# Patient Record
Sex: FEMALE | Race: ASIAN | Employment: UNEMPLOYED | URBAN - METROPOLITAN AREA
[De-identification: names, ages, dates, MRNs, and addresses within clinical notes are randomized per-mention and may not be internally consistent; named-entity substitution may affect disease eponyms.]

---

## 2017-01-10 ENCOUNTER — OFFICE VISIT (OUTPATIENT)
Dept: INTERNAL MEDICINE | Facility: CLINIC | Age: 62
End: 2017-01-10

## 2017-01-10 VITALS
HEART RATE: 80 BPM | BODY MASS INDEX: 40.96 KG/M2 | WEIGHT: 224 LBS | DIASTOLIC BLOOD PRESSURE: 80 MMHG | SYSTOLIC BLOOD PRESSURE: 118 MMHG

## 2017-01-10 DIAGNOSIS — Z12.31 VISIT FOR SCREENING MAMMOGRAM: Primary | ICD-10-CM

## 2017-01-10 DIAGNOSIS — I10 ESSENTIAL HYPERTENSION, BENIGN: ICD-10-CM

## 2017-01-10 DIAGNOSIS — Z79.52 LONG TERM CURRENT USE OF SYSTEMIC STEROIDS: ICD-10-CM

## 2017-01-10 DIAGNOSIS — Z94.0 S/P KIDNEY TRANSPLANT: ICD-10-CM

## 2017-01-10 PROBLEM — N18.4 CKD (CHRONIC KIDNEY DISEASE) STAGE 4, GFR 15-29 ML/MIN (H): Status: ACTIVE | Noted: 2017-01-10

## 2017-01-10 ASSESSMENT — PAIN SCALES - GENERAL: PAINLEVEL: NO PAIN (0)

## 2017-01-10 NOTE — MR AVS SNAPSHOT
After Visit Summary   1/10/2017    Eleni Logan    MRN: [id number]           Patient Information     Date Of Birth          1955        Visit Information        Provider Department      1/10/2017 8:20 AM Rocio Saul MD [facility]        Today's Diagnoses     Visit for screening mammogram    -  1     Essential hypertension, benign         S/P kidney transplant         Long term current use of systemic steroids            Care Instructions    Primary Care Center Medication Refill Request Information:  * Please contact your pharmacy regarding ANY request for medication refills.  ** [facility] Prescription Fax = [phone]  * Please allow 3 business days for routine medication refills.  * Please allow 5 business days for controlled substance medication refills.     Primary Care Center Test Result notification information:  *You will be notified with in 7-10 days of your appointment day regarding the results of your test.  If you are on MyChart you will be notified as soon as the provider has reviewed the results and signed off on them.    DEXA Screening Tool    Dexa Scan  Is the patient taking any calcium supplements? no, if yes patient must stop calcium supplements 24 hours prior to appointment.    Mammogram Screening Tool    Mammogram   Does patient have a history of breast cancer? no  Does patient have breast implants? no  Reason for mammogram? Routine          Follow-ups after your visit        Your next 10 appointments already scheduled     Jan 13, 2017 11:00 AM   DX HIP/PELVIS/SPINE with UCDX1   [facility] ([facility])    [address] [place] [id number] [phone]           Please do not take any of the following 48 hours prior to your exam: vitamins, calcium tablets, antacids.            Jan 13, 2017 11:30 AM   (Arrive by 11:15 AM)   MA SCREENING DIGITAL BILATERAL with [id number]   [facility] ([facility])    [address] [place] [address] [phone]           Do not use any powder, lotion or deodorant under your arms or on your breast. If you do, we will ask you to remove it before your exam.  Wear comfortable, two-piece clothing.  If you have any allergies, tell your care team.  Bring any previous mammograms from other facilities or have them mailed to the breast center. This mammogram location, [facility], now offers 3D mammography. It doesn't replace a screening mammogram and can be done with a regular screening mammogram. It is optional and not all insurances will pay for it. 3D mammography is a special kind of mammogram that produces a three-dimensional image of the breast by using low dose-xrays. 3D allows the radiologist to see the breast tissue differently from 2D, which reduces the chance of repeat testing due to overlapping breast tissue. If you are interested in have a 3D mammogram, please check with your insurance before you arrive for your exam. 3D mammography is offered to all patients. On the day of your exam you will be asked to sign a form stating yes, you wish to have 3D imaging or, no, you decline.            Jan 30, 2017  1:30 PM   LAB with  LAB   [facility] ([facility])    [address] [place] [address] [phone]           Patient must bring picture ID.  Patient should be prepared to give a urine specimen  Please do not eat 10-12 hours before your appointment if you are coming in fasting for labs on lipids, cholesterol, or glucose (sugar).  Pregnant women should follow their Care Team instructions. Water with medications is okay. Do not drink coffee or other fluids.   If you have concerns about taking  your medications, please ask at office or if scheduling via [employer], send a message by clicking on Secure Messaging,  Message Your Care Team.            Jan 30, 2017  2:20 PM   (Arrive by 1:50 PM)   Return Kidney Transplant with Sakina Huntley MD   [facility] ([facility])    [address] [place] [id number] [phone]            Mar 28, 2017  2:00 PM   (Arrive by 1:30 PM)   Return Kidney Transplant with Tyshawn Sexton MD   [facility] ([facility])    [address] [place] [id number] [phone]            Jun 14, 2017  2:00 PM   (Arrive by 1:45 PM)   Return Visit with Kayden Garcia MD   [facility] and Infectious Diseases ([facility])    [address] [place] [id number] [phone]              Future tests that were ordered for you today     Open Future Orders        Priority Expected Expires Ordered    Dexa hip/pelvis/spine* Routine  1/10/2018 1/10/2017    MA SCREENING DIGITAL BILAT - Future  (s+30) Routine  1/10/2018 1/10/2017            Who to contact     Please call your clinic at [phone] to:    Ask questions about your health    Make or cancel appointments    Discuss your medicines    Learn about your test results    Speak to your doctor   If you have compliments or concerns about an experience at your clinic, or if you wish to file a complaint, please contact [facility] Patient Relations at [phone] or email us at [email or web address]         Additional Information About Your Visit        [employer] Information     [employer] is an electronic gateway that provides easy, online access to your medical records. With [employer], you can request a clinic appointment, read your test results, renew a prescription or communicate with your care team.     To sign up for [employer] visit the website at [email or web address]   You will be asked to enter the access code listed below, as well as some  personal information. Please follow the directions to create your username and password.     Your access code is: [email or web address]  Expires: 3/7/2017  6:31 AM     Your access code will  in 90 days. If you need help or a new code, please contact your [facility] or call [phone] for assistance.        Care EveryWhere ID     This is your Care EveryWhere ID. This could be used by other organizations to access your [place] medical records  [id number]        Your Vitals Were     Pulse                   80            Blood Pressure from Last 3 Encounters:   01/10/17 118/80   16 158/100   10/02/16 156/73    Weight from Last 3 Encounters:   01/10/17 101.606 kg (224 lb)   16 101.197 kg (223 lb 1.6 oz)   10/01/16 98.9 kg (218 lb 0.6 oz)                 Today's Medication Changes          These changes are accurate as of: 1/10/17  8:50 AM.  If you have any questions, ask your nurse or doctor.               These medicines have changed or have updated prescriptions.        Dose/Directions    mycophenolate capsule   This may have changed:  additional instructions   Used for:  Renal transplant recipient, Long-term use of immunosuppressant medication, EBV (Abiel-Barr virus) viremia, Chronic kidney disease, stage IV (severe) (H)        Dose:  250 mg   Take 1 capsule (250 mg) by mouth 2 times daily   Quantity:  60 capsule   Refills:  6         Stop taking these medicines if you haven't already. Please contact your care team if you have questions.     BIOTENE ORALBALANCE DRY MOUTH Gel   Stopped by:  Rocio Saul MD           cefpodoxime 100 MG tablet   Commonly known as:  VANTIN   Stopped by:  Rocio Saul MD           cevimeline 30 MG capsule   Commonly known as:  EVOXAC   Stopped by:  Rocio Saul MD           pilocarpine 5 MG tablet   Commonly known as:  SALAGEN   Stopped by:  Rocio Saul MD           zolpidem 5 MG tablet   Commonly known as:   BARB   Stopped by:  Rocio Saul MD                    Primary Care Provider Office Phone # Fax #    Rocio Saul [phone]       [facility] [address] [facility] [address]        Thank you!     Thank you for choosing [facility]  for your care. Our goal is always to provide you with excellent care. Hearing back from our patients is one way we can continue to improve our services. Please take a few minutes to complete the written survey that you may receive in the mail after your visit with us. Thank you!             Your Updated Medication List - Protect others around you: Learn how to safely use, store and throw away your medicines at www.disposemymeds.org.          This list is accurate as of: 1/10/17  8:50 AM.  Always use your most recent med list.                   Brand Name Dispense Instructions for use    albuterol 108 (90 BASE) MCG/ACT Inhaler    albuterol    1 Inhaler    Inhale 2 puffs into the lungs every 4 hours as needed for shortness of breath / dyspnea       allopurinol 100 MG tablet    ZYLOPRIM    90 tablet    TAKE ONE TABLET BY MOUTH EVERY DAY       atorvastatin 20 MG tablet    LIPITOR    90 tablet    Take 1 tablet (20 mg) by mouth daily       darbepoetin bucky 300 MCG/0.6ML injection    ARANESP (ALBUMIN FREE)    0.6 mL    Inject 0.6 mLs (300 mcg) Subcutaneous every 28 days As needed for hgb<10g/dL       ferrous sulfate 325 (65 FE) MG tablet    IRON    200 tablet    Take 1 tablet (325 mg) by mouth daily       folic acid 1 MG tablet    FOLVITE    90 tablet    Take 1 tablet (1 mg) by mouth daily       hydrALAZINE 100 MG Tabs tablet    APRESOLINE    270 tablet    Take 1 tablet (100 mg) by mouth 3 times daily       hydrocortisone 1 % cream    CORTAID    60 g    Apply topically 2 times daily       mycophenolate capsule     60 capsule    Take 1 capsule (250 mg) by mouth 2 times daily       pantoprazole 40 MG EC tablet    PROTONIX     30 tablet    Take 1 tablet (40 mg) by mouth every morning       predniSONE 5 MG tablet    DELTASONE    30 tablet    Take 1 tablet (5 mg) by mouth daily       propranolol HCl 60 MG Tabs     180 tablet    Take 1 tablet (60 mg) by mouth 2 times daily       sodium bicarbonate 650 MG tablet     270 tablet    Take 1 tablet (650 mg) by mouth 2 times daily       triamcinolone 0.1 % ointment    KENALOG    80 g    Apply to affected area of the buttock and belly button twice a day as needed when rash is present. Do not use for longer than 4 weeks

## 2017-01-10 NOTE — PATIENT INSTRUCTIONS
Primary Care Center Medication Refill Request Information:  * Please contact your pharmacy regarding ANY request for medication refills.  ** [facility] Prescription Fax = [phone]  * Please allow 3 business days for routine medication refills.  * Please allow 5 business days for controlled substance medication refills.     Primary Care Center Test Result notification information:  *You will be notified with in 7-10 days of your appointment day regarding the results of your test.  If you are on MyChart you will be notified as soon as the provider has reviewed the results and signed off on them.    DEXA Screening Tool    Dexa Scan  Is the patient taking any calcium supplements? no, if yes patient must stop calcium supplements 24 hours prior to appointment.    Mammogram Screening Tool    Mammogram   Does patient have a history of breast cancer? no  Does patient have breast implants? no  Reason for mammogram? Routine

## 2017-01-10 NOTE — PROGRESS NOTES
HPI  Patient is here for follow up. She reports that she has been taking antibiotics for the bladder infection, completed the course as directed. She states that she currently has no symptoms consistent with an infection. She denies new problems. She has been taking all of her medications as prescribed.     Review of Systems     Constitutional:  Positive for fatigue. Negative for fever and chills.   Eyes:  Negative for decreased vision.   Respiratory:   Negative for cough.    Cardiovascular:  Negative for chest pain.   Gastrointestinal:  Negative for abdominal pain, diarrhea and constipation.   Skin:  Negative for itching and rash.   Endo/Heme:  Negative for anemia, swollen glands and bruises/bleeds easily.   Psychiatric/Behavioral:  Negative for depression, decreased concentration, mood swings and panic attacks.    Endocrine:  Negative for altered temperature regulation, polyphagia, polydipsia, unwanted hair growth and change in facial hair.    Current Outpatient Prescriptions   Medication     pantoprazole (PROTONIX) 40 MG EC tablet     atorvastatin (LIPITOR) 20 MG tablet     darbepoetin bucky (ARANESP, ALBUMIN FREE,) 300 MCG/0.6ML injection     predniSONE (DELTASONE) 5 MG tablet     triamcinolone (KENALOG) 0.1 % ointment     ferrous sulfate (IRON) 325 (65 FE) MG tablet     hydrALAZINE (APRESOLINE) 100 MG TABS     propranolol HCl 60 MG TABS     hydrocortisone (CORTAID) 1 % cream     CELLCEPT 250 MG PO CAPSULE     allopurinol (ZYLOPRIM) 100 MG tablet     folic acid (FOLVITE) 1 MG tablet     albuterol (ALBUTEROL) 108 (90 BASE) MCG/ACT inhaler     sodium bicarbonate 650 MG tablet     No current facility-administered medications for this visit.     Filed Vitals:    01/10/17 0746   BP: 118/80   Pulse: 80   Weight: 101.606 kg (224 lb)       Physical Exam   Constitutional: She is oriented to person, place, and time and well-developed, well-nourished, and in no distress.   HENT:   Head: Normocephalic and atraumatic.   Eyes:  Pupils are equal, round, and reactive to light.   Neck: Normal range of motion.   Cardiovascular: Normal rate and regular rhythm.    Pulmonary/Chest: Effort normal and breath sounds normal.   Neurological: She is alert and oriented to person, place, and time.   Skin: Skin is warm and dry.   Psychiatric: Mood, memory, affect and judgment normal.   Vitals reviewed.      Assessment and Plan:  Eleni was seen today for recheck medication.    Diagnoses and all orders for this visit:    Visit for screening mammogram  -     MA SCREENING DIGITAL BILAT - Future  (s+30); Future    Essential hypertension, benign. Blood pressure is at goal. Recommend to continue with current medical therapy without changes. Patient has regular follow up with Nephrology.     S/P kidney transplant. Discussed increased risk for osteoporosis in the setting of CKD and transplant. Will check DeXA scan, patient will be advised on the results accordingly.   -     Dexa hip/pelvis/spine*; Future    Long term current use of systemic steroids.   -     Dexa hip/pelvis/spine*; Future    Total time spent 25 minutes.  More than 50% of the time spent with Ms. Logan on counseling / coordinating her care    Rocio Saul MD

## 2017-01-10 NOTE — NURSING NOTE
Chief Complaint   Patient presents with     Recheck Medication     Pt is here to follow up on her medications.      Brigitte Amezquita LPN January 10, 2017 7:46 AM

## 2017-01-13 ENCOUNTER — RADIANT APPOINTMENT (OUTPATIENT)
Dept: MAMMOGRAPHY | Facility: CLINIC | Age: 62
End: 2017-01-13
Attending: INTERNAL MEDICINE

## 2017-01-13 ENCOUNTER — TELEPHONE (OUTPATIENT)
Dept: PHARMACY | Facility: CLINIC | Age: 62
End: 2017-01-13

## 2017-01-13 DIAGNOSIS — D63.1 ANEMIA DUE TO CHRONIC KIDNEY DISEASE: ICD-10-CM

## 2017-01-13 DIAGNOSIS — Z12.31 VISIT FOR SCREENING MAMMOGRAM: ICD-10-CM

## 2017-01-13 DIAGNOSIS — N18.9 ANEMIA DUE TO CHRONIC KIDNEY DISEASE: ICD-10-CM

## 2017-01-13 DIAGNOSIS — B27.00 EBV (EPSTEIN-BARR VIRUS) VIREMIA: ICD-10-CM

## 2017-01-13 LAB
HCT VFR BLD AUTO: 33.1 % (ref 35–47)
HGB BLD-MCNC: 10.1 G/DL (ref 11.7–15.7)

## 2017-01-13 PROCEDURE — 87799 DETECT AGENT NOS DNA QUANT: CPT | Performed by: INTERNAL MEDICINE

## 2017-01-13 NOTE — TELEPHONE ENCOUNTER
Anemia Management Note  SUBJECTIVE/OBJECTIVE:  Referred by Sakina Huntley on 2013  Primary Diagnosis: Anemia in Chronic Kidney Disease (N18.3 D63.1)   Secondary Diagnosis: Chronic Kidney Disease, Stage III (N18.3)   Hgb goal range: 9-10  Epo/Darbo: Aranesp 300 mcg every 14 days - At home   RX will  on 10/30/2017  Iron regimen:  Ferrous sulfate 325 mg QD on 16  Lab orders  17 in EPIC     Anemia Latest Ref Rng 10/17/2016 2016 2016 2016 12/15/2016 2016 2017   HGB Goal - - - - - - - -   VALARIE Dose - - - - - 300 mcg 300 mcg -   Hemoglobin 11.7 - 15.7 g/dL 12.1 11.2(L) 10.1(L) 8.8(L) - 8.8(L) 10.1(L)   TSAT 15 - 46 % 30 - - 13(L) - - -   Ferritin 8 - 252 ng/mL 729(H) - - 971(H) - - -   PRBCs - - - - - - - -     BP Readings from Last 3 Encounters:   01/10/17 118/80   16 158/100   10/02/16 156/73     Wt Readings from Last 2 Encounters:   01/10/17 224 lb (101.606 kg)   16 223 lb 1.6 oz (101.197 kg)     Angelnekevin has an appt w/Dr Huntley and labs on 17    ASSESSMENT:  Hgb:Above goal - recommend hold dose  TSat: not at goal (>30%) but ferritin >500ng/mL.  IV iron not indicated at this time per anemia protocol. Continue ferrous sulfate once daily.    PLAN:  LM to Hold Aranesp and RTC for hgb then aranesp if needed in 2 week(s)    Orders needed to be renewed (for next follow-up date) in [facility]: None    Iron labs due:  3/14/17    Plan discussed with:  NAY Knowles  Plan provided by:  Winston    NEXT FOLLOW-UP DATE:  17    Anemia Management Service  Yvette Goodwin,PharmD and Preethi Akers CPhT  Phone: [phone]  Fax: [phone]

## 2017-01-15 ASSESSMENT — ENCOUNTER SYMPTOMS
COUGH: 0
BRUISES/BLEEDS EASILY: 0
POLYDIPSIA: 0
FATIGUE: 1
ABDOMINAL PAIN: 0
ALTERED TEMPERATURE REGULATION: 0
DEPRESSION: 0
FEVER: 0
DIARRHEA: 0
CHILLS: 0
NERVOUS/ANXIOUS: 0
SWOLLEN GLANDS: 0
POLYPHAGIA: 0
DECREASED CONCENTRATION: 0
CONSTIPATION: 0
INSOMNIA: 0
PANIC: 0

## 2017-01-16 ENCOUNTER — TELEPHONE (OUTPATIENT)
Dept: NEPHROLOGY | Facility: CLINIC | Age: 62
End: 2017-01-16

## 2017-01-16 DIAGNOSIS — N18.4 CKD (CHRONIC KIDNEY DISEASE) STAGE 4, GFR 15-29 ML/MIN (H): Primary | ICD-10-CM

## 2017-01-16 DIAGNOSIS — Z94.0 KIDNEY REPLACED BY TRANSPLANT: ICD-10-CM

## 2017-01-16 LAB
EBV DNA # SPEC NAA+PROBE: 5760 {COPIES}/ML
EBV DNA SPEC NAA+PROBE-LOG#: 3.8 {LOG_COPIES}/ML

## 2017-01-16 NOTE — NURSING NOTE
Additional labs  Christine Iqbal LPN  Nephrology  [facility]  [phone]

## 2017-01-16 NOTE — TELEPHONE ENCOUNTER
Moe, this is  office from Clinic [facility] at the [facility]. We are calling to remind you of your upcoming Nephrology appointment on 01/30/17 at 220pm. Please arrive about 1 hours prior to your appointment time for labs. Also, please bring an updated medication list or your labeled medication bottles with you to your appointment. If you have any questions or would like to cancel or reschedule your appointment, please call us at [phone].     You are welcome to have your labs done up to a week before your appointment at any [facility] or [facility] facility. If you have your labs completed before your appointment, please still come 30 minutes early for check-in  HILDA CASTANEDA CMA

## 2017-01-30 ENCOUNTER — OFFICE VISIT (OUTPATIENT)
Dept: NEPHROLOGY | Facility: CLINIC | Age: 62
End: 2017-01-30
Attending: INTERNAL MEDICINE
Payer: MEDICARE

## 2017-01-30 ENCOUNTER — TELEPHONE (OUTPATIENT)
Dept: PHARMACY | Facility: CLINIC | Age: 62
End: 2017-01-30

## 2017-01-30 VITALS
BODY MASS INDEX: 41.11 KG/M2 | HEART RATE: 87 BPM | TEMPERATURE: 97.8 F | SYSTOLIC BLOOD PRESSURE: 173 MMHG | OXYGEN SATURATION: 98 % | WEIGHT: 223.4 LBS | HEIGHT: 62 IN | DIASTOLIC BLOOD PRESSURE: 81 MMHG

## 2017-01-30 DIAGNOSIS — D63.1 ANEMIA DUE TO CHRONIC KIDNEY DISEASE: ICD-10-CM

## 2017-01-30 DIAGNOSIS — N18.4 CKD (CHRONIC KIDNEY DISEASE) STAGE 4, GFR 15-29 ML/MIN (H): ICD-10-CM

## 2017-01-30 DIAGNOSIS — Z94.0 KIDNEY REPLACED BY TRANSPLANT: ICD-10-CM

## 2017-01-30 DIAGNOSIS — Z94.0 KIDNEY REPLACED BY TRANSPLANT: Primary | ICD-10-CM

## 2017-01-30 DIAGNOSIS — N18.9 ANEMIA DUE TO CHRONIC KIDNEY DISEASE: ICD-10-CM

## 2017-01-30 LAB
ALBUMIN SERPL-MCNC: 3.2 G/DL (ref 3.4–5)
ANION GAP SERPL CALCULATED.3IONS-SCNC: 10 MMOL/L (ref 3–14)
BUN SERPL-MCNC: 60 MG/DL (ref 7–30)
CALCIUM SERPL-MCNC: 8 MG/DL (ref 8.5–10.1)
CHLORIDE SERPL-SCNC: 110 MMOL/L (ref 94–109)
CO2 SERPL-SCNC: 22 MMOL/L (ref 20–32)
CREAT SERPL-MCNC: 2.29 MG/DL (ref 0.52–1.04)
CREAT UR-MCNC: 44 MG/DL
DEPRECATED CALCIDIOL+CALCIFEROL SERPL-MC: 14 UG/L (ref 20–75)
GFR SERPL CREATININE-BSD FRML MDRD: 22 ML/MIN/1.7M2
GLUCOSE SERPL-MCNC: 113 MG/DL (ref 70–99)
HCT VFR BLD AUTO: 28.8 % (ref 35–47)
HGB BLD-MCNC: 8.9 G/DL (ref 11.7–15.7)
PHOSPHATE SERPL-MCNC: 3.1 MG/DL (ref 2.5–4.5)
POTASSIUM SERPL-SCNC: 4.2 MMOL/L (ref 3.4–5.3)
PROT UR-MCNC: 0.41 G/L
PROT/CREAT 24H UR: 0.94 G/G CR (ref 0–0.2)
PTH-INTACT SERPL-MCNC: 277 PG/ML (ref 12–72)
SODIUM SERPL-SCNC: 141 MMOL/L (ref 133–144)

## 2017-01-30 PROCEDURE — 80069 RENAL FUNCTION PANEL: CPT | Performed by: INTERNAL MEDICINE

## 2017-01-30 PROCEDURE — 83970 ASSAY OF PARATHORMONE: CPT | Performed by: INTERNAL MEDICINE

## 2017-01-30 PROCEDURE — 99213 OFFICE O/P EST LOW 20 MIN: CPT | Mod: ZF

## 2017-01-30 PROCEDURE — 84156 ASSAY OF PROTEIN URINE: CPT | Performed by: INTERNAL MEDICINE

## 2017-01-30 PROCEDURE — 82306 VITAMIN D 25 HYDROXY: CPT | Performed by: INTERNAL MEDICINE

## 2017-01-30 PROCEDURE — 85018 HEMOGLOBIN: CPT | Performed by: INTERNAL MEDICINE

## 2017-01-30 PROCEDURE — 85014 HEMATOCRIT: CPT | Performed by: INTERNAL MEDICINE

## 2017-01-30 PROCEDURE — 36415 COLL VENOUS BLD VENIPUNCTURE: CPT | Performed by: INTERNAL MEDICINE

## 2017-01-30 ASSESSMENT — PAIN SCALES - GENERAL: PAINLEVEL: NO PAIN (0)

## 2017-01-30 NOTE — NURSING NOTE
"Chief Complaint   Patient presents with     RECHECK     Kidney follow up       Initial /81 mmHg  Pulse 87  Temp(Src) 97.8  F (36.6  C) (Oral)  Ht 1.575 m (5' 2\")  Wt 101.334 kg (223 lb 6.4 oz)  BMI 40.85 kg/m2  SpO2 98% Estimated body mass index is 40.85 kg/(m^2) as calculated from the following:    Height as of this encounter: 1.575 m (5' 2\").    Weight as of this encounter: 101.334 kg (223 lb 6.4 oz).  BP completed using cuff size: regular    "

## 2017-01-30 NOTE — Clinical Note
1/30/2017       RE: Eleni Logan  [address]  [place] [address]     Dear Colleague,    Thank you for referring your patient, Eleni Logan, to the [facility] at [facility]. Please see a copy of my visit note below.    CHIEF COMPLAINT:  Followup for kidney transplantation.      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  She is doing relatively well and has no active complaints.  During today's visit we discussed the following issues:   1.  Allograft function.  Her serum creatinine is stable at 2.2.  She is currently on an immunosuppressive regimen that consists of prednisone 5 mg daily in addition to CellCept 250 mg b.i.d.  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  Her kidney function has been highly fluctuance, but anywhere from 1.8-2.2.  She has modest proteinuria.  At this time, we will continue the same course.   2.  Recurrent urinary tract infections.  In fact, she has had at least 10 urinary tract infections since 2014.  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  I think we should have her see Infectious Disease again for a long-term plan as she has become resistant to most antibiotics.  Moreover, I would like her to see Urology as most of her episodes are due to E. coli.  I think what is striking about the frequency of these is that they are occurring on the background of minimal immunosuppression.  I wonder whether she has bladder related issue where a bladder suspension or other procedure that would strengthen the bladder wall might be helpful.   3.  Anemia of  chronic kidney disease.  She continues to require Aranesp therapy.   4.  No recent history of gout.  She will continue allopurinol.   5.  Continue Lipitor 20 mg daily.      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      Twenty-five minutes were spent with her, over half of which were in counseling regarding the potential need for cystoscopy.     Again, thank you for allowing me to participate in the care of your patient.      Sincerely,    Sakina Huntley MD

## 2017-01-30 NOTE — TELEPHONE ENCOUNTER
Anemia Management Note  SUBJECTIVE/OBJECTIVE:  Referred by Sakina Huntley on 2013  Primary Diagnosis: Anemia in Chronic Kidney Disease (N18.3 D63.1)   Secondary Diagnosis: Chronic Kidney Disease, Stage III (N18.3)   Hgb goal range: 9-10  Epo/Darbo: Aranesp 300 mcg every 14 days - At home   RX will  on 10/30/2017  Iron regimen:  Ferrous sulfate 325 mg QD on 16  Lab orders  17 in EPIC     Anemia Latest Ref Rng 2016 2016 2016 12/15/2016 2016 2017 2017   HGB Goal - - - - - - - -   VALARIE Dose - - - - 300 mcg 300 mcg - -   Hemoglobin 11.7 - 15.7 g/dL 11.2(L) 10.1(L) 8.8(L) - 8.8(L) 10.1(L) 8.9(L)   TSAT 15 - 46 % - - 13(L) - - - -   Ferritin 8 - 252 ng/mL - - 971(H) - - - -   PRBCs - - - - - - - -     BP Readings from Last 3 Encounters:   17 173/81   01/10/17 118/80   16 158/100     Wt Readings from Last 2 Encounters:   17 223 lb 6.4 oz (101.334 kg)   01/10/17 224 lb (101.606 kg)     PA for aranesp 300 mcg has been approved effective 16 - 16 -  - The PA needs to be renew for future doses.  I will initiate the process  Eleni has one syringe of aranesp 300 mcg at home for today's dose.      ASSESSMENT:  Hgb: Not at goal (4 weeks since last dose) - recommend dose and continue current regimen  TSat: not at goal (>30%) but ferritin >500ng/mL.  IV iron not indicated at this time per anemia protocol.     PLAN:  Eleni will dose with aranesp and RTC for hgb then aranesp if needed in 2 week(s)    Orders needed to be renewed (for next follow-up date) in [facility]: None    Iron labs due:  3/14/17    Plan discussed with:  Eleni   Plan provided by:  Winston    NEXT FOLLOW-UP DATE:  17    Anemia Management Service  Yvette Goodwin PharmD and Preethi Akers CPhT  Phone: [phone]  Fax: [phone]

## 2017-01-30 NOTE — PROGRESS NOTES
CHIEF COMPLAINT:  Followup for kidney transplantation.      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  She is doing relatively well and has no active complaints.  During today's visit we discussed the following issues:   1.  Allograft function.  Her serum creatinine is stable at 2.2.  She is currently on an immunosuppressive regimen that consists of prednisone 5 mg daily in addition to CellCept 250 mg b.i.d.  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  Her kidney function has been highly fluctuance, but anywhere from 1.8-2.2.  She has modest proteinuria.  At this time, we will continue the same course.   2.  Recurrent urinary tract infections.  In fact, she has had at least 10 urinary tract infections since 2014.  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  I think we should have her see Infectious Disease again for a long-term plan as she has become resistant to most antibiotics.  Moreover, I would like her to see Urology as most of her episodes are due to E. coli.  I think what is striking about the frequency of these is that they are occurring on the background of minimal immunosuppression.  I wonder whether she has bladder related issue where a bladder suspension or other procedure that would strengthen the bladder wall might be helpful.   3.  Anemia of chronic kidney disease.  She continues to require Aranesp therapy.   4.  No recent history of gout.  She will continue allopurinol.   5.  Continue Lipitor 20 mg daily.      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      Twenty-five minutes were spent with her, over half of which were in counseling regarding the potential need for cystoscopy.

## 2017-02-06 DIAGNOSIS — Z94.0 KIDNEY TRANSPLANTED: Primary | ICD-10-CM

## 2017-02-07 RX ORDER — MYCOPHENOLATE MOFETIL 250 MG/1
500 CAPSULE ORAL 2 TIMES DAILY
Qty: 120 CAPSULE | Refills: 11 | Status: SHIPPED | OUTPATIENT
Start: 2017-02-07 | End: 2018-01-29

## 2017-02-10 ENCOUNTER — TELEPHONE (OUTPATIENT)
Dept: PHARMACY | Facility: CLINIC | Age: 62
End: 2017-02-10

## 2017-02-10 DIAGNOSIS — N18.4 CKD (CHRONIC KIDNEY DISEASE) STAGE 4, GFR 15-29 ML/MIN (H): ICD-10-CM

## 2017-02-10 DIAGNOSIS — N18.4 ANEMIA IN STAGE 4 CHRONIC KIDNEY DISEASE (H): Primary | ICD-10-CM

## 2017-02-10 DIAGNOSIS — Z94.0 S/P KIDNEY TRANSPLANT: ICD-10-CM

## 2017-02-10 DIAGNOSIS — D63.1 ANEMIA IN STAGE 4 CHRONIC KIDNEY DISEASE (H): Primary | ICD-10-CM

## 2017-02-10 NOTE — TELEPHONE ENCOUNTER
Follow-up with anemia management service:    I spoke to Eleni, reminding her she is due for hgb then aranesp if needed.  Her aranesp orders are actually every 28 days so I will need to update orders to every 2 weeks if she needs to dose.      Anemia Latest Ref Rng 11/2/2016 11/16/2016 12/14/2016 12/15/2016 12/29/2016 1/13/2017 1/30/2017   HGB Goal - - - - - - - -   VALARIE Dose - - - - 300 mcg 300 mcg - 300 mcg   Hemoglobin 11.7 - 15.7 g/dL 11.2(L) 10.1(L) 8.8(L) - 8.8(L) 10.1(L) 8.9(L)   TSAT 15 - 46 % - - 13(L) - - - -   Ferritin 8 - 252 ng/mL - - 971(H) - - - -   PRBCs - - - - - - - -     Orders needed to be renewed (for next follow-up date) in EPIC: updated anemia referral to Dr Sexton.  Update med and lab orders when he signs.     Follow-up call date: 2/14    [facility]    Anemia Management Service  Yvette Goodwin,Doug and Preethi Akers CPhT  Phone: [phone]  Fax: [phone]

## 2017-02-13 ENCOUNTER — TELEPHONE (OUTPATIENT)
Dept: TRANSPLANT | Facility: CLINIC | Age: 62
End: 2017-02-13

## 2017-02-13 DIAGNOSIS — Z79.60 LONG-TERM USE OF IMMUNOSUPPRESSANT MEDICATION: ICD-10-CM

## 2017-02-13 DIAGNOSIS — Z94.0 KIDNEY REPLACED BY TRANSPLANT: Primary | ICD-10-CM

## 2017-02-13 DIAGNOSIS — Z48.298 AFTERCARE FOLLOWING ORGAN TRANSPLANT: ICD-10-CM

## 2017-02-13 DIAGNOSIS — Z79.899 ENCOUNTER FOR LONG-TERM CURRENT USE OF MEDICATION: ICD-10-CM

## 2017-02-13 DIAGNOSIS — D63.1 ANEMIA DUE TO CHRONIC KIDNEY DISEASE: ICD-10-CM

## 2017-02-13 DIAGNOSIS — Z94.0 KIDNEY TRANSPLANT RECIPIENT: ICD-10-CM

## 2017-02-13 DIAGNOSIS — N39.0 RECURRENT URINARY TRACT INFECTION: ICD-10-CM

## 2017-02-13 DIAGNOSIS — D84.9 IMMUNOSUPPRESSED STATUS (H): ICD-10-CM

## 2017-02-13 DIAGNOSIS — N18.9 ANEMIA DUE TO CHRONIC KIDNEY DISEASE: ICD-10-CM

## 2017-02-13 LAB
ANION GAP SERPL CALCULATED.3IONS-SCNC: 10 MMOL/L (ref 3–14)
BUN SERPL-MCNC: 70 MG/DL (ref 7–30)
CALCIUM SERPL-MCNC: 8 MG/DL (ref 8.5–10.1)
CHLORIDE SERPL-SCNC: 111 MMOL/L (ref 94–109)
CO2 SERPL-SCNC: 22 MMOL/L (ref 20–32)
CREAT SERPL-MCNC: 3.05 MG/DL (ref 0.52–1.04)
ERYTHROCYTE [DISTWIDTH] IN BLOOD BY AUTOMATED COUNT: 15.8 % (ref 10–15)
FERRITIN SERPL-MCNC: 828 NG/ML (ref 8–252)
GFR SERPL CREATININE-BSD FRML MDRD: 15 ML/MIN/1.7M2
GLUCOSE SERPL-MCNC: 140 MG/DL (ref 70–99)
HCT VFR BLD AUTO: 33.6 % (ref 35–47)
HGB BLD-MCNC: 10.2 G/DL (ref 11.7–15.7)
IRON SATN MFR SERPL: 29 % (ref 15–46)
IRON SERPL-MCNC: 66 UG/DL (ref 35–180)
MCH RBC QN AUTO: 25.4 PG (ref 26.5–33)
MCHC RBC AUTO-ENTMCNC: 30.4 G/DL (ref 31.5–36.5)
MCV RBC AUTO: 84 FL (ref 78–100)
PLATELET # BLD AUTO: 312 10E9/L (ref 150–450)
POTASSIUM SERPL-SCNC: 5.1 MMOL/L (ref 3.4–5.3)
RBC # BLD AUTO: 4.02 10E12/L (ref 3.8–5.2)
SODIUM SERPL-SCNC: 143 MMOL/L (ref 133–144)
TIBC SERPL-MCNC: 225 UG/DL (ref 240–430)
WBC # BLD AUTO: 8.6 10E9/L (ref 4–11)

## 2017-02-13 NOTE — TELEPHONE ENCOUNTER
Creatinine 3.05, up from baseline of 2,04-2.29.    PLAN:  - Screen for causes of increased creatinine: illness/dehydration, etc.  - Increase fluid intake, then repeat creatinine.    TASK: Please call patient with questions and instructions.

## 2017-02-14 ENCOUNTER — TELEPHONE (OUTPATIENT)
Dept: PHARMACY | Facility: CLINIC | Age: 62
End: 2017-02-14

## 2017-02-14 DIAGNOSIS — N18.30 CHRONIC KIDNEY DISEASE, STAGE III (MODERATE) (H): ICD-10-CM

## 2017-02-14 DIAGNOSIS — N18.30 ANEMIA IN STAGE 3 CHRONIC KIDNEY DISEASE (H): Primary | ICD-10-CM

## 2017-02-14 DIAGNOSIS — D63.1 ANEMIA IN STAGE 3 CHRONIC KIDNEY DISEASE (H): Primary | ICD-10-CM

## 2017-02-14 NOTE — TELEPHONE ENCOUNTER
Anemia Management Note  SUBJECTIVE/OBJECTIVE:  Referred by Dr Tyshawn Sexton February 10, 2017  Primary Diagnosis: Anemia in Chronic Kidney Disease (N18.3 D63.1)   Secondary Diagnosis: Chronic Kidney Disease, Stage III (N18.3)   Hgb goal range: 9-10  Epo/Darbo: Aranesp 300 mcg every 14 days - At home   RX will  on 10/30/2017  Iron regimen:  Ferrous sulfate 325 mg QD on 16  Lab orders  17 in EPIC     Anemia Latest Ref Rng & Units 2016 2016 12/15/2016 2016 2017 2017 2017   VALARIE Dose - - - 300 mcg 300 mcg - 300 mcg -   Hemoglobin 11.7 - 15.7 g/dL 10.1(L) 8.8(L) - 8.8(L) 10.1(L) 8.9(L) 10.2(L)   TSAT 15 - 46 % - 13(L) - - - - 29   Ferritin 8 - 252 ng/mL - 971(H) - - - - 828(H)   PRBCs - - - - - - - -     BP Readings from Last 3 Encounters:   17 173/81   01/10/17 118/80   16 (!) 158/100     Wt Readings from Last 2 Encounters:   17 223 lb 6.4 oz (101.3 kg)   01/10/17 224 lb (101.6 kg)     PA for aranesp Case: [id number], Status: Approved, Coverage Starts on: 2017 12:00:00 AM, Coverage Ends on: 2017 12:00:00 AM. Questions? Contact [phone] ([employer])    ASSESSMENT:  Hgb: Above goal - recommend hold dose  TSat: not at goal (>30%) but ferritin >500ng/mL.  IV iron not indicated at this time per anemia protocol.     PLAN:  Hold Aranesp and RTC for hgb then aranesp if needed in 2 week(s)  2/15: Updated all orders to Dr Sexton.  -[facility]    Orders needed to be renewed (for next follow-up date) in [facility]: None    Iron labs due:  3/14/17    Plan discussed with:  Eleni  Plan provided by:  Winston    NEXT FOLLOW-UP DATE:  17    Anemia Management Service  Yvette Goodwin PharmD and Preethi Akers CPhT  Phone: [phone]  Fax: [phone]

## 2017-02-14 NOTE — TELEPHONE ENCOUNTER
Spoke to patient regarding elevated creatinine.  Patient denies any new or recent illness.  Patient states that she is staying hydrated.  Encouraged 2-3 L of fluid daily and patient will return to lab in 2 weeks for repeat labs.

## 2017-02-20 ENCOUNTER — TELEPHONE (OUTPATIENT)
Dept: INTERNAL MEDICINE | Facility: CLINIC | Age: 62
End: 2017-02-20

## 2017-02-20 DIAGNOSIS — M81.0 OSTEOPOROSIS: Primary | ICD-10-CM

## 2017-02-27 ENCOUNTER — TELEPHONE (OUTPATIENT)
Dept: PHARMACY | Facility: CLINIC | Age: 62
End: 2017-02-27

## 2017-02-27 NOTE — TELEPHONE ENCOUNTER
Follow-up with anemia management service:    I spoke to Eleni reminding her that she is due for her Hgb lab and possibly an aranesp dose.  She will call to schedule the appt    Anemia Latest Ref Rng & Units 2016 2016 12/15/2016 2016 2017 2017 2017   VALARIE Dose - - - 300 mcg 300 mcg - 300 mcg -   Hemoglobin 11.7 - 15.7 g/dL 10.1(L) 8.8(L) - 8.8(L) 10.1(L) 8.9(L) 10.2(L)   TSAT 15 - 46 % - 13(L) - - - - 29   Ferritin 8 - 252 ng/mL - 971(H) - - - - 828(H)   PRBCs - - - - - - - -       Orders needed to be renewed (for next follow-up date) in EPIC: None   Med order expires: 18   Lab orders : 18    Follow-up call date: 3/2/17    [place]    Anemia Management Service  Yvette Goodwin,GiniD and Preethi Akers CPhT  Phone: [phone]  Fax: [phone]

## 2017-02-28 DIAGNOSIS — Z94.0 KIDNEY REPLACED BY TRANSPLANT: ICD-10-CM

## 2017-02-28 DIAGNOSIS — N18.30 CHRONIC KIDNEY DISEASE, STAGE III (MODERATE) (H): ICD-10-CM

## 2017-02-28 DIAGNOSIS — D63.1 ANEMIA IN STAGE 3 CHRONIC KIDNEY DISEASE (H): ICD-10-CM

## 2017-02-28 DIAGNOSIS — Z79.899 ENCOUNTER FOR LONG-TERM CURRENT USE OF MEDICATION: ICD-10-CM

## 2017-02-28 DIAGNOSIS — N18.30 ANEMIA IN STAGE 3 CHRONIC KIDNEY DISEASE (H): ICD-10-CM

## 2017-02-28 DIAGNOSIS — Z48.298 AFTERCARE FOLLOWING ORGAN TRANSPLANT: ICD-10-CM

## 2017-02-28 LAB
HCT VFR BLD AUTO: 32.9 % (ref 35–47)
HGB BLD-MCNC: 9.9 G/DL (ref 11.7–15.7)

## 2017-02-28 PROCEDURE — 86832 HLA CLASS I HIGH DEFIN QUAL: CPT | Performed by: INTERNAL MEDICINE

## 2017-02-28 PROCEDURE — 86833 HLA CLASS II HIGH DEFIN QUAL: CPT | Performed by: INTERNAL MEDICINE

## 2017-02-28 PROCEDURE — 87799 DETECT AGENT NOS DNA QUANT: CPT | Performed by: INTERNAL MEDICINE

## 2017-03-01 ENCOUNTER — TELEPHONE (OUTPATIENT)
Dept: PHARMACY | Facility: CLINIC | Age: 62
End: 2017-03-01

## 2017-03-01 LAB
BKV DNA # SPEC NAA+PROBE: NORMAL COPIES/ML
BKV DNA SPEC NAA+PROBE-LOG#: NORMAL LOG COPIES/ML
PRA DONOR SPECIFIC ABY: NORMAL
SPECIMEN SOURCE: NORMAL

## 2017-03-01 NOTE — TELEPHONE ENCOUNTER
Anemia Management Note  SUBJECTIVE/OBJECTIVE:  Referred by Dr Tyshawn Sexton February 10, 2017  Primary Diagnosis: Anemia in Chronic Kidney Disease (N18.3 D63.1)   Secondary Diagnosis: Chronic Kidney Disease, Stage III (N18.3)   Hgb goal range: 9-10  Epo/Darbo: Aranesp 300 mcg every 14 days - At home   RX will  on 18  Iron regimen:  Ferrous sulfate 325 mg QD on 16  Lab orders  18 in EPIC     Anemia Latest Ref Rng & Units 2016 12/15/2016 2016 2017 2017 2017 2017   VALARIE Dose - - 300 mcg 300 mcg - 300 mcg - -   Hemoglobin 11.7 - 15.7 g/dL 8.8(L) - 8.8(L) 10.1(L) 8.9(L) 10.2(L) 9.9(L)   TSAT 15 - 46 % 13(L) - - - - 29 -   Ferritin 8 - 252 ng/mL 971(H) - - - - 828(H) -   PRBCs - - - - - - - -     BP Readings from Last 3 Encounters:   17 173/81   01/10/17 118/80   16 (!) 158/100     Wt Readings from Last 2 Encounters:   17 223 lb 6.4 oz (101.3 kg)   01/10/17 224 lb (101.6 kg)     ASSESSMENT:  Hgb: At goal - recommend dose  TSat: not at goal (>30%) but ferritin >500ng/mL.  IV iron not indicated at this time per anemia protocol.     PLAN:  Dose with aranesp and RTC for hgb then aranesp if needed in 2 week(s)    Orders needed to be renewed (for next follow-up date) in [facility]: None    Iron labs due:  3/15/17    Plan discussed with:  Eleni  Plan provided by:  Winston    NEXT FOLLOW-UP DATE:  3/15/17    Anemia Management Service  Yvette Goodwin,GiniD and Preethi Akers CPhT  Phone: [phone]  Fax: [phone]

## 2017-03-08 DIAGNOSIS — E87.20 ACIDOSIS: ICD-10-CM

## 2017-03-08 RX ORDER — SODIUM BICARBONATE 650 MG/1
650 TABLET ORAL 2 TIMES DAILY
Qty: 180 TABLET | Refills: 1 | Status: SHIPPED | OUTPATIENT
Start: 2017-03-08 | End: 2017-10-02

## 2017-03-08 NOTE — TELEPHONE ENCOUNTER
Drug Name: sodium bicarbonate 650mg  Last Fill Date: 2/8/17  Quantity: 270    Edwige Delgado   [facility]  [phone]

## 2017-03-08 NOTE — TELEPHONE ENCOUNTER
Last Office Visit with Nephrologist:  1/30/17.  Medication refilled per Nephrology Clinic protocol.     Brittney Quinones RN

## 2017-03-15 ENCOUNTER — TELEPHONE (OUTPATIENT)
Dept: PHARMACY | Facility: CLINIC | Age: 62
End: 2017-03-15

## 2017-03-16 ENCOUNTER — TELEPHONE (OUTPATIENT)
Dept: NEPHROLOGY | Facility: CLINIC | Age: 62
End: 2017-03-16

## 2017-03-16 NOTE — TELEPHONE ENCOUNTER
Moe, this is Carlei's office from Medicine Specialty on the 3rd floor at [facility] located at [address] [place] [address]. We are reminding you of your upcoming Nephrology appointment on 3/28/2017 at 2:00 pm. Please arrive an hour prior to your appointment time for labs. Also, please bring an updated medication list including dose of prescribed and over the counter medications you are currently taking or your labeled medication bottles with you to your appointment. If you have any questions or would like to cancel or reschedule your appointment, please call us at [phone].     If you are having labs draw same day you need a lab appointment scheduled 1 hour prior to your visit.    You are welcome to have your labs done 2-7 days before your appointment at any [place] or [facility] facility. If you have your labs completed before your appointment, please still come 30 minutes early for check-in.     Thank-You for allowing us to be a member of your Health Care Team,     Jess Mayo. CMA

## 2017-03-20 ENCOUNTER — TELEPHONE (OUTPATIENT)
Dept: PHARMACY | Facility: CLINIC | Age: 62
End: 2017-03-20

## 2017-03-20 NOTE — TELEPHONE ENCOUNTER
Follow-up with anemia management service:    I spoke to Eleni reminding her that she is due for Hgb, ferritin, and iron labs and possibly an aranesp dose.  She has an appt w/Dr Sexton on 3/28/17 at 2:00 and would like to get her labs drawn then.    Anemia Latest Ref Rng & Units 12/15/2016 2016 2017 2017 2017 2017 3/1/2017   VALARIE Dose - 300 mcg 300 mcg - 300 mcg - - 300 mcg   Hemoglobin 11.7 - 15.7 g/dL - 8.8(L) 10.1(L) 8.9(L) 10.2(L) 9.9(L) -   TSAT 15 - 46 % - - - - 29 - -   Ferritin 8 - 252 ng/mL - - - - 828(H) - -     Orders needed to be renewed (for next follow-up date) in EPIC: None  Med order expires: 18  Lab orders : 18    Follow-up call date: 3/28/17    [place]    Anemia Management Service  Yvette Goodwin,PharmD and Preethi Akers CPhT  Phone: [phone]  Fax: [phone]

## 2017-03-28 ENCOUNTER — OFFICE VISIT (OUTPATIENT)
Dept: NEPHROLOGY | Facility: CLINIC | Age: 62
End: 2017-03-28
Attending: INTERNAL MEDICINE
Payer: MEDICARE

## 2017-03-28 ENCOUNTER — TELEPHONE (OUTPATIENT)
Dept: PHARMACY | Facility: CLINIC | Age: 62
End: 2017-03-28

## 2017-03-28 VITALS
OXYGEN SATURATION: 95 % | WEIGHT: 223 LBS | HEART RATE: 75 BPM | DIASTOLIC BLOOD PRESSURE: 86 MMHG | BODY MASS INDEX: 40.79 KG/M2 | SYSTOLIC BLOOD PRESSURE: 145 MMHG

## 2017-03-28 DIAGNOSIS — Z48.298 AFTERCARE FOLLOWING ORGAN TRANSPLANT: ICD-10-CM

## 2017-03-28 DIAGNOSIS — R30.0 DYSURIA: ICD-10-CM

## 2017-03-28 DIAGNOSIS — R30.0 DYSURIA: Primary | ICD-10-CM

## 2017-03-28 DIAGNOSIS — D84.9 IMMUNOSUPPRESSION (H): ICD-10-CM

## 2017-03-28 DIAGNOSIS — E55.9 VITAMIN D DEFICIENCY: ICD-10-CM

## 2017-03-28 DIAGNOSIS — Z94.0 S/P KIDNEY TRANSPLANT: ICD-10-CM

## 2017-03-28 LAB
ALBUMIN SERPL-MCNC: 3.4 G/DL (ref 3.4–5)
ALBUMIN UR-MCNC: 30 MG/DL
ANION GAP SERPL CALCULATED.3IONS-SCNC: 9 MMOL/L (ref 3–14)
APPEARANCE UR: ABNORMAL
BACTERIA #/AREA URNS HPF: ABNORMAL /HPF
BASOPHILS # BLD AUTO: 0 10E9/L (ref 0–0.2)
BASOPHILS NFR BLD AUTO: 0.4 %
BILIRUB UR QL STRIP: NEGATIVE
BUN SERPL-MCNC: 72 MG/DL (ref 7–30)
CALCIUM SERPL-MCNC: 8 MG/DL (ref 8.5–10.1)
CHLORIDE SERPL-SCNC: 110 MMOL/L (ref 94–109)
CO2 SERPL-SCNC: 23 MMOL/L (ref 20–32)
COLOR UR AUTO: YELLOW
CREAT SERPL-MCNC: 2.23 MG/DL (ref 0.52–1.04)
DIFFERENTIAL METHOD BLD: ABNORMAL
EOSINOPHIL # BLD AUTO: 0.1 10E9/L (ref 0–0.7)
EOSINOPHIL NFR BLD AUTO: 1.8 %
ERYTHROCYTE [DISTWIDTH] IN BLOOD BY AUTOMATED COUNT: 14.7 % (ref 10–15)
GFR SERPL CREATININE-BSD FRML MDRD: 22 ML/MIN/1.7M2
GLUCOSE SERPL-MCNC: 153 MG/DL (ref 70–99)
GLUCOSE UR STRIP-MCNC: NEGATIVE MG/DL
HCT VFR BLD AUTO: 34.3 % (ref 35–47)
HGB BLD-MCNC: 10.3 G/DL (ref 11.7–15.7)
HGB UR QL STRIP: NEGATIVE
IMM GRANULOCYTES # BLD: 0 10E9/L (ref 0–0.4)
IMM GRANULOCYTES NFR BLD: 0.4 %
KETONES UR STRIP-MCNC: NEGATIVE MG/DL
LEUKOCYTE ESTERASE UR QL STRIP: ABNORMAL
LYMPHOCYTES # BLD AUTO: 1.6 10E9/L (ref 0.8–5.3)
LYMPHOCYTES NFR BLD AUTO: 23.6 %
MCH RBC QN AUTO: 24.2 PG (ref 26.5–33)
MCHC RBC AUTO-ENTMCNC: 30 G/DL (ref 31.5–36.5)
MCV RBC AUTO: 81 FL (ref 78–100)
MONOCYTES # BLD AUTO: 0.6 10E9/L (ref 0–1.3)
MONOCYTES NFR BLD AUTO: 8.2 %
NEUTROPHILS # BLD AUTO: 4.4 10E9/L (ref 1.6–8.3)
NEUTROPHILS NFR BLD AUTO: 65.6 %
NITRATE UR QL: NEGATIVE
NRBC # BLD AUTO: 0 10*3/UL
NRBC BLD AUTO-RTO: 0 /100
PH UR STRIP: 5 PH (ref 5–7)
PHOSPHATE SERPL-MCNC: 3.4 MG/DL (ref 2.5–4.5)
PLATELET # BLD AUTO: 242 10E9/L (ref 150–450)
POTASSIUM SERPL-SCNC: 3.9 MMOL/L (ref 3.4–5.3)
RBC # BLD AUTO: 4.26 10E12/L (ref 3.8–5.2)
RBC #/AREA URNS AUTO: 2 /HPF (ref 0–2)
SODIUM SERPL-SCNC: 142 MMOL/L (ref 133–144)
SP GR UR STRIP: 1.01 (ref 1–1.03)
SQUAMOUS #/AREA URNS AUTO: <1 /HPF (ref 0–1)
URN SPEC COLLECT METH UR: ABNORMAL
UROBILINOGEN UR STRIP-MCNC: 0 MG/DL (ref 0–2)
WBC # BLD AUTO: 6.7 10E9/L (ref 4–11)
WBC #/AREA URNS AUTO: >182 /HPF (ref 0–2)

## 2017-03-28 PROCEDURE — 87186 SC STD MICRODIL/AGAR DIL: CPT | Performed by: INTERNAL MEDICINE

## 2017-03-28 PROCEDURE — 81001 URINALYSIS AUTO W/SCOPE: CPT | Performed by: INTERNAL MEDICINE

## 2017-03-28 PROCEDURE — 87086 URINE CULTURE/COLONY COUNT: CPT | Performed by: INTERNAL MEDICINE

## 2017-03-28 PROCEDURE — 99212 OFFICE O/P EST SF 10 MIN: CPT | Mod: ZF

## 2017-03-28 PROCEDURE — 87088 URINE BACTERIA CULTURE: CPT | Performed by: INTERNAL MEDICINE

## 2017-03-28 PROCEDURE — 36415 COLL VENOUS BLD VENIPUNCTURE: CPT | Performed by: INTERNAL MEDICINE

## 2017-03-28 PROCEDURE — 80069 RENAL FUNCTION PANEL: CPT | Performed by: INTERNAL MEDICINE

## 2017-03-28 PROCEDURE — 85025 COMPLETE CBC W/AUTO DIFF WBC: CPT | Performed by: INTERNAL MEDICINE

## 2017-03-28 RX ORDER — CEFPODOXIME PROXETIL 100 MG/1
100 TABLET, FILM COATED ORAL DAILY
Qty: 10 TABLET | Refills: 0 | Status: SHIPPED | OUTPATIENT
Start: 2017-03-28 | End: 2017-08-08

## 2017-03-28 RX ORDER — ERGOCALCIFEROL 1.25 MG/1
50000 CAPSULE, LIQUID FILLED ORAL WEEKLY
Qty: 8 CAPSULE | Refills: 0 | Status: SHIPPED | OUTPATIENT
Start: 2017-03-28 | End: 2017-08-08

## 2017-03-28 ASSESSMENT — PAIN SCALES - GENERAL: PAINLEVEL: NO PAIN (0)

## 2017-03-28 NOTE — TELEPHONE ENCOUNTER
Anemia Management Note  SUBJECTIVE/OBJECTIVE:  Referred by Dr Tyshawn Sexton February 10, 2017  Primary Diagnosis: Anemia in Chronic Kidney Disease (N18.3 D63.1)   Secondary Diagnosis: Chronic Kidney Disease, Stage III (N18.3)   Hgb goal range: 9-10  Epo/Darbo: Aranesp 300 mcg every 14 days - At home   RX will  on 18  Iron regimen:  Ferrous sulfate 325 mg QD on 16  Lab orders  18 in EPIC     Anemia Latest Ref Rng & Units 2016 2017 2017 2017 2017 3/1/2017 3/28/2017   VALARIE Dose - 300 mcg - 300 mcg - - 300 mcg -   Hemoglobin 11.7 - 15.7 g/dL 8.8(L) 10.1(L) 8.9(L) 10.2(L) 9.9(L) - 10.3(L)   TSAT 15 - 46 % - - - 29 - - -   Ferritin 8 - 252 ng/mL - - - 828(H) - - -     BP Readings from Last 3 Encounters:   17 145/86   17 173/81   01/10/17 118/80     Wt Readings from Last 2 Encounters:   17 223 lb (101.2 kg)   17 223 lb 6.4 oz (101.3 kg)     ASSESSMENT:  Hgb: Above goal - recommend hold dose  TSat: not at goal (>30%) but ferritin >500ng/mL.  IV iron not indicated at this time per anemia protocol.     PLAN:  Hold Aranesp and RTC for hgb then aranesp if needed in 4 week(s) or on  if feeling symptomatic  Appt w/Dr Saul on 17 - Could get her anemia labs drawn then if she is feeling symptomatic    Orders needed to be renewed (for next follow-up date) in [facility]: None    Iron labs due:  17    Plan discussed with:  NAY for Eleni - Eleni returned my call on 4/3 to discuss labs and plan Winston  Plan provided by:  Winston    NEXT FOLLOW-UP DATE:  17    Anemia Management Service  Yvette Goodwin,GiniD and Preethi Akers CPhT  Phone: [phone]  Fax: [phone]

## 2017-03-28 NOTE — MR AVS SNAPSHOT
After Visit Summary   3/28/2017    Eleni Logan    MRN: [id number]           Patient Information     Date Of Birth          1955        Visit Information        Provider Department      3/28/2017 2:00 PM Tyshawn Sexton MD [facility] Neph[facility]        Today's Diagnoses     Dysuria    -  1    Vitamin D deficiency        Aftercare following organ transplant           Follow-ups after your visit        Your next 10 appointments already scheduled     Apr 18, 2017 10:40 AM CDT   (Arrive by 10:25 AM)   ACUTE/CHRONIC SINGLE CONDITION with Rocio Saul MD   [facility] ([facility])    [address] [place] [address]  [address] [facility] [address]   [phone]            Jun 07, 2017  1:30 PM CDT   (Arrive by 1:15 PM)   Return Visit with Kayden Gacria MD   [facility] ([facility])    [address] [place] [address]  3rd [facility] [id number] [phone]            Aug 08, 2017  1:10 PM CDT   (Arrive by 12:40 PM)   Return Kidney Transplant with Tyshawn Sexton MD   [facility] ([facility])    [address] [place] [address]  3rd [facility] [id number] [phone]              Who to contact     If you have questions or need follow up information about today's clinic visit or your schedule please contact [facility] NEPHROLOG[facility] directly at [phone].  Normal or non-critical lab and imaging results will be communicated to you by MyChart, letter or phone within 4 business days after the clinic has received the results. If you do not hear from us within 7 days, please contact the clinic through MyChart or phone. If you have a critical or abnormal lab result, we will notify you by phone as soon as possible.  Submit refill requests through [employer] or call your pharmacy and they will forward the refill request to us. Please allow 3 business days for your refill  "to be completed.          Additional Information About Your Visit        [employer] Information     [employer] lets you send messages to your doctor, view your test results, renew your prescriptions, schedule appointments and more. To sign up, go to [email or web address] . Click on \"Log in\" on the left side of the screen, which will take you to the Welcome page. Then click on \"Sign up Now\" on the right side of the page.     You will be asked to enter the access code listed below, as well as some personal information. Please follow the directions to create your username and password.     Your access code is: [email or web address]  Expires: 2017  6:30 AM     Your access code will  in 90 days. If you need help or a new code, please call your [facility] clinic or [phone].        Care EveryWhere ID     This is your Care EveryWhere ID. This could be used by other organizations to access your [facility] medical records  [id number]        Your Vitals Were     Pulse Pulse Oximetry BMI (Body Mass Index)             75 95% 40.79 kg/m2          Blood Pressure from Last 3 Encounters:   17 145/86   17 173/81   01/10/17 118/80    Weight from Last 3 Encounters:   17 101.2 kg (223 lb)   17 101.3 kg (223 lb 6.4 oz)   01/10/17 101.6 kg (224 lb)                 Today's Medication Changes          These changes are accurate as of: 3/28/17  3:00 PM.  If you have any questions, ask your nurse or doctor.               Start taking these medicines.        Dose/Directions    cefpodoxime 100 MG tablet   Commonly known as:  VANTIN   Used for:  Dysuria   Started by:  Tyshawn Sexton MD        Dose:  100 mg   Take 1 tablet (100 mg) by mouth daily   Quantity:  10 tablet   Refills:  0       vitamin D 10812 UNIT capsule   Commonly known as:  ERGOCALCIFEROL   Used for:  Vitamin D deficiency   Started by:  Tyshawn Sexton MD        Dose:  86668 Units   Take 1 capsule (50,000 Units) by mouth once a week   Quantity:  8 " capsule   Refills:  0         Stop taking these medicines if you haven't already. Please contact your care team if you have questions.     albuterol 108 (90 BASE) MCG/ACT Inhaler   Commonly known as:  albuterol   Stopped by:  Tyshawn Sexton MD           ferrous sulfate 325 (65 FE) MG tablet   Commonly known as:  IRON   Stopped by:  Tyshawn Sexton MD                Where to get your medicines      These medications were sent to [facility] [address]  [address] [facility] [address], [facility] [address]    Hours:  TRANSPLANT PHONE NUMBER [phone] Phone:  [phone]     cefpodoxime 100 MG tablet    vitamin D 21510 UNIT capsule                Primary Care Provider Office Phone # Fax #    Rocio Marianna Saul [phone]       [facility] [address] [facility] [address]        Thank you!     Thank you for choosing [facility] NEPHROLOGY  for your care. Our goal is always to provide you with excellent care. Hearing back from our patients is one way we can continue to improve our services. Please take a few minutes to complete the written survey that you may receive in the mail after your visit with us. Thank you!             Your Updated Medication List - Protect others around you: Learn how to safely use, store and throw away your medicines at www.disposemymeds.org.          This list is accurate as of: 3/28/17  3:00 PM.  Always use your most recent med list.                   Brand Name Dispense Instructions for use    allopurinol 100 MG tablet    ZYLOPRIM    90 tablet    TAKE ONE TABLET BY MOUTH EVERY DAY       atorvastatin 20 MG tablet    LIPITOR    90 tablet    Take 1 tablet (20 mg) by mouth daily       cefpodoxime 100 MG tablet    VANTIN    10 tablet    Take 1 tablet (100 mg) by mouth daily       darbepoetin bucky 300 MCG/0.6ML injection    ARANESP (ALBUMIN FREE)    0.6 mL    Inject 0.6 mLs (300 mcg) Subcutaneous  every 28 days As needed for hgb<10g/dL       hydrALAZINE 100 MG Tabs tablet    APRESOLINE    270 tablet    Take 1 tablet (100 mg) by mouth 3 times daily       hydrocortisone 1 % cream    CORTAID    60 g    Apply topically 2 times daily       mycophenolate capsule     120 capsule    Take 2 capsules (500 mg) by mouth 2 times daily       predniSONE 5 MG tablet    DELTASONE    30 tablet    Take 1 tablet (5 mg) by mouth daily       propranolol HCl 60 MG Tabs     180 tablet    Take 1 tablet (60 mg) by mouth 2 times daily       sodium bicarbonate 650 MG tablet     180 tablet    Take 1 tablet (650 mg) by mouth 2 times daily       triamcinolone 0.1 % ointment    KENALOG    80 g    Apply to affected area of the buttock and belly button twice a day as needed when rash is present. Do not use for longer than 4 weeks       vitamin D 34964 UNIT capsule    ERGOCALCIFEROL    8 capsule    Take 1 capsule (50,000 Units) by mouth once a week

## 2017-03-28 NOTE — NURSING NOTE
"Chief Complaint   Patient presents with     RECHECK     kidney tx 7/1/1999       Initial /86  Pulse 75  Wt 101.2 kg (223 lb)  SpO2 95%  BMI 40.79 kg/m2 Estimated body mass index is 40.79 kg/(m^2) as calculated from the following:    Height as of 1/30/17: 1.575 m (5' 2\").    Weight as of this encounter: 101.2 kg (223 lb).  Medication Reconciliation: complete    "

## 2017-03-28 NOTE — LETTER
3/28/2017      RE: Eleni Logan  [address]  [address]  [facility] [address]       Assessment and Plan:  1.  Allograft function.  Her serum creatinine is stable at 2.2.    2. Immunosuppression management: She is currently on an immunosuppressive regimen that consists of prednisone 5 mg daily in addition to CellCept 250 mg b.i.d.  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  Her kidney function has been highly fluctuance, but anywhere from 1.8-2.2.  She has modest proteinuria.  At this time, we will continue the same course.   3.  Recurrent urinary tract infections.  She more than 10 episodes and she feels that she is dealing with one currently. Will check UA and UC.   4.  Anemia of chronic kidney disease.  She continues to require Aranesp therapy.  5. Will refer to access planning    6. Obesity: we discussed weight loss specially if she is interested in repeat kidney transplant     Assessment and plan was discussed with patient and she voiced her understanding and agreement.    Reason for Visit:  Ms. Logan is here for routine follow up and immunosuppression management.    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 She feels like she is dealing with a UTI. No other complaints. No fever or chills. No NVD.          Transplant Hx:       Tx: LDKT  Date: 1999       Present Maintenance IS: Mycophenolate mofetil and Prednisone       Baseline Creatinine: 2-3 mg/dL        Biopsy: yes with TMA    Home BP: controlled.      ROS:   A comprehensive review of systems was obtained and negative, except as noted in the HPI or PMH.    Active Medical Problems:  Patient Active Problem List   Diagnosis     Chronic rhinitis     Essential hypertension, benign     S/P kidney transplant     Rash     Asthma  exacerbation     UTI (urinary tract infection)     Anemia     Chest pain     Urinary tract infection     Localized pustular psoriasis     CKD (chronic kidney disease) stage 4, GFR 15-29 ml/min (H)       Personal Hx:  Social History     Social History     Marital status:      Spouse name: N/A     Number of children: N/A     Years of education: N/A     Occupational History     Not on file.     Social History Main Topics     Smoking status: Never Smoker     Smokeless tobacco: Never Used      Comment: Has been around second hand smoke     Alcohol use Yes      Comment: Once in a great while     Drug use: No     Sexual activity: Not on file     Other Topics Concern     Not on file     Social History Narrative       Allergies:  Allergies   Allergen Reactions     Ciprofloxacin Palpitations       Medications:  Prior to Admission medications    Medication Sig Start Date End Date Taking? Authorizing Provider   vitamin D (ERGOCALCIFEROL) 00507 UNIT capsule Take 1 capsule (50,000 Units) by mouth once a week 3/28/17  Yes Tyshawn Sexton MD   cefpodoxime (VANTIN) 100 MG tablet Take 1 tablet (100 mg) by mouth daily 3/28/17  Yes Tyshawn Sexton MD   sodium bicarbonate 650 MG tablet Take 1 tablet (650 mg) by mouth 2 times daily 3/8/17  Yes Sakina Huntley MD   darbepoetin bucky (ARANESP, ALBUMIN FREE,) 300 MCG/0.6ML injection Inject 0.6 mLs (300 mcg) Subcutaneous every 28 days As needed for hgb<10g/dL 2/15/17  Yes Tyshawn Sexton MD   CELLCEPT 250 MG PO CAPSULE Take 2 capsules (500 mg) by mouth 2 times daily 2/7/17  Yes Sakina Huntley MD   atorvastatin (LIPITOR) 20 MG tablet Take 1 tablet (20 mg) by mouth daily 11/10/16  Yes Rocio Saul MD   predniSONE (DELTASONE) 5 MG tablet Take 1 tablet (5 mg) by mouth daily 10/6/16  Yes Sakina Huntley MD   triamcinolone (KENALOG) 0.1 % ointment Apply to affected area of the buttock and belly button twice a day as needed when rash is present. Do not use for longer than  4 weeks 10/5/16  Yes Fidel Pereira MD   hydrALAZINE (APRESOLINE) 100 MG TABS Take 1 tablet (100 mg) by mouth 3 times daily 8/8/16  Yes Sakina Huntley MD   propranolol HCl 60 MG TABS Take 1 tablet (60 mg) by mouth 2 times daily 7/5/16  Yes Taryn Green APRN CNP   hydrocortisone (CORTAID) 1 % cream Apply topically 2 times daily 7/5/16  Yes Taryn Green APRN CNP   allopurinol (ZYLOPRIM) 100 MG tablet TAKE ONE TABLET BY MOUTH EVERY DAY 5/4/16  Yes Sakina Huntley MD       Vitals:  /86  Pulse 75  Wt 101.2 kg (223 lb)  SpO2 95%  BMI 40.79 kg/m2    Exam:   GENERAL APPEARANCE: alert and no distress  HENT: mouth without ulcers or lesions  LYMPHATICS: no cervical or supraclavicular nodes  RESP: lungs clear to auscultation - no rales, rhonchi or wheezes  CV: regular rhythm, normal rate, no rub, no murmur  EDEMA: no LE edema bilaterally  ABDOMEN: soft, nondistended, nontender, bowel sounds normal  MS: extremities normal - no gross deformities noted, no evidence of inflammation in joints, no muscle tenderness  SKIN: no rash    Results:   Reviewed       Tyshawn Sexton MD

## 2017-03-29 LAB
BACTERIA SPEC CULT: ABNORMAL
Lab: ABNORMAL
MICRO REPORT STATUS: ABNORMAL
MICROORGANISM SPEC CULT: ABNORMAL
SPECIMEN SOURCE: ABNORMAL

## 2017-04-02 PROBLEM — D84.9 IMMUNOSUPPRESSION (H): Status: ACTIVE | Noted: 2017-04-02

## 2017-04-03 ENCOUNTER — TELEPHONE (OUTPATIENT)
Dept: NEPHROLOGY | Facility: CLINIC | Age: 62
End: 2017-04-03

## 2017-04-03 NOTE — TELEPHONE ENCOUNTER
Patient states that she has picked up prescribed Vantin from pharmacy, but has not yet begun taking them. Her understanding was that someone from Dr. Sxeton's office was going to call her to confirm that the medication is necessary. Writer verified that she should start taking the medication today because based on lab results, there is some bacteria in her urine. Patient confirms that she will begin taking today and will call Dr. Sexton's office with further questions/concerns.

## 2017-04-03 NOTE — PROGRESS NOTES
Assessment and Plan:  1.  Allograft function.  Her serum creatinine is stable at 2.2.    2. Immunosuppression management: She is currently on an immunosuppressive regimen that consists of prednisone 5 mg daily in addition to CellCept 250 mg b.i.d.  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  Her kidney function has been highly fluctuance, but anywhere from 1.8-2.2.  She has modest proteinuria.  At this time, we will continue the same course.   3.  Recurrent urinary tract infections.  She more than 10 episodes and she feels that she is dealing with one currently. Will check UA and UC.   4.  Anemia of chronic kidney disease.  She continues to require Aranesp therapy.  5. Will refer to access planning    6. Obesity: we discussed weight loss specially if she is interested in repeat kidney transplant     Assessment and plan was discussed with patient and she voiced her understanding and agreement.    Reason for Visit:  Ms. Logan is here for routine follow up and immunosuppression management.    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 She feels like she is dealing with a UTI. No other complaints. No fever or chills. No NVD.          Transplant Hx:       Tx: LDKT  Date: 1999       Present Maintenance IS: Mycophenolate mofetil and Prednisone       Baseline Creatinine: 2-3 mg/dL        Biopsy: yes with TMA    Home BP: controlled.      ROS:   A comprehensive review of systems was obtained and negative, except as noted in the HPI or PMH.    Active Medical Problems:  Patient Active Problem List   Diagnosis     Chronic rhinitis     Essential hypertension, benign     S/P kidney transplant     Rash     Asthma exacerbation     UTI (urinary tract infection)     Anemia     Chest pain     Urinary tract  infection     Localized pustular psoriasis     CKD (chronic kidney disease) stage 4, GFR 15-29 ml/min (H)       Personal Hx:  Social History     Social History     Marital status:      Spouse name: N/A     Number of children: N/A     Years of education: N/A     Occupational History     Not on file.     Social History Main Topics     Smoking status: Never Smoker     Smokeless tobacco: Never Used      Comment: Has been around second hand smoke     Alcohol use Yes      Comment: Once in a great while     Drug use: No     Sexual activity: Not on file     Other Topics Concern     Not on file     Social History Narrative       Allergies:  Allergies   Allergen Reactions     Ciprofloxacin Palpitations       Medications:  Prior to Admission medications    Medication Sig Start Date End Date Taking? Authorizing Provider   vitamin D (ERGOCALCIFEROL) 59757 UNIT capsule Take 1 capsule (50,000 Units) by mouth once a week 3/28/17  Yes Tyshawn Sexton MD   cefpodoxime (VANTIN) 100 MG tablet Take 1 tablet (100 mg) by mouth daily 3/28/17  Yes Tyshawn Sexton MD   sodium bicarbonate 650 MG tablet Take 1 tablet (650 mg) by mouth 2 times daily 3/8/17  Yes Sakina Huntley MD   darbepoetin bucky (ARANESP, ALBUMIN FREE,) 300 MCG/0.6ML injection Inject 0.6 mLs (300 mcg) Subcutaneous every 28 days As needed for hgb<10g/dL 2/15/17  Yes Tyshawn Sexton MD   CELLCEPT 250 MG PO CAPSULE Take 2 capsules (500 mg) by mouth 2 times daily 2/7/17  Yes Sakina Huntley MD   atorvastatin (LIPITOR) 20 MG tablet Take 1 tablet (20 mg) by mouth daily 11/10/16  Yes Rocio Saul MD   predniSONE (DELTASONE) 5 MG tablet Take 1 tablet (5 mg) by mouth daily 10/6/16  Yes Sakina Huntley MD   triamcinolone (KENALOG) 0.1 % ointment Apply to affected area of the buttock and belly button twice a day as needed when rash is present. Do not use for longer than 4 weeks 10/5/16  Yes Fidel Pereira MD   hydrALAZINE (APRESOLINE) 100 MG TABS Take 1  tablet (100 mg) by mouth 3 times daily 8/8/16  Yes Sakina Huntley MD   propranolol HCl 60 MG TABS Take 1 tablet (60 mg) by mouth 2 times daily 7/5/16  Yes Taryn Green APRN CNP   hydrocortisone (CORTAID) 1 % cream Apply topically 2 times daily 7/5/16  Yes Taryn Green APRN CNP   allopurinol (ZYLOPRIM) 100 MG tablet TAKE ONE TABLET BY MOUTH EVERY DAY 5/4/16  Yes Sakina Huntley MD       Vitals:  /86  Pulse 75  Wt 101.2 kg (223 lb)  SpO2 95%  BMI 40.79 kg/m2    Exam:   GENERAL APPEARANCE: alert and no distress  HENT: mouth without ulcers or lesions  LYMPHATICS: no cervical or supraclavicular nodes  RESP: lungs clear to auscultation - no rales, rhonchi or wheezes  CV: regular rhythm, normal rate, no rub, no murmur  EDEMA: no LE edema bilaterally  ABDOMEN: soft, nondistended, nontender, bowel sounds normal  MS: extremities normal - no gross deformities noted, no evidence of inflammation in joints, no muscle tenderness  SKIN: no rash    Results:   Reviewed

## 2017-04-04 DIAGNOSIS — I10 HTN (HYPERTENSION): ICD-10-CM

## 2017-04-04 DIAGNOSIS — Z29.9 PREVENTIVE MEASURE: Primary | ICD-10-CM

## 2017-04-04 RX ORDER — ALLOPURINOL 100 MG/1
TABLET ORAL
Qty: 90 TABLET | Refills: 3 | Status: SHIPPED | OUTPATIENT
Start: 2017-04-04 | End: 2017-05-01

## 2017-04-04 NOTE — TELEPHONE ENCOUNTER
Last Office Visit with Nephrologist:  3/28/17.  Medication refilled per Nephrology Clinic protocol.     Brittney Quinones RN

## 2017-04-05 ENCOUNTER — TELEPHONE (OUTPATIENT)
Dept: NEPHROLOGY | Facility: CLINIC | Age: 62
End: 2017-04-05

## 2017-04-05 NOTE — TELEPHONE ENCOUNTER
Dr. Saul,   Patient is requesting a refill of folic acid. Is this something she should still be on? Was previously on 1mg daily.

## 2017-04-06 RX ORDER — FOLIC ACID 1 MG/1
1 TABLET ORAL DAILY
Qty: 30 TABLET | Refills: 11 | Status: SHIPPED | OUTPATIENT
Start: 2017-04-06 | End: 2018-04-02

## 2017-04-18 ENCOUNTER — TELEPHONE (OUTPATIENT)
Dept: PHARMACY | Facility: CLINIC | Age: 62
End: 2017-04-18

## 2017-04-18 ENCOUNTER — OFFICE VISIT (OUTPATIENT)
Dept: INTERNAL MEDICINE | Facility: CLINIC | Age: 62
End: 2017-04-18

## 2017-04-18 VITALS
HEART RATE: 72 BPM | RESPIRATION RATE: 20 BRPM | WEIGHT: 220.2 LBS | DIASTOLIC BLOOD PRESSURE: 72 MMHG | SYSTOLIC BLOOD PRESSURE: 104 MMHG | BODY MASS INDEX: 40.28 KG/M2

## 2017-04-18 DIAGNOSIS — E66.9 OBESITY, UNSPECIFIED OBESITY SEVERITY, UNSPECIFIED OBESITY TYPE: ICD-10-CM

## 2017-04-18 DIAGNOSIS — Z48.298 AFTERCARE FOLLOWING ORGAN TRANSPLANT: ICD-10-CM

## 2017-04-18 DIAGNOSIS — Z79.899 ENCOUNTER FOR LONG-TERM CURRENT USE OF MEDICATION: ICD-10-CM

## 2017-04-18 DIAGNOSIS — I10 ESSENTIAL HYPERTENSION, BENIGN: ICD-10-CM

## 2017-04-18 DIAGNOSIS — N18.30 ANEMIA IN STAGE 3 CHRONIC KIDNEY DISEASE (H): ICD-10-CM

## 2017-04-18 DIAGNOSIS — N18.4 CKD (CHRONIC KIDNEY DISEASE) STAGE 4, GFR 15-29 ML/MIN (H): ICD-10-CM

## 2017-04-18 DIAGNOSIS — Z94.0 KIDNEY REPLACED BY TRANSPLANT: ICD-10-CM

## 2017-04-18 DIAGNOSIS — E55.9 VITAMIN D DEFICIENCY: Primary | ICD-10-CM

## 2017-04-18 DIAGNOSIS — D63.1 ANEMIA IN STAGE 3 CHRONIC KIDNEY DISEASE (H): ICD-10-CM

## 2017-04-18 DIAGNOSIS — N18.30 CHRONIC KIDNEY DISEASE, STAGE III (MODERATE) (H): ICD-10-CM

## 2017-04-18 LAB
ANION GAP SERPL CALCULATED.3IONS-SCNC: 10 MMOL/L (ref 3–14)
BUN SERPL-MCNC: 63 MG/DL (ref 7–30)
CALCIUM SERPL-MCNC: 8.6 MG/DL (ref 8.5–10.1)
CHLORIDE SERPL-SCNC: 110 MMOL/L (ref 94–109)
CO2 SERPL-SCNC: 20 MMOL/L (ref 20–32)
CREAT SERPL-MCNC: 2.44 MG/DL (ref 0.52–1.04)
ERYTHROCYTE [DISTWIDTH] IN BLOOD BY AUTOMATED COUNT: 15 % (ref 10–15)
FERRITIN SERPL-MCNC: 966 NG/ML (ref 8–252)
GFR SERPL CREATININE-BSD FRML MDRD: 20 ML/MIN/1.7M2
GLUCOSE SERPL-MCNC: 101 MG/DL (ref 70–99)
HCT VFR BLD AUTO: 31.8 % (ref 35–47)
HGB BLD-MCNC: 9.6 G/DL (ref 11.7–15.7)
IRON SATN MFR SERPL: 40 % (ref 15–46)
IRON SERPL-MCNC: 78 UG/DL (ref 35–180)
MCH RBC QN AUTO: 24.4 PG (ref 26.5–33)
MCHC RBC AUTO-ENTMCNC: 30.2 G/DL (ref 31.5–36.5)
MCV RBC AUTO: 81 FL (ref 78–100)
PLATELET # BLD AUTO: 215 10E9/L (ref 150–450)
POTASSIUM SERPL-SCNC: 5 MMOL/L (ref 3.4–5.3)
RBC # BLD AUTO: 3.93 10E12/L (ref 3.8–5.2)
SODIUM SERPL-SCNC: 140 MMOL/L (ref 133–144)
TIBC SERPL-MCNC: 196 UG/DL (ref 240–430)
WBC # BLD AUTO: 5.4 10E9/L (ref 4–11)

## 2017-04-18 ASSESSMENT — PAIN SCALES - GENERAL: PAINLEVEL: NO PAIN (0)

## 2017-04-18 NOTE — PROGRESS NOTES
HPI  Patient is here for follow up on several medical issues. She is concerned about the need for dialysis as her kidney function has been decreasing. She was treated for UTI, states that she is feeling well and has no symptoms suggestive of infection.     Review of Systems     Constitutional:  Positive for fatigue. Negative for fever and chills.   Eyes:  Negative for decreased vision.   Respiratory:   Positive for dyspnea on exertion. Negative for cough, shortness of breath, respiratory pain and cough disturbing sleep.    Cardiovascular:  Positive for dyspnea on exertion. Negative for chest pain.   Gastrointestinal:  Negative for abdominal pain, diarrhea and constipation.   Skin:  Negative for itching and rash.   Endo/Heme:  Negative for anemia, swollen glands and bruises/bleeds easily.   Psychiatric/Behavioral:  Negative for depression, decreased concentration, mood swings and panic attacks.    Endocrine:  Negative for altered temperature regulation, polyphagia, polydipsia, unwanted hair growth and change in facial hair.    Current Outpatient Prescriptions   Medication     folic acid (FOLVITE) 1 MG tablet     allopurinol (ZYLOPRIM) 100 MG tablet     vitamin D (ERGOCALCIFEROL) 74698 UNIT capsule     cefpodoxime (VANTIN) 100 MG tablet     sodium bicarbonate 650 MG tablet     darbepoetin bucky (ARANESP, ALBUMIN FREE,) 300 MCG/0.6ML injection     CELLCEPT 250 MG PO CAPSULE     atorvastatin (LIPITOR) 20 MG tablet     predniSONE (DELTASONE) 5 MG tablet     triamcinolone (KENALOG) 0.1 % ointment     hydrALAZINE (APRESOLINE) 100 MG TABS     propranolol HCl 60 MG TABS     hydrocortisone (CORTAID) 1 % cream     No current facility-administered medications for this visit.      Vitals:    04/18/17 1120   BP: 104/72   Pulse: 72   Resp: 20   Weight: 99.9 kg (220 lb 3.2 oz)     Physical Exam   Constitutional: She is oriented to person, place, and time and well-developed, well-nourished, and in no distress.   HENT:   Head:  Normocephalic and atraumatic.   Eyes: Pupils are equal, round, and reactive to light.   Neck: Normal range of motion.   Cardiovascular: Normal rate, regular rhythm and normal heart sounds.    Pulmonary/Chest: Effort normal and breath sounds normal.   Neurological: She is alert and oriented to person, place, and time.   Skin: Skin is warm and dry.   Psychiatric: Mood, memory, affect and judgment normal.   Vitals reviewed.      Assessment and Plan:  Eleni was seen today for recheck medication.    Diagnoses and all orders for this visit:    Vitamin D deficiency. Patient has recently started vitamin D replacement therapy. Recommend checking level after completion of the 2 month course.       Obesity, unspecified obesity severity, unspecified obesity type. Patient has been advised to work of weight loss by Nephrology to be considerate for the kidney transplantation. Discussed approach to weight loss, recommend consultation with the Weight Management center. Referral was made today. Patient is planning to work on weight loss independently for several months, if not successful, will schedule a visit with Weight Management.   -     BARIATRIC ADULT REFERRAL    CKD (chronic kidney disease) stage 4, GFR 15-29 ml/min (H). Patient has close follow up with Nephrology. Recommend to continue with current plan of care. Discussed plans for dialysis, patient would like to avoid it given her prior experience. She has scheduled consultation to discuss dialysis access.   -     BARIATRIC ADULT REFERRAL    Essential hypertension, benign. Blood pressure is within acceptable range. Recommend to continue with current medical therapy as she has been able to tolerate it well.       Total time spent 25 minutes.  More than 50% of the time spent with Ms. Logan on counseling / coordinating her care    Rocio Saul MD

## 2017-04-18 NOTE — TELEPHONE ENCOUNTER
Anemia Management Note  SUBJECTIVE/OBJECTIVE:  Referred by Dr Tyshawn Sexton February 10, 2017  Primary Diagnosis: Anemia in Chronic Kidney Disease (N18.3 D63.1)   Secondary Diagnosis: Chronic Kidney Disease, Stage III (N18.3)   Hgb goal range: 9-10  Epo/Darbo: Aranesp 300 mcg every 14 days - At home   RX will  on 18  Iron regimen:  Ferrous sulfate 325 mg QD on 16  Lab orders  18 in EPIC     Anemia Latest Ref Rng & Units 2017 2017 2017 2017 3/1/2017 3/28/2017 2017   VALARIE Dose - - 300 mcg - - 300 mcg - -   Hemoglobin 11.7 - 15.7 g/dL 10.1(L) 8.9(L) 10.2(L) 9.9(L) - 10.3(L) 9.6(L)   TSAT 15 - 46 % - - 29 - - - 40   Ferritin 8 - 252 ng/mL - - 828(H) - - - 966(H)     BP Readings from Last 3 Encounters:   17 104/72   17 145/86   17 173/81     Wt Readings from Last 2 Encounters:   17 220 lb 3.2 oz (99.9 kg)   17 223 lb (101.2 kg)     ASSESSMENT:  Hgb:At goal - recommend dose  TSat: at goal >30% Ferritin: At goal (>100ng/mL)    PLAN:  Eleni will dose with aranesp  and RTC for hgb then aranesp if needed in 4 week(s)    Orders needed to be renewed (for next follow-up date) in [facility]: None    Iron labs due:  17    Plan discussed with:  Eleni  Plan provided by:  Winston    NEXT FOLLOW-UP DATE:  17    Anemia Management Service  Yvette Goodwin,GiniD and Preethi Akers CPhT  Phone: [phone]  Fax: [phone]

## 2017-04-18 NOTE — PATIENT INSTRUCTIONS
Primary Care Center Phone Number [phone]  Primary Care Center Medication Refill Request Information:  * Please contact your pharmacy regarding ANY request for medication refills.  ** [facility] = [phone]  * Please allow 3 business days for routine medication refills.  * Please allow 5 business days for controlled substance medication refills.     [facility] Care Center Test Result notification information:  *You will be notified with in 7-10 days of your appointment day regarding the results of your test.  If you are on MyChart you will be notified as soon as the provider has reviewed the results and signed off on them.        Please schedule the following appointments:  Bariatrics/ Weight Management [phone] ([facility] [place]

## 2017-04-18 NOTE — NURSING NOTE
Chief Complaint   Patient presents with     Recheck Medication     pt is here for a medication follow up        Leisa Schwab CMA at 11:20 AM on 4/18/2017

## 2017-04-18 NOTE — MR AVS SNAPSHOT
After Visit Summary   4/18/2017    Eleni Logan    MRN: [id number]           Patient Information     Date Of Birth          1955        Visit Information        Provider Department      4/18/2017 10:40 AM Rocio Saul MD [facility]        Today's Diagnoses     Vitamin D deficiency    -  1    Obesity, unspecified obesity severity, unspecified obesity type        CKD (chronic kidney disease) stage 4, GFR 15-29 ml/min (H)        Essential hypertension, benign          Care Instructions    Primary Care Center Phone Number [phone]  Primary Care Center Medication Refill Request Information:  * Please contact your pharmacy regarding ANY request for medication refills.  ** [facility] Prescription Fax = [phone]  * Please allow 3 business days for routine medication refills.  * Please allow 5 business days for controlled substance medication refills.     [facility] Care Center Test Result notification information:  *You will be notified with in 7-10 days of your appointment day regarding the results of your test.  If you are on MyChart you will be notified as soon as the provider has reviewed the results and signed off on them.        Please schedule the following appointments:  Bariatrics/ Weight Management [phone] ([facility] 4th [facility])          Follow-ups after your visit        Additional Services     BARIATRIC ADULT REFERRAL       Your provider has referred you to: [facility]: [facility] - [place] [phone]   [email or web address]    Please be aware that coverage of these services is subject to the terms and limitations of your health insurance plan.  Call member services at your health plan with any benefit or coverage questions.      Please bring the following with you to your appointment:      (1) List of current medications   (2) This referral request   (3) Any documents/labs given to you for this referral                   Your next 10 appointments already scheduled     May 01, 2017  9:00 AM CDT   (Arrive by 8:45 AM)   New Patient Visit with Pancho Salgado MD   [facility] Management ([facility])    [address] [place] [address]  4th [facility] [id number] [phone]            2017  1:30 PM CDT   (Arrive by 1:15 PM)   Return Visit with Kayden Garcia MD   [facility] Diseases ([facility])    [address] [place] [address]  3rd [facility] [id number] [phone]            Aug 08, 2017  1:10 PM CDT   (Arrive by 12:40 PM)   Return Kidney Transplant with Tyshawn Sexton MD   [facility] Nephrology ([facility])    [address] [place] [id number] [phone]              Who to contact     Please call your clinic at [phone] to:    Ask questions about your health    Make or cancel appointments    Discuss your medicines    Learn about your test results    Speak to your doctor   If you have compliments or concerns about an experience at your clinic, or if you wish to file a complaint, please contact [facility] Patient Relations at [phone] or email us at [email or web address]         Additional Information About Your Visit        [employer] Information     [facility] is an electronic gateway that provides easy, online access to your medical records. With [employer], you can request a clinic appointment, read your test results, renew a prescription or communicate with your care team.     To sign up for [facility] visit the website at [email or web address]   You will be asked to enter the access code listed below, as well as some personal information. Please follow the directions to create your username and password.     Your access code is: [email or web address]  Expires: 2017  6:30 AM     Your access code will  in  90 days. If you need help or a new code, please contact your [facility] or call [phone] for assistance.        Care EveryWhere ID     This is your Care EveryWhere ID. This could be used by other organizations to access your [facility] medical records  [id number]        Your Vitals Were     Pulse Respirations Breastfeeding? BMI (Body Mass Index)          72 20 No 40.28 kg/m2         Blood Pressure from Last 3 Encounters:   04/18/17 104/72   03/28/17 145/86   01/30/17 173/81    Weight from Last 3 Encounters:   04/18/17 99.9 kg (220 lb 3.2 oz)   03/28/17 101.2 kg (223 lb)   01/30/17 101.3 kg (223 lb 6.4 oz)              We Performed the Following     BARIATRIC ADULT REFERRAL        Primary Care Provider Office Phone # Fax #    Rocio Marianna Saul [phone]       [facility] [address] [place] [id number]        Thank you!     Thank you for choosing [facility]  for your care. Our goal is always to provide you with excellent care. Hearing back from our patients is one way we can continue to improve our services. Please take a few minutes to complete the written survey that you may receive in the mail after your visit with us. Thank you!             Your Updated Medication List - Protect others around you: Learn how to safely use, store and throw away your medicines at www.disposemymeds.org.          This list is accurate as of: 4/18/17 11:43 AM.  Always use your most recent med list.                   Brand Name Dispense Instructions for use    allopurinol 100 MG tablet    ZYLOPRIM    90 tablet    TAKE ONE TABLET BY MOUTH EVERY DAY       atorvastatin 20 MG tablet    LIPITOR    90 tablet    Take 1 tablet (20 mg) by mouth daily       cefpodoxime 100 MG tablet    VANTIN    10 tablet    Take 1 tablet (100 mg) by mouth daily       darbepoetin bucky 300 MCG/0.6ML injection    ARANESP (ALBUMIN FREE)    0.6 mL    Inject 0.6 mLs (300 mcg)  Subcutaneous every 28 days As needed for hgb<10g/dL       folic acid 1 MG tablet    FOLVITE    30 tablet    Take 1 tablet (1 mg) by mouth daily       hydrALAZINE 100 MG Tabs tablet    APRESOLINE    270 tablet    Take 1 tablet (100 mg) by mouth 3 times daily       hydrocortisone 1 % cream    CORTAID    60 g    Apply topically 2 times daily       mycophenolate capsule     120 capsule    Take 2 capsules (500 mg) by mouth 2 times daily       predniSONE 5 MG tablet    DELTASONE    30 tablet    Take 1 tablet (5 mg) by mouth daily       propranolol HCl 60 MG Tabs     180 tablet    Take 1 tablet (60 mg) by mouth 2 times daily       sodium bicarbonate 650 MG tablet     180 tablet    Take 1 tablet (650 mg) by mouth 2 times daily       triamcinolone 0.1 % ointment    KENALOG    80 g    Apply to affected area of the buttock and belly button twice a day as needed when rash is present. Do not use for longer than 4 weeks       vitamin D 00962 UNIT capsule    ERGOCALCIFEROL    8 capsule    Take 1 capsule (50,000 Units) by mouth once a week

## 2017-04-28 ASSESSMENT — ENCOUNTER SYMPTOMS
CHILLS: 0
POLYPHAGIA: 0
ALTERED TEMPERATURE REGULATION: 0
DYSPNEA ON EXERTION: 1
DEPRESSION: 0
DIARRHEA: 0
COUGH: 0
POLYDIPSIA: 0
SWOLLEN GLANDS: 0
PANIC: 0
DECREASED CONCENTRATION: 0
SHORTNESS OF BREATH: 0
ABDOMINAL PAIN: 0
RESPIRATORY PAIN: 0
FATIGUE: 1
CONSTIPATION: 0
INSOMNIA: 0
NERVOUS/ANXIOUS: 0
COUGH DISTURBING SLEEP: 0
FEVER: 0
BRUISES/BLEEDS EASILY: 0

## 2017-05-01 ENCOUNTER — OFFICE VISIT (OUTPATIENT)
Dept: ENDOCRINOLOGY | Facility: CLINIC | Age: 62
End: 2017-05-01

## 2017-05-01 VITALS
HEIGHT: 62 IN | DIASTOLIC BLOOD PRESSURE: 96 MMHG | SYSTOLIC BLOOD PRESSURE: 172 MMHG | OXYGEN SATURATION: 97 % | BODY MASS INDEX: 41.88 KG/M2 | WEIGHT: 227.6 LBS | TEMPERATURE: 97.9 F | HEART RATE: 69 BPM

## 2017-05-01 DIAGNOSIS — E66.1 MORBID DRUG-INDUCED OBESITY: Primary | ICD-10-CM

## 2017-05-01 RX ORDER — TOPIRAMATE 25 MG/1
TABLET, FILM COATED ORAL
Qty: 90 TABLET | Refills: 5 | Status: SHIPPED | OUTPATIENT
Start: 2017-05-01 | End: 2017-08-08

## 2017-05-01 ASSESSMENT — PAIN SCALES - GENERAL: PAINLEVEL: NO PAIN (0)

## 2017-05-01 NOTE — PROGRESS NOTES
"    New Medical Weight Management Consult    PATIENT:  Eleni Logan  MRN:         [id number]  :         1955  MATT:         2017    Dear Rocio Saul MD,    I had the pleasure of seeing your patient, Eleni Logan.  Full intake/assessment done to determine barriers to weight loss success and develop a treatment plan.  Eleni Logan is a 61 year old female interested in treatment of medical problems associated with weight.  Her weight today is 227 lbs 9.6 oz, Body mass index is 41.63 kg/(m^2)., and she has the following co-morbidities:     2017   I have the following co-morbidities associated with obesity: High Blood Pressure, High Cholesterol, Sleep Apnea, Asthma, Weight Bearing Joint Pain       Patient Goals Reviewed With Patient 2017   I am interested in attaining a healthier weight to diminish current health problems related to co-morbid conditions: Yes   I am interested in attaining a healthier weight in order to prevent future health problems: Yes       Referring Provider 2017   Please name the provider who referred you to Medical Weight Management.  If you do not know, please answer: \"I Don't Know\". Dr. Zarate       Wt Readings from Last 4 Encounters:   17 227 lb 9.6 oz   17 220 lb 3.2 oz   17 223 lb   17 223 lb 6.4 oz       Weight History Reviewed With Patient 2017   How concerned are you about your weight? Very Concerned   Would you describe your weight gain as gradual? Yes   I became overweight: After Pregnancy   The following factors have contributed to my weight gain:  Eating Wrong Types of Food, Eating Too Much, Lack of Exercise   I have tried the following methods to lose weight: Exercise   I have the following family history of obesity/being overweight:  Many of my relatives are overweight   Has anyone in your family had weight loss surgery? No       Diet Recall Reviewed With Patient 2017   How many glasses of juice do you drink in a " typical day? 0   How many of glasses of milk do you drink in a typical day? 0   How many 8oz glasses of sugar containing drinks such as Julius-Aid/sweet tea do you drink in a day? 0   How many cans/bottles of sugar pop/soda/tea/sports drinks do you drink in a day? 0   How many cans/bottles of diet pop/soda/tea or sports drink do you drink in a day? 0   How often do you have a drink of alcohol? Never       Eating Habits Reviewed With Patient 5/1/2017   Generally, my meals include foods like these: bread, pasta, rice, potatoes, corn, crackers, sweet dessert, pop, or juice. A Few Times a Week   Generally, my meals include foods like these: fried meats, brats, burgers, french fries, pizza, cheese, chips, or ice cream. A Few Times a Week   Eat fast food (like McDonal[employer], [employer], [employer]). A Few Times a Week   Eat at a buffet or sit-down restaurant. Never   Eat most of my meals in front of the TV or computer. Everyday   Often skip meals, eat at random times, have no regular eating times. Everyday   Rarely sit down for a meal but snack or graze throughout.  Never   Eat extra snacks between meals. A Few Times a Week   Eat most of my food at the end of the day. Everyday   Eat in the middle of the night or wake up at night to eat. A Few TImes a Week   Eat extra snacks to prevent or correct low blood sugar. Never   Eat to prevent acid reflux or stomach pain. Never   Worry about not having enough food to eat. Never   Have you been to the food shelf at least a few times this year? No   I eat when I am depressed, stressed, anxious, or bored. Everyday   I eat when I am happy or as a reward. Half of the Week   I feel hungry all the time even if I just have eaten. Never   Feeling full is important to me. A Few Times a Week   Once I start eating, it is hard to stop. Everyday   I can't resist eating delicious food or walk past the good food/smell. Everyday   I eat/snack without noticing that I am eating. Everyday   I eat when I am  preparing the meal. Everyday   I eat more than usual when I see others eating. Everyday   I have trouble not eating sweets, ice cream, cookies, or chips if they are around the house. Never   I think about food all day. Never   Please list any other foods you crave? Hot Spicy Foods   I feel out of control when eating. Everyday   I eat a large amount of food, like a loaf of bread, a box of cookies, a pint/quart of ice cream, all at once. Never   I eat a large amount of food even when I am not hungry. Almost Everyday   I eat rapidly. Everyday   I eat alone because I feel embarrassed and do not want others to see how much I have eaten. Never   I eat until I am uncomfortably full. Monthly   I feel bad, disgusted, or guilty after I overeat. Everyday   I make myself vomit what I have eaten or use laxatives to get rid of food. Never       Activity/Exercise History Reviewed With Patient 5/1/2017   How much of a typical 12 hour day do you spend sitting? Most of the Day   How much of a typical 12 hour day do you spend lying down? Most of the Day   How much of a typical day do you spend walking/standing? Less Than Half the Day   How many hours (not including work) do you spend on the TV/Video Games/Computer/Tablet/Phone? 6 Hours or More   How many times a week are you active for the purpose of exercise? Never   How many total minutes do you spend doing some activity for the purpose of exercising when you exercise? Less Than 15 Minutes   What keeps you from being more active? Pain, Shortness of Breath       PAST MEDICAL HISTORY:  Past Medical History:   Diagnosis Date     Anatomical narrow angle      Anemia      Gout      HTN (hypertension)      Hyperlipidaemia      IgA nephropathy      Immunosuppressed status (H)     Prednisone and cellcept     Nonsenile cataract      Obstructive sleep apnea        Work/Social History Reviewed With Patient 5/1/2017   My employment status is: Unemployed   What is your marital status? Single   If  in a relationship, is your significant other overweight? N/A   Do you have children? Yes   If you have children, are they overweight? Yes       Mental Health History Reviewed With Patient 5/1/2017   Have you ever been physically or sexually abused? No   How often in the past 2 weeks have you felt little interest or pleasure in doing things? For Several Days   Over the past 2 weeks how often have you felt down, depressed, or hopeless? For Several Days       Sleep History Reviewed With Patient 5/1/2017   How many hours do you sleep at night? 4   Do you think that you snore loudly or has anybody ever heard you snore loudly (louder than talking or so loud it can be heard behind a shut door)? Yes   Has anyone seen or heard you stop breathing during your sleep? No   Do you often feel tired, fatigued, or sleepy during the day? Yes       MEDICATIONS:   Current Outpatient Prescriptions   Medication Sig Dispense Refill     folic acid (FOLVITE) 1 MG tablet Take 1 tablet (1 mg) by mouth daily 30 tablet 11     vitamin D (ERGOCALCIFEROL) 89539 UNIT capsule Take 1 capsule (50,000 Units) by mouth once a week 8 capsule 0     cefpodoxime (VANTIN) 100 MG tablet Take 1 tablet (100 mg) by mouth daily 10 tablet 0     sodium bicarbonate 650 MG tablet Take 1 tablet (650 mg) by mouth 2 times daily 180 tablet 1     darbepoetin bucky (ARANESP, ALBUMIN FREE,) 300 MCG/0.6ML injection Inject 0.6 mLs (300 mcg) Subcutaneous every 28 days As needed for hgb<10g/dL 0.6 mL 11     CELLCEPT 250 MG PO CAPSULE Take 2 capsules (500 mg) by mouth 2 times daily 120 capsule 11     atorvastatin (LIPITOR) 20 MG tablet Take 1 tablet (20 mg) by mouth daily 90 tablet 2     predniSONE (DELTASONE) 5 MG tablet Take 1 tablet (5 mg) by mouth daily 30 tablet 11     hydrALAZINE (APRESOLINE) 100 MG TABS Take 1 tablet (100 mg) by mouth 3 times daily 270 tablet 3     propranolol HCl 60 MG TABS Take 1 tablet (60 mg) by mouth 2 times daily 180 tablet 3     hydrocortisone  "(CORTAID) 1 % cream Apply topically 2 times daily 60 g 1       ALLERGIES:   Allergies   Allergen Reactions     Ciprofloxacin Palpitations       PHYSICAL EXAM:  BP (!) 172/96 (BP Location: Left arm, Patient Position: Chair, Cuff Size: Adult Large)  Pulse 69  Temp 97.9  F (36.6  C)  Ht 5' 2\"  Wt 227 lb 9.6 oz  SpO2 97%  BMI 41.63 kg/m2   A & O x 3  HEENT: NCAT, mucous membranes moist  Respirations unlabored  Location of obesity: Mixed Obesity    ASSESSMENT:  Eleni is a patient with mature onset morbid obesity without significant element of familial/genetic influence and with current health consequences. She does need aggressive weight loss plan due to High Blood Pressure, High Cholesterol, Sleep Apnea, Asthma, Weight Bearing Joint Pain.      Eleni Logan eats a high carb diet, eats a high fat diet, eats fast food once or more per week, mostly eats during the evening, wakes at night to eat and has a disorganized meal pattern.    Her problem is complicated by strong craving/reward pathways, a neurobiological disorder, gender and short stature, impaired metabolic rate and history of prednisone induced weight gain.    Her ability to lose weight is impacted by physical impairment, lack of confidence and lack of education on nutrition and dietary needs.    PLAN:    Volumetrics eating plan  Meal planning    Mental health/Medication barriers   Ancillary testing:  N/A.  Food Plan:  Volumetrics.   Activity Plan:   Activity journal.  Supplementary:  N/A.   Medication:   The patient will begin medication in pursuit of improved medical status as influenced by body weight. She will start topiramate. Patient was made aware that topiramate is not approved for the treatment of obesity.  There is a mutual understanding of the goals and risks of this therapy. The patient is in agreement. She is educated on dosage regimen and possible side effects.    RTC:    12 weeks.  30/45 minutes spent on counseling and " education    Sincerely,    Pancho Salgado MD

## 2017-05-01 NOTE — LETTER
"2017       RE: Eleni Logan  [address]  [address]  [facility] [id number]     Dear Colleague,    Thank you for referring your patient, Eleni Logan, to the [facility] MEDICAL WEIGHT MANAGEMENT at [facility]. Please see a copy of my visit note below.        New Medical Weight Management Consult    PATIENT:  Eleni Logan  MRN:         [id number]  :         1955  MATT:         2017    Dear Rocio Saul MD,    I had the pleasure of seeing your patient, Eleni Logan.  Full intake/assessment done to determine barriers to weight loss success and develop a treatment plan.  Eleni Logan is a 61 year old female interested in treatment of medical problems associated with weight.  Her weight today is 227 lbs 9.6 oz, Body mass index is 41.63 kg/(m^2)., and she has the following co-morbidities:     2017   I have the following co-morbidities associated with obesity: High Blood Pressure, High Cholesterol, Sleep Apnea, Asthma, Weight Bearing Joint Pain       Patient Goals Reviewed With Patient 2017   I am interested in attaining a healthier weight to diminish current health problems related to co-morbid conditions: Yes   I am interested in attaining a healthier weight in order to prevent future health problems: Yes       Referring Provider 2017   Please name the provider who referred you to Medical Weight Management.  If you do not know, please answer: \"I Don't Know\". Dr. Zarate       Wt Readings from Last 4 Encounters:   17 227 lb 9.6 oz   17 220 lb 3.2 oz   17 223 lb   17 223 lb 6.4 oz       Weight History Reviewed With Patient 2017   How concerned are you about your weight? Very Concerned   Would you describe your weight gain as gradual? Yes   I became overweight: After Pregnancy   The following factors have contributed to my weight gain:  Eating Wrong Types of Food, Eating Too Much, Lack of Exercise   I have tried the " following methods to lose weight: Exercise   I have the following family history of obesity/being overweight:  Many of my relatives are overweight   Has anyone in your family had weight loss surgery? No       Diet Recall Reviewed With Patient 5/1/2017   How many glasses of juice do you drink in a typical day? 0   How many of glasses of milk do you drink in a typical day? 0   How many 8oz glasses of sugar containing drinks such as Julius-Aid/sweet tea do you drink in a day? 0   How many cans/bottles of sugar pop/soda/tea/sports drinks do you drink in a day? 0   How many cans/bottles of diet pop/soda/tea or sports drink do you drink in a day? 0   How often do you have a drink of alcohol? Never       Eating Habits Reviewed With Patient 5/1/2017   Generally, my meals include foods like these: bread, pasta, rice, potatoes, corn, crackers, sweet dessert, pop, or juice. A Few Times a Week   Generally, my meals include foods like these: fried meats, brats, burgers, french fries, pizza, cheese, chips, or ice cream. A Few Times a Week   Eat fast food (like McDonalds, [employer], [employer]). A Few Times a Week   Eat at a buffet or sit-down restaurant. Never   Eat most of my meals in front of the TV or computer. Everyday   Often skip meals, eat at random times, have no regular eating times. Everyday   Rarely sit down for a meal but snack or graze throughout.  Never   Eat extra snacks between meals. A Few Times a Week   Eat most of my food at the end of the day. Everyday   Eat in the middle of the night or wake up at night to eat. A Few TImes a Week   Eat extra snacks to prevent or correct low blood sugar. Never   Eat to prevent acid reflux or stomach pain. Never   Worry about not having enough food to eat. Never   Have you been to the food shelf at least a few times this year? No   I eat when I am depressed, stressed, anxious, or bored. Everyday   I eat when I am happy or as a reward. Half of the Week   I feel hungry all the  time even if I just have eaten. Never   Feeling full is important to me. A Few Times a Week   Once I start eating, it is hard to stop. Everyday   I can't resist eating delicious food or walk past the good food/smell. Everyday   I eat/snack without noticing that I am eating. Everyday   I eat when I am preparing the meal. Everyday   I eat more than usual when I see others eating. Everyday   I have trouble not eating sweets, ice cream, cookies, or chips if they are around the house. Never   I think about food all day. Never   Please list any other foods you crave? Hot Spicy Foods   I feel out of control when eating. Everyday   I eat a large amount of food, like a loaf of bread, a box of cookies, a pint/quart of ice cream, all at once. Never   I eat a large amount of food even when I am not hungry. Almost Everyday   I eat rapidly. Everyday   I eat alone because I feel embarrassed and do not want others to see how much I have eaten. Never   I eat until I am uncomfortably full. Monthly   I feel bad, disgusted, or guilty after I overeat. Everyday   I make myself vomit what I have eaten or use laxatives to get rid of food. Never       Activity/Exercise History Reviewed With Patient 5/1/2017   How much of a typical 12 hour day do you spend sitting? Most of the Day   How much of a typical 12 hour day do you spend lying down? Most of the Day   How much of a typical day do you spend walking/standing? Less Than Half the Day   How many hours (not including work) do you spend on the TV/Video Games/Computer/Tablet/Phone? 6 Hours or More   How many times a week are you active for the purpose of exercise? Never   How many total minutes do you spend doing some activity for the purpose of exercising when you exercise? Less Than 15 Minutes   What keeps you from being more active? Pain, Shortness of Breath       PAST MEDICAL HISTORY:  Past Medical History:   Diagnosis Date     Anatomical narrow angle      Anemia      Gout      HTN  (hypertension)      Hyperlipidaemia      IgA nephropathy      Immunosuppressed status (H)     Prednisone and cellcept     Nonsenile cataract      Obstructive sleep apnea        Work/Social History Reviewed With Patient 5/1/2017   My employment status is: Unemployed   What is your marital status? Single   If in a relationship, is your significant other overweight? N/A   Do you have children? Yes   If you have children, are they overweight? Yes       Mental Health History Reviewed With Patient 5/1/2017   Have you ever been physically or sexually abused? No   How often in the past 2 weeks have you felt little interest or pleasure in doing things? For Several Days   Over the past 2 weeks how often have you felt down, depressed, or hopeless? For Several Days       Sleep History Reviewed With Patient 5/1/2017   How many hours do you sleep at night? 4   Do you think that you snore loudly or has anybody ever heard you snore loudly (louder than talking or so loud it can be heard behind a shut door)? Yes   Has anyone seen or heard you stop breathing during your sleep? No   Do you often feel tired, fatigued, or sleepy during the day? Yes       MEDICATIONS:   Current Outpatient Prescriptions   Medication Sig Dispense Refill     folic acid (FOLVITE) 1 MG tablet Take 1 tablet (1 mg) by mouth daily 30 tablet 11     vitamin D (ERGOCALCIFEROL) 32161 UNIT capsule Take 1 capsule (50,000 Units) by mouth once a week 8 capsule 0     cefpodoxime (VANTIN) 100 MG tablet Take 1 tablet (100 mg) by mouth daily 10 tablet 0     sodium bicarbonate 650 MG tablet Take 1 tablet (650 mg) by mouth 2 times daily 180 tablet 1     darbepoetin bucky (ARANESP, ALBUMIN FREE,) 300 MCG/0.6ML injection Inject 0.6 mLs (300 mcg) Subcutaneous every 28 days As needed for hgb<10g/dL 0.6 mL 11     CELLCEPT 250 MG PO CAPSULE Take 2 capsules (500 mg) by mouth 2 times daily 120 capsule 11     atorvastatin (LIPITOR) 20 MG tablet Take 1 tablet (20 mg) by mouth daily 90  "tablet 2     predniSONE (DELTASONE) 5 MG tablet Take 1 tablet (5 mg) by mouth daily 30 tablet 11     hydrALAZINE (APRESOLINE) 100 MG TABS Take 1 tablet (100 mg) by mouth 3 times daily 270 tablet 3     propranolol HCl 60 MG TABS Take 1 tablet (60 mg) by mouth 2 times daily 180 tablet 3     hydrocortisone (CORTAID) 1 % cream Apply topically 2 times daily 60 g 1       ALLERGIES:   Allergies   Allergen Reactions     Ciprofloxacin Palpitations       PHYSICAL EXAM:  BP (!) 172/96 (BP Location: Left arm, Patient Position: Chair, Cuff Size: Adult Large)  Pulse 69  Temp 97.9  F (36.6  C)  Ht 5' 2\"  Wt 227 lb 9.6 oz  SpO2 97%  BMI 41.63 kg/m2   A & O x 3  HEENT: NCAT, mucous membranes moist  Respirations unlabored  Location of obesity: Mixed Obesity    ASSESSMENT:  Eleni is a patient with mature onset morbid obesity without significant element of familial/genetic influence and with current health consequences. She does need aggressive weight loss plan due to High Blood Pressure, High Cholesterol, Sleep Apnea, Asthma, Weight Bearing Joint Pain.      Eleni Logan eats a high carb diet, eats a high fat diet, eats fast food once or more per week, mostly eats during the evening, wakes at night to eat and has a disorganized meal pattern.    Her problem is complicated by strong craving/reward pathways, a neurobiological disorder, gender and short stature, impaired metabolic rate and history of prednisone induced weight gain.    Her ability to lose weight is impacted by physical impairment, lack of confidence and lack of education on nutrition and dietary needs.    PLAN:    Volumetrics eating plan  Meal planning    Mental health/Medication barriers   Ancillary testing:  N/A.  Food Plan:  Volumetrics.   Activity Plan:   Activity journal.  Supplementary:  N/A.   Medication:   The patient will begin medication in pursuit of improved medical status as influenced by body weight. She will start topiramate. Patient was made aware " that topiramate is not approved for the treatment of obesity.  There is a mutual understanding of the goals and risks of this therapy. The patient is in agreement. She is educated on dosage regimen and possible side effects.    RTC:    12 weeks.  30/45 minutes spent on counseling and education    Sincerely,    Pancho Salgado MD

## 2017-05-01 NOTE — MR AVS SNAPSHOT
After Visit Summary   5/1/2017    Eleni Logan    MRN: [id number]           Patient Information     Date Of Birth          1955        Visit Information        Provider Department      5/1/2017 9:00 AM Pancho Salgado MD [facility] Weight Management        Today's Diagnoses     Morbid drug-induced obesity    -  1       Follow-ups after your visit        Your next 10 appointments already scheduled     Jun 07, 2017  1:30 PM CDT   (Arrive by 1:15 PM)   Return Visit with Kayden Garcia MD   [facility] and Infectious Diseases ([facility])    [address]  3rd [facility] [address]   [phone]            Aug 07, 2017  9:30 AM CDT   (Arrive by 9:15 AM)   Return Visit with Pancho Salgado MD   [facility] Management ([facility])    [address] [place] [address]  4th [facility] [id number] [phone]            Aug 08, 2017  1:10 PM CDT   (Arrive by 12:40 PM)   Return Kidney Transplant with Tyshawn Sexton MD   [facility] Nephrology ([facility])    [address] [place] [address]  3rd [facility] [id number] [phone]              Who to contact     Please call your clinic at [phone] to:    Ask questions about your health    Make or cancel appointments    Discuss your medicines    Learn about your test results    Speak to your doctor   If you have compliments or concerns about an experience at your clinic, or if you wish to file a complaint, please contact [facility] Patient Relations at [phone] or email us at [email or web address]         Additional Information About Your Visit        MyChart Information     Extreme Reality is an electronic gateway that provides easy, online access to your medical records. With Extreme Reality, you can request a clinic appointment, read your test results, renew a prescription or  "communicate with your care team.     To sign up for StratusLIVEhart visit the website at [email or web address]   You will be asked to enter the access code listed below, as well as some personal information. Please follow the directions to create your username and password.     Your access code is: [email or web address]  Expires: 2017  6:30 AM     Your access code will  in 90 days. If you need help or a new code, please contact your [facility] or call [phone] for assistance.        Care EveryWhere ID     This is your Care EveryWhere ID. This could be used by other organizations to access your [place] medical records  [id number]        Your Vitals Were     Pulse Temperature Height Pulse Oximetry BMI (Body Mass Index)       69 97.9  F (36.6  C) 5' 2\" 97% 41.63 kg/m2        Blood Pressure from Last 3 Encounters:   17 (!) 172/96   17 104/72   17 145/86    Weight from Last 3 Encounters:   17 227 lb 9.6 oz   17 220 lb 3.2 oz   17 223 lb              Today, you had the following     No orders found for display         Today's Medication Changes          These changes are accurate as of: 17  9:28 AM.  If you have any questions, ask your nurse or doctor.               Start taking these medicines.        Dose/Directions    topiramate 25 MG tablet   Commonly known as:  TOPAMAX   Used for:  Morbid drug-induced obesity   Started by:  Pancho Salgado MD        25 mg at bedtime for 1 week, 50 mg at bedtime for 1 week and 75 mg daily at bedtime thereafter   Quantity:  90 tablet   Refills:  5         Stop taking these medicines if you haven't already. Please contact your care team if you have questions.     allopurinol 100 MG tablet   Commonly known as:  ZYLOPRIM   Stopped by:  Pancho Salgado MD           triamcinolone 0.1 % ointment   Commonly known as:  KENALOG   Stopped by:  Pancho Salgado MD                Where to get " your medicines      These medications were sent to [facility] - [place], [place] - [address]  [address], [facility] [address]    Hours:  TRANSPLANT PHONE NUMBER [phone] Phone:  [phone]     topiramate 25 MG tablet                Primary Care Provider Office Phone # Fax #    Rocio Marianna Saul [phone]       [facility] [address] [facility] [address]        Thank you!     Thank you for choosing [facility] WE[facility] MANAGEMENT  for your care. Our goal is always to provide you with excellent care. Hearing back from our patients is one way we can continue to improve our services. Please take a few minutes to complete the written survey that you may receive in the mail after your visit with us. Thank you!             Your Updated Medication List - Protect others around you: Learn how to safely use, store and throw away your medicines at www.disposemymeds.org.          This list is accurate as of: 5/1/17  9:28 AM.  Always use your most recent med list.                   Brand Name Dispense Instructions for use    atorvastatin 20 MG tablet    LIPITOR    90 tablet    Take 1 tablet (20 mg) by mouth daily       cefpodoxime 100 MG tablet    VANTIN    10 tablet    Take 1 tablet (100 mg) by mouth daily       darbepoetin bucky 300 MCG/0.6ML injection    ARANESP (ALBUMIN FREE)    0.6 mL    Inject 0.6 mLs (300 mcg) Subcutaneous every 28 days As needed for hgb<10g/dL       folic acid 1 MG tablet    FOLVITE    30 tablet    Take 1 tablet (1 mg) by mouth daily       hydrALAZINE 100 MG Tabs tablet    APRESOLINE    270 tablet    Take 1 tablet (100 mg) by mouth 3 times daily       hydrocortisone 1 % cream    CORTAID    60 g    Apply topically 2 times daily       mycophenolate capsule     120 capsule    Take 2 capsules (500 mg) by mouth 2 times daily       predniSONE 5 MG tablet    DELTASONE    30 tablet    Take 1 tablet (5 mg) by  mouth daily       propranolol HCl 60 MG Tabs     180 tablet    Take 1 tablet (60 mg) by mouth 2 times daily       sodium bicarbonate 650 MG tablet     180 tablet    Take 1 tablet (650 mg) by mouth 2 times daily       topiramate 25 MG tablet    TOPAMAX    90 tablet    25 mg at bedtime for 1 week, 50 mg at bedtime for 1 week and 75 mg daily at bedtime thereafter       vitamin D 12774 UNIT capsule    ERGOCALCIFEROL    8 capsule    Take 1 capsule (50,000 Units) by mouth once a week

## 2017-05-05 DIAGNOSIS — K21.00 GASTROESOPHAGEAL REFLUX DISEASE WITH ESOPHAGITIS: Primary | ICD-10-CM

## 2017-05-07 RX ORDER — PANTOPRAZOLE SODIUM 40 MG/1
40 TABLET, DELAYED RELEASE ORAL DAILY
Qty: 30 TABLET | Refills: 11 | Status: SHIPPED | OUTPATIENT
Start: 2017-05-07 | End: 2018-11-15

## 2017-05-15 DIAGNOSIS — N18.30 ANEMIA IN STAGE 3 CHRONIC KIDNEY DISEASE (H): ICD-10-CM

## 2017-05-15 DIAGNOSIS — D63.1 ANEMIA IN STAGE 3 CHRONIC KIDNEY DISEASE (H): ICD-10-CM

## 2017-05-15 DIAGNOSIS — Z79.899 ENCOUNTER FOR LONG-TERM CURRENT USE OF MEDICATION: ICD-10-CM

## 2017-05-15 DIAGNOSIS — N18.30 CHRONIC KIDNEY DISEASE, STAGE III (MODERATE) (H): ICD-10-CM

## 2017-05-15 DIAGNOSIS — Z48.298 AFTERCARE FOLLOWING ORGAN TRANSPLANT: ICD-10-CM

## 2017-05-15 DIAGNOSIS — Z94.0 KIDNEY REPLACED BY TRANSPLANT: ICD-10-CM

## 2017-05-15 LAB
ANION GAP SERPL CALCULATED.3IONS-SCNC: 11 MMOL/L (ref 3–14)
BUN SERPL-MCNC: 72 MG/DL (ref 7–30)
CALCIUM SERPL-MCNC: 8.4 MG/DL (ref 8.5–10.1)
CHLORIDE SERPL-SCNC: 110 MMOL/L (ref 94–109)
CO2 SERPL-SCNC: 18 MMOL/L (ref 20–32)
CREAT SERPL-MCNC: 2.72 MG/DL (ref 0.52–1.04)
ERYTHROCYTE [DISTWIDTH] IN BLOOD BY AUTOMATED COUNT: 15.9 % (ref 10–15)
GFR SERPL CREATININE-BSD FRML MDRD: 18 ML/MIN/1.7M2
GLUCOSE SERPL-MCNC: 103 MG/DL (ref 70–99)
HCT VFR BLD AUTO: 30 % (ref 35–47)
HGB BLD-MCNC: 9.3 G/DL (ref 11.7–15.7)
MCH RBC QN AUTO: 25.3 PG (ref 26.5–33)
MCHC RBC AUTO-ENTMCNC: 31 G/DL (ref 31.5–36.5)
MCV RBC AUTO: 82 FL (ref 78–100)
PLATELET # BLD AUTO: 246 10E9/L (ref 150–450)
POTASSIUM SERPL-SCNC: 4.2 MMOL/L (ref 3.4–5.3)
RBC # BLD AUTO: 3.67 10E12/L (ref 3.8–5.2)
SODIUM SERPL-SCNC: 139 MMOL/L (ref 133–144)
WBC # BLD AUTO: 6.1 10E9/L (ref 4–11)

## 2017-05-16 ENCOUNTER — TELEPHONE (OUTPATIENT)
Dept: PHARMACY | Facility: CLINIC | Age: 62
End: 2017-05-16

## 2017-05-16 NOTE — TELEPHONE ENCOUNTER
Anemia Management Note  SUBJECTIVE/OBJECTIVE:  Referred by Dr Tyshawn Sexton February 10, 2017  Primary Diagnosis: Anemia in Chronic Kidney Disease (N18.3 D63.1)   Secondary Diagnosis: Chronic Kidney Disease, Stage III (N18.3)   Hgb goal range: 9-10  Epo/Darbo: Aranesp 300 mcg every 14 days - At home   RX will  on 18  Iron regimen:  Ferrous sulfate 325 mg QD on 16  Lab orders  18 in EPIC     Anemia Latest Ref Rng & Units 2017 2017 2017 3/1/2017 3/28/2017 2017 5/15/2017   VALARIE Dose - 300 mcg - - 300 mcg - 300 mcg -   Hemoglobin 11.7 - 15.7 g/dL 8.9(L) 10.2(L) 9.9(L) - 10.3(L) 9.6(L) 9.3(L)   TSAT 15 - 46 % - 29 - - - 40 -   Ferritin 8 - 252 ng/mL - 828(H) - - - 966(H) -     BP Readings from Last 3 Encounters:   17 (!) 172/96   17 104/72   17 145/86     Wt Readings from Last 2 Encounters:   17 227 lb 9.6 oz (103.2 kg)   17 220 lb 3.2 oz (99.9 kg)     ASSESSMENT:  Hgb: At goal - recommend dose  TSat: at goal >30% Ferritin: At goal (>100ng/mL)    PLAN:  I spoke to Eleni, she will dose with aranesp  and RTC for hgb then aranesp if needed in 4 week(s)    Orders needed to be renewed (for next follow-up date) in [facility]: None    Iron labs due:  17    Plan discussed with:  Eleni  Plan provided by:  Winston    NEXT FOLLOW-UP DATE:  17    Anemia Management Service  Yvette Goodwin,GiniD and Preethi Akers CPhT  Phone: [phone]  Fax: [phone]

## 2017-06-13 ENCOUNTER — TELEPHONE (OUTPATIENT)
Dept: PHARMACY | Facility: CLINIC | Age: 62
End: 2017-06-13

## 2017-06-13 NOTE — TELEPHONE ENCOUNTER
Follow-up with anemia management service:    LM for Eleni reminding her that she is due for Hgb lab and possibly an aranesp dose    Anemia Latest Ref Rng & Units 2017 2017 3/1/2017 3/28/2017 2017 5/15/2017 2017   VALARIE Dose - - - 300 mcg - 300 mcg - 300 mcg   Hemoglobin 11.7 - 15.7 g/dL 10.2(L) 9.9(L) - 10.3(L) 9.6(L) 9.3(L) -   TSAT 15 - 46 % 29 - - - 40 - -   Ferritin 8 - 252 ng/mL 828(H) - - - 966(H) - -       Orders needed to be renewed (for next follow-up date) in EPIC: None  Med order expires: 18  Lab orders : 18       Follow-up call date: 6/15/17    Winston    Anemia Management Service  Yvette Goodwin,GiniD and Preethi Akers CPhT  Phone: [phone]  Fax: [phone]

## 2017-06-15 ENCOUNTER — TELEPHONE (OUTPATIENT)
Dept: PHARMACY | Facility: CLINIC | Age: 62
End: 2017-06-15

## 2017-06-15 NOTE — TELEPHONE ENCOUNTER
Follow-up with anemia management service:    I spoke to Eleni reminded her that she is due for Hgb lab and possibly an aranesp dose.  She didn't have a car today so she will go in on 17    Anemia Latest Ref Rng & Units 2017 2017 3/1/2017 3/28/2017 2017 5/15/2017 2017   VALARIE Dose - - - 300 mcg - 300 mcg - 300 mcg   Hemoglobin 11.7 - 15.7 g/dL 10.2(L) 9.9(L) - 10.3(L) 9.6(L) 9.3(L) -   TSAT 15 - 46 % 29 - - - 40 - -   Ferritin 8 - 252 ng/mL 828(H) - - - 966(H) - -       Orders needed to be renewed (for next follow-up date) in EPIC: None  Med order expires: 18  Lab orders : 18    Follow-up call date: 17    Winston    Anemia Management Service  Yvette Goodwin,Doug and Preethi Akers CPhT  Phone: [phone]  Fax: [phone]

## 2017-06-16 DIAGNOSIS — N18.30 ANEMIA IN STAGE 3 CHRONIC KIDNEY DISEASE (H): ICD-10-CM

## 2017-06-16 DIAGNOSIS — J45.901 ASTHMA EXACERBATION: ICD-10-CM

## 2017-06-16 DIAGNOSIS — Z48.298 AFTERCARE FOLLOWING ORGAN TRANSPLANT: ICD-10-CM

## 2017-06-16 DIAGNOSIS — Z94.0 KIDNEY REPLACED BY TRANSPLANT: ICD-10-CM

## 2017-06-16 DIAGNOSIS — D63.1 ANEMIA IN STAGE 3 CHRONIC KIDNEY DISEASE (H): ICD-10-CM

## 2017-06-16 DIAGNOSIS — N18.30 CHRONIC KIDNEY DISEASE, STAGE III (MODERATE) (H): ICD-10-CM

## 2017-06-16 DIAGNOSIS — Z79.899 ENCOUNTER FOR LONG-TERM CURRENT USE OF MEDICATION: ICD-10-CM

## 2017-06-16 LAB
ANION GAP SERPL CALCULATED.3IONS-SCNC: 10 MMOL/L (ref 3–14)
BUN SERPL-MCNC: 50 MG/DL (ref 7–30)
CALCIUM SERPL-MCNC: 8.1 MG/DL (ref 8.5–10.1)
CHLORIDE SERPL-SCNC: 112 MMOL/L (ref 94–109)
CO2 SERPL-SCNC: 21 MMOL/L (ref 20–32)
CREAT SERPL-MCNC: 1.86 MG/DL (ref 0.52–1.04)
ERYTHROCYTE [DISTWIDTH] IN BLOOD BY AUTOMATED COUNT: 15.4 % (ref 10–15)
GFR SERPL CREATININE-BSD FRML MDRD: 27 ML/MIN/1.7M2
GLUCOSE SERPL-MCNC: 98 MG/DL (ref 70–99)
HCT VFR BLD AUTO: 30.5 % (ref 35–47)
HGB BLD-MCNC: 9.2 G/DL (ref 11.7–15.7)
MCH RBC QN AUTO: 25.2 PG (ref 26.5–33)
MCHC RBC AUTO-ENTMCNC: 30.2 G/DL (ref 31.5–36.5)
MCV RBC AUTO: 84 FL (ref 78–100)
PLATELET # BLD AUTO: 213 10E9/L (ref 150–450)
POTASSIUM SERPL-SCNC: 4.1 MMOL/L (ref 3.4–5.3)
RBC # BLD AUTO: 3.65 10E12/L (ref 3.8–5.2)
SODIUM SERPL-SCNC: 143 MMOL/L (ref 133–144)
WBC # BLD AUTO: 5.8 10E9/L (ref 4–11)

## 2017-06-19 ENCOUNTER — TELEPHONE (OUTPATIENT)
Dept: PHARMACY | Facility: CLINIC | Age: 62
End: 2017-06-19

## 2017-06-20 NOTE — TELEPHONE ENCOUNTER
Anemia Management Note  SUBJECTIVE/OBJECTIVE:  Referred by Dr Tyshawn Sexton February 10, 2017  Primary Diagnosis: Anemia in Chronic Kidney Disease (N18.3 D63.1)   Secondary Diagnosis: Chronic Kidney Disease, Stage III (N18.3)   Hgb goal range: 9-10  Epo/Darbo: Aranesp 300 mcg every 14 days - At home   RX will  on 18  Iron regimen:  Ferrous sulfate 325 mg QD on 16  Lab orders  18 in EPIC     Anemia Latest Ref Rng & Units 2017 3/1/2017 3/28/2017 2017 5/15/2017 2017 2017   VALARIE Dose - - 300 mcg - 300 mcg - 300 mcg -   Hemoglobin 11.7 - 15.7 g/dL 9.9(L) - 10.3(L) 9.6(L) 9.3(L) - 9.2(L)   TSAT 15 - 46 % - - - 40 - - -   Ferritin 8 - 252 ng/mL - - - 966(H) - - -     BP Readings from Last 3 Encounters:   17 (!) 172/96   17 104/72   17 145/86     Wt Readings from Last 2 Encounters:   17 227 lb 9.6 oz (103.2 kg)   17 220 lb 3.2 oz (99.9 kg)     ASSESSMENT:  Hgb:At goal - recommend dose  TSat: at goal >30% Ferritin: At goal (>100ng/mL)    PLAN:  Dose with aranesp and RTC for hgb then aranesp if needed in 2 week(s)    Orders needed to be renewed (for next follow-up date) in [facility]: None    Iron labs due:  17    Plan discussed with:  NAY for Usanee  Plan provided by:  Winston    NEXT FOLLOW-UP DATE:  7/3/17    Anemia Management Service  Yvette Goodwin,GiniD and Preethi Akers CPhT  Phone: [phone]  Fax: [phone]

## 2017-06-21 ENCOUNTER — OFFICE VISIT (OUTPATIENT)
Dept: INFECTIOUS DISEASES | Facility: CLINIC | Age: 62
End: 2017-06-21
Attending: INTERNAL MEDICINE
Payer: MEDICARE

## 2017-06-21 VITALS
DIASTOLIC BLOOD PRESSURE: 75 MMHG | HEART RATE: 78 BPM | BODY MASS INDEX: 40.63 KG/M2 | HEIGHT: 62 IN | SYSTOLIC BLOOD PRESSURE: 101 MMHG | TEMPERATURE: 97.6 F | WEIGHT: 220.8 LBS

## 2017-06-21 DIAGNOSIS — B27.00 EBV (EPSTEIN-BARR VIRUS) VIREMIA: Primary | ICD-10-CM

## 2017-06-21 DIAGNOSIS — Z94.0 S/P KIDNEY TRANSPLANT: ICD-10-CM

## 2017-06-21 PROCEDURE — 99213 OFFICE O/P EST LOW 20 MIN: CPT | Mod: ZF

## 2017-06-21 ASSESSMENT — PAIN SCALES - GENERAL: PAINLEVEL: NO PAIN (0)

## 2017-06-21 NOTE — MR AVS SNAPSHOT
After Visit Summary   6/21/2017    Eleni Logan    MRN: [id number]           Patient Information     Date Of Birth          1955        Visit Information        Provider Department      6/21/2017 4:00 PM Kayden Garcia MD [facility] and Infectious Diseases        Today's Diagnoses     EBV (Abiel-Barr virus) viremia    -  1       Follow-ups after your visit        Follow-up notes from your care team     Return if symptoms worsen or fail to improve.      Your next 10 appointments already scheduled     Jun 22, 2017  8:20 AM CDT   CT CHEST ABODMEN W/O CONTRAST with UCCT1   [facility] ([facility])    [address]  [address]st [facility] [id number] [phone]           Please bring any scans or X-rays taken at other hospitals, if similar tests were done. Also bring a list of your medicines, including vitamins, minerals and over-the-counter drugs. It is safest to leave personal items at home.  Be sure to tell your doctor:   If you have any allergies.   If there s any chance you are pregnant.   If you are breastfeeding.   If you have any special needs.  You do not need to do anything special to prepare.  Please wear loose clothing, such as a sweat suit or jogging clothes. Avoid snaps, zippers and other metal. We may ask you to undress and put on a hospital gown.            Aug 07, 2017  9:30 AM CDT   (Arrive by 9:15 AM)   Return Visit with Pancho Salgado MD   [facility] ([facility])    [address]  4th Floor  [facility] [id number] [phone]            Aug 08, 2017  1:10 PM CDT   (Arrive by 12:40 PM)   Return Kidney Transplant with Tyshawn Sexton MD   [facility] ([facility])    [address]  3rd Floor  [facility] [address] [phone]              Future tests that were ordered for you today     Open  "Future Orders        Priority Expected Expires Ordered    EBV DNA PCR Quantitative Whole Blood Routine 2017            Who to contact     If you have questions or need follow up information about today's clinic visit or your schedule please contact [facility] AND INFECTIOUS DISEASES directly at [phone].  Normal or non-critical lab and imaging results will be communicated to you by MyChart, letter or phone within 4 business days after the clinic has received the results. If you do not hear from us within 7 days, please contact the clinic through MightyNesthart or phone. If you have a critical or abnormal lab result, we will notify you by phone as soon as possible.  Submit refill requests through Platter or call your pharmacy and they will forward the refill request to us. Please allow 3 business days for your refill to be completed.          Additional Information About Your Visit        MyChart Information     Platter lets you send messages to your doctor, view your test results, renew your prescriptions, schedule appointments and more. To sign up, go to [email or web address] . Click on \"Log in\" on the left side of the screen, which will take you to the Welcome page. Then click on \"Sign up Now\" on the right side of the page.     You will be asked to enter the access code listed below, as well as some personal information. Please follow the directions to create your username and password.     Your access code is: [email or web address]  Expires: 2017  4:36 PM     Your access code will  in 90 days. If you need help or a new code, please call your [place] clinic or [phone].        Care EveryWhere ID     This is your Care EveryWhere ID. This could be used by other organizations to access your [place] medical records  [id number]        Your Vitals Were     Pulse Temperature Height BMI (Body Mass Index)          78 97.6  F (36.4  C) (Oral) 1.575 m (5' 2\") 40.38 kg/m2      "    Blood Pressure from Last 3 Encounters:   06/21/17 101/75   05/01/17 (!) 172/96   04/18/17 104/72    Weight from Last 3 Encounters:   06/21/17 100.2 kg (220 lb 12.8 oz)   05/01/17 103.2 kg (227 lb 9.6 oz)   04/18/17 99.9 kg (220 lb 3.2 oz)               Primary Care Provider Office Phone # Fax #    Rocio Marianna Saul [phone]       [facility] [address]  [facility] [address]        Equal Access to Services     [facility]: Hadii lani castaneda hadstanley Sodioni, wawyatt muniz, qaybta kaalmada inessa, clare chambers . So [facility] [phone].    ATENCIÓN: Si habla español, tiene a stoner disposición servicios gratuitos de asistencia lingüística. Ll[facility] [phone].    We comply with applicable federal civil rights laws and [place] laws. We do not discriminate on the basis of race, color, national origin, age, disability sex, sexual orientation or gender identity.            Thank you!     Thank you for choosing [facility] AND INFECTIOUS DISEASES  for your care. Our goal is always to provide you with excellent care. Hearing back from our patients is one way we can continue to improve our services. Please take a few minutes to complete the written survey that you may receive in the mail after your visit with us. Thank you!             Your Updated Medication List - Protect others around you: Learn how to safely use, store and throw away your medicines at www.disposemymeds.org.          This list is accurate as of: 6/21/17  4:36 PM.  Always use your most recent med list.                   Brand Name Dispense Instructions for use Diagnosis    atorvastatin 20 MG tablet    LIPITOR    90 tablet    Take 1 tablet (20 mg) by mouth daily    Hypercholesteremia       cefpodoxime 100 MG tablet    VANTIN    10 tablet    Take 1 tablet (100 mg) by mouth daily    Dysuria       darbepoetin bucky 300 MCG/0.6ML injection    ARANESP (ALBUMIN FREE)    0.6 mL     Inject 0.6 mLs (300 mcg) Subcutaneous every 28 days As needed for hgb<10g/dL    Chronic kidney disease, stage III (moderate), Anemia in stage 3 chronic kidney disease       folic acid 1 MG tablet    FOLVITE    30 tablet    Take 1 tablet (1 mg) by mouth daily    Preventive measure       hydrALAZINE 100 MG Tabs tablet    APRESOLINE    270 tablet    Take 1 tablet (100 mg) by mouth 3 times daily    HTN (hypertension)       hydrocortisone 1 % cream    CORTAID    60 g    Apply topically 2 times daily    Rash, skin       mycophenolate capsule     120 capsule    Take 2 capsules (500 mg) by mouth 2 times daily    Kidney transplanted       pantoprazole 40 MG EC tablet    PROTONIX    30 tablet    Take 1 tablet (40 mg) by mouth daily    Gastroesophageal reflux disease with esophagitis       predniSONE 5 MG tablet    DELTASONE    30 tablet    Take 1 tablet (5 mg) by mouth daily    Kidney transplanted       propranolol HCl 60 MG Tabs     180 tablet    Take 1 tablet (60 mg) by mouth 2 times daily    Kidney replaced by transplant       sodium bicarbonate 650 MG tablet     180 tablet    Take 1 tablet (650 mg) by mouth 2 times daily    Acidosis       topiramate 25 MG tablet    TOPAMAX    90 tablet    25 mg at bedtime for 1 week, 50 mg at bedtime for 1 week and 75 mg daily at bedtime thereafter    Morbid drug-induced obesity       vitamin D 30811 UNIT capsule    ERGOCALCIFEROL    8 capsule    Take 1 capsule (50,000 Units) by mouth once a week    Vitamin D deficiency

## 2017-06-21 NOTE — LETTER
"6/21/2017      RE: Eleni Logan  [address]  [address]  [facility] [address]       Infectious Disease Clinic Staff Note: Ms. Logan was seen, examined, and her case was discussed with Dr. Garcia, ID Fellow -- I agree with his interval history and examination, assessment and plan in this outpatient ID Progress note. The note reflects my observations and opinions, and the plan outlined fully reflects my approach. I have reviewed the available history, laboratory results, radiography, and other reports with the Fellow.  The Review of Systems was otherwise completely negative beyond that in the Fellow's note.      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  She is presenting to me for evaluation of EBV viremia.  I last saw the patient on 12/16.  Since that visit, she has had repeat EBV blood checks, last 01/13/2017 at 5000.  Last CT scan was 06/04/2016, which showed no evidence of PTLD.  The patient has had recurrent UTIs growing pansensitive E. coli.  No recent episodes of urinary tract infections.  Her energy level is down, though she thinks this is from the recent weight loss medicine, which has caused her to have much decreased energy.  In addition, she continues to have issues with anemia.  She denies fevers, chills or sweats.  Says her weight has been stable recently.  She continues to live with a \"roommate\" in [place].  No recent travel home.  She did travel to [place] recently to visit her son and daughter who live there.      PAST MEDICAL HISTORY:    Past Medical History:   Diagnosis Date     Anatomical narrow angle      Anemia      Gout      HTN (hypertension)      Hyperlipidaemia      IgA nephropathy      Immunosuppressed status (H)     Prednisone and cellcept     Nonsenile cataract      Obstructive sleep apnea       "   MEDICATIONS:    Current Outpatient Prescriptions   Medication     pantoprazole (PROTONIX) 40 MG EC tablet     folic acid (FOLVITE) 1 MG tablet     vitamin D (ERGOCALCIFEROL) 38666 UNIT capsule     cefpodoxime (VANTIN) 100 MG tablet     sodium bicarbonate 650 MG tablet     darbepoetin bucky (ARANESP, ALBUMIN FREE,) 300 MCG/0.6ML injection     CELLCEPT 250 MG PO CAPSULE     atorvastatin (LIPITOR) 20 MG tablet     predniSONE (DELTASONE) 5 MG tablet     hydrALAZINE (APRESOLINE) 100 MG TABS     propranolol HCl 60 MG TABS     hydrocortisone (CORTAID) 1 % cream     topiramate (TOPAMAX) 25 MG tablet     No current facility-administered medications for this visit.          SOCIAL HISTORY:  Reviewed.      FAMILY HISTORY:  Reviewed.      PHYSICAL EXAMINATION:   GENERAL:  The patient appears stated age.  Pleasant.   HEENT:  Sclerae are anicteric.  Oral mucosa moist.   CARDIOVASCULAR:  Regular rhythm.  Normal rate.  No additional heart sounds or murmurs.   LUNGS:  Clear.  No crackles or wheezes.   ABDOMEN:  Obese.  Bowel sounds present.  Nontender to light palpation.   EXTREMITIES:  No lower extremity edema.   SKIN:  Warm, dry.  No rash.      LABORATORY WORK AND IMAGING:  Reviewed.   - Urine culture 12/14/2016:   E. coli, pansensitive.   - Urine culture 03/28/2017:  E. coli, pansensitive.   - EBV PCR blood 01/13/2017:  5000.      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  No symptoms to suggest worsening EBV, though we will need to recheck EBV PCR blood.  At this point, she is due for a repeat CT chest, abdomen, and pelvis to rule out findings suggestive of PTLD.  If the CT scan does not show evidence of PTLD, and her EBV PCR blood has not increased significantly since January, it would be reasonable to continue checking the patient every 3-6 months with EBV PCR blood.  This can be done by her Nephrology  Team.  Indications to obtain a CT scan to evaluate for PTLD would be marked and persistent rise in the EBV PCR blood level.  It would be reasonable to re-consult ID at that time.  For now, the patient can be seen by Nephrology moving forward.      The patient was discussed with Dr. Kate.   Dictated by Sheryl Harrison, Infectious Disease Fellow         LINDSEY KATE MD       As dictated by SHERYL HARRISON MD            D: 2017 17:32   T: 2017 05:54   MT: PARRISH      Name:     ISABELLE VAZQUEZ   MRN:      [id number]        Account:      [id number]   :      1955           Service Date: 2017      Document: [id number]        Sheryl Harrison MD

## 2017-06-21 NOTE — NURSING NOTE
"Chief Complaint   Patient presents with     RECHECK     follow up with UTI's, tzimmer cma       Initial /75  Pulse 78  Temp 97.6  F (36.4  C) (Oral)  Ht 1.575 m (5' 2\")  Wt 100.2 kg (220 lb 12.8 oz)  BMI 40.38 kg/m2 Estimated body mass index is 40.38 kg/(m^2) as calculated from the following:    Height as of this encounter: 1.575 m (5' 2\").    Weight as of this encounter: 100.2 kg (220 lb 12.8 oz).  Medication Reconciliation: complete    "

## 2017-06-22 DIAGNOSIS — D63.1 ANEMIA IN STAGE 3 CHRONIC KIDNEY DISEASE (H): ICD-10-CM

## 2017-06-22 DIAGNOSIS — B27.00 EBV (EPSTEIN-BARR VIRUS) VIREMIA: ICD-10-CM

## 2017-06-22 DIAGNOSIS — N18.30 CHRONIC KIDNEY DISEASE, STAGE III (MODERATE) (H): ICD-10-CM

## 2017-06-22 DIAGNOSIS — N18.30 ANEMIA IN STAGE 3 CHRONIC KIDNEY DISEASE (H): ICD-10-CM

## 2017-06-22 LAB
HCT VFR BLD AUTO: 31.3 % (ref 35–47)
HGB BLD-MCNC: 9.4 G/DL (ref 11.7–15.7)

## 2017-06-22 PROCEDURE — 87799 DETECT AGENT NOS DNA QUANT: CPT | Performed by: INTERNAL MEDICINE

## 2017-06-22 NOTE — PROGRESS NOTES
"Progress Note    HISTORY OF PRESENT ILLNESS:  The patient is a 61-year-old female with a living related kidney transplant secondary to IgA nephropathy, currently on CellCept and prednisone; EBV viremia; CKD; morbid obesity.  She is presenting to me for evaluation of EBV viremia.  I last saw the patient on 12/16.  Since that visit, she has had repeat EBV blood checks, last 01/13/2017 at 5000.  Last CT scan was 06/04/2016, which showed no evidence of PTLD.  The patient has had recurrent UTIs growing pansensitive E. coli.  No recent episodes of urinary tract infections.  Her energy level is down, though she thinks this is from the recent weight loss medicine, which has caused her to have much decreased energy.  In addition, she continues to have issues with anemia.  She denies fevers, chills or sweats.  Says her weight has been stable recently.  She continues to live with a \"roommate\" in [place].  No recent travel home.  She did travel to [place] recently to visit her son and daughter who live there.      PAST MEDICAL HISTORY:    Past Medical History:   Diagnosis Date     Anatomical narrow angle      Anemia      Gout      HTN (hypertension)      Hyperlipidaemia      IgA nephropathy      Immunosuppressed status (H)     Prednisone and cellcept     Nonsenile cataract      Obstructive sleep apnea         MEDICATIONS:    Current Outpatient Prescriptions   Medication     pantoprazole (PROTONIX) 40 MG EC tablet     folic acid (FOLVITE) 1 MG tablet     vitamin D (ERGOCALCIFEROL) 81277 UNIT capsule     cefpodoxime (VANTIN) 100 MG tablet     sodium bicarbonate 650 MG tablet     darbepoetin bucky (ARANESP, ALBUMIN FREE,) 300 MCG/0.6ML injection     CELLCEPT 250 MG PO CAPSULE     atorvastatin (LIPITOR) 20 MG tablet     predniSONE (DELTASONE) 5 MG tablet     hydrALAZINE (APRESOLINE) 100 MG TABS     propranolol HCl 60 MG TABS     hydrocortisone (CORTAID) 1 % cream     topiramate (TOPAMAX) 25 MG tablet     No current " facility-administered medications for this visit.          SOCIAL HISTORY:  Reviewed.      FAMILY HISTORY:  Reviewed.      PHYSICAL EXAMINATION:   GENERAL:  The patient appears stated age.  Pleasant.   HEENT:  Sclerae are anicteric.  Oral mucosa moist.   CARDIOVASCULAR:  Regular rhythm.  Normal rate.  No additional heart sounds or murmurs.   LUNGS:  Clear.  No crackles or wheezes.   ABDOMEN:  Obese.  Bowel sounds present.  Nontender to light palpation.   EXTREMITIES:  No lower extremity edema.   SKIN:  Warm, dry.  No rash.      LABORATORY WORK AND IMAGING:  Reviewed.   - Urine culture 2016:   E. coli, pansensitive.   - Urine culture 2017:  E. coli, pansensitive.   - EBV PCR blood 2017:  5000.      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  No symptoms to suggest worsening EBV, though we will need to recheck EBV PCR blood.  At this point, she is due for a repeat CT chest, abdomen, and pelvis to rule out findings suggestive of PTLD.  If the CT scan does not show evidence of PTLD, and her EBV PCR blood has not increased significantly since January, it would be reasonable to continue checking the patient every 3-6 months with EBV PCR blood.  This can be done by her Nephrology Team.  Indications to obtain a CT scan to evaluate for PTLD would be marked and persistent rise in the EBV PCR blood level.  It would be reasonable to re-consult ID at that time.  For now, the patient can be seen by Nephrology moving forward.      The patient was discussed with Dr. Kate.   Dictated by Sheryl Harrison, Infectious Disease Fellow         LINDSEY KATE MD       As dictated by SHERYL HARRISON MD            D: 2017 17:32   T: 2017 05:54   MT: PARRISH      Name:     ISABELLE VAZQUEZ   MRN:      [id number]        Account:      [id number]   :      1955           Service Date:  06/21/2017      Document: [id number]

## 2017-06-23 LAB
EBV DNA # SPEC NAA+PROBE: 6495 {COPIES}/ML
EBV DNA SPEC NAA+PROBE-LOG#: 3.8 {LOG_COPIES}/ML

## 2017-06-27 NOTE — PROGRESS NOTES
Infectious Disease Clinic Staff Note: Ms. Logan was seen, examined, and her case was discussed with Dr. Garcia, ID Fellow -- I agree with his interval history and examination, assessment and plan in this outpatient ID Progress note. The note reflects my observations and opinions, and the plan outlined fully reflects my approach. I have reviewed the available history, laboratory results, radiography, and other reports with the Fellow.  The Review of Systems was otherwise completely negative beyond that in the Fellow's note.

## 2017-07-03 ENCOUNTER — TELEPHONE (OUTPATIENT)
Dept: PHARMACY | Facility: CLINIC | Age: 62
End: 2017-07-03

## 2017-07-03 NOTE — TELEPHONE ENCOUNTER
Anemia Management Note  SUBJECTIVE/OBJECTIVE:  Referred by Dr Tyshawn Sexton February 10, 2017  Primary Diagnosis: Anemia in Chronic Kidney Disease (N18.3 D63.1)   Secondary Diagnosis: Chronic Kidney Disease, Stage III (N18.3)   Hgb goal range: 9-10  Epo/Darbo: Aranesp 300 mcg every 14 days - At home   RX will  on 18  Iron regimen:  Ferrous sulfate 325 mg QD on 16  Lab orders  18 in EPIC     Anemia Latest Ref Rng & Units 3/28/2017 2017 5/15/2017 2017 2017 2017 2017   VALARIE Dose - - 300 mcg - 300 mcg - - 300 mcg   Hemoglobin 11.7 - 15.7 g/dL 10.3(L) 9.6(L) 9.3(L) - 9.2(L) 9.4(L) -   TSAT 15 - 46 % - 40 - - - - -   Ferritin 8 - 252 ng/mL - 966(H) - - - - -     BP Readings from Last 3 Encounters:   17 101/75   17 (!) 172/96   17 104/72     Wt Readings from Last 2 Encounters:   17 220 lb 12.8 oz (100.2 kg)   17 227 lb 9.6 oz (103.2 kg)     ASSESSMENT:  Hgb:  At goal - recommend dose  TSat: at goal >30% Ferritin: At goal (>100ng/mL)    PLAN:  Eleni dosed with aranesp  and will RTC for hgb then aranesp if needed in 2 week(s)    Orders needed to be renewed (for next follow-up date) in [facility]: None    Iron labs due:  17    Plan discussed with:  Eleni  Plan provided by:  Winston    NEXT FOLLOW-UP DATE:  17    Anemia Management Service  Yvette Goodwin,GiniD and Preethi Akers CPhT  Phone: [phone]  Fax: [phone]

## 2017-07-07 ENCOUNTER — TELEPHONE (OUTPATIENT)
Dept: PHARMACY | Facility: CLINIC | Age: 62
End: 2017-07-07

## 2017-07-07 NOTE — TELEPHONE ENCOUNTER
Follow-up with anemia management service:    NAY with Gomez that Eleni is due for hgb then aranesp if needed.    Anemia Latest Ref Rng & Units 3/28/2017 2017 5/15/2017 2017 2017 2017 2017   VALARIE Dose - - 300 mcg - 300 mcg - - 300 mcg   Hemoglobin 11.7 - 15.7 g/dL 10.3(L) 9.6(L) 9.3(L) - 9.2(L) 9.4(L) -   TSAT 15 - 46 % - 40 - - - - -   Ferritin 8 - 252 ng/mL - 966(H) - - - - -     Orders needed to be renewed (for next follow-up date) in EPIC: None   Med order expires: 2018   Lab orders : 2018    Follow-up call date:     [facility]    Anemia Management Service  Yvette Goodwin PharmD and Preethi Akers CPhT  Phone: [phone]  Fax: [phone]

## 2017-07-10 DIAGNOSIS — Z94.0 KIDNEY REPLACED BY TRANSPLANT: ICD-10-CM

## 2017-07-10 DIAGNOSIS — Z48.298 AFTERCARE FOLLOWING ORGAN TRANSPLANT: ICD-10-CM

## 2017-07-10 DIAGNOSIS — N18.30 ANEMIA IN STAGE 3 CHRONIC KIDNEY DISEASE (H): ICD-10-CM

## 2017-07-10 DIAGNOSIS — N18.30 CHRONIC KIDNEY DISEASE, STAGE III (MODERATE) (H): ICD-10-CM

## 2017-07-10 DIAGNOSIS — Z79.899 ENCOUNTER FOR LONG-TERM CURRENT USE OF MEDICATION: ICD-10-CM

## 2017-07-10 DIAGNOSIS — D63.1 ANEMIA IN STAGE 3 CHRONIC KIDNEY DISEASE (H): ICD-10-CM

## 2017-07-10 LAB
ANION GAP SERPL CALCULATED.3IONS-SCNC: 9 MMOL/L (ref 3–14)
BUN SERPL-MCNC: 71 MG/DL (ref 7–30)
CALCIUM SERPL-MCNC: 8.2 MG/DL (ref 8.5–10.1)
CHLORIDE SERPL-SCNC: 107 MMOL/L (ref 94–109)
CO2 SERPL-SCNC: 24 MMOL/L (ref 20–32)
CREAT SERPL-MCNC: 2.55 MG/DL (ref 0.52–1.04)
CREAT UR-MCNC: 91 MG/DL
ERYTHROCYTE [DISTWIDTH] IN BLOOD BY AUTOMATED COUNT: 15.6 % (ref 10–15)
FERRITIN SERPL-MCNC: 759 NG/ML (ref 8–252)
GFR SERPL CREATININE-BSD FRML MDRD: 19 ML/MIN/1.7M2
GLUCOSE SERPL-MCNC: 142 MG/DL (ref 70–99)
HCT VFR BLD AUTO: 31.6 % (ref 35–47)
HGB BLD-MCNC: 9.5 G/DL (ref 11.7–15.7)
IRON SATN MFR SERPL: 23 % (ref 15–46)
IRON SERPL-MCNC: 52 UG/DL (ref 35–180)
MCH RBC QN AUTO: 25.1 PG (ref 26.5–33)
MCHC RBC AUTO-ENTMCNC: 30.1 G/DL (ref 31.5–36.5)
MCV RBC AUTO: 83 FL (ref 78–100)
PLATELET # BLD AUTO: 254 10E9/L (ref 150–450)
POTASSIUM SERPL-SCNC: 3.7 MMOL/L (ref 3.4–5.3)
PROT UR-MCNC: 0.34 G/L
PROT/CREAT 24H UR: 0.37 G/G CR (ref 0–0.2)
RBC # BLD AUTO: 3.79 10E12/L (ref 3.8–5.2)
SODIUM SERPL-SCNC: 140 MMOL/L (ref 133–144)
TIBC SERPL-MCNC: 219 UG/DL (ref 240–430)
WBC # BLD AUTO: 7.1 10E9/L (ref 4–11)

## 2017-07-10 PROCEDURE — 87799 DETECT AGENT NOS DNA QUANT: CPT | Performed by: INTERNAL MEDICINE

## 2017-07-12 LAB
BKV DNA # SPEC NAA+PROBE: NORMAL COPIES/ML
BKV DNA SPEC NAA+PROBE-LOG#: NORMAL LOG COPIES/ML
SPECIMEN SOURCE: NORMAL

## 2017-07-12 RX ORDER — PROPRANOLOL HYDROCHLORIDE 60 MG/1
1 TABLET ORAL 2 TIMES DAILY
Qty: 180 TABLET | Refills: 2 | Status: SHIPPED | OUTPATIENT
Start: 2017-07-12 | End: 2017-08-08

## 2017-07-12 NOTE — TELEPHONE ENCOUNTER
propranolol HCl 60 MG      Last Written Prescription Date:  7/5/16  Last Fill Quantity: 180,   # refills: 3  Last Office Visit : 4/18/17  Future Office visit:  none  BP Readings from Last 3 Encounters:   06/21/17 101/75   05/01/17 (!) 172/96   04/18/17 104/72

## 2017-07-13 ENCOUNTER — TELEPHONE (OUTPATIENT)
Dept: PHARMACY | Facility: CLINIC | Age: 62
End: 2017-07-13

## 2017-07-13 NOTE — TELEPHONE ENCOUNTER
Anemia Management Note  SUBJECTIVE/OBJECTIVE:  Referred by Dr Tyshawn Sexton February 10, 2017  Primary Diagnosis: Anemia in Chronic Kidney Disease (N18.3 D63.1)   Secondary Diagnosis: Chronic Kidney Disease, Stage III (N18.3)   Hgb goal range: 9-10  Epo/Darbo: Aranesp 300 mcg every 14 days - At home   RX will  on 18  Iron regimen:  Ferrous sulfate 325 mg QD on 16  Lab orders  18 in EPIC     Anemia Latest Ref Rng & Units 2017 5/15/2017 2017 2017 2017 2017 7/10/2017   VALARIE Dose - 300 mcg - 300 mcg - - 300 mcg -   Hemoglobin 11.7 - 15.7 g/dL 9.6(L) 9.3(L) - 9.2(L) 9.4(L) - 9.5(L)   TSAT 15 - 46 % 40 - - - - - 23   Ferritin 8 - 252 ng/mL 966(H) - - - - - 759(H)     BP Readings from Last 3 Encounters:   17 101/75   17 (!) 172/96   17 104/72     Wt Readings from Last 2 Encounters:   17 220 lb 12.8 oz (100.2 kg)   17 227 lb 9.6 oz (103.2 kg)     ASSESSMENT:  Hgb: At goal - recommend dose  TSat: not at goal (>30%) but ferritin >500ng/mL.  IV iron not indicated at this time per anemia protocol.     PLAN:  Eleni will dose with aranesp  and RTC for hgb then aranesp if needed in 2 week(s)    Orders needed to be renewed (for next follow-up date) in [facility]: None    Iron labs due:  10/10/17    Plan discussed with:  Eleni  Plan provided by:  Winston    NEXT FOLLOW-UP DATE:  17    Anemia Management Service  Yvette Goodwin,GiniD and Preethi Akers CPhT  Phone: [phone]  Fax: [phone]

## 2017-07-17 ENCOUNTER — OFFICE VISIT (OUTPATIENT)
Dept: INTERNAL MEDICINE | Facility: CLINIC | Age: 62
End: 2017-07-17

## 2017-07-17 VITALS
TEMPERATURE: 98.6 F | SYSTOLIC BLOOD PRESSURE: 120 MMHG | WEIGHT: 220 LBS | RESPIRATION RATE: 20 BRPM | DIASTOLIC BLOOD PRESSURE: 88 MMHG | HEART RATE: 117 BPM | BODY MASS INDEX: 40.24 KG/M2 | OXYGEN SATURATION: 97 %

## 2017-07-17 DIAGNOSIS — J30.1 ACUTE SEASONAL ALLERGIC RHINITIS DUE TO POLLEN: Primary | ICD-10-CM

## 2017-07-17 RX ORDER — FEXOFENADINE HCL 180 MG/1
180 TABLET ORAL DAILY
Qty: 30 TABLET | Refills: 1 | Status: SHIPPED | OUTPATIENT
Start: 2017-07-17 | End: 2017-11-03

## 2017-07-17 ASSESSMENT — PAIN SCALES - GENERAL: PAINLEVEL: MODERATE PAIN (5)

## 2017-07-17 NOTE — NURSING NOTE
Chief Complaint   Patient presents with     Insomnia     Here for sleep problems     Nose Problem     Also here for nose pain; burning sensation      Ruy Milan CMA at 5:23 PM on 7/17/2017

## 2017-07-17 NOTE — MR AVS SNAPSHOT
After Visit Summary   7/17/2017    Eleni Logan    MRN: [id number]           Patient Information     Date Of Birth          1955        Visit Information        Provider Department      7/17/2017 5:00 PM Perri Malagon MD [facility]        Today's Diagnoses     Acute seasonal allergic rhinitis due to pollen    -  1      Care Instructions    Primary Care Center Medication Refill Request Information:  * Please contact your pharmacy regarding ANY request for medication refills.  ** [facility] Prescription Fax = [phone]  * Please allow 3 business days for routine medication refills.  * Please allow 5 business days for controlled substance medication refills.     Primary Care Center Test Result notification information:  *You will be notified with in 7-10 days of your appointment day regarding the results of your test.  If you are on MyChart you will be notified as soon as the provider has reviewed the results and signed off on them.    Primary Care Center [phone]             Follow-ups after your visit        Your next 10 appointments already scheduled     Aug 07, 2017  9:30 AM CDT   (Arrive by 9:15 AM)   Return Visit with Pancho Salgado MD   [facility] ([facility])    [address]  [address] [facility] [address] [phone]            Aug 08, 2017  1:10 PM CDT   (Arrive by 12:40 PM)   Return Kidney Transplant with Tyshawn Sexton MD   [facility] Nephrology ([facility])    [address]  [address] [facility] [address] [phone]              Who to contact     Please call your clinic at [phone] to:    Ask questions about your health    Make or cancel appointments    Discuss your medicines    Learn about your test results    Speak to your doctor   If you have compliments or concerns about an experience at your clinic, or if you wish to file a  complaint, please contact [facility] Patient Relations at [phone] or email us at [email or web address]         Additional Information About Your Visit        [employer] Information     Mozy is an electronic gateway that provides easy, online access to your medical records. With Mozy, you can request a clinic appointment, read your test results, renew a prescription or communicate with your care team.     To sign up for Mozy visit the website at [email or web address]   You will be asked to enter the access code listed below, as well as some personal information. Please follow the directions to create your username and password.     Your access code is: [email or web address]  Expires: 2017  4:36 PM     Your access code will  in 90 days. If you need help or a new code, please contact your [facility] or call [phone] for assistance.        Care EveryWhere ID     This is your Care EveryWhere ID. This could be used by other organizations to access your [place] medical records  [id number]        Your Vitals Were     Pulse Temperature Respirations Pulse Oximetry Breastfeeding? BMI (Body Mass Index)    117 98.6  F (37  C) (Oral) 20 97% No 40.24 kg/m2       Blood Pressure from Last 3 Encounters:   17 120/88   17 101/75   17 (!) 172/96    Weight from Last 3 Encounters:   17 99.8 kg (220 lb)   17 100.2 kg (220 lb 12.8 oz)   17 103.2 kg (227 lb 9.6 oz)              Today, you had the following     No orders found for display         Today's Medication Changes          These changes are accurate as of: 17  5:37 PM.  If you have any questions, ask your nurse or doctor.               Start taking these medicines.        Dose/Directions    fexofenadine 180 MG tablet   Commonly known as:  ALLEGRA   Used for:  Acute seasonal allergic rhinitis due to pollen   Started by:  Perri Malagon MD         Dose:  180 mg   Take 1 tablet (180 mg) by mouth daily   Quantity:  30 tablet   Refills:  1            Where to get your medicines      These medications were sent to [place], [place] - [address]  [address], [facility] [address]    Hours:  TRANSPLANT PHONE NUMBER [phone] Phone:  [phone]     fexofenadine 180 MG tablet                Primary Care Provider Office Phone # Fax #    Rocio Marianna Saul [phone]       [facility] [address]  [facility] [address]        Equal Access to Services     [facility] CenterMARIA D : Hadii lani castaneda hadasho Sodioni, waaxda luqadaha, qaybta kaalmada adehayderyada, clare chambers . So [facility] [phone].    ATENCIÓN: Si habla español, tiene a stoner disposición servicios gratuitos de asistencia lingüística. Llame al [phone].    We comply with applicable federal civil rights laws and [place] laws. We do not discriminate on the basis of race, color, national origin, age, disability sex, sexual orientation or gender identity.            Thank you!     Thank you for choosing [facility]  for your care. Our goal is always to provide you with excellent care. Hearing back from our patients is one way we can continue to improve our services. Please take a few minutes to complete the written survey that you may receive in the mail after your visit with us. Thank you!             Your Updated Medication List - Protect others around you: Learn how to safely use, store and throw away your medicines at www.disposemymeds.org.          This list is accurate as of: 7/17/17  5:37 PM.  Always use your most recent med list.                   Brand Name Dispense Instructions for use Diagnosis    atorvastatin 20 MG tablet    LIPITOR    90 tablet    Take 1 tablet (20 mg) by mouth daily    Hypercholesteremia       cefpodoxime 100 MG tablet    VANTIN    10  tablet    Take 1 tablet (100 mg) by mouth daily    Dysuria       darbepoetin bucky 300 MCG/0.6ML injection    ARANESP (ALBUMIN FREE)    0.6 mL    Inject 0.6 mLs (300 mcg) Subcutaneous every 28 days As needed for hgb<10g/dL    Chronic kidney disease, stage III (moderate), Anemia in stage 3 chronic kidney disease       fexofenadine 180 MG tablet    ALLEGRA    30 tablet    Take 1 tablet (180 mg) by mouth daily    Acute seasonal allergic rhinitis due to pollen       folic acid 1 MG tablet    FOLVITE    30 tablet    Take 1 tablet (1 mg) by mouth daily    Preventive measure       hydrALAZINE 100 MG Tabs tablet    APRESOLINE    270 tablet    Take 1 tablet (100 mg) by mouth 3 times daily    HTN (hypertension)       hydrocortisone 1 % cream    CORTAID    60 g    Apply topically 2 times daily    Rash, skin       mycophenolate capsule     120 capsule    Take 2 capsules (500 mg) by mouth 2 times daily    Kidney transplanted       pantoprazole 40 MG EC tablet    PROTONIX    30 tablet    Take 1 tablet (40 mg) by mouth daily    Gastroesophageal reflux disease with esophagitis       predniSONE 5 MG tablet    DELTASONE    30 tablet    Take 1 tablet (5 mg) by mouth daily    Kidney transplanted       propranolol HCl 60 MG Tabs     180 tablet    Take 1 tablet (60 mg) by mouth 2 times daily    Kidney replaced by transplant       sodium bicarbonate 650 MG tablet     180 tablet    Take 1 tablet (650 mg) by mouth 2 times daily    Acidosis       topiramate 25 MG tablet    TOPAMAX    90 tablet    25 mg at bedtime for 1 week, 50 mg at bedtime for 1 week and 75 mg daily at bedtime thereafter    Morbid drug-induced obesity       vitamin D 33076 UNIT capsule    ERGOCALCIFEROL    8 capsule    Take 1 capsule (50,000 Units) by mouth once a week    Vitamin D deficiency

## 2017-07-17 NOTE — PATIENT INSTRUCTIONS
[facility] Medication Refill Request Information:  * Please contact your pharmacy regarding ANY request for medication refills.  ** [facility] Prescription Fax = [phone]  * Please allow 3 business days for routine medication refills.  * Please allow 5 business days for controlled substance medication refills.     [facility] Test Result notification information:  *You will be notified with in 7-10 days of your appointment day regarding the results of your test.  If you are on MyChart you will be notified as soon as the provider has reviewed the results and signed off on them.    [facility] [phone]

## 2017-07-17 NOTE — PROGRESS NOTES
PRIMARY CARE CENTER       SUBJECTIVE:  Eleni Logan is a 61 year old female who comes in as she has not been sleeping well for the past couple days. She has been drinking more water because her Cr was more elevated last week. She also has burning in her nose and pain in her eyes with swelling. She isn't sure if this is making her not sleep well either. She has only been getting a couple hours of sleep at a time.     Past Medical History:   Diagnosis Date     Anatomical narrow angle      Anemia      Gout      HTN (hypertension)      Hyperlipidaemia      IgA nephropathy      Immunosuppressed status (H)     Prednisone and cellcept     Nonsenile cataract      Obstructive sleep apnea    Kidney replaced by transplant    Medications and allergies reviewed by me today.     ROS:   Constitutional, HEENT, cardiovascular, pulmonary, gi and gu systems are negative, except as otherwise noted.    OBJECTIVE:    /88 (BP Location: Right arm, Patient Position: Chair, Cuff Size: Adult Large)  Pulse 117  Temp 98.6  F (37  C) (Oral)  Resp 20  Wt 99.8 kg (220 lb)  SpO2 97%  Breastfeeding? No  BMI 40.24 kg/m2   Wt Readings from Last 1 Encounters:   07/17/17 99.8 kg (220 lb)       GENERAL APPEARANCE: healthy, alert and no distress     EYES: edematous eyelids, mild conjunctival irritation     HENT: ear canals and TM's normal, mouth without ulcers or lesions, nares with mild mucosal edema     NECK: no adenopathy, no asymmetry, masses, or scars and thyroid normal to palpation     RESP: lungs clear to auscultation - no rales, rhonchi or wheezes     CV: regular rates and rhythm, normal S1 S2, no S3 or S4 and no murmur, click or rub     LYMPHATICS: No axillary, cervical, or supraclavicular nodes     ASSESSMENT/PLAN:    Eleni was seen today for insomnia and nose problem.    Diagnoses and all orders for this visit:    Acute seasonal allergic rhinitis due to pollen. Suspect her nasal and eye symptoms are related  to allergies, and this maybe what's keeping her up at night. Will start anti-histamine. Her kidney function although more elevated at last check, is within her baseline function over the past several months, so I am less concerned that this is related to her current symptoms.   -     fexofenadine (ALLEGRA) 180 MG tablet; Take 1 tablet (180 mg) by mouth daily       Pt should return to clinic for f/u with me PRN.      Perri Silverman MD  07/17/17

## 2017-07-27 ENCOUNTER — TELEPHONE (OUTPATIENT)
Dept: PHARMACY | Facility: CLINIC | Age: 62
End: 2017-07-27

## 2017-07-27 NOTE — TELEPHONE ENCOUNTER
Follow-up with anemia management service:    I spoke to Eleni, reminding her that she will be due for Hgb labs and possibly an aranesp dose on 17    Eleni has an appt with Dr Sexton on 17 at 1:10 pm    Anemia Latest Ref Rng & Units 5/15/2017 2017 2017 2017 2017 7/10/2017 7/15/2017   HGB Goal - - - - - - - -   VALARIE Dose - - 300 mcg - - 300 mcg - 300 mcg   Hemoglobin 11.7 - 15.7 g/dL 9.3(L) - 9.2(L) 9.4(L) - 9.5(L) -   TSAT 15 - 46 % - - - - - 23 -   Ferritin 8 - 252 ng/mL - - - - - 759(H) -   PRBCs - - - - - - - -     Orders needed to be renewed (for next follow-up date) in EPIC: None                          Med order expires: 2018                          Lab orders : 2018    Follow-up call date: 17  [facility]    Anemia Management Service  Yvette Goodwin,GiniD and Preethi Akers CPhT  Phone: [phone]  Fax: [phone]

## 2017-07-29 ENCOUNTER — HEALTH MAINTENANCE LETTER (OUTPATIENT)
Age: 62
End: 2017-07-29

## 2017-08-04 DIAGNOSIS — N18.30 CHRONIC KIDNEY DISEASE, STAGE III (MODERATE) (H): ICD-10-CM

## 2017-08-04 DIAGNOSIS — D63.1 ANEMIA IN STAGE 3 CHRONIC KIDNEY DISEASE (H): ICD-10-CM

## 2017-08-04 DIAGNOSIS — N18.30 ANEMIA IN STAGE 3 CHRONIC KIDNEY DISEASE (H): ICD-10-CM

## 2017-08-04 LAB
HCT VFR BLD AUTO: 32.9 % (ref 35–47)
HGB BLD-MCNC: 10 G/DL (ref 11.7–15.7)

## 2017-08-07 ENCOUNTER — OFFICE VISIT (OUTPATIENT)
Dept: ENDOCRINOLOGY | Facility: CLINIC | Age: 62
End: 2017-08-07

## 2017-08-07 VITALS
DIASTOLIC BLOOD PRESSURE: 92 MMHG | SYSTOLIC BLOOD PRESSURE: 189 MMHG | BODY MASS INDEX: 41.09 KG/M2 | WEIGHT: 223.3 LBS | HEART RATE: 62 BPM | HEIGHT: 62 IN | OXYGEN SATURATION: 96 %

## 2017-08-07 DIAGNOSIS — E66.01 MORBID OBESITY DUE TO EXCESS CALORIES (H): Primary | ICD-10-CM

## 2017-08-07 ASSESSMENT — PAIN SCALES - GENERAL: PAINLEVEL: NO PAIN (0)

## 2017-08-07 NOTE — PROGRESS NOTES
"    Return Medical Weight Management Note     Eleni Logan  MRN:  [id number]  :  1955  MATT:  2017    Dear Rocio Saul MD,    I had the pleasure of seeing your patient Eleni Logan.  She is a 61 year old female who I am continuing to see for treatment of obesity related to:       2017   I have the following co-morbidities associated with obesity: High Blood Pressure, High Cholesterol, Sleep Apnea, Asthma, Weight Bearing Joint Pain       CURRENT WEIGHT:   223 lbs 4.8 oz    Wt Readings from Last 4 Encounters:   17 101.3 kg (223 lb 4.8 oz)   17 99.8 kg (220 lb)   17 100.2 kg (220 lb 12.8 oz)   17 103.2 kg (227 lb 9.6 oz)       Height:  5' 2\"  Body Mass Index:  Body mass index is 40.84 kg/(m^2).  Vitals:  B/P: 189/92, P: 62    Initial consult weight was 227 on 17.  Weight change since last seen on 2017 is down 4 pounds.   Total loss is 4 pounds.    INTERVAL HISTORY:  Lost weight but she is not sure how. Did not tolerate topiramate 75 HS (weakness), but thinks she was ok on 50 HS.    Diet and Activity Changes Since Last Visit Reviewed With Patient 2017   I have made the following changes to my diet since my last visit: no   For breakfast, I typically eat: toast egg ceael   For lunch, I typically eat: rice veggies   For supper, I typically eat: rice vegies   For snack(s), I typically eat: fruit   With regards to my activity/exercise, I am still struggling with: foot pain       MEDICATIONS:   Current Outpatient Prescriptions   Medication     fexofenadine (ALLEGRA) 180 MG tablet     propranolol HCl 60 MG TABS     pantoprazole (PROTONIX) 40 MG EC tablet     topiramate (TOPAMAX) 25 MG tablet     folic acid (FOLVITE) 1 MG tablet     vitamin D (ERGOCALCIFEROL) 70452 UNIT capsule     cefpodoxime (VANTIN) 100 MG tablet     sodium bicarbonate 650 MG tablet     darbepoetin bucky (ARANESP, ALBUMIN FREE,) 300 MCG/0.6ML injection     CELLCEPT 250 MG PO CAPSULE     " atorvastatin (LIPITOR) 20 MG tablet     predniSONE (DELTASONE) 5 MG tablet     hydrALAZINE (APRESOLINE) 100 MG TABS     hydrocortisone (CORTAID) 1 % cream     No current facility-administered medications for this visit.        Weight Loss Medication History Reviewed With Patient 8/7/2017   Which weight loss medications are you currently taking on a regular basis?  Topamax (topiramate)   If you are not taking a weight loss medication that was prescribed to you, please indicate why: I did not like the side effects   Are you having any side effects from the weight loss medication that we have prescribed you? Yes   If you are having side effects please describe: weakness shortness of breath       ASSESSMENT:   Will trial ramp back up just to 50 mg HS, as no other medications that are safe and paid for by medicare.    FOLLOW-UP:    12 weeks.  I spent 15 minutes with this patient face to face and explained the conditions and plans (more than 50% of time was counseling/coordination of weight management).    Sincerely,    Pancho Salgado MD

## 2017-08-07 NOTE — LETTER
"2017       RE: Eleni Logan  [address]  [address]  [facility] [address]     Dear Colleague,    Thank you for referring your patient, Eleni Logan, to the [facility] MEDICAL WEIGHT MANAGEMENT at [facility]. Please see a copy of my visit note below.        Return Medical Weight Management Note     Eleni Logan  MRN:  [id number]  :  1955  MATT:  2017    Dear Rocio Saul MD,    I had the pleasure of seeing your patient Eleni Logan.  She is a 61 year old female who I am continuing to see for treatment of obesity related to:       2017   I have the following co-morbidities associated with obesity: High Blood Pressure, High Cholesterol, Sleep Apnea, Asthma, Weight Bearing Joint Pain       CURRENT WEIGHT:   223 lbs 4.8 oz    Wt Readings from Last 4 Encounters:   17 101.3 kg (223 lb 4.8 oz)   17 99.8 kg (220 lb)   17 100.2 kg (220 lb 12.8 oz)   17 103.2 kg (227 lb 9.6 oz)       Height:  5' 2\"  Body Mass Index:  Body mass index is 40.84 kg/(m^2).  Vitals:  B/P: 189/92, P: 62    Initial consult weight was 227 on 17.  Weight change since last seen on 2017 is down 4 pounds.   Total loss is 4 pounds.    INTERVAL HISTORY:  Lost weight but she is not sure how. Did not tolerate topiramate 75 HS (weakness), but thinks she was ok on 50 HS.    Diet and Activity Changes Since Last Visit Reviewed With Patient 2017   I have made the following changes to my diet since my last visit: no   For breakfast, I typically eat: toast egg ceael   For lunch, I typically eat: rice veggies   For supper, I typically eat: rice vegies   For snack(s), I typically eat: fruit   With regards to my activity/exercise, I am still struggling with: foot pain       MEDICATIONS:   Current Outpatient Prescriptions   Medication     fexofenadine (ALLEGRA) 180 MG tablet     propranolol HCl 60 MG TABS     pantoprazole (PROTONIX) 40 MG EC tablet     topiramate " (TOPAMAX) 25 MG tablet     folic acid (FOLVITE) 1 MG tablet     vitamin D (ERGOCALCIFEROL) 33030 UNIT capsule     cefpodoxime (VANTIN) 100 MG tablet     sodium bicarbonate 650 MG tablet     darbepoetin bucky (ARANESP, ALBUMIN FREE,) 300 MCG/0.6ML injection     CELLCEPT 250 MG PO CAPSULE     atorvastatin (LIPITOR) 20 MG tablet     predniSONE (DELTASONE) 5 MG tablet     hydrALAZINE (APRESOLINE) 100 MG TABS     hydrocortisone (CORTAID) 1 % cream     No current facility-administered medications for this visit.        Weight Loss Medication History Reviewed With Patient 8/7/2017   Which weight loss medications are you currently taking on a regular basis?  Topamax (topiramate)   If you are not taking a weight loss medication that was prescribed to you, please indicate why: I did not like the side effects   Are you having any side effects from the weight loss medication that we have prescribed you? Yes   If you are having side effects please describe: weakness shortness of breath       ASSESSMENT:   Will trial ramp back up just to 50 mg HS, as no other medications that are safe and paid for by medicare.    FOLLOW-UP:    12 weeks.  I spent 15 minutes with this patient face to face and explained the conditions and plans (more than 50% of time was counseling/coordination of weight management).    Sincerely,    Pancho Salgado MD

## 2017-08-07 NOTE — MR AVS SNAPSHOT
After Visit Summary   8/7/2017    Eleni Logan    MRN: [id number]           Patient Information     Date Of Birth          1955        Visit Information        Provider Department      8/7/2017 9:30 AM Pancho Salgado MD [facility] Medical Weight Management        Today's Diagnoses     Morbid obesity due to excess calories (H)    -  1       Follow-ups after your visit        Follow-up notes from your care team     Return in about 3 months (around 11/7/2017).      Your next 10 appointments already scheduled     Aug 08, 2017  1:10 PM CDT   (Arrive by 12:40 PM)   Return Kidney Transplant with Tyshawn Sexton MD   [facility] Nephrology ([facility])    [address] [place] [address]  3rd [facility] [address] [phone]            Nov 10, 2017  9:00 AM CST   (Arrive by 8:45 AM)   Return Visit with Pancho Salgado MD   [facility] Weight Management ([facility])    [address] [place] [address]  4th [facility] [address] [phone]              Who to contact     Please call your clinic at [phone] to:    Ask questions about your health    Make or cancel appointments    Discuss your medicines    Learn about your test results    Speak to your doctor   If you have compliments or concerns about an experience at your clinic, or if you wish to file a complaint, please contact [facility] Patient Relations at [phone] or email us at [email or web address]         Additional Information About Your Visit        MyChart Information     Hoblee is an electronic gateway that provides easy, online access to your medical records. With Hoblee, you can request a clinic appointment, read your test results, renew a prescription or communicate with your care team.     To sign up for PopUpt visit the website at [email or web address]   You will be asked to enter the access code listed  "below, as well as some personal information. Please follow the directions to create your username and password.     Your access code is: [email or web address]  Expires: 2017  4:36 PM     Your access code will  in 90 days. If you need help or a new code, please contact your [facility] or call [phone] for assistance.        Care EveryWhere ID     This is your Care EveryWhere ID. This could be used by other organizations to access your [place] medical records  [id number]        Your Vitals Were     Pulse Height Pulse Oximetry BMI (Body Mass Index)          62 1.575 m (5' 2\") 96% 40.84 kg/m2         Blood Pressure from Last 3 Encounters:   17 (!) 189/92   17 120/88   17 101/75    Weight from Last 3 Encounters:   17 101.3 kg (223 lb 4.8 oz)   17 99.8 kg (220 lb)   17 100.2 kg (220 lb 12.8 oz)              Today, you had the following     No orders found for display       Primary Care Provider Office Phone # Fax #    Rocio Marianna Saul [phone]       [facility] [address] [facility] [address]        Equal Access to Services     [facility] HospitalMARIA D : Hadii lani castaneda hadasho Sodioni, waaxda luqadaha, qaybta kaalmada adeegyada, clare chambers . So [facility] [phone].    ATENCIÓN: Si habla español, tiene a stoner disposición servicios gratuitos de asistencia lingüística. Llame al [phone].    We comply with applicable federal civil rights laws and [place] laws. We do not discriminate on the basis of race, color, national origin, age, disability sex, sexual orientation or gender identity.            Thank you!     Thank you for choosing [facility] WEIGHT MANAGEMENT  for your care. Our goal is always to provide you with excellent care. Hearing back from our patients is one way we can continue to improve our services. Please take a few minutes to complete the written survey that you may " receive in the mail after your visit with us. Thank you!             Your Updated Medication List - Protect others around you: Learn how to safely use, store and throw away your medicines at www.disposemymeds.org.          This list is accurate as of: 8/7/17 10:09 AM.  Always use your most recent med list.                   Brand Name Dispense Instructions for use Diagnosis    atorvastatin 20 MG tablet    LIPITOR    90 tablet    Take 1 tablet (20 mg) by mouth daily    Hypercholesteremia       cefpodoxime 100 MG tablet    VANTIN    10 tablet    Take 1 tablet (100 mg) by mouth daily    Dysuria       darbepoetin bucky 300 MCG/0.6ML injection    ARANESP (ALBUMIN FREE)    0.6 mL    Inject 0.6 mLs (300 mcg) Subcutaneous every 28 days As needed for hgb<10g/dL    Chronic kidney disease, stage III (moderate), Anemia in stage 3 chronic kidney disease       fexofenadine 180 MG tablet    ALLEGRA    30 tablet    Take 1 tablet (180 mg) by mouth daily    Acute seasonal allergic rhinitis due to pollen       folic acid 1 MG tablet    FOLVITE    30 tablet    Take 1 tablet (1 mg) by mouth daily    Preventive measure       hydrALAZINE 100 MG Tabs tablet    APRESOLINE    270 tablet    Take 1 tablet (100 mg) by mouth 3 times daily    HTN (hypertension)       hydrocortisone 1 % cream    CORTAID    60 g    Apply topically 2 times daily    Rash, skin       mycophenolate capsule     120 capsule    Take 2 capsules (500 mg) by mouth 2 times daily    Kidney transplanted       pantoprazole 40 MG EC tablet    PROTONIX    30 tablet    Take 1 tablet (40 mg) by mouth daily    Gastroesophageal reflux disease with esophagitis       predniSONE 5 MG tablet    DELTASONE    30 tablet    Take 1 tablet (5 mg) by mouth daily    Kidney transplanted       propranolol HCl 60 MG Tabs     180 tablet    Take 1 tablet (60 mg) by mouth 2 times daily    Kidney replaced by transplant       sodium bicarbonate 650 MG tablet     180 tablet    Take 1 tablet (650 mg) by  mouth 2 times daily    Acidosis       topiramate 25 MG tablet    TOPAMAX    90 tablet    25 mg at bedtime for 1 week, 50 mg at bedtime for 1 week and 75 mg daily at bedtime thereafter    Morbid drug-induced obesity       vitamin D 66528 UNIT capsule    ERGOCALCIFEROL    8 capsule    Take 1 capsule (50,000 Units) by mouth once a week    Vitamin D deficiency

## 2017-08-07 NOTE — NURSING NOTE
"Chief Complaint   Patient presents with     Weight Problem     RMWM- 3 Months       Vitals:    08/07/17 0931   BP: (!) 189/92   BP Location: Left arm   Patient Position: Chair   Cuff Size: Adult Regular   Pulse: 62   SpO2: 96%   Weight: 223 lb 4.8 oz   Height: 5' 2\"       Body mass index is 40.84 kg/(m^2).      WOUND EVALUATION:                          "

## 2017-08-08 ENCOUNTER — OFFICE VISIT (OUTPATIENT)
Dept: NEPHROLOGY | Facility: CLINIC | Age: 62
End: 2017-08-08
Attending: INTERNAL MEDICINE
Payer: MEDICARE

## 2017-08-08 ENCOUNTER — TELEPHONE (OUTPATIENT)
Dept: PHARMACY | Facility: CLINIC | Age: 62
End: 2017-08-08

## 2017-08-08 VITALS
DIASTOLIC BLOOD PRESSURE: 81 MMHG | SYSTOLIC BLOOD PRESSURE: 152 MMHG | HEART RATE: 61 BPM | TEMPERATURE: 98.1 F | WEIGHT: 221.2 LBS | OXYGEN SATURATION: 98 % | HEIGHT: 62 IN | BODY MASS INDEX: 40.7 KG/M2

## 2017-08-08 DIAGNOSIS — N25.0 RENAL OSTEODYSTROPHY: ICD-10-CM

## 2017-08-08 DIAGNOSIS — I15.1 HYPERTENSION SECONDARY TO OTHER RENAL DISORDERS: Primary | ICD-10-CM

## 2017-08-08 DIAGNOSIS — E55.9 VITAMIN D DEFICIENCY: ICD-10-CM

## 2017-08-08 DIAGNOSIS — Z48.298 AFTERCARE FOLLOWING ORGAN TRANSPLANT: ICD-10-CM

## 2017-08-08 PROCEDURE — 99212 OFFICE O/P EST SF 10 MIN: CPT | Mod: ZF

## 2017-08-08 RX ORDER — CARVEDILOL 6.25 MG/1
12.5 TABLET ORAL 2 TIMES DAILY WITH MEALS
Qty: 360 TABLET | Refills: 1 | Status: SHIPPED | OUTPATIENT
Start: 2017-08-08 | End: 2018-11-15 | Stop reason: ALTCHOICE

## 2017-08-08 ASSESSMENT — PAIN SCALES - GENERAL: PAINLEVEL: MILD PAIN (2)

## 2017-08-08 NOTE — NURSING NOTE
"Chief Complaint   Patient presents with     RECHECK     Kidney follow up       Initial /81  Pulse 61  Temp 98.1  F (36.7  C) (Oral)  Ht 1.575 m (5' 2\")  Wt 100.3 kg (221 lb 3.2 oz)  SpO2 98%  BMI 40.46 kg/m2 Estimated body mass index is 40.46 kg/(m^2) as calculated from the following:    Height as of this encounter: 1.575 m (5' 2\").    Weight as of this encounter: 100.3 kg (221 lb 3.2 oz).  Medication Reconciliation: complete   HILDA CASTANEDA CMA      "

## 2017-08-08 NOTE — TELEPHONE ENCOUNTER
Anemia Management Note  SUBJECTIVE/OBJECTIVE:  Referred by Dr Tyshawn Sexton February 10, 2017  Primary Diagnosis: Anemia in Chronic Kidney Disease (N18.3 D63.1)   Secondary Diagnosis: Chronic Kidney Disease, Stage III (N18.3)   Hgb goal range: 9-10  Epo/Darbo: Aranesp 300 mcg every 14 days - At home   RX will  on 18  Iron regimen:  Ferrous sulfate 325 mg QD on 16  Lab orders  18 in EPIC     Anemia Latest Ref Rng & Units 2017 2017 2017 2017 7/10/2017 7/15/2017 2017   HGB Goal - - - - - - - -   VALARIE Dose - 300 mcg - - 300 mcg - 300 mcg -   Hemoglobin 11.7 - 15.7 g/dL - 9.2(L) 9.4(L) - 9.5(L) - 10.0(L)   TSAT 15 - 46 % - - - - 23 - -   Ferritin 8 - 252 ng/mL - - - - 759(H) - -   PRBCs - - - - - - - -     BP Readings from Last 3 Encounters:   17 152/81   17 (!) 189/92   17 120/88     Wt Readings from Last 2 Encounters:   17 221 lb 3.2 oz (100.3 kg)   17 223 lb 4.8 oz (101.3 kg)     ASSESSMENT:  Hgb: Above goal - recommend hold dose  TSat: not at goal (>30%) but ferritin >500ng/mL.  IV iron not indicated at this time per anemia protocol.     PLAN:  I spoke to Eleni, she will hold her Aranesp and RTC for hgb then aranesp if needed in 2 week(s)    Orders needed to be renewed (for next follow-up date) in [facility]: None    Iron labs due:  10/10/17    Plan discussed with:  NAY for Eleni - Eleni returned my call  Plan provided by:  Winston    NEXT FOLLOW-UP DATE:  17    Anemia Management Service  Yvette Goodwin,GiniD and Preethi Akers CPhT  Phone: [phone]  Fax: [phone]

## 2017-08-08 NOTE — PATIENT INSTRUCTIONS
1. I stopped your propranolol   2. I started Carvedilol (coreg) start with 1 tablet twice daily in addition to your other blood pressure medicine. If BP > 140/90 increase Carvedilol to 2 tablets twice daily. If BP < 130/80 decrease hydralazine to twice daily   3. Follow up with access clinic   4. Follow up with weight management clinic goal wait for transplant Re:listing around 195 Lb

## 2017-08-08 NOTE — LETTER
8/8/2017      RE: Eleni Logan  [address]  [address]  [facility] [address]       Assessment and Plan:  1.  Allograft function.  Her serum creatinine is stable to slightly worse now at 2.5 mg/dL. We discussed re-referral to access clinic for dialysis access planning    2. Immunosuppression management: She is currently on an immunosuppressive regimen that consists of prednisone 5 mg daily in addition to CellCept 500 mg b.i.d.  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 Her kidney function has been anywhere from 1.8-2.2 mg/dL but now slightly worse. She has modest proteinuria.    3.  Recurrent urinary tract infections. But feels free currently. Will update UA with next lab draw.   4.  Anemia of chronic kidney disease.  She continues to require Aranesp therapy.  5. Hematodialysis Access: Will refer to access planning     6. Obesity: we discussed weight loss specially if she is interested in repeat kidney transplant   7. Renal osteodystrophy will need to recheck her PTH and vitamin D    8. Hypertension I changed propranolol to Coreg for better BP control     Assessment and plan was discussed with patient and she voiced her understanding and agreement.    Reason for Visit:  Ms. Logan is here for routine follow up and immunosuppression management.    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          Transplant Hx:       Tx: LDKT  Date: 1999       Present Maintenance IS: Mycophenolate mofetil and Prednisone       Baseline Creatinine: 2-3 mg/dL        Biopsy: yes with TMA    Since she was seen last, had no new events. No recent hospitalizations. Her UTI had resolved. She did not follow up with the access clinic as planned but she is now willing.   She denied urinary symptoms. No NVD. No  abdominal pain.     Home BP: controlled.      ROS:   A comprehensive review of systems was obtained and negative, except as noted in the HPI or PMH.    Active Medical Problems:  Patient Active Problem List   Diagnosis     Chronic rhinitis     Essential hypertension, benign     S/P kidney transplant     Rash     Asthma exacerbation     UTI (urinary tract infection)     Anemia     Chest pain     Urinary tract infection     Localized pustular psoriasis     CKD (chronic kidney disease) stage 4, GFR 15-29 ml/min (H)     Immunosuppression (H)       Personal Hx:  Social History     Social History     Marital status:      Spouse name: N/A     Number of children: N/A     Years of education: N/A     Occupational History     Not on file.     Social History Main Topics     Smoking status: Never Smoker     Smokeless tobacco: Never Used      Comment: Has been around second hand smoke     Alcohol use Yes      Comment: Once in a great while     Drug use: No     Sexual activity: Not on file     Other Topics Concern     Not on file     Social History Narrative       Allergies:  Allergies   Allergen Reactions     Ciprofloxacin Palpitations       Medications:    Current Outpatient Prescriptions on File Prior to Visit:  fexofenadine (ALLEGRA) 180 MG tablet Take 1 tablet (180 mg) by mouth daily   pantoprazole (PROTONIX) 40 MG EC tablet Take 1 tablet (40 mg) by mouth daily   folic acid (FOLVITE) 1 MG tablet Take 1 tablet (1 mg) by mouth daily   sodium bicarbonate 650 MG tablet Take 1 tablet (650 mg) by mouth 2 times daily   darbepoetin bucky (ARANESP, ALBUMIN FREE,) 300 MCG/0.6ML injection Inject 0.6 mLs (300 mcg) Subcutaneous every 28 days As needed for hgb<10g/dL   CELLCEPT 250 MG PO CAPSULE Take 2 capsules (500 mg) by mouth 2 times daily   predniSONE (DELTASONE) 5 MG tablet Take 1 tablet (5 mg) by mouth daily   hydrALAZINE (APRESOLINE) 100 MG TABS Take 1 tablet (100 mg) by mouth 3 times daily   hydrocortisone (CORTAID) 1 % cream  "Apply topically 2 times daily     No current facility-administered medications on file prior to visit.       Vitals:  /81  Pulse 61  Temp 98.1  F (36.7  C) (Oral)  Ht 1.575 m (5' 2\")  Wt 100.3 kg (221 lb 3.2 oz)  SpO2 98%  BMI 40.46 kg/m2    Exam:   GENERAL APPEARANCE: alert and no distress  HENT: mouth without ulcers or lesions  LYMPHATICS: no cervical or supraclavicular nodes  RESP: lungs clear to auscultation - no rales, rhonchi or wheezes  CV: regular rhythm, normal rate, no rub, no murmur  EDEMA: no LE edema bilaterally  ABDOMEN: soft, nondistended, nontender, bowel sounds normal  MS: extremities normal - no gross deformities noted, no evidence of inflammation in joints, no muscle tenderness  SKIN: no rash    Results:   Reviewed     Tyshawn Sexton MD      "

## 2017-08-08 NOTE — MR AVS SNAPSHOT
After Visit Summary   8/8/2017    Eleni Logan    MRN: [id number]           Patient Information     Date Of Birth          1955        Visit Information        Provider Department      8/8/2017 1:10 PM Tyshawn Sexton MD [facility] Nephrology        Today's Diagnoses     Hypertension secondary to other renal disorders    -  1    Vitamin D deficiency        Renal osteodystrophy        Aftercare following organ transplant          Care Instructions    1. I stopped your propranolol   2. I started Carvedilol (coreg) start with 1 tablet twice daily in addition to your other blood pressure medicine. If BP > 140/90 increase Carvedilol to 2 tablets twice daily. If BP < 130/80 decrease hydralazine to twice daily   3. Follow up with access clinic   4. Follow up with weight management clinic goal wait for transplant Re:listing around 195 Lb           Follow-ups after your visit        Follow-up notes from your care team     Return in about 3 months (around 11/8/2017).      Your next 10 appointments already scheduled     Aug 31, 2017  4:45 PM CDT   (Arrive by 4:30 PM)   New Vascular Visit with Brandy Bond MD   [facility] ([facility])    [address] [place] [address]   [phone]            Nov 06, 2017  2:25 PM CST   (Arrive by 1:55 PM)   Return Kidney Transplant with Tyshawn Sexton MD   [facility] ([facility])    [address] [place] [address]   [phone]            Nov 10, 2017  9:00 AM CST   (Arrive by 8:45 AM)   Return Visit with Pancho Salgado MD   [facility] ([facility])    [address] [place] [id number] [phone]              Who to contact     If you have questions or need follow up information about today's clinic visit or your schedule please contact [facility] NEPHROLOGY  "directly at [phone].  Normal or non-critical lab and imaging results will be communicated to you by Equallogichart, letter or phone within 4 business days after the clinic has received the results. If you do not hear from us within 7 days, please contact the clinic through Equallogichart or phone. If you have a critical or abnormal lab result, we will notify you by phone as soon as possible.  Submit refill requests through [employer] or call your pharmacy and they will forward the refill request to us. Please allow 3 business days for your refill to be completed.          Additional Information About Your Visit        EquallogicharVook Information     [employer] lets you send messages to your doctor, view your test results, renew your prescriptions, schedule appointments and more. To sign up, go to [email or web address] . Click on \"Log in\" on the left side of the screen, which will take you to the Welcome page. Then click on \"Sign up Now\" on the right side of the page.     You will be asked to enter the access code listed below, as well as some personal information. Please follow the directions to create your username and password.     Your access code is: [email or web address]  Expires: 2017  4:36 PM     Your access code will  in 90 days. If you need help or a new code, please call your [facility] clinic or [phone].        Care EveryWhere ID     This is your Care EveryWhere ID. This could be used by other organizations to access your [facility] medical records  [id number]        Your Vitals Were     Pulse Temperature Height Pulse Oximetry BMI (Body Mass Index)       61 98.1  F (36.7  C) (Oral) 1.575 m (5' 2\") 98% 40.46 kg/m2        Blood Pressure from Last 3 Encounters:   17 152/81   17 (!) 189/92   17 120/88    Weight from Last 3 Encounters:   17 100.3 kg (221 lb 3.2 oz)   17 101.3 kg (223 lb 4.8 oz)   17 99.8 kg (220 lb)                 Today's Medication Changes          These changes are " accurate as of: 8/8/17 11:59 PM.  If you have any questions, ask your nurse or doctor.               Start taking these medicines.        Dose/Directions    carvedilol 6.25 MG tablet   Commonly known as:  COREG   Used for:  Hypertension secondary to other renal disorders   Started by:  Tyshawn Sexton MD        Dose:  12.5 mg   Take 2 tablets (12.5 mg) by mouth 2 times daily (with meals)   Quantity:  360 tablet   Refills:  1       cholecalciferol 1000 UNITS capsule   Commonly known as:  vitamin  -D   Used for:  Vitamin D deficiency   Started by:  Tyshawn Sexton MD        Dose:  2 capsule   Take 2 capsules (2,000 Units) by mouth daily   Quantity:  180 capsule   Refills:  3         Stop taking these medicines if you haven't already. Please contact your care team if you have questions.     cefpodoxime 100 MG tablet   Commonly known as:  VANTIN   Stopped by:  Tyshawn Sexton MD           propranolol HCl 60 MG Tabs   Stopped by:  Tyshawn Sexton MD           topiramate 25 MG tablet   Commonly known as:  TOPAMAX   Stopped by:  Tyshawn Sexton MD           vitamin D 87568 UNIT capsule   Commonly known as:  ERGOCALCIFEROL   Stopped by:  Tyshawn Sexton MD                Where to get your medicines      These medications were sent to [facility] [address] [address]    Hours:  TRANSPLANT PHONE NUMBER [phone] Phone:  [phone]     carvedilol 6.25 MG tablet    cholecalciferol 1000 UNITS capsule                Primary Care Provider Office Phone # Fax #    Rocio Marianna Saul [phone]       [facility] [address]  [facility] [address]        Equal Access to Services     CARLOS ENRIQUE DAVENPORT AH: Luís Anderson, nathaniel muniz, clare buchanan. So [facility] [phone].    ATENCIÓN: Si jeff sales, rao garcia stoner  disposición servicios gratuitos de asistencia lingüística. Celine muñiz [phone].    We comply with applicable federal civil rights laws and [place] laws. We do not discriminate on the basis of race, color, national origin, age, disability sex, sexual orientation or gender identity.            Thank you!     Thank you for choosing [facility] NEPHROLOGY  for your care. Our goal is always to provide you with excellent care. Hearing back from our patients is one way we can continue to improve our services. Please take a few minutes to complete the written survey that you may receive in the mail after your visit with us. Thank you!             Your Updated Medication List - Protect others around you: Learn how to safely use, store and throw away your medicines at www.disposemymeds.org.          This list is accurate as of: 8/8/17 11:59 PM.  Always use your most recent med list.                   Brand Name Dispense Instructions for use Diagnosis    carvedilol 6.25 MG tablet    COREG    360 tablet    Take 2 tablets (12.5 mg) by mouth 2 times daily (with meals)    Hypertension secondary to other renal disorders       cholecalciferol 1000 UNITS capsule    vitamin  -D    180 capsule    Take 2 capsules (2,000 Units) by mouth daily    Vitamin D deficiency       darbepoetin bucky 300 MCG/0.6ML injection    ARANESP (ALBUMIN FREE)    0.6 mL    Inject 0.6 mLs (300 mcg) Subcutaneous every 28 days As needed for hgb<10g/dL    Chronic kidney disease, stage III (moderate), Anemia in stage 3 chronic kidney disease       fexofenadine 180 MG tablet    ALLEGRA    30 tablet    Take 1 tablet (180 mg) by mouth daily    Acute seasonal allergic rhinitis due to pollen       folic acid 1 MG tablet    FOLVITE    30 tablet    Take 1 tablet (1 mg) by mouth daily    Preventive measure       hydrALAZINE 100 MG Tabs tablet    APRESOLINE    270 tablet    Take 1 tablet (100 mg) by mouth 3 times daily    HTN (hypertension)       hydrocortisone 1 % cream     CORTAID    60 g    Apply topically 2 times daily    Rash, skin       mycophenolate capsule     120 capsule    Take 2 capsules (500 mg) by mouth 2 times daily    Kidney transplanted       pantoprazole 40 MG EC tablet    PROTONIX    30 tablet    Take 1 tablet (40 mg) by mouth daily    Gastroesophageal reflux disease with esophagitis       predniSONE 5 MG tablet    DELTASONE    30 tablet    Take 1 tablet (5 mg) by mouth daily    Kidney transplanted       sodium bicarbonate 650 MG tablet     180 tablet    Take 1 tablet (650 mg) by mouth 2 times daily    Acidosis

## 2017-08-14 DIAGNOSIS — E78.00 HYPERCHOLESTEREMIA: ICD-10-CM

## 2017-08-16 RX ORDER — ATORVASTATIN CALCIUM 20 MG/1
20 TABLET, FILM COATED ORAL DAILY
Qty: 90 TABLET | Refills: 0 | Status: SHIPPED | OUTPATIENT
Start: 2017-08-16 | End: 2017-12-01

## 2017-08-16 NOTE — TELEPHONE ENCOUNTER
atorvastatin (LIPITOR) 20 MG tablet      Last Written Prescription Date:  11-10-16  Last Fill Quantity: 90,   # refills: 2  Last Office Visit : 7-17-17  Future Office visit:  none    Kathleen M Doege RN

## 2017-08-20 NOTE — PROGRESS NOTES
Assessment and Plan:  1.  Allograft function.  Her serum creatinine is stable to slightly worse now at 2.5 mg/dL. We discussed re-referral to access clinic for dialysis access planning    2. Immunosuppression management: She is currently on an immunosuppressive regimen that consists of prednisone 5 mg daily in addition to CellCept 500 mg b.i.d.  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 Her kidney function has been anywhere from 1.8-2.2 mg/dL but now slightly worse. She has modest proteinuria.    3.  Recurrent urinary tract infections. But feels free currently. Will update UA with next lab draw.   4.  Anemia of chronic kidney disease.  She continues to require Aranesp therapy.  5. Hematodialysis Access: Will refer to access planning     6. Obesity: we discussed weight loss specially if she is interested in repeat kidney transplant   7. Renal osteodystrophy will need to recheck her PTH and vitamin D    8. Hypertension I changed propranolol to Coreg for better BP control     Assessment and plan was discussed with patient and she voiced her understanding and agreement.    Reason for Visit:  Ms. Logan is here for routine follow up and immunosuppression management.    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          Transplant Hx:       Tx: LDKT  Date: 1999       Present Maintenance IS: Mycophenolate mofetil and Prednisone       Baseline Creatinine: 2-3 mg/dL        Biopsy: yes with TMA    Since she was seen last, had no new events. No recent hospitalizations. Her UTI had resolved. She did not follow up with the access clinic as planned but she is now willing.   She denied urinary symptoms. No NVD. No abdominal pain.     Home BP: controlled.      ROS:   A comprehensive review of systems was  obtained and negative, except as noted in the HPI or PMH.    Active Medical Problems:  Patient Active Problem List   Diagnosis     Chronic rhinitis     Essential hypertension, benign     S/P kidney transplant     Rash     Asthma exacerbation     UTI (urinary tract infection)     Anemia     Chest pain     Urinary tract infection     Localized pustular psoriasis     CKD (chronic kidney disease) stage 4, GFR 15-29 ml/min (H)     Immunosuppression (H)       Personal Hx:  Social History     Social History     Marital status:      Spouse name: N/A     Number of children: N/A     Years of education: N/A     Occupational History     Not on file.     Social History Main Topics     Smoking status: Never Smoker     Smokeless tobacco: Never Used      Comment: Has been around second hand smoke     Alcohol use Yes      Comment: Once in a great while     Drug use: No     Sexual activity: Not on file     Other Topics Concern     Not on file     Social History Narrative       Allergies:  Allergies   Allergen Reactions     Ciprofloxacin Palpitations       Medications:    Current Outpatient Prescriptions on File Prior to Visit:  fexofenadine (ALLEGRA) 180 MG tablet Take 1 tablet (180 mg) by mouth daily   pantoprazole (PROTONIX) 40 MG EC tablet Take 1 tablet (40 mg) by mouth daily   folic acid (FOLVITE) 1 MG tablet Take 1 tablet (1 mg) by mouth daily   sodium bicarbonate 650 MG tablet Take 1 tablet (650 mg) by mouth 2 times daily   darbepoetin bucky (ARANESP, ALBUMIN FREE,) 300 MCG/0.6ML injection Inject 0.6 mLs (300 mcg) Subcutaneous every 28 days As needed for hgb<10g/dL   CELLCEPT 250 MG PO CAPSULE Take 2 capsules (500 mg) by mouth 2 times daily   predniSONE (DELTASONE) 5 MG tablet Take 1 tablet (5 mg) by mouth daily   hydrALAZINE (APRESOLINE) 100 MG TABS Take 1 tablet (100 mg) by mouth 3 times daily   hydrocortisone (CORTAID) 1 % cream Apply topically 2 times daily     No current facility-administered medications on file  "prior to visit.       Vitals:  /81  Pulse 61  Temp 98.1  F (36.7  C) (Oral)  Ht 1.575 m (5' 2\")  Wt 100.3 kg (221 lb 3.2 oz)  SpO2 98%  BMI 40.46 kg/m2    Exam:   GENERAL APPEARANCE: alert and no distress  HENT: mouth without ulcers or lesions  LYMPHATICS: no cervical or supraclavicular nodes  RESP: lungs clear to auscultation - no rales, rhonchi or wheezes  CV: regular rhythm, normal rate, no rub, no murmur  EDEMA: no LE edema bilaterally  ABDOMEN: soft, nondistended, nontender, bowel sounds normal  MS: extremities normal - no gross deformities noted, no evidence of inflammation in joints, no muscle tenderness  SKIN: no rash    Results:   Reviewed   "

## 2017-08-21 ENCOUNTER — CARE COORDINATION (OUTPATIENT)
Dept: NEPHROLOGY | Facility: CLINIC | Age: 62
End: 2017-08-21

## 2017-08-21 DIAGNOSIS — Z79.899 ENCOUNTER FOR LONG-TERM CURRENT USE OF MEDICATION: ICD-10-CM

## 2017-08-21 DIAGNOSIS — Z48.298 AFTERCARE FOLLOWING ORGAN TRANSPLANT: ICD-10-CM

## 2017-08-21 DIAGNOSIS — E55.9 VITAMIN D DEFICIENCY: ICD-10-CM

## 2017-08-21 DIAGNOSIS — Z94.0 KIDNEY REPLACED BY TRANSPLANT: Primary | ICD-10-CM

## 2017-08-21 DIAGNOSIS — I15.1 HYPERTENSION SECONDARY TO OTHER RENAL DISORDERS: ICD-10-CM

## 2017-08-21 DIAGNOSIS — Z94.0 KIDNEY REPLACED BY TRANSPLANT: ICD-10-CM

## 2017-08-21 DIAGNOSIS — N39.0 URINARY TRACT INFECTION: ICD-10-CM

## 2017-08-21 DIAGNOSIS — N18.30 CHRONIC KIDNEY DISEASE, STAGE III (MODERATE) (H): ICD-10-CM

## 2017-08-21 DIAGNOSIS — N18.30 ANEMIA IN STAGE 3 CHRONIC KIDNEY DISEASE (H): ICD-10-CM

## 2017-08-21 DIAGNOSIS — D63.1 ANEMIA IN STAGE 3 CHRONIC KIDNEY DISEASE (H): ICD-10-CM

## 2017-08-21 DIAGNOSIS — N25.0 RENAL OSTEODYSTROPHY: ICD-10-CM

## 2017-08-21 LAB
ALBUMIN SERPL-MCNC: 3.3 G/DL (ref 3.4–5)
ALBUMIN UR-MCNC: 100 MG/DL
ANION GAP SERPL CALCULATED.3IONS-SCNC: 10 MMOL/L (ref 3–14)
APPEARANCE UR: CLEAR
BACTERIA #/AREA URNS HPF: ABNORMAL /HPF
BASOPHILS # BLD AUTO: 0 10E9/L (ref 0–0.2)
BASOPHILS NFR BLD AUTO: 0.4 %
BILIRUB UR QL STRIP: NEGATIVE
BUN SERPL-MCNC: 63 MG/DL (ref 7–30)
CALCIUM SERPL-MCNC: 8.6 MG/DL (ref 8.5–10.1)
CHLORIDE SERPL-SCNC: 108 MMOL/L (ref 94–109)
CO2 SERPL-SCNC: 23 MMOL/L (ref 20–32)
COLOR UR AUTO: YELLOW
CREAT SERPL-MCNC: 2.31 MG/DL (ref 0.52–1.04)
DIFFERENTIAL METHOD BLD: ABNORMAL
EOSINOPHIL # BLD AUTO: 0 10E9/L (ref 0–0.7)
EOSINOPHIL NFR BLD AUTO: 0.5 %
ERYTHROCYTE [DISTWIDTH] IN BLOOD BY AUTOMATED COUNT: 14.6 % (ref 10–15)
GFR SERPL CREATININE-BSD FRML MDRD: 21 ML/MIN/1.7M2
GLUCOSE SERPL-MCNC: 130 MG/DL (ref 70–99)
GLUCOSE UR STRIP-MCNC: NEGATIVE MG/DL
HCT VFR BLD AUTO: 33 % (ref 35–47)
HGB BLD-MCNC: 9.9 G/DL (ref 11.7–15.7)
HGB UR QL STRIP: NEGATIVE
HYALINE CASTS #/AREA URNS LPF: 1 /LPF (ref 0–2)
IMM GRANULOCYTES # BLD: 0 10E9/L (ref 0–0.4)
IMM GRANULOCYTES NFR BLD: 0.5 %
KETONES UR STRIP-MCNC: NEGATIVE MG/DL
LEUKOCYTE ESTERASE UR QL STRIP: ABNORMAL
LYMPHOCYTES # BLD AUTO: 1.4 10E9/L (ref 0.8–5.3)
LYMPHOCYTES NFR BLD AUTO: 25.6 %
MCH RBC QN AUTO: 24.8 PG (ref 26.5–33)
MCHC RBC AUTO-ENTMCNC: 30 G/DL (ref 31.5–36.5)
MCV RBC AUTO: 83 FL (ref 78–100)
MONOCYTES # BLD AUTO: 0.4 10E9/L (ref 0–1.3)
MONOCYTES NFR BLD AUTO: 7.5 %
NEUTROPHILS # BLD AUTO: 3.7 10E9/L (ref 1.6–8.3)
NEUTROPHILS NFR BLD AUTO: 65.5 %
NITRATE UR QL: NEGATIVE
NRBC # BLD AUTO: 0 10*3/UL
NRBC BLD AUTO-RTO: 0 /100
PH UR STRIP: 6 PH (ref 5–7)
PHOSPHATE SERPL-MCNC: 3.1 MG/DL (ref 2.5–4.5)
PLATELET # BLD AUTO: 213 10E9/L (ref 150–450)
POTASSIUM SERPL-SCNC: 4.4 MMOL/L (ref 3.4–5.3)
PTH-INTACT SERPL-MCNC: 289 PG/ML (ref 12–72)
RBC # BLD AUTO: 3.99 10E12/L (ref 3.8–5.2)
RBC #/AREA URNS AUTO: 1 /HPF (ref 0–2)
SODIUM SERPL-SCNC: 140 MMOL/L (ref 133–144)
SOURCE: ABNORMAL
SP GR UR STRIP: 1.01 (ref 1–1.03)
UROBILINOGEN UR STRIP-MCNC: 0 MG/DL (ref 0–2)
WBC # BLD AUTO: 5.6 10E9/L (ref 4–11)
WBC #/AREA URNS AUTO: 61 /HPF (ref 0–2)

## 2017-08-21 PROCEDURE — 87086 URINE CULTURE/COLONY COUNT: CPT | Performed by: INTERNAL MEDICINE

## 2017-08-21 PROCEDURE — 87088 URINE BACTERIA CULTURE: CPT | Performed by: INTERNAL MEDICINE

## 2017-08-21 PROCEDURE — 82306 VITAMIN D 25 HYDROXY: CPT | Performed by: INTERNAL MEDICINE

## 2017-08-21 PROCEDURE — 87186 SC STD MICRODIL/AGAR DIL: CPT | Performed by: INTERNAL MEDICINE

## 2017-08-21 PROCEDURE — 83970 ASSAY OF PARATHORMONE: CPT | Performed by: INTERNAL MEDICINE

## 2017-08-21 NOTE — PROGRESS NOTES
Per Dr. Sexton:  Please check on her BP progress   Needs UA and UC with her repeat labs   Needs q 1-2 ms labs and PTH q 6 months now due. And vitamin D x 1     Left detailed voicemail for patient to call back.    Brittney Quinones RN

## 2017-08-22 ENCOUNTER — TELEPHONE (OUTPATIENT)
Dept: PHARMACY | Facility: CLINIC | Age: 62
End: 2017-08-22

## 2017-08-22 LAB
BACTERIA SPEC CULT: ABNORMAL
DEPRECATED CALCIDIOL+CALCIFEROL SERPL-MC: 17 UG/L (ref 20–75)
Lab: ABNORMAL
SPECIMEN SOURCE: ABNORMAL

## 2017-08-22 NOTE — PROGRESS NOTES
Spoke with patient. Had labs drawn yesterday. Said she has not been checking BP at home, doesn't have a cuff. Denied any abnormal symptoms or concerns at this time.    Brittney Quinones RN

## 2017-08-22 NOTE — TELEPHONE ENCOUNTER
Anemia Management Note  SUBJECTIVE/OBJECTIVE:  Referred by Dr Tyshawn Sexton February 10, 2017  Primary Diagnosis: Anemia in Chronic Kidney Disease (N18.3 D63.1)   Secondary Diagnosis: Chronic Kidney Disease, Stage III (N18.3)   Hgb goal range: 9-10  Epo/Darbo: Aranesp 300 mcg every 14 days - At home   RX will  on 18  Iron regimen:  Ferrous sulfate 325 mg QD on 16  Lab orders  18 in EPIC     Anemia Latest Ref Rng & Units 2017 2017 2017 7/10/2017 7/15/2017 2017 2017   HGB Goal - - - - - - - -   VALARIE Dose - - - 300 mcg - 300 mcg - -   Hemoglobin 11.7 - 15.7 g/dL 9.2(L) 9.4(L) - 9.5(L) - 10.0(L) 9.9(L)   TSAT 15 - 46 % - - - 23 - - -   Ferritin 8 - 252 ng/mL - - - 759(H) - - -   PRBCs - - - - - - - -     BP Readings from Last 3 Encounters:   17 152/81   17 (!) 189/92   17 120/88     Wt Readings from Last 2 Encounters:   17 221 lb 3.2 oz (100.3 kg)   17 223 lb 4.8 oz (101.3 kg)     ASSESSMENT:  Hgb: At goal - recommend dose  TSat: not at goal (>30%) but ferritin >1000ng/mL.  PO iron not indicated at this time per anemia protocol. Continue ferrous sulfate once daily.    PLAN:  Eleni returned my call, she will dose with aranesp and RTC for hgb then aranesp if needed in 2 week(s)    Orders needed to be renewed (for next follow-up date) in [facility]: None    Iron labs due:  10/10/17    Plan discussed with:  NAY for Eleni  Plan provided by:  Winston    NEXT FOLLOW-UP DATE:  17    Anemia Management Service  Yvette Goodwin,GiniD and Preethi Akers CPhT  Phone: [phone]  Fax: [phone]

## 2017-08-23 NOTE — PROGRESS NOTES
Per Dr. Sexton:  Needs to start cefpodoxime 100 mg po bid for 14 days (make sure she is not allergic)     Second, lets start her on weekly vitamin D 50,000 units weekly x 12   Then D3 2000 units daily     Left voicemail for patient to call back.    Brittney Quinones RN

## 2017-08-24 NOTE — PROGRESS NOTES
Attempted to contact patient. Line picked up, but no one there. Will try again at a later time.    Brittney Quinones RN

## 2017-08-25 RX ORDER — ERGOCALCIFEROL 1.25 MG/1
50000 CAPSULE, LIQUID FILLED ORAL WEEKLY
Qty: 12 CAPSULE | Refills: 0 | Status: SHIPPED | OUTPATIENT
Start: 2017-08-25 | End: 2018-08-07

## 2017-08-25 RX ORDER — CEFPODOXIME PROXETIL 100 MG/1
100 TABLET, FILM COATED ORAL 2 TIMES DAILY
Qty: 28 TABLET | Refills: 0 | Status: SHIPPED | OUTPATIENT
Start: 2017-08-25 | End: 2017-11-06

## 2017-08-28 DIAGNOSIS — N18.6 END STAGE RENAL DISEASE (H): Primary | ICD-10-CM

## 2017-08-31 ENCOUNTER — OFFICE VISIT (OUTPATIENT)
Dept: VASCULAR SURGERY | Facility: CLINIC | Age: 62
End: 2017-08-31

## 2017-08-31 VITALS
OXYGEN SATURATION: 97 % | DIASTOLIC BLOOD PRESSURE: 99 MMHG | SYSTOLIC BLOOD PRESSURE: 174 MMHG | RESPIRATION RATE: 15 BRPM | HEART RATE: 77 BPM

## 2017-08-31 DIAGNOSIS — N18.6 END STAGE RENAL DISEASE (H): Primary | ICD-10-CM

## 2017-08-31 DIAGNOSIS — N18.30 ANEMIA IN STAGE 3 CHRONIC KIDNEY DISEASE (H): ICD-10-CM

## 2017-08-31 DIAGNOSIS — D63.1 ANEMIA IN STAGE 3 CHRONIC KIDNEY DISEASE (H): ICD-10-CM

## 2017-08-31 DIAGNOSIS — N18.30 CHRONIC KIDNEY DISEASE, STAGE III (MODERATE) (H): ICD-10-CM

## 2017-08-31 LAB
HCT VFR BLD AUTO: 31.7 % (ref 35–47)
HGB BLD-MCNC: 9.6 G/DL (ref 11.7–15.7)

## 2017-08-31 ASSESSMENT — PAIN SCALES - GENERAL
PAINLEVEL: NO PAIN (0)
PAINLEVEL: NO PAIN (0)

## 2017-08-31 NOTE — MR AVS SNAPSHOT
After Visit Summary   8/31/2017    Eleni Logan    MRN: [id number]           Patient Information     Date Of Birth          1955        Visit Information        Provider Department      8/31/2017 4:45 PM Brandy Bond MD [facility]        Today's Diagnoses     End stage renal disease (H)    -  1       Follow-ups after your visit        Your next 10 appointments already scheduled     Nov 06, 2017  2:25 PM CST   (Arrive by 1:55 PM)   Return Kidney Transplant with Tyshawn Sexton MD   [facility]phrology ([facility])    [address]  3rd Floor  [facility] [address] [phone]            Nov 10, 2017  9:00 AM CST   (Arrive by 8:45 AM)   Return Visit with Pancho Salgado MD   [facility] Management ([facility])    [address]  4th Floor  [place] [address] [phone]              Who to contact     Please call your clinic at [phone] to:    Ask questions about your health    Make or cancel appointments    Discuss your medicines    Learn about your test results    Speak to your doctor   If you have compliments or concerns about an experience at your clinic, or if you wish to file a complaint, please contact [facility] Patient Relations at [phone] or email us at [email or web address]         Additional Information About Your Visit        MyChart Information     He[employer] is an electronic gateway that provides easy, online access to your medical records. With [employer], you can request a clinic appointment, read your test results, renew a prescription or communicate with your care team.     To sign up for [employer] visit the website at [email or web address]   You will be asked to enter the access code listed below, as well as some personal information. Please follow the directions to create your username and password.     Your access  code is: [email or web address]  Expires: 2017  4:36 PM     Your access code will  in 90 days. If you need help or a new code, please contact your [facility] or call [phone] for assistance.        Care EveryWhere ID     This is your Care EveryWhere ID. This could be used by other organizations to access your [place] medical records  [id number]        Your Vitals Were     Pulse Respirations Pulse Oximetry Breastfeeding?          77 15 97% No         Blood Pressure from Last 3 Encounters:   17 (!) 174/99   17 152/81   17 (!) 189/92    Weight from Last 3 Encounters:   17 221 lb 3.2 oz   17 223 lb 4.8 oz   17 220 lb              Today, you had the following     No orders found for display       Primary Care Provider Office Phone # Fax #    Rocio Marianna Saul [phone]       [facility] [address] [place] [address]        Equal Access to Services     [facility]: Hadii aad ku hadasho Soomaali, waaxda luqadaha, qaybta kaalmada adeeggladis, clare chambers . So [facility] [phone].    ATENCIÓN: Si habla español, tiene a stoner disposición servicios gratuitos de asistencia lingüística. Llame al [phone].    We comply with applicable federal civil rights laws and [place] laws. We do not discriminate on the basis of race, color, national origin, age, disability sex, sexual orientation or gender identity.            Thank you!     Thank you for choosing [facility]  for your care. Our goal is always to provide you with excellent care. Hearing back from our patients is one way we can continue to improve our services. Please take a few minutes to complete the written survey that you may receive in the mail after your visit with us. Thank you!             Your Updated Medication List - Protect others around you: Learn how to safely use, store and throw away your medicines at  www.disposemymeds.org.          This list is accurate as of: 8/31/17 11:59 PM.  Always use your most recent med list.                   Brand Name Dispense Instructions for use Diagnosis    atorvastatin 20 MG tablet    LIPITOR    90 tablet    Take 1 tablet (20 mg) by mouth daily    Hypercholesteremia       carvedilol 6.25 MG tablet    COREG    360 tablet    Take 2 tablets (12.5 mg) by mouth 2 times daily (with meals)    Hypertension secondary to other renal disorders       cefpodoxime 100 MG tablet    VANTIN    28 tablet    Take 1 tablet (100 mg) by mouth 2 times daily    Urinary tract infection       cholecalciferol 1000 UNITS capsule    vitamin  -D    180 capsule    Take 2 capsules (2,000 Units) by mouth daily    Vitamin D deficiency       darbepoetin bucky 300 MCG/0.6ML injection    ARANESP (ALBUMIN FREE)    0.6 mL    Inject 0.6 mLs (300 mcg) Subcutaneous every 28 days As needed for hgb<10g/dL    Chronic kidney disease, stage III (moderate), Anemia in stage 3 chronic kidney disease       fexofenadine 180 MG tablet    ALLEGRA    30 tablet    Take 1 tablet (180 mg) by mouth daily    Acute seasonal allergic rhinitis due to pollen       folic acid 1 MG tablet    FOLVITE    30 tablet    Take 1 tablet (1 mg) by mouth daily    Preventive measure       hydrALAZINE 100 MG Tabs tablet    APRESOLINE    270 tablet    Take 1 tablet (100 mg) by mouth 3 times daily    HTN (hypertension)       hydrocortisone 1 % cream    CORTAID    60 g    Apply topically 2 times daily    Rash, skin       mycophenolate capsule     120 capsule    Take 2 capsules (500 mg) by mouth 2 times daily    Kidney transplanted       pantoprazole 40 MG EC tablet    PROTONIX    30 tablet    Take 1 tablet (40 mg) by mouth daily    Gastroesophageal reflux disease with esophagitis       predniSONE 5 MG tablet    DELTASONE    30 tablet    Take 1 tablet (5 mg) by mouth daily    Kidney transplanted       sodium bicarbonate 650 MG tablet     180 tablet    Take 1  tablet (650 mg) by mouth 2 times daily    Acidosis       vitamin D 80140 UNIT capsule    ERGOCALCIFEROL    12 capsule    Take 1 capsule (50,000 Units) by mouth once a week    Vitamin D deficiency

## 2017-08-31 NOTE — NURSING NOTE
Chief Complaint   Patient presents with     Consult     Consult dialysis access        Vitals:    08/31/17 1721 08/31/17 1722 08/31/17 1724   BP: (!) 182/103 (!) 159/100 (!) 174/99   BP Location: Left arm Right arm Left arm   Pulse: 65  77   Resp: 16  15   SpO2: 98%  97%       There is no height or weight on file to calculate BMI.           Kia Sotomayor LPN

## 2017-08-31 NOTE — LETTER
8/31/2017       RE: Eleni Logan  [address]  [address]  [facility] [address]     Dear Colleague,    Thank you for referring your patient, Eleni Logan, to the [facility]. Please see a copy of my visit note below.    CC: renal failure    HPI: 61f s/p renal xplant in 99 due to iga nephropathy who referred by renal service for fistula placement. Has never had a fistula before. She has no majoy complaints    Past Medical History:   Diagnosis Date     Anatomical narrow angle      Anemia      Gout      HTN (hypertension)      Hyperlipidaemia      IgA nephropathy      Immunosuppressed status (H)     Prednisone and cellcept     Nonsenile cataract      Obstructive sleep apnea      Past Surgical History:   Procedure Laterality Date     ARTHROPLASTY KNEE       COLONOSCOPY N/A 2/18/2016    Procedure: COLONOSCOPY;  Surgeon: Markie Squires MD;  Location:  GI     ESOPHAGOSCOPY, GASTROSCOPY, DUODENOSCOPY (EGD), COMBINED N/A 2/17/2016    Procedure: COMBINED ESOPHAGOSCOPY, GASTROSCOPY, DUODENOSCOPY (EGD);  Surgeon: Markie Squires MD;  Location:  GI     HC CAPSULE ENDOSCOPY N/A 2/18/2016    Procedure: CAPSULE/PILL CAM ENDOSCOPY;  Surgeon: Markie Squires MD;  Location:  GI     LASER YAG IRIDOTOMY BILATERAL  2008     RENAL TRANSPLANT PROGRAM ADULT IP CONSULT       TRANSPLANT       Family History   Problem Relation Age of Onset     KIDNEY DISEASE Mother      Macular Degeneration No family hx of      Eye Surgery No family hx of      Glaucoma No family hx of      DIABETES No family hx of      Hypertension No family hx of      Amblyopia No family hx of      Skin Cancer No family hx of      Melanoma No family hx of      Social History     Social History     Marital status:      Spouse name: N/A     Number of children: N/A     Years of education: N/A     Occupational History     Not on file.     Social History Main Topics      Smoking status: Never Smoker     Smokeless tobacco: Never Used      Comment: Has been around second hand smoke     Alcohol use Yes      Comment: Once in a great while     Drug use: No     Sexual activity: Not on file     Other Topics Concern     Not on file     Social History Narrative        Allergies   Allergen Reactions     Ciprofloxacin Palpitations     Current Outpatient Prescriptions   Medication     cefpodoxime (VANTIN) 100 MG tablet     vitamin D (ERGOCALCIFEROL) 96706 UNIT capsule     atorvastatin (LIPITOR) 20 MG tablet     carvedilol (COREG) 6.25 MG tablet     cholecalciferol (VITAMIN  -D) 1000 UNITS capsule     fexofenadine (ALLEGRA) 180 MG tablet     pantoprazole (PROTONIX) 40 MG EC tablet     folic acid (FOLVITE) 1 MG tablet     sodium bicarbonate 650 MG tablet     darbepoetin bucky (ARANESP, ALBUMIN FREE,) 300 MCG/0.6ML injection     CELLCEPT 250 MG PO CAPSULE     predniSONE (DELTASONE) 5 MG tablet     hydrALAZINE (APRESOLINE) 100 MG TABS     hydrocortisone (CORTAID) 1 % cream     No current facility-administered medications for this visit.      BP (!) 174/99 (BP Location: Left arm)  Pulse 77  Resp 15  SpO2 97%  Breastfeeding? No    nad   Rue exam  Palpable brachial, radial and ulnar pulses  Negative eneida's test  Vein mapping reveals right cephalic vein as suitable vein for fistula    Plan  Schedule rue brachial cephalic fistula  Risks of failure to mature and steal discussed with patient    Azul Lema           I saw the patient with the resident.  I agree with the resident note and plan of care attached. Adequate vein only on the right upper cephalic vein.  Most interesting issue is that she has had multiple central catheters, and so central stenosis is a possibility, but given that she is not on dialysis, venogram is as unacceptable risk.  We will go ahead with out one, accepting the duplex findings of normal vein waveforms. Surgery under regional block.      Brandy Bond MD     I spent >50%  of this 30 min visit in counseling    Again, thank you for allowing me to participate in the care of your patient.      Sincerely,    Brandy Bond MD

## 2017-09-01 NOTE — PROGRESS NOTES
CC: renal failure    HPI: 61f s/p renal xplant in 99 due to iga nephropathy who referred by renal service for fistula placement. Has never had a fistula before. She has no majoy complaints    Past Medical History:   Diagnosis Date     Anatomical narrow angle      Anemia      Gout      HTN (hypertension)      Hyperlipidaemia      IgA nephropathy      Immunosuppressed status (H)     Prednisone and cellcept     Nonsenile cataract      Obstructive sleep apnea      Past Surgical History:   Procedure Laterality Date     ARTHROPLASTY KNEE       COLONOSCOPY N/A 2/18/2016    Procedure: COLONOSCOPY;  Surgeon: Markie Squires MD;  Location:  GI     ESOPHAGOSCOPY, GASTROSCOPY, DUODENOSCOPY (EGD), COMBINED N/A 2/17/2016    Procedure: COMBINED ESOPHAGOSCOPY, GASTROSCOPY, DUODENOSCOPY (EGD);  Surgeon: Markie Squires MD;  Location:  GI     HC CAPSULE ENDOSCOPY N/A 2/18/2016    Procedure: CAPSULE/PILL CAM ENDOSCOPY;  Surgeon: Markie Squires MD;  Location:  GI     LASER YAG IRIDOTOMY BILATERAL  2008     RENAL TRANSPLANT PROGRAM ADULT IP CONSULT       TRANSPLANT       Family History   Problem Relation Age of Onset     KIDNEY DISEASE Mother      Macular Degeneration No family hx of      Eye Surgery No family hx of      Glaucoma No family hx of      DIABETES No family hx of      Hypertension No family hx of      Amblyopia No family hx of      Skin Cancer No family hx of      Melanoma No family hx of      Social History     Social History     Marital status:      Spouse name: N/A     Number of children: N/A     Years of education: N/A     Occupational History     Not on file.     Social History Main Topics     Smoking status: Never Smoker     Smokeless tobacco: Never Used      Comment: Has been around second hand smoke     Alcohol use Yes      Comment: Once in a great while     Drug use: No     Sexual activity: Not on file     Other Topics Concern     Not on file     Social History Narrative         Allergies   Allergen Reactions     Ciprofloxacin Palpitations     Current Outpatient Prescriptions   Medication     cefpodoxime (VANTIN) 100 MG tablet     vitamin D (ERGOCALCIFEROL) 42515 UNIT capsule     atorvastatin (LIPITOR) 20 MG tablet     carvedilol (COREG) 6.25 MG tablet     cholecalciferol (VITAMIN  -D) 1000 UNITS capsule     fexofenadine (ALLEGRA) 180 MG tablet     pantoprazole (PROTONIX) 40 MG EC tablet     folic acid (FOLVITE) 1 MG tablet     sodium bicarbonate 650 MG tablet     darbepoetin bucky (ARANESP, ALBUMIN FREE,) 300 MCG/0.6ML injection     CELLCEPT 250 MG PO CAPSULE     predniSONE (DELTASONE) 5 MG tablet     hydrALAZINE (APRESOLINE) 100 MG TABS     hydrocortisone (CORTAID) 1 % cream     No current facility-administered medications for this visit.      BP (!) 174/99 (BP Location: Left arm)  Pulse 77  Resp 15  SpO2 97%  Breastfeeding? No    nad   Rue exam  Palpable brachial, radial and ulnar pulses  Negative eneida's test  Vein mapping reveals right cephalic vein as suitable vein for fistula    Plan  Schedule rue brachial cephalic fistula  Risks of failure to mature and steal discussed with patient    Azul Pita

## 2017-09-06 ENCOUNTER — TELEPHONE (OUTPATIENT)
Dept: PHARMACY | Facility: CLINIC | Age: 62
End: 2017-09-06

## 2017-09-06 NOTE — TELEPHONE ENCOUNTER
Tresakevin returned my call. She will order a refill on her aranesp rx and dose when she receives it on 9/7 or 9/8.    NEXT FOLLOW-UP DATE:  9/20/17     Winston

## 2017-09-06 NOTE — TELEPHONE ENCOUNTER
Anemia Management Note  SUBJECTIVE/OBJECTIVE:  Referred by Dr Tyshawn Sexton February 10, 2017  Primary Diagnosis: Anemia in Chronic Kidney Disease (N18.3 D63.1)   Secondary Diagnosis: Chronic Kidney Disease, Stage III (N18.3)   Hgb goal range: 9-10  Epo/Darbo: Aranesp 300 mcg every 14 days - At home   RX will  on 18  Iron regimen:  Ferrous sulfate 325 mg QD on 16  Lab orders  18 in EPIC     Anemia Latest Ref Rng & Units 2017 7/10/2017 7/15/2017 2017 2017 2017 2017   HGB Goal - - - - - - - -   VALARIE Dose - 300 mcg - 300 mcg - - 300 mcg -   Hemoglobin 11.7 - 15.7 g/dL - 9.5(L) - 10.0(L) 9.9(L) - 9.6(L)   TSAT 15 - 46 % - 23 - - - - -   Ferritin 8 - 252 ng/mL - 759(H) - - - - -   PRBCs - - - - - - - -     BP Readings from Last 3 Encounters:   17 (!) 174/99   17 152/81   17 (!) 189/92     Wt Readings from Last 2 Encounters:   17 221 lb 3.2 oz (100.3 kg)   17 223 lb 4.8 oz (101.3 kg)     ASSESSMENT:  Hgb: At goal - recommend dose  TSat: not at goal (>30%) but ferritin >500ng/mL.  IV iron not indicated at this time per anemia protocol.    PLAN:  Dose with aranesp and RTC for hgb then aranesp if needed in 2 week(s)    Orders needed to be renewed (for next follow-up date) in [facility]: None    Iron labs due:  10/10/17    Plan discussed with:  NAY for Eleni  Plan provided by:  Winston    NEXT FOLLOW-UP DATE:  17    Anemia Management Service  Yvette Goodwin,PharmD and Preethi Akers CPhT  Phone: [phone]  Fax: [phone]

## 2017-09-07 NOTE — PROGRESS NOTES
I saw the patient with the resident.  I agree with the resident note and plan of care attached. Adequate vein only on the right upper cephalic vein.  Most interesting issue is that she has had multiple central catheters, and so central stenosis is a possibility, but given that she is not on dialysis, venogram is as unacceptable risk.  We will go ahead with out one, accepting the duplex findings of normal vein waveforms. Surgery under regional block.      Brandy Bond MD     I spent >50% of this 30 min visit in counseling

## 2017-09-11 DIAGNOSIS — N18.6 END STAGE RENAL DISEASE (H): Primary | ICD-10-CM

## 2017-09-12 ENCOUNTER — TELEPHONE (OUTPATIENT)
Dept: INTERVENTIONAL RADIOLOGY/VASCULAR | Facility: CLINIC | Age: 62
End: 2017-09-12

## 2017-09-12 ENCOUNTER — CARE COORDINATION (OUTPATIENT)
Dept: VASCULAR SURGERY | Facility: CLINIC | Age: 62
End: 2017-09-12

## 2017-09-12 NOTE — PROGRESS NOTES
Spoke with Eleni regarding venogram on Thursday.  Explained rationale: Dr Bond needs further imaging of the right subclavian vein prior to fistula placemen on Tuesday 9/19.        Eleni in agreement with plan.

## 2017-09-14 ENCOUNTER — APPOINTMENT (OUTPATIENT)
Dept: INTERVENTIONAL RADIOLOGY/VASCULAR | Facility: CLINIC | Age: 62
End: 2017-09-14
Attending: SURGERY
Payer: MEDICARE

## 2017-09-14 ENCOUNTER — HOSPITAL ENCOUNTER (OUTPATIENT)
Facility: CLINIC | Age: 62
Discharge: HOME | End: 2017-09-14
Attending: SURGERY | Admitting: SURGERY
Payer: MEDICARE

## 2017-09-14 ENCOUNTER — ANESTHESIA EVENT (OUTPATIENT)
Dept: SURGERY | Facility: CLINIC | Age: 62
End: 2017-09-14
Payer: MEDICARE

## 2017-09-14 ENCOUNTER — APPOINTMENT (OUTPATIENT)
Dept: MEDSURG UNIT | Facility: CLINIC | Age: 62
End: 2017-09-14
Attending: SURGERY
Payer: MEDICARE

## 2017-09-14 VITALS
RESPIRATION RATE: 16 BRPM | TEMPERATURE: 98.6 F | SYSTOLIC BLOOD PRESSURE: 146 MMHG | DIASTOLIC BLOOD PRESSURE: 75 MMHG | HEART RATE: 63 BPM | OXYGEN SATURATION: 99 %

## 2017-09-14 DIAGNOSIS — N18.6 END STAGE RENAL DISEASE (H): ICD-10-CM

## 2017-09-14 LAB
CREAT SERPL-MCNC: 2.19 MG/DL (ref 0.52–1.04)
ERYTHROCYTE [DISTWIDTH] IN BLOOD BY AUTOMATED COUNT: 16 % (ref 10–15)
GFR SERPL CREATININE-BSD FRML MDRD: 23 ML/MIN/1.7M2
HCT VFR BLD AUTO: 33.6 % (ref 35–47)
HGB BLD-MCNC: 10.1 G/DL (ref 11.7–15.7)
INR PPP: 1.01 (ref 0.86–1.14)
MCH RBC QN AUTO: 24.7 PG (ref 26.5–33)
MCHC RBC AUTO-ENTMCNC: 30.1 G/DL (ref 31.5–36.5)
MCV RBC AUTO: 82 FL (ref 78–100)
PLATELET # BLD AUTO: 260 10E9/L (ref 150–450)
POTASSIUM SERPL-SCNC: 4.1 MMOL/L (ref 3.4–5.3)
RBC # BLD AUTO: 4.09 10E12/L (ref 3.8–5.2)
WBC # BLD AUTO: 4.9 10E9/L (ref 4–11)

## 2017-09-14 PROCEDURE — 36005 INJECTION EXT VENOGRAPHY: CPT

## 2017-09-14 PROCEDURE — 99153 MOD SED SAME PHYS/QHP EA: CPT

## 2017-09-14 PROCEDURE — 25000128 H RX IP 250 OP 636: Performed by: PHYSICIAN ASSISTANT

## 2017-09-14 PROCEDURE — 40000166 ZZH STATISTIC PP CARE STAGE 1

## 2017-09-14 PROCEDURE — C1769 GUIDE WIRE: HCPCS

## 2017-09-14 PROCEDURE — 84132 ASSAY OF SERUM POTASSIUM: CPT | Performed by: RADIOLOGY

## 2017-09-14 PROCEDURE — 25000125 ZZHC RX 250: Performed by: RADIOLOGY

## 2017-09-14 PROCEDURE — 82565 ASSAY OF CREATININE: CPT | Performed by: RADIOLOGY

## 2017-09-14 PROCEDURE — 27210732 ZZH ACCESSORY CR1

## 2017-09-14 PROCEDURE — 85027 COMPLETE CBC AUTOMATED: CPT | Performed by: RADIOLOGY

## 2017-09-14 PROCEDURE — 36012 PLACE CATHETER IN VEIN: CPT

## 2017-09-14 PROCEDURE — 85610 PROTHROMBIN TIME: CPT | Performed by: RADIOLOGY

## 2017-09-14 PROCEDURE — C1753 CATH, INTRAVAS ULTRASOUND: HCPCS

## 2017-09-14 PROCEDURE — 75820 VEIN X-RAY ARM/LEG: CPT | Mod: RT

## 2017-09-14 PROCEDURE — 27210905 ZZH KIT CR7

## 2017-09-14 PROCEDURE — 27210804 ZZH SHEATH CR3

## 2017-09-14 PROCEDURE — 27210908 ZZH NEEDLE CR4

## 2017-09-14 PROCEDURE — 25000128 H RX IP 250 OP 636: Performed by: RADIOLOGY

## 2017-09-14 PROCEDURE — 37252 INTRVASC US NONCORONARY 1ST: CPT

## 2017-09-14 PROCEDURE — 99152 MOD SED SAME PHYS/QHP 5/>YRS: CPT

## 2017-09-14 PROCEDURE — C1887 CATHETER, GUIDING: HCPCS

## 2017-09-14 RX ORDER — NALOXONE HYDROCHLORIDE 0.4 MG/ML
.1-.4 INJECTION, SOLUTION INTRAMUSCULAR; INTRAVENOUS; SUBCUTANEOUS
Status: DISCONTINUED | OUTPATIENT
Start: 2017-09-14 | End: 2017-09-14 | Stop reason: HOSPADM

## 2017-09-14 RX ORDER — FENTANYL CITRATE 50 UG/ML
25-50 INJECTION, SOLUTION INTRAMUSCULAR; INTRAVENOUS EVERY 5 MIN PRN
Status: DISCONTINUED | OUTPATIENT
Start: 2017-09-14 | End: 2017-09-14 | Stop reason: HOSPADM

## 2017-09-14 RX ORDER — SODIUM CHLORIDE 9 MG/ML
INJECTION, SOLUTION INTRAVENOUS CONTINUOUS
Status: DISCONTINUED | OUTPATIENT
Start: 2017-09-14 | End: 2017-09-14 | Stop reason: HOSPADM

## 2017-09-14 RX ORDER — FLUMAZENIL 0.1 MG/ML
0.2 INJECTION, SOLUTION INTRAVENOUS
Status: DISCONTINUED | OUTPATIENT
Start: 2017-09-14 | End: 2017-09-14 | Stop reason: HOSPADM

## 2017-09-14 RX ORDER — LIDOCAINE 40 MG/G
CREAM TOPICAL
Status: DISCONTINUED | OUTPATIENT
Start: 2017-09-14 | End: 2017-09-14 | Stop reason: HOSPADM

## 2017-09-14 RX ADMIN — MIDAZOLAM 1 MG: 1 INJECTION INTRAMUSCULAR; INTRAVENOUS at 15:52

## 2017-09-14 RX ADMIN — FENTANYL CITRATE 50 MCG: 50 INJECTION, SOLUTION INTRAMUSCULAR; INTRAVENOUS at 15:52

## 2017-09-14 RX ADMIN — LIDOCAINE HYDROCHLORIDE 6 ML: 10 INJECTION, SOLUTION EPIDURAL; INFILTRATION; INTRACAUDAL; PERINEURAL at 15:51

## 2017-09-14 RX ADMIN — SODIUM CHLORIDE: 9 INJECTION, SOLUTION INTRAVENOUS at 14:45

## 2017-09-14 NOTE — IP AVS SNAPSHOT
[address] [address]                                       After Visit Summary   9/14/2017    Eleni Logan    MRN: [id number]           After Visit Summary Signature Page     I have received my discharge instructions, and my questions have been answered. I have discussed any challenges I see with this plan with the nurse or doctor.    ..........................................................................................................................................  Patient/Patient Representative Signature      ..........................................................................................................................................  Patient Representative Print Name and Relationship to Patient    ..................................................               ................................................  Date                                            Time    ..........................................................................................................................................  Reviewed by Signature/Title    ...................................................              ..............................................  Date                                                            Time

## 2017-09-14 NOTE — IP AVS SNAPSHOT
MRN:[id number]                      After Visit Summary   9/14/2017    Eleni Logan    MRN: [id number]           Visit Information        Department      9/14/2017 10:40 AM [address] [facility] [place]          Review of your medicines      UNREVIEWED medicines. Ask your doctor about these medicines        Dose / Directions    atorvastatin 20 MG tablet   Commonly known as:  LIPITOR   Used for:  Hypercholesteremia        Dose:  20 mg   Take 1 tablet (20 mg) by mouth daily   Quantity:  90 tablet   Refills:  0       carvedilol 6.25 MG tablet   Commonly known as:  COREG   Used for:  Hypertension secondary to other renal disorders        Dose:  12.5 mg   Take 2 tablets (12.5 mg) by mouth 2 times daily (with meals)   Quantity:  360 tablet   Refills:  1       cefpodoxime 100 MG tablet   Commonly known as:  VANTIN   Used for:  Urinary tract infection        Dose:  100 mg   Take 1 tablet (100 mg) by mouth 2 times daily   Quantity:  28 tablet   Refills:  0       cholecalciferol 1000 UNITS capsule   Commonly known as:  vitamin  -D   Used for:  Vitamin D deficiency        Dose:  2 capsule   Take 2 capsules (2,000 Units) by mouth daily   Quantity:  180 capsule   Refills:  3       darbepoetin bucky 300 MCG/0.6ML injection   Commonly known as:  ARANESP (ALBUMIN FREE)   Used for:  Chronic kidney disease, stage III (moderate), Anemia in stage 3 chronic kidney disease        Dose:  300 mcg   Inject 0.6 mLs (300 mcg) Subcutaneous every 28 days As needed for hgb<10g/dL   Quantity:  0.6 mL   Refills:  11       fexofenadine 180 MG tablet   Commonly known as:  ALLEGRA   Used for:  Acute seasonal allergic rhinitis due to pollen        Dose:  180 mg   Take 1 tablet (180 mg) by mouth daily   Quantity:  30 tablet   Refills:  1       folic acid 1 MG tablet   Commonly known as:  FOLVITE   Used for:  Preventive measure        Dose:  1 mg   Take 1 tablet (1 mg) by mouth daily   Quantity:  30 tablet   Refills:  11       hydrALAZINE 100  MG Tabs tablet   Commonly known as:  APRESOLINE   Used for:  HTN (hypertension)        Dose:  100 mg   Take 1 tablet (100 mg) by mouth 3 times daily   Quantity:  270 tablet   Refills:  3       hydrocortisone 1 % cream   Commonly known as:  CORTAID   Used for:  Rash, skin        Apply topically 2 times daily   Quantity:  60 g   Refills:  1       mycophenolate capsule   Used for:  Kidney transplanted        Dose:  500 mg   Take 2 capsules (500 mg) by mouth 2 times daily   Quantity:  120 capsule   Refills:  11       pantoprazole 40 MG EC tablet   Commonly known as:  PROTONIX   Used for:  Gastroesophageal reflux disease with esophagitis        Dose:  40 mg   Take 1 tablet (40 mg) by mouth daily   Quantity:  30 tablet   Refills:  11       predniSONE 5 MG tablet   Commonly known as:  DELTASONE   Used for:  Kidney transplanted        Dose:  5 mg   Take 1 tablet (5 mg) by mouth daily   Quantity:  30 tablet   Refills:  11       sodium bicarbonate 650 MG tablet   Used for:  Acidosis        Dose:  650 mg   Take 1 tablet (650 mg) by mouth 2 times daily   Quantity:  180 tablet   Refills:  1       vitamin D 38652 UNIT capsule   Commonly known as:  ERGOCALCIFEROL   Used for:  Vitamin D deficiency        Dose:  31975 Units   Take 1 capsule (50,000 Units) by mouth once a week   Quantity:  12 capsule   Refills:  0                Protect others around you: Learn how to safely use, store and throw away your medicines at www.disposemymeds.org.         Follow-ups after your visit        Your next 10 appointments already scheduled     Sep 19, 2017   Procedure with Brandy Bond MD   [facility], [place], Same Day Surgery (--)    [address] [address]   [phone]            Nov 06, 2017  2:25 PM CST   (Arrive by 1:55 PM)   Return Kidney Transplant with Tyshawn Sexton MD   [facility] Nephrology ([facility])    [address]  3rd Floor  [facility] [address] [phone]            Nov 10, 2017   9:00 AM CST   (Arrive by 8:45 AM)   Return Visit with Pancho Salgado MD   [facility] Medical [facility] ([facility])    [address]  [address] [facility] [address] [phone]               Care Instructions        Further instructions from your care team       [facility]    Interventional Radiology  Patient Instructions Following Venogram    AFTER YOU GO HOME  ? If you were given sedation DO NOT drive or operate machinery at home or at work for at least 24 hours  ? DO relax and take it easy for 24 hours, no strenuous activity for 24 hours  ? DO drink plenty of fluids  ? DO resume your regular diet, unless otherwise instructed by your Primary Physician  ? Keep the dressing dry until tomorrow morning..  ? DO NOT SMOKE FOR AT LEAST 24 HOURS, if you have been given any medications that were to help you relax or sedate you during your procedure  ? DO NOT drink alcoholic beverages the day of your procedure  ? DO NOT take a bath or shower for at least 12 hours following your procedure  ? Remove dressing after shower the next day. Replace with Band aid for 2 days.  Never leave a wet dressing in place.  ? DO NOT make any important or legal decisions for 24 hours following your procedure  ? There should be minimum drainage from the biopsy site    CALL THE PHYSICIAN IF:  ? You start bleeding from the procedure site.  If you do start to bleed from that site, lie down flat and hold pressure on the site for a minimum of 10 minutes.  Your physician will tell you if you need to return to the hospital  ? You develop nausea or vomiting  ? You have excessive swelling, redness, or tenderness at the site  ? You have drainage that looks like it is infected.  ? You develop hives or a rash or unexplained itching  ? You develop shortness of breath  ? You develop a temperature of 101 degrees F or greater          [facility] INTERVENTIONAL RADIOLOGY DEPARTMENT  Procedure  "Physician: Dr. Harrington                                 Date of procedure: 2017  Telephone Numbers: [phone] Monday-Friday 8:00 am to 4:30 pm  [phone] After 4:30 pm Monday-Friday, Weekends & Holidays.   Ask for the Interventional Radiologist on call.  Someone is on call 24 hrs/day  [facility] toll free number: [phone] Monday-Friday 8:00 am to 4:30 pm  [facility] Emergency Dept: [phone]                                                                                                                                                          Additional Information About Your Visit        [employer] Information     [employer] lets you send messages to your doctor, view your test results, renew your prescriptions, schedule appointments and more. To sign up, go to [email or web address] . Click on \"Log in\" on the left side of the screen, which will take you to the Welcome page. Then click on \"Sign up Now\" on the right side of the page.     You will be asked to enter the access code listed below, as well as some personal information. Please follow the directions to create your username and password.     Your access code is: [email or web address]  Expires: 2017  4:36 PM     Your access code will  in 90 days. If you need help or a new code, please call your [place] clinic or [phone].        Care EveryWhere ID     This is your Care EveryWhere ID. This could be used by other organizations to access your [place] medical records  [id number]        Your Vitals Were     Blood Pressure Pulse Temperature Respirations Pulse Oximetry       160/69 (BP Location: Left arm) 63 98.6  F (37  C) (Oral) 16 97%        Primary Care Provider Office Phone # Fax #    Rocio Saul [phone]      Equal Access to Services     GERBER DAVENPORT : Luís Anderson, nathaniel muniz, qaybta kaalclare romo. So [facility] [phone].    ATENCIÓN: Si " jeff sales, tiene a stoner disposición servicios gratuitos de asistencia lingüística. Celine muñiz [phone].    We comply with applicable federal civil rights laws and [place] laws. We do not discriminate on the basis of race, color, national origin, age, disability sex, sexual orientation or gender identity.            Thank you!     Thank you for choosing [facility] for your care. Our goal is always to provide you with excellent care. Hearing back from our patients is one way we can continue to improve our services. Please take a few minutes to complete the written survey that you may receive in the mail after you visit with us. Thank you!             Medication List: This is a list of all your medications and when to take them. Check marks below indicate your daily home schedule. Keep this list as a reference.      Medications           Morning Afternoon Evening Bedtime As Needed    atorvastatin 20 MG tablet   Commonly known as:  LIPITOR   Take 1 tablet (20 mg) by mouth daily                                carvedilol 6.25 MG tablet   Commonly known as:  COREG   Take 2 tablets (12.5 mg) by mouth 2 times daily (with meals)                                cefpodoxime 100 MG tablet   Commonly known as:  VANTIN   Take 1 tablet (100 mg) by mouth 2 times daily                                cholecalciferol 1000 UNITS capsule   Commonly known as:  vitamin  -D   Take 2 capsules (2,000 Units) by mouth daily                                darbepoetin bucky 300 MCG/0.6ML injection   Commonly known as:  ARANESP (ALBUMIN FREE)   Inject 0.6 mLs (300 mcg) Subcutaneous every 28 days As needed for hgb<10g/dL                                fexofenadine 180 MG tablet   Commonly known as:  ALLEGRA   Take 1 tablet (180 mg) by mouth daily                                folic acid 1 MG tablet   Commonly known as:  FOLVITE   Take 1 tablet (1 mg) by mouth daily                                hydrALAZINE 100 MG Tabs tablet   Commonly known  as:  APRESOLINE   Take 1 tablet (100 mg) by mouth 3 times daily                                hydrocortisone 1 % cream   Commonly known as:  CORTAID   Apply topically 2 times daily                                mycophenolate capsule   Take 2 capsules (500 mg) by mouth 2 times daily                                pantoprazole 40 MG EC tablet   Commonly known as:  PROTONIX   Take 1 tablet (40 mg) by mouth daily                                predniSONE 5 MG tablet   Commonly known as:  DELTASONE   Take 1 tablet (5 mg) by mouth daily                                sodium bicarbonate 650 MG tablet   Take 1 tablet (650 mg) by mouth 2 times daily                                vitamin D 51006 UNIT capsule   Commonly known as:  ERGOCALCIFEROL   Take 1 capsule (50,000 Units) by mouth once a week

## 2017-09-14 NOTE — PROGRESS NOTES
1603: Received from IR post venogram. Right upper arm/antecubital area dressing clean, flat, dry, and intact. Denies pain;requesting orals.  1605: Patient coughing and vomiting clear liquid. Patient states this happens when she is hungry.   at bedside. 1630:  Tolerates orals.  1640:  Reviewed discharge instructions. Copies given to patient. 1655:  Discharged to home. Care transferred to patient's spouse. Transported to vehicle via wheelchair.

## 2017-09-14 NOTE — DISCHARGE INSTRUCTIONS
[facility]    Interventional Radiology  Patient Instructions Following Venogram    AFTER YOU GO HOME  ? If you were given sedation DO NOT drive or operate machinery at home or at work for at least 24 hours  ? DO relax and take it easy for 24 hours, no strenuous activity for 24 hours  ? DO drink plenty of fluids  ? DO resume your regular diet, unless otherwise instructed by your Primary Physician  ? Keep the dressing dry until tomorrow morning..  ? DO NOT SMOKE FOR AT LEAST 24 HOURS, if you have been given any medications that were to help you relax or sedate you during your procedure  ? DO NOT drink alcoholic beverages the day of your procedure  ? DO NOT take a bath or shower for at least 12 hours following your procedure  ? Remove dressing after shower the next day. Replace with Band aid for 2 days.  Never leave a wet dressing in place.  ? DO NOT make any important or legal decisions for 24 hours following your procedure  ? There should be minimum drainage from the biopsy site    CALL THE PHYSICIAN IF:  ? You start bleeding from the procedure site.  If you do start to bleed from that site, lie down flat and hold pressure on the site for a minimum of 10 minutes.  Your physician will tell you if you need to return to the hospital  ? You develop nausea or vomiting  ? You have excessive swelling, redness, or tenderness at the site  ? You have drainage that looks like it is infected.  ? You develop hives or a rash or unexplained itching  ? You develop shortness of breath  ? You develop a temperature of 101 degrees F or greater          [facility] INTERVENTIONAL RADIOLOGY DEPARTMENT  Procedure Physician: Dr. Harrington                                 Date of procedure: September 14, 2017  Telephone Numbers: [phone] Monday-Friday 8:00 am to 4:30 pm  [phone] After 4:30 pm Monday-Friday, Weekends & Holidays.   Ask for the Interventional Radiologist on call.  Someone is on call 24 hrs/day  [facility] toll  free number: [phone] Monday-Friday 8:00 am to 4:30 pm  [facility] Emergency Dept: [phone]

## 2017-09-14 NOTE — PROGRESS NOTES
Interventional Radiology Pre-Procedure Sedation Assessment   Time of Assessment: 12:24 PM    Expected Level: Moderate Sedation    Indication: Sedation is required for the following type of Procedure: Venous    Sedation and procedural consent: Risks, benefits and alternatives were discussed with Patient    PO Intake: Appropriately NPO for procedure    ASA Class: Class 3 - SEVERE SYSTEMIC DISEASE, DEFINITE FUNCTIONAL LIMITATIONS.    Mallampati: Grade 3:  Soft palate visible, posterior pharyngeal wall not visible    Lungs: Lungs Clear with good breath sounds bilaterally    Heart: Normal heart sounds and rate    History and physical reviewed and no updates needed. I have reviewed the lab findings, diagnostic data, medications, and the plan for sedation. I have determined this patient to be an appropriate candidate for the planned sedation and procedure and have reassessed the patient IMMEDIATELY PRIOR to sedation and procedure.    Salvador Harrington MD

## 2017-09-14 NOTE — BRIEF OP NOTE
Interventional Radiology Brief Post Procedure Note    Procedure: Right upper extremity intravascular ultrasound and venogram    Proceduralist: Tamara De La O MD    Assistant: Salvador Harrington MD    Time Out: Prior to the start of the procedure and with procedural staff participation, I verbally confirmed the patient s identity using two indicators, relevant allergies, that the procedure was appropriate and matched the consent or emergent situation, and that the correct equipment/implants were available. Immediately prior to starting the procedure I conducted the Time Out with the procedural staff and re-confirmed the patient s name, procedure, and site/side. (The Joint Commission universal protocol was followed.)  Yes        Sedation: IR Nurse Monitored Care   Post Procedure Summary:  Prior to the start of the procedure and with procedural staff participation, I verbally confirmed the patient s identity using two indicators, relevant allergies, that the procedure was appropriate and matched the consent or emergent situation, and that the correct equipment/implants were available. Immediately prior to starting the procedure I conducted the Time Out with the procedural staff and re-confirmed the patient s name, procedure, and site/side. (The Joint Commission universal protocol was followed.)  Yes       Sedatives: Fentanyl and Midazolam (Versed)    Vital signs, airway and pulse oximetry were monitored and remained stable throughout the procedure and sedation was maintained until the procedure was complete.  The patient was monitored by staff until sedation discharge criteria were met.    Patient tolerance: Patient tolerated the procedure well with no immediate complications.    Time of sedation in minutes: 30 Minutes minutes from beginning to end of physician one to one monitoring.          Findings: Successful right upper extremity venogram and intravascular ultrasound    Estimated Blood Loss:  None    Fluoroscopy Time:  minute(s)    SPECIMENS: None    Complications: 1. None     Condition: Stable    Plan: Patient to 2A for postprocedure cares. May discharge when meets criteria.    Comments: See dictated procedure note for full details.    Salvador Harrington MD

## 2017-09-14 NOTE — PROGRESS NOTES
Interventional Radiology Intra-procedural Nursing Note    Patient Name: Eleni Logan  Medical Record Number: [id number]  Today's Date: 09/14/17    Start Time: 1455  End of procedure time: 1545  Procedure: Right Upper Extremity Venogram with CO2 and Intravascular Ultrasound  Report given to: MARIBELL Sheets 2A  Time pt departs: 1555    Proceduralist: Dr. Nader De La O and Dr. Salvador Harrington       Pt to IR Room 4 from Unit 2A. Consent confirmed with patient; questions addressed. Pt positioned supine and prepped on IR table by technologist.  Right Upper Extremity Venogram with CO2 done.  Imaging of vessels with IVUS done.  Pt tolerated procedure without apparent incident. Pt denies pain post-procedure.    Sedation Time = 50 minutes  Fentanyl given: 50 mcg  Versed given: 1 mg    MARIBELL Morales RN

## 2017-09-15 ENCOUNTER — CARE COORDINATION (OUTPATIENT)
Dept: NEPHROLOGY | Facility: CLINIC | Age: 62
End: 2017-09-15

## 2017-09-15 DIAGNOSIS — I10 HTN (HYPERTENSION): ICD-10-CM

## 2017-09-15 RX ORDER — HYDRALAZINE HYDROCHLORIDE 100 MG/1
100 TABLET, FILM COATED ORAL 3 TIMES DAILY PRN
Qty: 270 TABLET | Refills: 1 | Status: SHIPPED | OUTPATIENT
Start: 2017-09-15 | End: 2017-09-18

## 2017-09-15 RX ORDER — NIFEDIPINE 30 MG/1
30 TABLET, EXTENDED RELEASE ORAL
Qty: 180 TABLET | Refills: 3 | Status: SHIPPED | OUTPATIENT
Start: 2017-09-15 | End: 2018-02-19

## 2017-09-15 RX ORDER — CLONIDINE HYDROCHLORIDE 0.1 MG/1
0.1 TABLET ORAL 2 TIMES DAILY PRN
Qty: 60 TABLET | Refills: 3 | Status: SHIPPED | OUTPATIENT
Start: 2017-09-15 | End: 2018-02-01

## 2017-09-15 RX ORDER — HYDRALAZINE HYDROCHLORIDE 100 MG/1
100 TABLET, FILM COATED ORAL 3 TIMES DAILY
Qty: 270 TABLET | Refills: 3 | Status: SHIPPED | OUTPATIENT
Start: 2017-09-15 | End: 2017-09-15

## 2017-09-15 NOTE — PROGRESS NOTES
Reason for Call    Patient called to report BP meds are not working. BP yesterday was 183/88. Said that is an average reading for her at home. Denied any physical symptoms. Wants to know if meds should be changed.    Currently prescribed hydralazine 100mg TID and coreg 12.5mg BID. Refills of hydralazine were sent per patient request.    Collaboration    Above discussed with Dr. Sexton.  Orders received.    Lets add nifedipine 30 mg po bid   And change hydralazine to 25 mg tid prn for sbp> 170   Also lets fill her clonidine for PRN use 0.1 mg po bid prn for SBP> 180.   Lets do this before we raise coreg, but she needs to keep tack of the heart rate     Patient Education    1. Call this nurse if systolic (top number) blood pressure is consistently <110 or >160 for further instructions.     Plan    1. Adjust medications as directed  2. Follow up in 2 weeks    Patient was given an opportunity to ask questions and have those questions answered to her satisfaction.  Patient verbalized understanding of instructions provided and agreed to plan of care.    Brittney Quinones RN

## 2017-09-18 RX ORDER — HYDRALAZINE HYDROCHLORIDE 25 MG/1
25 TABLET, FILM COATED ORAL 3 TIMES DAILY PRN
Qty: 90 TABLET | Refills: 3 | Status: SHIPPED | OUTPATIENT
Start: 2017-09-18 | End: 2018-05-03

## 2017-09-18 NOTE — ANESTHESIA PREPROCEDURE EVALUATION
Eleni Logan is a 61 year old female with a PMH of  End Stage Renal Disease  who is scheduled for Procedure(s):  Creation Right Upper Extremity Arteriovenous Dialysis Fistula, Anesthesia Block - Wound Class: I-Clean    NPO Status: Adequate.  > 6 hours solids, > 2 hours clear liquids.       Past Surgical History:   Procedure Laterality Date     ARTHROPLASTY KNEE       COLONOSCOPY N/A 2/18/2016    Procedure: COLONOSCOPY;  Surgeon: Markie Squires MD;  Location:  GI     ESOPHAGOSCOPY, GASTROSCOPY, DUODENOSCOPY (EGD), COMBINED N/A 2/17/2016    Procedure: COMBINED ESOPHAGOSCOPY, GASTROSCOPY, DUODENOSCOPY (EGD);  Surgeon: Markie Squires MD;  Location:  GI     HC CAPSULE ENDOSCOPY N/A 2/18/2016    Procedure: CAPSULE/PILL CAM ENDOSCOPY;  Surgeon: Markie Squires MD;  Location:  GI     LASER YAG IRIDOTOMY BILATERAL  2008     RENAL TRANSPLANT PROGRAM ADULT IP CONSULT       TRANSPLANT  07/1999    kidney       Anesthesia Evaluation     . Pt has had prior anesthetic.     No history of anesthetic complications          ROS/MED HX    ENT/Pulmonary:     (+)sleep apnea (Does not tolerate CPAP), doesn't use CPAP , . .    Neurologic:  - neg neurologic ROS     Cardiovascular:     (+) Dyslipidemia, hypertension (Carvedilol, hydralazine)----. : . . . :. . pulmonary hypertension (The right ventricular systolic pressure is approximated at 48.3 mmHg plus the right atrial pressure. Moderate (46-55mmHg) pulmonary hypertension.), Previous cardiac testing date:results:Stress Testdate:10/2016 results:Normal dobutamine stress echocardiogram without evidence of inducible ischemia. Target heart rate was achieved. Heart rate and blood pressure response to dobutamine were normal. With stress, the left ventricular  ejection fraction increased from 55-60% to greater than 65% and the left ventricular size decreased appropriately. There was no ECG evidence of ischemia.  There is mild to moderate (1-2+) tricuspid  regurgitation.  Mild-moderate pulmonary hypertension is present. date: results: date: results:          METS/Exercise Tolerance:     Hematologic:     (+) Anemia, -      Musculoskeletal:  - neg musculoskeletal ROS       GI/Hepatic:  - neg GI/hepatic ROS       Renal/Genitourinary:     (+) chronic renal disease (s/p kidney transplant (1999) for IgA nephropathy, now with CKD 4),       Endo:     (+) Obesity, .      Psychiatric:  - neg psychiatric ROS       Infectious Disease:  - neg infectious disease ROS       Malignancy:      - no malignancy   Other:    - neg other ROS                 Physical Exam  Normal systems: cardiovascular, pulmonary and dental    Airway   Mallampati: III  TM distance: >3 FB  Neck ROM: full    Dental     Cardiovascular   Rhythm and rate: regular and normal      Pulmonary                     Anesthesia Plan      History & Physical Review  History and physical reviewed and following examination; no interval change.    ASA Status:  3 .        Plan for Periph. Nerve Block for postop pain and MAC with Intravenous induction. Maintenance will be TIVA.  Reason for MAC:  Deep or markedly invasive procedure (G8)  PONV prophylaxis:  Ondansetron (or other 5HT-3) and Dexamethasone or Solumedrol  Additional equipment: Videolaryngoscope (possible need for arterial line pending reliable NIPB measurements)      Postoperative Care  Postoperative pain management:  Multi-modal analgesia.      Consents  Anesthetic plan, risks, benefits and alternatives discussed with:  Patient.  Use of blood products discussed: Yes.   Use of blood products discussed with Patient.  Consented to blood products.  .        Landon Rae MD  11:21 AM September 19, 2017                       .

## 2017-09-19 ENCOUNTER — HOSPITAL ENCOUNTER (OUTPATIENT)
Facility: CLINIC | Age: 62
Discharge: HOME | End: 2017-09-20
Attending: SURGERY | Admitting: SURGERY
Payer: MEDICARE

## 2017-09-19 ENCOUNTER — ANESTHESIA (OUTPATIENT)
Dept: SURGERY | Facility: CLINIC | Age: 62
End: 2017-09-19
Payer: MEDICARE

## 2017-09-19 DIAGNOSIS — N18.4 CKD (CHRONIC KIDNEY DISEASE) STAGE 4, GFR 15-29 ML/MIN (H): Primary | ICD-10-CM

## 2017-09-19 PROBLEM — L98.8 FISTULA: Status: ACTIVE | Noted: 2017-09-19

## 2017-09-19 PROBLEM — N18.6: Status: ACTIVE | Noted: 2017-09-19

## 2017-09-19 LAB
APTT PPP: 28 SEC (ref 22–37)
APTT PPP: 30 SEC (ref 22–37)
BASOPHILS # BLD AUTO: 0 10E9/L (ref 0–0.2)
BASOPHILS NFR BLD AUTO: 0.2 %
CREAT SERPL-MCNC: 2.26 MG/DL (ref 0.52–1.04)
DIFFERENTIAL METHOD BLD: ABNORMAL
EOSINOPHIL # BLD AUTO: 0.1 10E9/L (ref 0–0.7)
EOSINOPHIL NFR BLD AUTO: 1.1 %
ERYTHROCYTE [DISTWIDTH] IN BLOOD BY AUTOMATED COUNT: 15.9 % (ref 10–15)
ERYTHROCYTE [DISTWIDTH] IN BLOOD BY AUTOMATED COUNT: 16 % (ref 10–15)
GFR SERPL CREATININE-BSD FRML MDRD: 22 ML/MIN/1.7M2
GLUCOSE SERPL-MCNC: 115 MG/DL (ref 70–99)
HCT VFR BLD AUTO: 31.4 % (ref 35–47)
HCT VFR BLD AUTO: 34 % (ref 35–47)
HGB BLD-MCNC: 10.2 G/DL (ref 11.7–15.7)
HGB BLD-MCNC: 9.5 G/DL (ref 11.7–15.7)
IMM GRANULOCYTES # BLD: 0 10E9/L (ref 0–0.4)
IMM GRANULOCYTES NFR BLD: 0 %
INR PPP: 0.96 (ref 0.86–1.14)
LYMPHOCYTES # BLD AUTO: 1.3 10E9/L (ref 0.8–5.3)
LYMPHOCYTES NFR BLD AUTO: 28.5 %
MCH RBC QN AUTO: 24.6 PG (ref 26.5–33)
MCH RBC QN AUTO: 24.7 PG (ref 26.5–33)
MCHC RBC AUTO-ENTMCNC: 30 G/DL (ref 31.5–36.5)
MCHC RBC AUTO-ENTMCNC: 30.3 G/DL (ref 31.5–36.5)
MCV RBC AUTO: 81 FL (ref 78–100)
MCV RBC AUTO: 82 FL (ref 78–100)
MONOCYTES # BLD AUTO: 0.3 10E9/L (ref 0–1.3)
MONOCYTES NFR BLD AUTO: 7 %
NEUTROPHILS # BLD AUTO: 3 10E9/L (ref 1.6–8.3)
NEUTROPHILS NFR BLD AUTO: 63.2 %
NRBC # BLD AUTO: 0 10*3/UL
NRBC BLD AUTO-RTO: 0 /100
PLATELET # BLD AUTO: 191 10E9/L (ref 150–450)
PLATELET # BLD AUTO: 196 10E9/L (ref 150–450)
POTASSIUM SERPL-SCNC: 4.1 MMOL/L (ref 3.4–5.3)
RBC # BLD AUTO: 3.86 10E12/L (ref 3.8–5.2)
RBC # BLD AUTO: 4.13 10E12/L (ref 3.8–5.2)
WBC # BLD AUTO: 4.7 10E9/L (ref 4–11)
WBC # BLD AUTO: 6.6 10E9/L (ref 4–11)

## 2017-09-19 PROCEDURE — 25000128 H RX IP 250 OP 636: Performed by: STUDENT IN AN ORGANIZED HEALTH CARE EDUCATION/TRAINING PROGRAM

## 2017-09-19 PROCEDURE — 27210995 ZZH RX 272: Performed by: SURGERY

## 2017-09-19 PROCEDURE — 25000128 H RX IP 250 OP 636: Performed by: SURGERY

## 2017-09-19 PROCEDURE — 82947 ASSAY GLUCOSE BLOOD QUANT: CPT | Performed by: CLINICAL NURSE SPECIALIST

## 2017-09-19 PROCEDURE — 25000128 H RX IP 250 OP 636: Performed by: CLINICAL NURSE SPECIALIST

## 2017-09-19 PROCEDURE — 36415 COLL VENOUS BLD VENIPUNCTURE: CPT | Performed by: SURGERY

## 2017-09-19 PROCEDURE — 93010 ELECTROCARDIOGRAM REPORT: CPT | Performed by: INTERNAL MEDICINE

## 2017-09-19 PROCEDURE — 37000009 ZZH ANESTHESIA TECHNICAL FEE, EACH ADDTL 15 MIN: Performed by: SURGERY

## 2017-09-19 PROCEDURE — 36000059 ZZH SURGERY LEVEL 3 EA 15 ADDTL MIN UMMC: Performed by: SURGERY

## 2017-09-19 PROCEDURE — G0378 HOSPITAL OBSERVATION PER HR: HCPCS

## 2017-09-19 PROCEDURE — 85730 THROMBOPLASTIN TIME PARTIAL: CPT | Performed by: SURGERY

## 2017-09-19 PROCEDURE — 40000171 ZZH STATISTIC PRE-PROCEDURE ASSESSMENT III: Performed by: SURGERY

## 2017-09-19 PROCEDURE — 88304 TISSUE EXAM BY PATHOLOGIST: CPT | Performed by: SURGERY

## 2017-09-19 PROCEDURE — 25000125 ZZHC RX 250: Performed by: ANESTHESIOLOGY

## 2017-09-19 PROCEDURE — 71000015 ZZH RECOVERY PHASE 1 LEVEL 2 EA ADDTL HR: Performed by: SURGERY

## 2017-09-19 PROCEDURE — 25000128 H RX IP 250 OP 636: Performed by: ANESTHESIOLOGY

## 2017-09-19 PROCEDURE — C1757 CATH, THROMBECTOMY/EMBOLECT: HCPCS | Performed by: SURGERY

## 2017-09-19 PROCEDURE — G0008 ADMIN INFLUENZA VIRUS VAC: HCPCS

## 2017-09-19 PROCEDURE — 85610 PROTHROMBIN TIME: CPT | Performed by: CLINICAL NURSE SPECIALIST

## 2017-09-19 PROCEDURE — 36000057 ZZH SURGERY LEVEL 3 1ST 30 MIN - UMMC: Performed by: SURGERY

## 2017-09-19 PROCEDURE — 85025 COMPLETE CBC W/AUTO DIFF WBC: CPT | Performed by: CLINICAL NURSE SPECIALIST

## 2017-09-19 PROCEDURE — 25000125 ZZHC RX 250: Performed by: STUDENT IN AN ORGANIZED HEALTH CARE EDUCATION/TRAINING PROGRAM

## 2017-09-19 PROCEDURE — 37000008 ZZH ANESTHESIA TECHNICAL FEE, 1ST 30 MIN: Performed by: SURGERY

## 2017-09-19 PROCEDURE — 85027 COMPLETE CBC AUTOMATED: CPT | Mod: 91 | Performed by: SURGERY

## 2017-09-19 PROCEDURE — 71000014 ZZH RECOVERY PHASE 1 LEVEL 2 FIRST HR: Performed by: SURGERY

## 2017-09-19 PROCEDURE — 25000131 ZZH RX MED GY IP 250 OP 636 PS 637: Mod: GY | Performed by: SURGERY

## 2017-09-19 PROCEDURE — A9270 NON-COVERED ITEM OR SERVICE: HCPCS | Mod: GY | Performed by: STUDENT IN AN ORGANIZED HEALTH CARE EDUCATION/TRAINING PROGRAM

## 2017-09-19 PROCEDURE — 40000065 ZZH STATISTIC EKG NON-CHARGEABLE

## 2017-09-19 PROCEDURE — 82565 ASSAY OF CREATININE: CPT | Performed by: CLINICAL NURSE SPECIALIST

## 2017-09-19 PROCEDURE — 36415 COLL VENOUS BLD VENIPUNCTURE: CPT | Performed by: CLINICAL NURSE SPECIALIST

## 2017-09-19 PROCEDURE — 27210794 ZZH OR GENERAL SUPPLY STERILE: Performed by: SURGERY

## 2017-09-19 PROCEDURE — 84132 ASSAY OF SERUM POTASSIUM: CPT | Performed by: CLINICAL NURSE SPECIALIST

## 2017-09-19 PROCEDURE — A9270 NON-COVERED ITEM OR SERVICE: HCPCS | Mod: GY | Performed by: SURGERY

## 2017-09-19 PROCEDURE — 25000132 ZZH RX MED GY IP 250 OP 250 PS 637: Mod: GY | Performed by: STUDENT IN AN ORGANIZED HEALTH CARE EDUCATION/TRAINING PROGRAM

## 2017-09-19 PROCEDURE — 25000132 ZZH RX MED GY IP 250 OP 250 PS 637: Mod: GY | Performed by: SURGERY

## 2017-09-19 RX ORDER — MYCOPHENOLATE MOFETIL 250 MG/1
500 CAPSULE ORAL 2 TIMES DAILY
Status: DISCONTINUED | OUTPATIENT
Start: 2017-09-19 | End: 2017-09-20 | Stop reason: HOSPADM

## 2017-09-19 RX ORDER — ATORVASTATIN CALCIUM 20 MG/1
20 TABLET, FILM COATED ORAL DAILY
Status: DISCONTINUED | OUTPATIENT
Start: 2017-09-19 | End: 2017-09-20 | Stop reason: HOSPADM

## 2017-09-19 RX ORDER — ACETAMINOPHEN 325 MG/1
650 TABLET ORAL
Status: DISCONTINUED | OUTPATIENT
Start: 2017-09-19 | End: 2017-09-20 | Stop reason: HOSPADM

## 2017-09-19 RX ORDER — LABETALOL HYDROCHLORIDE 5 MG/ML
5-10 INJECTION, SOLUTION INTRAVENOUS EVERY 10 MIN PRN
Status: DISCONTINUED | OUTPATIENT
Start: 2017-09-19 | End: 2017-09-19 | Stop reason: HOSPADM

## 2017-09-19 RX ORDER — CARVEDILOL 12.5 MG/1
12.5 TABLET ORAL 2 TIMES DAILY WITH MEALS
Status: DISCONTINUED | OUTPATIENT
Start: 2017-09-19 | End: 2017-09-20 | Stop reason: HOSPADM

## 2017-09-19 RX ORDER — DEXAMETHASONE SODIUM PHOSPHATE 4 MG/ML
INJECTION, SOLUTION INTRA-ARTICULAR; INTRALESIONAL; INTRAMUSCULAR; INTRAVENOUS; SOFT TISSUE PRN
Status: DISCONTINUED | OUTPATIENT
Start: 2017-09-19 | End: 2017-09-19

## 2017-09-19 RX ORDER — SODIUM CHLORIDE, SODIUM LACTATE, POTASSIUM CHLORIDE, CALCIUM CHLORIDE 600; 310; 30; 20 MG/100ML; MG/100ML; MG/100ML; MG/100ML
INJECTION, SOLUTION INTRAVENOUS CONTINUOUS
Status: DISCONTINUED | OUTPATIENT
Start: 2017-09-19 | End: 2017-09-19 | Stop reason: HOSPADM

## 2017-09-19 RX ORDER — NALOXONE HYDROCHLORIDE 0.4 MG/ML
.1-.4 INJECTION, SOLUTION INTRAMUSCULAR; INTRAVENOUS; SUBCUTANEOUS
Status: DISCONTINUED | OUTPATIENT
Start: 2017-09-19 | End: 2017-09-19 | Stop reason: HOSPADM

## 2017-09-19 RX ORDER — NIFEDIPINE 30 MG/1
30 TABLET, EXTENDED RELEASE ORAL
Status: DISCONTINUED | OUTPATIENT
Start: 2017-09-19 | End: 2017-09-20 | Stop reason: HOSPADM

## 2017-09-19 RX ORDER — ONDANSETRON 4 MG/1
4 TABLET, ORALLY DISINTEGRATING ORAL EVERY 30 MIN PRN
Status: DISCONTINUED | OUTPATIENT
Start: 2017-09-19 | End: 2017-09-19 | Stop reason: HOSPADM

## 2017-09-19 RX ORDER — HEPARIN SODIUM 10000 [USP'U]/100ML
500 INJECTION, SOLUTION INTRAVENOUS CONTINUOUS
Status: DISCONTINUED | OUTPATIENT
Start: 2017-09-19 | End: 2017-09-19

## 2017-09-19 RX ORDER — CEFAZOLIN SODIUM 2 G/100ML
2 INJECTION, SOLUTION INTRAVENOUS
Status: COMPLETED | OUTPATIENT
Start: 2017-09-19 | End: 2017-09-19

## 2017-09-19 RX ORDER — ERGOCALCIFEROL 1.25 MG/1
50000 CAPSULE, LIQUID FILLED ORAL WEEKLY
Status: DISCONTINUED | OUTPATIENT
Start: 2017-09-25 | End: 2017-09-20 | Stop reason: HOSPADM

## 2017-09-19 RX ORDER — HYDROMORPHONE HCL/0.9% NACL/PF 0.2MG/0.2
.2-.4 SYRINGE (ML) INTRAVENOUS EVERY 10 MIN PRN
Status: DISCONTINUED | OUTPATIENT
Start: 2017-09-19 | End: 2017-09-19 | Stop reason: HOSPADM

## 2017-09-19 RX ORDER — FEXOFENADINE HCL 180 MG/1
180 TABLET ORAL DAILY
Status: DISCONTINUED | OUTPATIENT
Start: 2017-09-19 | End: 2017-09-20 | Stop reason: HOSPADM

## 2017-09-19 RX ORDER — FLUMAZENIL 0.1 MG/ML
0.2 INJECTION, SOLUTION INTRAVENOUS
Status: DISCONTINUED | OUTPATIENT
Start: 2017-09-19 | End: 2017-09-19 | Stop reason: HOSPADM

## 2017-09-19 RX ORDER — BUPIVACAINE HYDROCHLORIDE AND EPINEPHRINE 5; 5 MG/ML; UG/ML
INJECTION, SOLUTION PERINEURAL PRN
Status: DISCONTINUED | OUTPATIENT
Start: 2017-09-19 | End: 2017-09-19

## 2017-09-19 RX ORDER — SODIUM BICARBONATE 325 MG/1
650 TABLET ORAL 2 TIMES DAILY
Status: DISCONTINUED | OUTPATIENT
Start: 2017-09-19 | End: 2017-09-20 | Stop reason: HOSPADM

## 2017-09-19 RX ORDER — OXYCODONE HYDROCHLORIDE 5 MG/1
5-10 TABLET ORAL
Status: DISCONTINUED | OUTPATIENT
Start: 2017-09-19 | End: 2017-09-20 | Stop reason: HOSPADM

## 2017-09-19 RX ORDER — PROPOFOL 10 MG/ML
INJECTION, EMULSION INTRAVENOUS CONTINUOUS PRN
Status: DISCONTINUED | OUTPATIENT
Start: 2017-09-19 | End: 2017-09-19

## 2017-09-19 RX ORDER — ACETAMINOPHEN 325 MG/1
975 TABLET ORAL ONCE
Status: COMPLETED | OUTPATIENT
Start: 2017-09-19 | End: 2017-09-19

## 2017-09-19 RX ORDER — FOLIC ACID 1 MG/1
1 TABLET ORAL DAILY
Status: DISCONTINUED | OUTPATIENT
Start: 2017-09-19 | End: 2017-09-20 | Stop reason: HOSPADM

## 2017-09-19 RX ORDER — HYDRALAZINE HYDROCHLORIDE 25 MG/1
25 TABLET, FILM COATED ORAL 3 TIMES DAILY PRN
Status: DISCONTINUED | OUTPATIENT
Start: 2017-09-19 | End: 2017-09-20 | Stop reason: HOSPADM

## 2017-09-19 RX ORDER — FENTANYL CITRATE 50 UG/ML
25 INJECTION, SOLUTION INTRAMUSCULAR; INTRAVENOUS EVERY 5 MIN PRN
Status: DISCONTINUED | OUTPATIENT
Start: 2017-09-19 | End: 2017-09-19 | Stop reason: HOSPADM

## 2017-09-19 RX ORDER — LIDOCAINE HYDROCHLORIDE 20 MG/ML
INJECTION, SOLUTION INFILTRATION; PERINEURAL PRN
Status: DISCONTINUED | OUTPATIENT
Start: 2017-09-19 | End: 2017-09-19

## 2017-09-19 RX ORDER — CEFPODOXIME PROXETIL 100 MG/1
100 TABLET, FILM COATED ORAL 2 TIMES DAILY
Status: DISCONTINUED | OUTPATIENT
Start: 2017-09-19 | End: 2017-09-20 | Stop reason: HOSPADM

## 2017-09-19 RX ORDER — HEPARIN SODIUM 1000 [USP'U]/ML
INJECTION, SOLUTION INTRAVENOUS; SUBCUTANEOUS PRN
Status: DISCONTINUED | OUTPATIENT
Start: 2017-09-19 | End: 2017-09-19

## 2017-09-19 RX ORDER — PROPOFOL 10 MG/ML
INJECTION, EMULSION INTRAVENOUS PRN
Status: DISCONTINUED | OUTPATIENT
Start: 2017-09-19 | End: 2017-09-19

## 2017-09-19 RX ORDER — ONDANSETRON 2 MG/ML
4 INJECTION INTRAMUSCULAR; INTRAVENOUS EVERY 30 MIN PRN
Status: DISCONTINUED | OUTPATIENT
Start: 2017-09-19 | End: 2017-09-19 | Stop reason: HOSPADM

## 2017-09-19 RX ORDER — FENTANYL CITRATE 50 UG/ML
25 INJECTION, SOLUTION INTRAMUSCULAR; INTRAVENOUS
Status: DISCONTINUED | OUTPATIENT
Start: 2017-09-19 | End: 2017-09-19 | Stop reason: HOSPADM

## 2017-09-19 RX ORDER — SODIUM CHLORIDE, SODIUM LACTATE, POTASSIUM CHLORIDE, CALCIUM CHLORIDE 600; 310; 30; 20 MG/100ML; MG/100ML; MG/100ML; MG/100ML
INJECTION, SOLUTION INTRAVENOUS CONTINUOUS PRN
Status: DISCONTINUED | OUTPATIENT
Start: 2017-09-19 | End: 2017-09-19

## 2017-09-19 RX ORDER — FENTANYL CITRATE 50 UG/ML
25-50 INJECTION, SOLUTION INTRAMUSCULAR; INTRAVENOUS
Status: DISCONTINUED | OUTPATIENT
Start: 2017-09-19 | End: 2017-09-19 | Stop reason: HOSPADM

## 2017-09-19 RX ORDER — PREDNISONE 2.5 MG/1
5 TABLET ORAL DAILY
Status: DISCONTINUED | OUTPATIENT
Start: 2017-09-19 | End: 2017-09-20 | Stop reason: HOSPADM

## 2017-09-19 RX ORDER — ONDANSETRON 2 MG/ML
INJECTION INTRAMUSCULAR; INTRAVENOUS PRN
Status: DISCONTINUED | OUTPATIENT
Start: 2017-09-19 | End: 2017-09-19

## 2017-09-19 RX ORDER — HEPARIN SODIUM 10000 [USP'U]/100ML
500 INJECTION, SOLUTION INTRAVENOUS CONTINUOUS
Status: DISCONTINUED | OUTPATIENT
Start: 2017-09-19 | End: 2017-09-20

## 2017-09-19 RX ORDER — PANTOPRAZOLE SODIUM 40 MG/1
40 TABLET, DELAYED RELEASE ORAL DAILY
Status: DISCONTINUED | OUTPATIENT
Start: 2017-09-19 | End: 2017-09-20 | Stop reason: HOSPADM

## 2017-09-19 RX ADMIN — MEPIVACAINE HYDROCHLORIDE 10 ML: 20 INJECTION, SOLUTION EPIDURAL; INFILTRATION at 09:00

## 2017-09-19 RX ADMIN — LIDOCAINE HYDROCHLORIDE 20 MG: 20 INJECTION, SOLUTION INFILTRATION; PERINEURAL at 09:37

## 2017-09-19 RX ADMIN — DEXAMETHASONE SODIUM PHOSPHATE 4 MG: 4 INJECTION, SOLUTION INTRA-ARTICULAR; INTRALESIONAL; INTRAMUSCULAR; INTRAVENOUS; SOFT TISSUE at 09:43

## 2017-09-19 RX ADMIN — FOLIC ACID 1 MG: 1 TABLET ORAL at 18:27

## 2017-09-19 RX ADMIN — CEFAZOLIN SODIUM 2 G: 2 INJECTION, SOLUTION INTRAVENOUS at 09:40

## 2017-09-19 RX ADMIN — SODIUM CHLORIDE, POTASSIUM CHLORIDE, SODIUM LACTATE AND CALCIUM CHLORIDE: 600; 310; 30; 20 INJECTION, SOLUTION INTRAVENOUS at 07:30

## 2017-09-19 RX ADMIN — CEFPODOXIME PROXETIL 100 MG: 100 TABLET, FILM COATED ORAL at 21:29

## 2017-09-19 RX ADMIN — PROPOFOL 50 MCG/KG/MIN: 10 INJECTION, EMULSION INTRAVENOUS at 09:34

## 2017-09-19 RX ADMIN — PROPOFOL 10 MG: 10 INJECTION, EMULSION INTRAVENOUS at 10:38

## 2017-09-19 RX ADMIN — PREDNISONE 5 MG: 2.5 TABLET ORAL at 18:27

## 2017-09-19 RX ADMIN — MYCOPHENOLATE MOFETIL 500 MG: 250 CAPSULE ORAL at 21:29

## 2017-09-19 RX ADMIN — HEPARIN SODIUM 500 UNITS/HR: 10000 INJECTION, SOLUTION INTRAVENOUS at 13:40

## 2017-09-19 RX ADMIN — VITAMIN D, TAB 1000IU (100/BT) 2000 UNITS: 25 TAB at 18:28

## 2017-09-19 RX ADMIN — FENTANYL CITRATE 50 MCG: 50 INJECTION, SOLUTION INTRAMUSCULAR; INTRAVENOUS at 08:44

## 2017-09-19 RX ADMIN — BUPIVACAINE HYDROCHLORIDE AND EPINEPHRINE BITARTRATE 20 ML: 5; .005 INJECTION, SOLUTION EPIDURAL; INTRACAUDAL; PERINEURAL at 09:00

## 2017-09-19 RX ADMIN — PANTOPRAZOLE SODIUM 40 MG: 40 TABLET, DELAYED RELEASE ORAL at 18:28

## 2017-09-19 RX ADMIN — CARVEDILOL 12.5 MG: 12.5 TABLET, FILM COATED ORAL at 18:33

## 2017-09-19 RX ADMIN — NIFEDIPINE 30 MG: 30 TABLET, FILM COATED, EXTENDED RELEASE ORAL at 18:28

## 2017-09-19 RX ADMIN — HEPARIN SODIUM 2000 UNITS: 1000 INJECTION, SOLUTION INTRAVENOUS; SUBCUTANEOUS at 10:41

## 2017-09-19 RX ADMIN — SODIUM BICARBONATE 650 MG: 325 TABLET ORAL at 21:29

## 2017-09-19 RX ADMIN — ONDANSETRON 4 MG: 2 INJECTION INTRAMUSCULAR; INTRAVENOUS at 09:25

## 2017-09-19 RX ADMIN — ACETAMINOPHEN 975 MG: 325 TABLET ORAL at 08:37

## 2017-09-19 RX ADMIN — ATORVASTATIN CALCIUM 20 MG: 20 TABLET, FILM COATED ORAL at 18:26

## 2017-09-19 RX ADMIN — FEXOFENADINE HYDROCHLORIDE 180 MG: 180 TABLET, FILM COATED ORAL at 18:27

## 2017-09-19 NOTE — OR NURSING
Contacted rosa friend [phone] at 0840 this AM - he stated he was able to pick pt up and stay with her for 24 hours.

## 2017-09-19 NOTE — IP AVS SNAPSHOT
MRN:[id number]                      After Visit Summary   9/19/2017    Eleni Logan    MRN: [id number]           Thank you!     Thank you for choosing [facility] for your care. Our goal is always to provide you with excellent care. Hearing back from our patients is one way we can continue to improve our services. Please take a few minutes to complete the written survey that you may receive in the mail after you visit with us. Thank you!        Patient Information     Date Of Birth          1955        Designated Caregiver       Most Recent Value    Caregiver    Will someone help with your care after discharge? no      About your hospital stay     You were admitted on:  September 19, 2017 You last received care in the:  [address] [facility] [place]    You were discharged on:  September 20, 2017        Reason for your hospital stay       You underwent right arm brachial artery-to-basilic vein fistula placement and cephalic vein upper arm thrombectomy with Dr. Bond                  Who to Call     For medical emergencies, please call 911.  For non-urgent questions about your medical care, please call your primary care provider or clinic, [phone]  For questions related to your surgery, please call your surgery clinic        Attending Provider     Provider Specialty    Brandy Bond MD Vascular Surgery       Primary Care Provider Office Phone # Fax #    Rocio Saul [phone]       When to contact your care team       Please contact Dr. Bond if any increased pain, swelling, fever, incision drainage,  right arm/hand numbness or right arm/hand coolness develop.    With questions, concerns, or to request an appointment, please call either:    Kacey Chavez, Care Coordinator RN, Vascular Surgery  [phone]    Vascular Call Center  [phone]    To contact someone after 5 pm, on a weekend, or on a Holiday, please call:  [facility]  [place]  [phone], option 4 to have a member of the Vascular Surgery Service paged.                  After Care Instructions     Activity       Your activity upon discharge: no heavy lifting, pushing or pulling over 10 lbs until seen in clinic. No strenuous activity            Diet       Follow this diet upon discharge:       Regular Diet Adult            Discharge Instructions       Follow up appointment as instructed by Dr. Bond            Discharge Instructions       Okay to shower, no tub baths            No lifting        No lifting over 5 lbs and no strenuous physical activity for 2 weeks            Wound care and dressings       Instructions to care for your wound at home: okay to keep right arm incision open to air                  Follow-up Appointments     Follow Up and recommended labs and tests       Follow up with nephrology CNS, [place], in 2 weeks with ultrasound.                  Your next 10 appointments already scheduled     Nov 06, 2017  2:25 PM CST   (Arrive by 1:55 PM)   Return Kidney Transplant with Tyshawn Sexton MD   [facility] ([facility] [place])    [address] [place] [address]  3rd [facility] [address] [phone]            Nov 10, 2017  9:00 AM CST   (Arrive by 8:45 AM)   Return Visit with Pancho Salgado MD   [facility] ([facility] [place])    [address] [facility]  4th [facility] [address] [phone]              Future tests that were ordered for you     US Ext Arterial Venous Dialys Acs Graft                 Pending Results     Date and Time Order Name Status Description    9/19/2017 1206 Surgical pathology exam In process     9/19/2017 0844 EKG 12-lead, tracing only Preliminary             Statement of Approval     Ordered          09/20/17 0935  I have reviewed and agree with all the recommendations and orders detailed in this document.  EFFECTIVE NOW     Approved and electronically  "signed by:  Yvette Prieto APRN CNS             Admission Information     Date & Time Provider Department Dept. Phone    2017 Brandy Bond MD [address] [facility] [phone]      Your Vitals Were     Blood Pressure Pulse Temperature Respirations Height Weight    145/79 (BP Location: Left arm) 70 96.8  F (36  C) (Oral) 22 1.575 m (5' 2\") 99.3 kg (218 lb 14.7 oz)    Pulse Oximetry BMI (Body Mass Index)                97% 40.04 kg/m2          [facility] Information     [facility] [employer] [facility] lets you send messages to your doctor, view your test results, renew your prescriptions, schedule appointments and more. To sign up, go to [email or web address] . Click on \"Log in\" on the left side of the screen, which will take you to the Welcome page. Then click on \"Sign up Now\" on the right side of the page.     You will be asked to enter the access code listed below, as well as some personal information. Please follow the directions to create your username and password.     Your access code is: [email or web address]  Expires: 2017  9:42 AM     Your access code will  in 90 days. If you need help or a new code, please call your [place] clinic or [phone].        Care EveryWhere ID     This is your Care EveryWhere ID. This could be used by other organizations to access your [place] medical records  [id number]        Equal Access to Services     [facility]: Hadii aad ku hadasho Soomaali, waaxda luqadaha, qaybta kaalmada adeegyada, [facility] chambers . So [facility] [phone].    ATENCIÓN: Si habla español, tiene a stoner disposición servicios gratuitos de asistencia lingüística. Llame al [phone].    We comply with applicable federal civil rights laws and [place] laws. We do not discriminate on the basis of race, color, national origin, age, disability sex, sexual orientation or gender identity.               Review of your medicines      START taking        Dose / Directions    acetaminophen " 325 MG tablet   Commonly known as:  TYLENOL   Used for:  CKD (chronic kidney disease) stage 4, GFR 15-29 ml/min (H)        Dose:  650 mg   Take 2 tablets (650 mg) by mouth once as needed for mild pain (to moderate pain)   Quantity:  100 tablet   Refills:  0       oxyCODONE 5 MG IR tablet   Commonly known as:  ROXICODONE   Used for:  CKD (chronic kidney disease) stage 4, GFR 15-29 ml/min (H)        Dose:  5-10 mg   Take 1-2 tablets (5-10 mg) by mouth every 6 hours as needed for moderate to severe pain   Quantity:  30 tablet   Refills:  0         CONTINUE these medicines which have NOT CHANGED        Dose / Directions    atorvastatin 20 MG tablet   Commonly known as:  LIPITOR   Used for:  Hypercholesteremia        Dose:  20 mg   Take 1 tablet (20 mg) by mouth daily   Quantity:  90 tablet   Refills:  0       carvedilol 6.25 MG tablet   Commonly known as:  COREG   Used for:  Hypertension secondary to other renal disorders        Dose:  12.5 mg   Take 2 tablets (12.5 mg) by mouth 2 times daily (with meals)   Quantity:  360 tablet   Refills:  1       cefpodoxime 100 MG tablet   Commonly known as:  VANTIN   Used for:  Urinary tract infection        Dose:  100 mg   Take 1 tablet (100 mg) by mouth 2 times daily   Quantity:  28 tablet   Refills:  0       cholecalciferol 1000 UNITS capsule   Commonly known as:  vitamin  -D   Used for:  Vitamin D deficiency        Dose:  2 capsule   Take 2 capsules (2,000 Units) by mouth daily   Quantity:  180 capsule   Refills:  3       cloNIDine 0.1 MG tablet   Commonly known as:  CATAPRES   Used for:  HTN (hypertension)        Dose:  0.1 mg   Take 1 tablet (0.1 mg) by mouth 2 times daily as needed For systolic BP >180   Quantity:  60 tablet   Refills:  3       darbepoetin bucky 300 MCG/0.6ML injection   Commonly known as:  ARANESP (ALBUMIN FREE)   Used for:  Chronic kidney disease, stage III (moderate), Anemia in stage 3 chronic kidney disease        Dose:  300 mcg   Inject 0.6 mLs (300 mcg)  Subcutaneous every 28 days As needed for hgb<10g/dL   Quantity:  0.6 mL   Refills:  11       fexofenadine 180 MG tablet   Commonly known as:  ALLEGRA   Used for:  Acute seasonal allergic rhinitis due to pollen        Dose:  180 mg   Take 1 tablet (180 mg) by mouth daily   Quantity:  30 tablet   Refills:  1       folic acid 1 MG tablet   Commonly known as:  FOLVITE   Used for:  Preventive measure        Dose:  1 mg   Take 1 tablet (1 mg) by mouth daily   Quantity:  30 tablet   Refills:  11       hydrALAZINE 25 MG tablet   Commonly known as:  APRESOLINE   Used for:  HTN (hypertension)        Dose:  25 mg   Take 1 tablet (25 mg) by mouth 3 times daily as needed For systolic BP >170   Quantity:  90 tablet   Refills:  3       hydrocortisone 1 % cream   Commonly known as:  CORTAID   Used for:  Rash, skin        Apply topically 2 times daily   Quantity:  60 g   Refills:  1       mycophenolate capsule   Used for:  Kidney transplanted        Dose:  500 mg   Take 2 capsules (500 mg) by mouth 2 times daily   Quantity:  120 capsule   Refills:  11       NIFEdipine ER osmotic 30 MG 24 hr tablet   Commonly known as:  PROCARDIA XL   Used for:  HTN (hypertension)        Dose:  30 mg   Take 1 tablet (30 mg) by mouth 2 times daily   Quantity:  180 tablet   Refills:  3       pantoprazole 40 MG EC tablet   Commonly known as:  PROTONIX   Used for:  Gastroesophageal reflux disease with esophagitis        Dose:  40 mg   Take 1 tablet (40 mg) by mouth daily   Quantity:  30 tablet   Refills:  11       predniSONE 5 MG tablet   Commonly known as:  DELTASONE   Used for:  Kidney transplanted        Dose:  5 mg   Take 1 tablet (5 mg) by mouth daily   Quantity:  30 tablet   Refills:  11       sodium bicarbonate 650 MG tablet   Used for:  Acidosis        Dose:  650 mg   Take 1 tablet (650 mg) by mouth 2 times daily   Quantity:  180 tablet   Refills:  1       vitamin D 47146 UNIT capsule   Commonly known as:  ERGOCALCIFEROL   Used for:  Vitamin D  deficiency        Dose:  43771 Units   Take 1 capsule (50,000 Units) by mouth once a week   Quantity:  12 capsule   Refills:  0            Where to get your medicines      Some of these will need a paper prescription and others can be bought over the counter. Ask your nurse if you have questions.     Bring a paper prescription for each of these medications     oxyCODONE 5 MG IR tablet       You don't need a prescription for these medications     acetaminophen 325 MG tablet                Protect others around you: Learn how to safely use, store and throw away your medicines at www.disposemymeds.org.             Medication List: This is a list of all your medications and when to take them. Check marks below indicate your daily home schedule. Keep this list as a reference.      Medications           Morning Afternoon Evening Bedtime As Needed    acetaminophen 325 MG tablet   Commonly known as:  TYLENOL   Take 2 tablets (650 mg) by mouth once as needed for mild pain (to moderate pain)   Last time this was given:  975 mg on 9/19/2017  8:37 AM                                atorvastatin 20 MG tablet   Commonly known as:  LIPITOR   Take 1 tablet (20 mg) by mouth daily   Last time this was given:  20 mg on 9/20/2017  9:03 AM                                carvedilol 6.25 MG tablet   Commonly known as:  COREG   Take 2 tablets (12.5 mg) by mouth 2 times daily (with meals)   Last time this was given:  12.5 mg on 9/20/2017  9:03 AM                                cefpodoxime 100 MG tablet   Commonly known as:  VANTIN   Take 1 tablet (100 mg) by mouth 2 times daily   Last time this was given:  100 mg on 9/20/2017  9:03 AM                                cholecalciferol 1000 UNITS capsule   Commonly known as:  vitamin  -D   Take 2 capsules (2,000 Units) by mouth daily                                cloNIDine 0.1 MG tablet   Commonly known as:  CATAPRES   Take 1 tablet (0.1 mg) by mouth 2 times daily as needed For systolic BP >180                                 darbepoetin bucky 300 MCG/0.6ML injection   Commonly known as:  ARANESP (ALBUMIN FREE)   Inject 0.6 mLs (300 mcg) Subcutaneous every 28 days As needed for hgb<10g/dL                                fexofenadine 180 MG tablet   Commonly known as:  ALLEGRA   Take 1 tablet (180 mg) by mouth daily   Last time this was given:  180 mg on 9/20/2017  9:03 AM                                folic acid 1 MG tablet   Commonly known as:  FOLVITE   Take 1 tablet (1 mg) by mouth daily   Last time this was given:  1 mg on 9/20/2017  9:03 AM                                hydrALAZINE 25 MG tablet   Commonly known as:  APRESOLINE   Take 1 tablet (25 mg) by mouth 3 times daily as needed For systolic BP >170                                hydrocortisone 1 % cream   Commonly known as:  CORTAID   Apply topically 2 times daily                                mycophenolate capsule   Take 2 capsules (500 mg) by mouth 2 times daily   Last time this was given:  500 mg on 9/20/2017  9:03 AM                                NIFEdipine ER osmotic 30 MG 24 hr tablet   Commonly known as:  PROCARDIA XL   Take 1 tablet (30 mg) by mouth 2 times daily   Last time this was given:  30 mg on 9/20/2017  9:03 AM                                oxyCODONE 5 MG IR tablet   Commonly known as:  ROXICODONE   Take 1-2 tablets (5-10 mg) by mouth every 6 hours as needed for moderate to severe pain                                pantoprazole 40 MG EC tablet   Commonly known as:  PROTONIX   Take 1 tablet (40 mg) by mouth daily   Last time this was given:  40 mg on 9/20/2017  9:03 AM                                predniSONE 5 MG tablet   Commonly known as:  DELTASONE   Take 1 tablet (5 mg) by mouth daily   Last time this was given:  5 mg on 9/20/2017  9:03 AM                                sodium bicarbonate 650 MG tablet   Take 1 tablet (650 mg) by mouth 2 times daily   Last time this was given:  650 mg on 9/20/2017  9:03 AM                                 vitamin D 57388 UNIT capsule   Commonly known as:  ERGOCALCIFEROL   Take 1 capsule (50,000 Units) by mouth once a week

## 2017-09-19 NOTE — IP AVS SNAPSHOT
[address] [address]    Phone:  [phone]                                       After Visit Summary   9/19/2017    Eleni Logan    MRN: [id number]           After Visit Summary Signature Page     I have received my discharge instructions, and my questions have been answered. I have discussed any challenges I see with this plan with the nurse or doctor.    ..........................................................................................................................................  Patient/Patient Representative Signature      ..........................................................................................................................................  Patient Representative Print Name and Relationship to Patient    ..................................................               ................................................  Date                                            Time    ..........................................................................................................................................  Reviewed by Signature/Title    ...................................................              ..............................................  Date                                                            Time

## 2017-09-19 NOTE — ANESTHESIA CARE TRANSFER NOTE
Patient: Tresakevin Doreen    Procedure(s):  Right Rm Basilic Vein Fistula, Cephalic Vein Thrombectomy. - Wound Class: I-Clean    Diagnosis: End Stage Renal Disease   Diagnosis Additional Information: No value filed.    Anesthesia Type:   Periph. Nerve Block for postop pain, MAC     Note:  Airway :Face Mask  Patient transferred to:PACU  Comments: Transferred to PACU, report given to RN.  Patient awake and conversant, no pain.  VSS.        Vitals: (Last set prior to Anesthesia Care Transfer)    CRNA VITALS  9/19/2017 1209 - 9/19/2017 1307      9/19/2017             Pulse: 57    SpO2: 100 %    Resp Rate (observed): (!)  1                Electronically Signed By: Jovan Martínez MD  September 19, 2017  1:07 PM

## 2017-09-19 NOTE — UTILIZATION REVIEW
"[facility]  [facility] Status; Secondary Review Determination     Admission Date: 9/19/2017  7:18 AM       Under the authority of the Utilization Management Committee, the utilization review process indicated a secondary review on the above patient.  The review outcome is based on review of the medical records, discussions with staff, and applying clinical experience noted on the date of the review.        ()      Inpatient Status Appropriate - This patient's medical care is consistent with medical management for inpatient care and reasonable inpatient medical practice.      () Observation Status Appropriate - This patient does not meet hospital inpatient criteria and is placed in observation status. If this patient's primary payer is Medicare and was admitted as an inpatient, Condition Code 44 should be used and patient status changed to \"observation\".   () Admission Status NOT Appropriate - This patient's medical care is not consistent with medical management for Inpatient or Observation Status.        (X) Outpatient Procedure Status Appropriate - Procedure not on Medicare Inpatient list and no complications at the time of this review       RATIONALE FOR DETERMINATION      Brief clinical presentation, information copied from the chart, abbreviated and edited for relevant content:       9/19/2017 5:02 PM (CT) Bharti Celestin: Paged the attending and told him this is not OBS, but OUTPATIENT unless the patient will be staying over 2 nights then inpatient would be appropriate. Unable to assess from just chart review what the long term plan is for the patient.       Patient is a 61 year old patient with End-stage renal failure. Admitted for her procedure :  Right arm brachial artery-to-basilic vein fistula and cephalic vein upper arm thrombectomy. During surgery, the surgeon felt patient could not discharge today and needed IV heparin overnight - per the OP NOTE \"keep the patient in house overnight on a heparin drip to " "improve the chance of success which we have anecdotally noted and, given that plan, we felt that we might as well try to recanalize the cephalic vein, made a small cut down directly over the thrombosed segment of the vein, dissected it out.\"      I spoke with Dr. Bond who was in surgery, and he thought OBS was correct, but the correct order is STILL OUTPATIENT despite staying overnight. Outpatient procedures have up to 24 hours after surgery even overnight. He was in surgery and advised me to contact a resident to do the order change.              The information on this document is developed by the utilization review team in order for the business office to ensure compliance.  This only denotes the appropriateness of proper admission status and does not reflect the quality of care rendered.         The definitions of Inpatient Status and Observation Status used in making the determination above are those provided in the CMS Coverage Manual, Chapter 1 and Chapter 6, section 70.4.      Sincerely,      Bharti Celestin MD   Utilization Review/ Case Management  [facility].            "

## 2017-09-19 NOTE — PROGRESS NOTES
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 Has never had a fistula before. She has no major complaints.   She denies any shortness of breath, chest pain and headaches.  She is being seen today for right arm AVF placement with Dr. Bond.      Physical Exam:  General: NAD  Chest: Regular rate and rhythm, S1 S2 present, no murmur  Lungs: Clear to auscultation bilateral    /88 (Cuff Size: Adult Regular)  Temp 97.9  F (36.6  C)  Resp 15  Ht 1.575 m (5' 2\")  Wt 99.3 kg (218 lb 14.7 oz)  SpO2 97%  BMI 40.04 kg/m2      A/P  Patient deemed suitable candidate for right arm AVF placement with Dr. Bond with block.  Procedure discussed with patient, consent obtained and patient marked.    Yvette ROTH, CNS  Division of Vascular Surgery  [facility]  Pager [phone]         "

## 2017-09-19 NOTE — ANESTHESIA PROCEDURE NOTES
Peripheral Nerve Block Procedure Note    Staff:     Anesthesiologist:  ROSA FREY    Resident/CRNA:  KATHIE IVERSON    Block performed by resident/CRNA in the presence of a teaching physician    Location: Pre-op  Procedure Start/Stop TImes:      9/19/2017 8:55 AM     9/19/2017 9:05 AM    patient identified, IV checked, site marked, risks and benefits discussed, informed consent, monitors and equipment checked, pre-op evaluation, at physician/surgeon's request and post-op pain management      Correct Patient: Yes      Correct Position: Yes      Correct Site: Yes      Correct Procedure: Yes      Correct Laterality:  Yes    Site Marked:  Yes  Procedure details:     Procedure:  Supraclavicular    ASA:  3    Laterality:  Right    Position:  Sitting    Sterile Prep: chloraprep      Needle type: pajunk.    Needle gauge:  21    Needle length (mm):  110    Ultrasound: Yes      Ultrasound used to identify targeted nerve, plexus, or vascular structure and placed a needle adjacent to it      Permanent Image entered into patiient's record      Abnormal pain on injection: No      Blood Aspirated: No      Paresthesias:  No    Bleeding at site: No      Bolus via:  Needle    Infusion Method:  Single Shot    Complications:  None

## 2017-09-19 NOTE — ANESTHESIA POSTPROCEDURE EVALUATION
Patient: Eleni Sahuryan    Procedure(s):  Right Rm Basilic Vein Fistula, Cephalic Vein Thrombectomy. - Wound Class: I-Clean    Diagnosis:End Stage Renal Disease   Diagnosis Additional Information: No value filed.    Anesthesia Type:  Periph. Nerve Block for postop pain, MAC    Note:  Anesthesia Post Evaluation    Patient location during evaluation: PACU  Patient participation: Able to participate in evaluation but full recovery from regional anesthesia has not yet ocurrred but is anticipated to occur within 48 hours (SC block)  Level of consciousness: awake and alert  Pain management: adequate  Airway patency: patent  Cardiovascular status: acceptable  Respiratory status: acceptable  Hydration status: acceptable  PONV: none     Anesthetic complications: None    Comments: Doing well. Arm remains numb 2/2 SC nerve block.        Last vitals:  Vitals:    09/19/17 0855 09/19/17 0900 09/19/17 1250   BP:  155/78 115/59   Resp: 16 18 16   Temp:   36.3  C (97.4  F)   SpO2:  98% 100%         Electronically Signed By: Landon Rae MD  September 19, 2017  1:01 PM

## 2017-09-19 NOTE — PLAN OF CARE
Problem: Goal Outcome Summary  Goal: Goal Outcome Summary  Outcome: Improving  Patient back from PACU post right arm fistula placement for dialysis. Right fistula with positive bruit/thrill, site bruised, dressing dry/intact, numbness in right arm, denies pain. Voided spont, lungs clear, tolerating sips of clear liquids.AVSS.Heparin drip infusing via piv.    Right arm with numbness, can wiggle fingers, slight grasp, cannot lift off of bed.  Dr Lema notified, patient had a block in arm for procedure, should last 24 hours per Dr Lema,

## 2017-09-19 NOTE — OP NOTE
DATE OF PROCEDURE:  09/19/2017.      LOCATION:  [facility] main operating room.      PREOPERATIVE DIAGNOSIS:  End-stage renal failure.      POSTOPERATIVE DIAGNOSIS:  End-stage renal failure.      PROCEDURE:  Right arm brachial artery-to-basilic vein fistula and cephalic vein upper arm thrombectomy.      SURGEON:  Brandy Bond MD      FIRST ASSISTANT:  Azul Lema MD      ANESTHESIA:  Regional anesthesia with IV sedation.      FINDINGS:  On intraop vein mapping, evidence of thrombosis of the cephalic vein, likely at the time of a central venogram.  In this context, brachiocephalic fistula was felt to be an unlikely choice.  The basilic vein appeared adequate in size and so went forward with a brachial artery-basilic vein fistula for future return to OR once matured for transposition.  After completion of our fistula, we had fairly intermittent pressures in the fistula with thrill that seemed to come and go, despite ligation of parasitic branches.  In this context,  I have concern that in the long-term, her cephalic vein may need to be reevaluated and for this reason, he performed a cutdown on it and thrombectomized it with the idea of keeping her on heparin overnight and trying to recanalize the vein, recognizing that this is a fairly poor success rate.  Blood loss was less than 30 mL.  The procedure was well tolerated.  Specimen of the thrombus in the cephalic vein was sent for pathology.  There is a large segment of subacute thrombus.      DESCRIPTION OF PROCEDURE:  Eleni Logan was prepped and draped for access to her right arm.  Ultrasound was performed with the findings noted above, namely thrombosis of the cephalic vein.  We, at this point, searched for her basilic vein and found it to be adequate.  A cutdown was performed just distal to the antecubital fossa crease directly over the basilic vein where the vein crossed over the brachial artery.  We dissected out the vein and ligated its  distal diving branch, mobilizing the vein.  We divided the biceps aponeurosis and identified the brachial artery, where it too divided into radial and ulnar arteries.  We controlled the artery as well here.  We now heparinized patient with 1000 units of heparin IV and ligated the vein, clamped the artery.  We opened the artery longitudinally for an opening of approximately 6-7 mm and performed a spatulated end-to-side anastomosis with the basilic vein with a 6-0 Prolene suture.  Prior to completing our anastomosis, we flushed the vessels and completed our anastomosis and restored flow.  There was initially an excellent thrill in the fistula.  After a few minutes this appeared to disappear and while we had weak Doppler signals, we did not have a good pulse or thrill.  We went onto ligate 3 parasitic branches which we had marked out on ultrasound with a separate counterincision on the upper arm.  These were ligated with 3-0 silk suture ligatures.  This improved the thrill in the fistula, but, again, we were not super happy with it.  We used intraoperative ultrasound and evaluated the artery and found that there was no anastomotic stenosis and a widely patent vessel and therefore ascribe her issues to just weaker blood pressures and dilated outflow.  In this context, however, I decided that I would keep the patient in house overnight on a heparin drip to improve the chance of success which we have anecdotally noted and, given that plan, we felt that we might as well try to recanalize the cephalic vein, made a small cut down directly over the thrombosed segment of the vein, dissected it out.  There was also a lipoma overlying it, which we removed.  We made a small transverse incision in the vein and used a Brittanie catheter, #3, to remove a long segment thrombus of least 7 cm.  We sent this for formal pathology.  Once the vein was completely thrombectomized and had good bleeding, we flushed with heparinized saline  solution.  Then we repaired it with a running 7-0 Prolene stitch.  We closed all wounds with 4-0 Vicryl subcuticular after a 3-0 Vicryl subcutaneous suture.  Procedure was well tolerated.         JOHAN CUEVAS MD             D: 2017 12:27   T: 2017 14:32   MT: BERNADETTE      Name:     ISABELLE VAZQUEZ   MRN:      [id number]        Account:        [id number]   :      1955           Procedure Date: 2017      Document: [id number]

## 2017-09-19 NOTE — BRIEF OP NOTE
[facility], [place]    Brief Operative Note    Pre-operative diagnosis: End Stage Renal Disease   Post-operative diagnosis same  Procedure: Procedure(s):  Right Rm Basilic Vein Fistula, Cephalic Vein Thrombectomy. - Wound Class: I-Clean  Surgeon: Surgeon(s) and Role:     * Brandy Bond MD - Primary     * Azul Lema MD - Assisting  Anesthesia: Combined MAC with Supraclavicular Block   Estimated blood loss: Minimal  Drains: None  Specimens:   ID Type Source Tests Collected by Time Destination   B : Lymphoma Tissue Other SURGICAL PATHOLOGY EXAM Brandy Bond MD 9/19/2017 12:06 PM    C : Vein Thrombus Tissue Other SURGICAL PATHOLOGY EXAM Brandy Bond MD 9/19/2017 12:07 PM      Findings:   None.  Complications: None.  Implants: None.

## 2017-09-20 ENCOUNTER — CARE COORDINATION (OUTPATIENT)
Dept: CARE COORDINATION | Facility: CLINIC | Age: 62
End: 2017-09-20

## 2017-09-20 ENCOUNTER — TELEPHONE (OUTPATIENT)
Dept: PHARMACY | Facility: CLINIC | Age: 62
End: 2017-09-20

## 2017-09-20 VITALS
HEART RATE: 70 BPM | RESPIRATION RATE: 22 BRPM | OXYGEN SATURATION: 97 % | BODY MASS INDEX: 40.29 KG/M2 | HEIGHT: 62 IN | TEMPERATURE: 96.8 F | DIASTOLIC BLOOD PRESSURE: 79 MMHG | WEIGHT: 218.92 LBS | SYSTOLIC BLOOD PRESSURE: 145 MMHG

## 2017-09-20 DIAGNOSIS — N18.4 CHRONIC KIDNEY DISEASE, STAGE 4, SEVERELY DECREASED GFR (H): Primary | ICD-10-CM

## 2017-09-20 DIAGNOSIS — T82.868D: ICD-10-CM

## 2017-09-20 DIAGNOSIS — T82.9XXA COMPLICATION OF VASCULAR ACCESS FOR DIALYSIS, INITIAL ENCOUNTER: ICD-10-CM

## 2017-09-20 LAB
APTT PPP: 33 SEC (ref 22–37)
INTERPRETATION ECG - MUSE: NORMAL

## 2017-09-20 PROCEDURE — 90686 IIV4 VACC NO PRSV 0.5 ML IM: CPT | Performed by: SURGERY

## 2017-09-20 PROCEDURE — 36415 COLL VENOUS BLD VENIPUNCTURE: CPT | Performed by: SURGERY

## 2017-09-20 PROCEDURE — 25000128 H RX IP 250 OP 636: Performed by: SURGERY

## 2017-09-20 PROCEDURE — 85730 THROMBOPLASTIN TIME PARTIAL: CPT | Performed by: SURGERY

## 2017-09-20 PROCEDURE — 25000132 ZZH RX MED GY IP 250 OP 250 PS 637: Mod: GY | Performed by: SURGERY

## 2017-09-20 PROCEDURE — 25000131 ZZH RX MED GY IP 250 OP 636 PS 637: Mod: GY | Performed by: SURGERY

## 2017-09-20 PROCEDURE — G0008 ADMIN INFLUENZA VIRUS VAC: HCPCS

## 2017-09-20 PROCEDURE — A9270 NON-COVERED ITEM OR SERVICE: HCPCS | Mod: GY | Performed by: SURGERY

## 2017-09-20 RX ORDER — ACETAMINOPHEN 325 MG/1
650 TABLET ORAL
Qty: 100 TABLET | Refills: 0 | COMMUNITY
Start: 2017-09-20 | End: 2017-11-06

## 2017-09-20 RX ORDER — OXYCODONE HYDROCHLORIDE 5 MG/1
5-10 TABLET ORAL EVERY 6 HOURS PRN
Qty: 30 TABLET | Refills: 0 | Status: SHIPPED | OUTPATIENT
Start: 2017-09-20 | End: 2017-11-06

## 2017-09-20 RX ADMIN — FOLIC ACID 1 MG: 1 TABLET ORAL at 09:03

## 2017-09-20 RX ADMIN — INFLUENZA A VIRUS A/MICHIGAN/45/2015 X-275 (H1N1) ANTIGEN (FORMALDEHYDE INACTIVATED), INFLUENZA A VIRUS A/HONG KONG/4801/2014 X-263B (H3N2) ANTIGEN (FORMALDEHYDE INACTIVATED), INFLUENZA B VIRUS B/PHUKET/3073/2013 ANTIGEN (FORMALDEHYDE INACTIVATED), AND INFLUENZA B VIRUS B/BRISBANE/60/2008 ANTIGEN (FORMALDEHYDE INACTIVATED) 0.5 ML: 15; 15; 15; 15 INJECTION, SUSPENSION INTRAMUSCULAR at 09:37

## 2017-09-20 RX ADMIN — FEXOFENADINE HYDROCHLORIDE 180 MG: 180 TABLET, FILM COATED ORAL at 09:03

## 2017-09-20 RX ADMIN — CEFPODOXIME PROXETIL 100 MG: 100 TABLET, FILM COATED ORAL at 09:03

## 2017-09-20 RX ADMIN — PREDNISONE 5 MG: 2.5 TABLET ORAL at 09:03

## 2017-09-20 RX ADMIN — ATORVASTATIN CALCIUM 20 MG: 20 TABLET, FILM COATED ORAL at 09:03

## 2017-09-20 RX ADMIN — MYCOPHENOLATE MOFETIL 500 MG: 250 CAPSULE ORAL at 09:03

## 2017-09-20 RX ADMIN — CARVEDILOL 12.5 MG: 12.5 TABLET, FILM COATED ORAL at 09:03

## 2017-09-20 RX ADMIN — NIFEDIPINE 30 MG: 30 TABLET, FILM COATED, EXTENDED RELEASE ORAL at 09:03

## 2017-09-20 RX ADMIN — PANTOPRAZOLE SODIUM 40 MG: 40 TABLET, DELAYED RELEASE ORAL at 09:03

## 2017-09-20 RX ADMIN — SODIUM BICARBONATE 650 MG: 325 TABLET ORAL at 09:03

## 2017-09-20 RX ADMIN — VITAMIN D, TAB 1000IU (100/BT) 2000 UNITS: 25 TAB at 09:03

## 2017-09-20 ASSESSMENT — ACTIVITIES OF DAILY LIVING (ADL)
TRANSFERRING: 0-->INDEPENDENT
SWALLOWING: 0-->SWALLOWS FOODS/LIQUIDS WITHOUT DIFFICULTY
FALL_HISTORY_WITHIN_LAST_SIX_MONTHS: NO
COGNITION: 0 - NO COGNITION ISSUES REPORTED
RETIRED_EATING: 0-->INDEPENDENT
TOILETING: 0-->INDEPENDENT
RETIRED_COMMUNICATION: 0-->UNDERSTANDS/COMMUNICATES WITHOUT DIFFICULTY
DRESS: 0-->INDEPENDENT
BATHING: 0-->INDEPENDENT
AMBULATION: 0-->INDEPENDENT

## 2017-09-20 NOTE — PLAN OF CARE
Problem: Goal Outcome Summary  Goal: Goal Outcome Summary  Outcome: Improving  RR high but OK, denies SOB, AOVSS. Denies pain. Right arm fistula placement site bruised, liquid bandage CDI. Numbness in right arm but able to squeeze hand and lift arm off bed, improving by end of shift. Regular diet. Denied nausea. Up with SBA. Voiding adequately. Heparin gtt @ 500 units/hr through PIV, pt to DC today.

## 2017-09-20 NOTE — PROGRESS NOTES
VASCULAR SURGERY PROGRESS NOTE    HPI:    Eleni Logan is a 61 year old female status post renal xplant in 1999 due to iga nephropathy who was referred to Dr. Bond by renal service for fistula placement. Has never had a fistula before. On 9/19/2017 she underwent Right arm brachial artery-to-basilic vein fistula and cephalic vein upper arm thrombectomy with Dr. Bond.    SUBJECTIVE:  Patient endorses adequate pain control.  Offers no specific complaints.  Anxious to go home.  Slept well last night.     OBJECTIVE:  Vital signs:  Temp: 96.8  F (36  C) Temp src: Oral BP: 145/79 Pulse: 70 Heart Rate: 61 Resp: 22 SpO2: 97 % O2 Device: None (Room air) Oxygen Delivery: 8 LPM      Intake/Output Summary (Last 24 hours) at 09/20/17 0909  Last data filed at 09/20/17 0600   Gross per 24 hour   Intake             1340 ml   Output             1180 ml   Net              160 ml       Vitals:    09/19/17 0745   Weight: 99.3 kg (218 lb 14.7 oz)       PHYSICAL EXAM:  NEURO/PSYCH: The patient is alert and oriented.  Appropriate.  Moves all extremities.    SKIN: Color appropriate for race, warm, dry.  PULMONARY: non-labored breathing, not requiring supplemental oxygen  EXTREMITIES: right hand warm, right fistula incision C/D/I, strong thrill palpated, right hand CMS intact      BMP  Recent Labs  Lab 09/19/17  0820 09/14/17  1219   POTASSIUM 4.1 4.1   CR 2.26* 2.19*   *  --      CBC  Recent Labs  Lab 09/19/17  1340 09/19/17  0820 09/14/17  1219   WBC 6.6 4.7 4.9   HGB 10.2* 9.5* 10.1*    196 260     INR/PTT  Recent Labs  Lab 09/20/17  0836 09/19/17  2007 09/19/17  1340 09/19/17  0820 09/14/17  1219   INR  --   --   --  0.96 1.01   PTT 33 30 28  --   --          ASSESSMENT/PLAN:  61 year old female status post right arm brachial artery-to-basilic vein fistula and cephalic vein upper arm thrombectomy with Dr. Bond on 9/19/2017    - pain controlled and patient status stable  -stop heparin  - okay to discharge to home  -  follow up with Sum (nephrology CNS) in 2 weeks with duplex    Please do not hesitate to contact Dr. Bond's vascular surgery team with any questions.       Yvette ROTH, CNS  Division of Vascular Surgery  [facility]  Pager [phone]

## 2017-09-20 NOTE — PROGRESS NOTES
"[facility]  \"Hello, my name is Haleigh Ferrari , and I am calling from the [facility].  I want to check in and see how you are doing, after leaving the hospital.  You may also receive a call from your Care Coordinator (care team), but I want to make sure you don t have any urgent needs.  I have a couple questions to review with you:     Post-Discharge Outreach                                                    Eleni Logan is a 61 year old female     Follow-up Appointments           Follow Up and recommended labs and tests         Follow up with nephrology CNS, Sum, in 2 weeks with ultrasound.                        Your next 10 appointments already scheduled            Nov 06, 2017  2:25 PM CST   (Arrive by 1:55 PM)   Return Kidney Transplant with Tyshawn Sexton MD   [facility] Nephrology ([facility])     [address] [place] [address]  [address] [facility] [id number] [phone]                  Nov 10, 2017  9:00 AM CST   (Arrive by 8:45 AM)   Return Visit with Pancho Salgado MD   [facility] Management ([facility])     [address] [place] [address]  [address] [facility] [address] [phone]              Care Team:    Patient Care Team       Relationship Specialty Notifications Start End    Rocio Saul MD PCP - General Internal Medicine  6/2/11     Phone: [phone] Fax: [phone]         [facility] [address] [facility] [address]    Mirian Buchanan MD MD Internal Medicine  6/18/15     Phone: [phone] Pager: [phone] Fax: [phone]        [address] [place] [address]    Taryn Green APRN CNP Nurse Practitioner Nurse Practitioner  12/28/15     Phone: [phone] Fax: [phone]         [address] [place] [address]    Aysha Mejía MD MD Ophthalmology  3/8/16     Phone: [phone] Fax: " [phone]         [address] [address]    Sarah Dean MD MD Student in organized health care education/training program  5/5/16     Phone: [phone] Fax: [phone]         [address] [facility] [address]            Transition of Care Review                                                      Did you have a surgery or procedure during your hospital visit? Yes   If yes, do you have any of the following:     Signs of infection:  NO    Pain:  No     Pain Scale (0-10) 0/10     Location: NA    Wound/incision concerns? no    Do you have all of your medications/refills?  Yes    Are you having any side effects or questions about your medication(s)? No    Do you have any new or worsening symptoms?  No    Do you have any future appointments scheduled?   Yes             Plan                                                      Thanks for your time.  Your Care Coordinator may follow-up within the next couple days.  In the meantime if you have questions, concerns or problems call your care team.        Haleigh Ferrari

## 2017-09-20 NOTE — TELEPHONE ENCOUNTER
Anemia Management Note  SUBJECTIVE/OBJECTIVE:  Referred by Dr Tyshawn Sexton February 10, 2017  Primary Diagnosis: Anemia in Chronic Kidney Disease (N18.3 D63.1)   Secondary Diagnosis: Chronic Kidney Disease, Stage III (N18.3)   Hgb goal range: 9-10  Epo/Darbo: Aranesp 300 mcg every 14 days - At home   RX will  on 18  Iron regimen:  Ferrous sulfate 325 mg QD on 16  Lab orders  18 in EPIC     Anemia Latest Ref Rng & Units 2017   HGB Goal - - - - - - - -   VALARIE Dose - - - 300 mcg - - - -   Hemoglobin 11.7 - 15.7 g/dL 10.0(L) 9.9(L) - 9.6(L) 10.1(L) 9.5(L) 10.2(L)   TSAT 15 - 46 % - - - - - - -   Ferritin 8 - 252 ng/mL - - - - - - -   PRBCs - - - - - - - -     BP Readings from Last 3 Encounters:   17 145/79   17 146/75   17 (!) 174/99     Wt Readings from Last 2 Encounters:   17 218 lb 14.7 oz (99.3 kg)   17 221 lb 3.2 oz (100.3 kg)       9.9 is the average of the 2 Hgb labs drawn on 17 - ok to dose now or wait until labs are drawn on 10/4    Eleni does not have any aranesp at home at this time, so she will order a refill on her aranesp RX so she will have it on hand for future doses    ASSESSMENT:  Hgb: at goal - continue to monitor  TSat: not at goal (>30%) but ferritin >500ng/mL.  IV iron not indicated at this time per anemia protocol.    PLAN:  Eleni wants to hold her Aranesp dose for now and RTC for hgb, ferritin and iron labs then aranesp if needed on 10/4/17    Orders needed to be renewed (for next follow-up date) in [facility]: None    Iron labs due:  10/10/17    Plan discussed with:  Eleni  Plan provided by:  Winston    NEXT FOLLOW-UP DATE:  10/04/17    Anemia Management Service  Yvette Goodwin PharmD and Preethi Akers CPhT  Phone: [phone]  Fax: [phone]

## 2017-09-20 NOTE — PLAN OF CARE
"Problem: Goal Outcome Summary  Goal: Goal Outcome Summary  Outcome: Adequate for Discharge Date Met:  09/20/17  /79 (BP Location: Left arm)  Pulse 70  Temp 96.8  F (36  C) (Oral)  Resp 22  Ht 1.575 m (5' 2\")  Wt 99.3 kg (218 lb 14.7 oz)  SpO2 97%  BMI 40.04 kg/m2     Heparin gtt d/c'ed. Denies pain. Denies nausea. Bruit/thrill present. Pt tolerating regular diet. Voiding spont. Okay'ed by MD for pt to be d/c'ed today. Went over discharge medications and paperwork with pt. Answered all questions at this time. Removed PIV. This RN walked pt down to the lobby where her ride picked her up. Adequate for discharge.       "

## 2017-09-21 LAB — COPATH REPORT: NORMAL

## 2017-09-29 ENCOUNTER — CARE COORDINATION (OUTPATIENT)
Dept: NEPHROLOGY | Facility: CLINIC | Age: 62
End: 2017-09-29

## 2017-09-29 NOTE — PROGRESS NOTES
Reason for Call    Followed up with patient. Said she's doing very well, has no complaints or symptoms at this time. BP steady around 130-140's / 70's. She's not writing down her heart rates but said they're okay.    Patient Education    1. Call this nurse if systolic (top number) blood pressure is consistently <110 or >160 for further instructions.     Plan    1. Follow up in 2-3 weeks  2. Call if any change in symptoms or concerns    Patient was given an opportunity to ask questions and have those questions answered to her satisfaction.  Patient verbalized understanding of instructions provided and agreed to plan of care.    Brittney Quinones RN

## 2017-10-02 DIAGNOSIS — Z94.0 KIDNEY TRANSPLANTED: ICD-10-CM

## 2017-10-02 DIAGNOSIS — E87.20 ACIDOSIS: ICD-10-CM

## 2017-10-02 RX ORDER — PREDNISONE 5 MG/1
5 TABLET ORAL DAILY
Qty: 30 TABLET | Refills: 11 | Status: SHIPPED | OUTPATIENT
Start: 2017-10-02 | End: 2018-10-05

## 2017-10-02 RX ORDER — SODIUM BICARBONATE 650 MG/1
650 TABLET ORAL 2 TIMES DAILY
Qty: 180 TABLET | Refills: 3 | Status: SHIPPED | OUTPATIENT
Start: 2017-10-02 | End: 2018-08-06

## 2017-10-03 RX ORDER — PREDNISONE 5 MG/1
TABLET ORAL
Qty: 30 TABLET | Refills: 2 | OUTPATIENT
Start: 2017-10-03

## 2017-10-04 ENCOUNTER — OFFICE VISIT (OUTPATIENT)
Dept: TRANSPLANT | Facility: CLINIC | Age: 62
End: 2017-10-04
Attending: CLINICAL NURSE SPECIALIST
Payer: MEDICARE

## 2017-10-04 VITALS
TEMPERATURE: 97.7 F | HEART RATE: 78 BPM | OXYGEN SATURATION: 98 % | SYSTOLIC BLOOD PRESSURE: 164 MMHG | DIASTOLIC BLOOD PRESSURE: 70 MMHG | RESPIRATION RATE: 16 BRPM

## 2017-10-04 DIAGNOSIS — N18.4 CHRONIC KIDNEY DISEASE, STAGE 4, SEVERELY DECREASED GFR (H): Primary | ICD-10-CM

## 2017-10-04 DIAGNOSIS — Z79.899 ENCOUNTER FOR LONG-TERM CURRENT USE OF MEDICATION: ICD-10-CM

## 2017-10-04 DIAGNOSIS — N18.30 CHRONIC KIDNEY DISEASE, STAGE III (MODERATE) (H): ICD-10-CM

## 2017-10-04 DIAGNOSIS — Z48.298 AFTERCARE FOLLOWING ORGAN TRANSPLANT: ICD-10-CM

## 2017-10-04 DIAGNOSIS — D63.1 ANEMIA IN STAGE 3 CHRONIC KIDNEY DISEASE (H): ICD-10-CM

## 2017-10-04 DIAGNOSIS — T82.9XXA COMPLICATION OF VASCULAR ACCESS FOR DIALYSIS, INITIAL ENCOUNTER: ICD-10-CM

## 2017-10-04 DIAGNOSIS — N18.30 ANEMIA IN STAGE 3 CHRONIC KIDNEY DISEASE (H): ICD-10-CM

## 2017-10-04 DIAGNOSIS — Z94.0 KIDNEY REPLACED BY TRANSPLANT: ICD-10-CM

## 2017-10-04 LAB
ANION GAP SERPL CALCULATED.3IONS-SCNC: 9 MMOL/L (ref 3–14)
BUN SERPL-MCNC: 43 MG/DL (ref 7–30)
CALCIUM SERPL-MCNC: 8.2 MG/DL (ref 8.5–10.1)
CHLORIDE SERPL-SCNC: 110 MMOL/L (ref 94–109)
CO2 SERPL-SCNC: 21 MMOL/L (ref 20–32)
CREAT SERPL-MCNC: 1.75 MG/DL (ref 0.52–1.04)
ERYTHROCYTE [DISTWIDTH] IN BLOOD BY AUTOMATED COUNT: 15.3 % (ref 10–15)
FERRITIN SERPL-MCNC: 838 NG/ML (ref 8–252)
GFR SERPL CREATININE-BSD FRML MDRD: 29 ML/MIN/1.7M2
GLUCOSE SERPL-MCNC: 118 MG/DL (ref 70–99)
HCT VFR BLD AUTO: 34.7 % (ref 35–47)
HGB BLD-MCNC: 10.5 G/DL (ref 11.7–15.7)
IRON SATN MFR SERPL: 51 % (ref 15–46)
IRON SERPL-MCNC: 104 UG/DL (ref 35–180)
MCH RBC QN AUTO: 24.8 PG (ref 26.5–33)
MCHC RBC AUTO-ENTMCNC: 30.3 G/DL (ref 31.5–36.5)
MCV RBC AUTO: 82 FL (ref 78–100)
PLATELET # BLD AUTO: 195 10E9/L (ref 150–450)
POTASSIUM SERPL-SCNC: 4.5 MMOL/L (ref 3.4–5.3)
RBC # BLD AUTO: 4.24 10E12/L (ref 3.8–5.2)
SODIUM SERPL-SCNC: 140 MMOL/L (ref 133–144)
TIBC SERPL-MCNC: 204 UG/DL (ref 240–430)
WBC # BLD AUTO: 6.4 10E9/L (ref 4–11)

## 2017-10-04 PROCEDURE — 83550 IRON BINDING TEST: CPT | Performed by: INTERNAL MEDICINE

## 2017-10-04 PROCEDURE — 82728 ASSAY OF FERRITIN: CPT | Performed by: INTERNAL MEDICINE

## 2017-10-04 PROCEDURE — 83540 ASSAY OF IRON: CPT | Performed by: INTERNAL MEDICINE

## 2017-10-04 PROCEDURE — 36415 COLL VENOUS BLD VENIPUNCTURE: CPT | Performed by: INTERNAL MEDICINE

## 2017-10-04 PROCEDURE — 85027 COMPLETE CBC AUTOMATED: CPT | Performed by: INTERNAL MEDICINE

## 2017-10-04 PROCEDURE — 99211 OFF/OP EST MAY X REQ PHY/QHP: CPT

## 2017-10-04 PROCEDURE — 80048 BASIC METABOLIC PNL TOTAL CA: CPT | Performed by: INTERNAL MEDICINE

## 2017-10-04 ASSESSMENT — PAIN SCALES - GENERAL: PAINLEVEL: NO PAIN (0)

## 2017-10-04 NOTE — PROGRESS NOTES
Dialysis Access Service   Progress Note    S:  Ms. Logan is being seen today for surgical followup of her dialysis access.  She reports no issues with the wound, and  no steal syndrome of the distal extremity. C/O patchy numbness at lateral aspect in right forearm. Denies pain, swelling in right arm, numbness/tingling, a change of color/ temperature in right hand and fingers.  S/P Right arm brachial artery-to-basilic vein fistula and cephalic vein upper arm thrombectomy on 9/14/2017.    O:  Temp:  [97.7  F (36.5  C)] 97.7  F (36.5  C)  Pulse:  [78] 78  Resp:  [16] 16  BP: (164)/(70) 164/70  SpO2:  [98 %] 98 %  GENERAL: no distress  Circulation:   Radial pulse 2+ dopplerable  Ulnar pulse  2+ dopplerable     Capillary refill:  capillary refill < 3 sec    Sensory exam:   arm: Normal   []           Abnormal   [x]          Comment:  patchy numbness at lateral aspect in right forearm    hand: Normal   [x]           Abnormal   []          Comment:   Motor exam:   arm: Normal   [x]           Abnormal   []          Comment:    hand: Normal   [x]           Abnormal   []          Comment:    Access: R upper extremity wound(s) healed. non-tender. Capillary refill < 3 sec, ++ thrill and bruit present via hand held doppler. No edema in right arm, without apparent infection. Palpable weak radial pulse and ulnar in right wrist, both is dopplerable. Temperature and color in both hands are equally the same.  RUE US of AV fistula showed  1. Arterial inflow: Patent.   2. Fistula anastomosis evaluation: Narrowing but patent anastomosis measuring 3.2 x 3.4 mm.   3. Venous outflow: Elevated velocity up to 339 adjacent to the arterial inflow, may represent turbulence from anastomotic narrowing.  4. Abnormal preocclusive waveforms in the radial artery, consider clinical correlation for symptoms.  5. Occlusive thrombosis of the right cephalic vein from and without fossa to the proximal arm, suspect acute thrombus, clinical correlation is  recommended.    Assessment & Plan: Ms. Logan's dialysis access has matured marginal at this time point.    1. RUE US of AV fistula today  2. May start hammer curl exercise in right arm with a squeeze ball. 15 minutes a time. 3-4 times a day.  3. F/U with Dr. Bond in 4 weeks  4. Page Dr. Bond for RUE US findings  We would like to see the patient back in the clinic in 4 weeks time to assess progress. The patient was counselled to contact our nurse coordinator, GONZALEZ Booker CNS (Sum) at [phone] with any questions or concerns.  Thank you for the opportunity to participate in Ms. Logan's care.    TT: 15 min  CT: 15 min    TALYA Yancey (Sum)  Dialysis access program   [facility]  Pager # [phone]

## 2017-10-04 NOTE — NURSING NOTE
"Chief Complaint   Patient presents with     Vascular Access Problem       Initial /70  Pulse 78  Temp 97.7  F (36.5  C) (Oral)  Resp 16  SpO2 98% Estimated body mass index is 40.04 kg/(m^2) as calculated from the following:    Height as of 9/19/17: 1.575 m (5' 2\").    Weight as of 9/19/17: 99.3 kg (218 lb 14.7 oz).      "

## 2017-10-04 NOTE — LETTER
10/4/2017       RE: Eleni Logan  [address]  [facility] [address]     Dear Colleague,    Thank you for referring your patient, Eleni Logan, to the [facility] SOLID ORGAN TRANSPLANT at [facility]. Please see a copy of my visit note below.    Dialysis Access Service   Progress Note    S:  Ms. Logan is being seen today for surgical followup of her dialysis access.  She reports no issues with the wound, and  no steal syndrome of the distal extremity. C/O patchy numbness at lateral aspect in right forearm. Denies pain, swelling in right arm, numbness/tingling, a change of color/ temperature in right hand and fingers.  S/P Right arm brachial artery-to-basilic vein fistula and cephalic vein upper arm thrombectomy on 9/14/2017.    O:  Temp:  [97.7  F (36.5  C)] 97.7  F (36.5  C)  Pulse:  [78] 78  Resp:  [16] 16  BP: (164)/(70) 164/70  SpO2:  [98 %] 98 %  GENERAL: no distress  Circulation:   Radial pulse 2+ dopplerable  Ulnar pulse  2+ dopplerable     Capillary refill:  capillary refill < 3 sec    Sensory exam:   arm: Normal   []           Abnormal   [x]          Comment:  patchy numbness at lateral aspect in right forearm    hand: Normal   [x]           Abnormal   []          Comment:   Motor exam:   arm: Normal   [x]           Abnormal   []          Comment:    hand: Normal   [x]           Abnormal   []          Comment:    Access: R upper extremity wound(s) healed. non-tender. Capillary refill < 3 sec, ++ thrill and bruit present via hand held doppler. No edema in right arm, without apparent infection. Palpable weak radial pulse and ulnar in right wrist, both is dopplerable. Temperature and color in both hands are equally the same.  RUE US of AV fistula showed  1. Arterial inflow: Patent.   2. Fistula anastomosis evaluation: Narrowing but patent anastomosis measuring 3.2 x 3.4 mm.   3. Venous outflow: Elevated velocity up to 339 adjacent to the arterial inflow, may  represent turbulence from anastomotic narrowing.  4. Abnormal preocclusive waveforms in the radial artery, consider clinical correlation for symptoms.  5. Occlusive thrombosis of the right cephalic vein from and without fossa to the proximal arm, suspect acute thrombus, clinical correlation is recommended.    Assessment & Plan: Ms. Ritchie dialysis access has matured marginal at this time point.    1. RUE US of AV fistula today  2. May start hammer curl exercise in right arm with a squeeze ball. 15 minutes a time. 3-4 times a day.  3. F/U with Dr. Bond in 4 weeks  4. Page Dr. Bond for RUE US findings  We would like to see the patient back in the clinic in 4 weeks time to assess progress. The patient was counselled to contact our nurse coordinator, GONZALEZ Booker CNS (Sum) at [phone] with any questions or concerns.  Thank you for the opportunity to participate in Ms. Logan's care.    TT: 15 min  CT: 15 min    TALYA Yancey (Sum)  Dialysis access program   [facility]  Pager # [phone]    Again, thank you for allowing me to participate in the care of your patient.      Sincerely,    GONZALEZ Pulido

## 2017-10-04 NOTE — LETTER
10/4/2017       RE: Eleni Logan  [address]  [address]  [place] [address]     Dear Colleague,    Thank you for referring your patient, Eleni Logan, to the [facility] SOLID ORGAN TRANSPLANT at [facility]. Please see a copy of my visit note below.    Dialysis Access Service   Progress Note    S:  Ms. Logan is being seen today for surgical followup of her dialysis access.  She reports no issues with the wound, and  no steal syndrome of the distal extremity. C/O patchy numbness at lateral aspect in right forearm. Denies pain, swelling in right arm, numbness/tingling, a change of color/ temperature in right hand and fingers.  S/P Right arm brachial artery-to-basilic vein fistula and cephalic vein upper arm thrombectomy on 9/14/2017.    O:  Temp:  [97.7  F (36.5  C)] 97.7  F (36.5  C)  Pulse:  [78] 78  Resp:  [16] 16  BP: (164)/(70) 164/70  SpO2:  [98 %] 98 %  GENERAL: no distress  Circulation:   Radial pulse 2+ dopplerable  Ulnar pulse  2+ dopplerable     Capillary refill:  capillary refill < 3 sec    Sensory exam:   arm: Normal   []           Abnormal   [x]          Comment:  patchy numbness at lateral aspect in right forearm    hand: Normal   [x]           Abnormal   []          Comment:   Motor exam:   arm: Normal   [x]           Abnormal   []          Comment:    hand: Normal   [x]           Abnormal   []          Comment:    Access: R upper extremity wound(s) healed. non-tender. Capillary refill < 3 sec, ++ thrill and bruit present via hand held doppler. No edema in right arm, without apparent infection. Palpable weak radial pulse and ulnar in right wrist, both is dopplerable. Temperature and color in both hands are equally the same.  RUE US of AV fistula showed  1. Arterial inflow: Patent.   2. Fistula anastomosis evaluation: Narrowing but patent anastomosis measuring 3.2 x 3.4 mm.   3. Venous outflow: Elevated velocity up to 339 adjacent to the arterial inflow, may  represent turbulence from anastomotic narrowing.  4. Abnormal preocclusive waveforms in the radial artery, consider clinical correlation for symptoms.  5. Occlusive thrombosis of the right cephalic vein from and without fossa to the proximal arm, suspect acute thrombus, clinical correlation is recommended.    Assessment & Plan: Ms. Ritchie dialysis access has matured marginal at this time point.    1. RUE US of AV fistula today  2. May start hammer curl exercise in right arm with a squeeze ball. 15 minutes a time. 3-4 times a day.  3. F/U with Dr. Bond in 4 weeks  4. Page Dr. Bond for RUE US findings  We would like to see the patient back in the clinic in 4 weeks time to assess progress. The patient was counselled to contact our nurse coordinator, GONZALEZ Booker CNS (Sum) at [phone] with any questions or concerns.  Thank you for the opportunity to participate in Ms. Logan's care.    TT: 15 min  CT: 15 min    TALYA Yancey (Sum)  Dialysis access program   [facility]  Pager # [phone]

## 2017-10-04 NOTE — MR AVS SNAPSHOT
After Visit Summary   10/4/2017    Eleni Logan    MRN: [id number]           Patient Information     Date Of Birth          1955        Visit Information        Provider Department      10/4/2017 1:00 PM Sophie Rosario APRN CNS [facility] Trans[facility]        Today's Diagnoses     Chronic kidney disease, stage 4, severely decreased GFR (H)    -  1    Complication of vascular access for dialysis, initial encounter           Follow-ups after your visit        Your next 10 appointments already scheduled     Nov 02, 2017  1:45 PM CDT   (Arrive by 1:30 PM)   Return Vascular Visit with Brandy Bond MD   [facility] ([facility])    [address] [place] [address]  3rd [facility] [id number] [phone]            Nov 06, 2017  2:25 PM CST   (Arrive by 1:55 PM)   Return Kidney Transplant with Tyshawn Sexton MD   [facility] Nephrology ([facility])    [address] [place] [address]  3rd [facility] [address] [phone]            Nov 10, 2017  9:00 AM CST   (Arrive by 8:45 AM)   Return Visit with Pancho Salgado MD   [facility] ([facility])    [address] [place] [address]  4th [facility] [address] [phone]              Who to contact     If you have questions or need follow up information about today's clinic visit or your schedule please contact [facility]ANS[facility] directly at [phone].  Normal or non-critical lab and imaging results will be communicated to you by MyChart, letter or phone within 4 business days after the clinic has received the results. If you do not hear from us within 7 days, please contact the clinic through MyChart or phone. If you have a critical or abnormal lab result, we will notify you by phone as soon as possible.  Submit refill requests through "[employer] or call your pharmacy and they will forward the refill request to  "us. Please allow 3 business days for your refill to be completed.          Additional Information About Your Visit        MyChart Information     [employer] lets you send messages to your doctor, view your test results, renew your prescriptions, schedule appointments and more. To sign up, go to [email or web address] . Click on \"Log in\" on the left side of the screen, which will take you to the Welcome page. Then click on \"Sign up Now\" on the right side of the page.     You will be asked to enter the access code listed below, as well as some personal information. Please follow the directions to create your username and password.     Your access code is: [email or web address]  Expires: 2017  9:42 AM     Your access code will  in 90 days. If you need help or a new code, please call your [place] clinic or [phone].        Care EveryWhere ID     This is your Care EveryWhere ID. This could be used by other organizations to access your [place] medical records  [id number]        Your Vitals Were     Pulse Temperature Respirations Pulse Oximetry          78 97.7  F (36.5  C) (Oral) 16 98%         Blood Pressure from Last 3 Encounters:   10/04/17 164/70   17 145/79   17 146/75    Weight from Last 3 Encounters:   17 99.3 kg (218 lb 14.7 oz)   17 100.3 kg (221 lb 3.2 oz)   17 101.3 kg (223 lb 4.8 oz)              Today, you had the following     No orders found for display       Primary Care Provider Office Phone # Fax #    Rocio Marianna Saul [phone]       [facility] [address]  [facility] [address]        Equal Access to Services     GERBER DAVENPORT : Luís Anderson, nathaniel muniz, clare buchanan. So [facility] [phone].    ATENCIÓN: Si habla español, tiene a stoner disposición servicios gratuitos de asistencia lingüística. Celine al [phone].    We comply with applicable federal " civil rights laws and [place] laws. We do not discriminate on the basis of race, color, national origin, age, disability, sex, sexual orientation, or gender identity.            Thank you!     Thank you for choosing [facility] TRANSPL[facility]  for your care. Our goal is always to provide you with excellent care. Hearing back from our patients is one way we can continue to improve our services. Please take a few minutes to complete the written survey that you may receive in the mail after your visit with us. Thank you!             Your Updated Medication List - Protect others around you: Learn how to safely use, store and throw away your medicines at www.disposemymeds.org.          This list is accurate as of: 10/4/17  3:36 PM.  Always use your most recent med list.                   Brand Name Dispense Instructions for use Diagnosis    acetaminophen 325 MG tablet    TYLENOL    100 tablet    Take 2 tablets (650 mg) by mouth once as needed for mild pain (to moderate pain)    CKD (chronic kidney disease) stage 4, GFR 15-29 ml/min (H)       atorvastatin 20 MG tablet    LIPITOR    90 tablet    Take 1 tablet (20 mg) by mouth daily    Hypercholesteremia       carvedilol 6.25 MG tablet    COREG    360 tablet    Take 2 tablets (12.5 mg) by mouth 2 times daily (with meals)    Hypertension secondary to other renal disorders       cefpodoxime 100 MG tablet    VANTIN    28 tablet    Take 1 tablet (100 mg) by mouth 2 times daily    Urinary tract infection       cholecalciferol 1000 UNITS capsule    vitamin  -D    180 capsule    Take 2 capsules (2,000 Units) by mouth daily    Vitamin D deficiency       cloNIDine 0.1 MG tablet    CATAPRES    60 tablet    Take 1 tablet (0.1 mg) by mouth 2 times daily as needed For systolic BP >180    HTN (hypertension)       darbepoetin bucky 300 MCG/0.6ML injection    ARANESP (ALBUMIN FREE)    0.6 mL    Inject 0.6 mLs (300 mcg) Subcutaneous every 28 days As needed for hgb<10g/dL    Chronic  kidney disease, stage III (moderate), Anemia in stage 3 chronic kidney disease       fexofenadine 180 MG tablet    ALLEGRA    30 tablet    Take 1 tablet (180 mg) by mouth daily    Acute seasonal allergic rhinitis due to pollen       folic acid 1 MG tablet    FOLVITE    30 tablet    Take 1 tablet (1 mg) by mouth daily    Preventive measure       hydrALAZINE 25 MG tablet    APRESOLINE    90 tablet    Take 1 tablet (25 mg) by mouth 3 times daily as needed For systolic BP >170    HTN (hypertension)       hydrocortisone 1 % cream    CORTAID    60 g    Apply topically 2 times daily    Rash, skin       mycophenolate 250 MG capsule     120 capsule    Take 2 capsules (500 mg) by mouth 2 times daily    Kidney transplanted       NIFEdipine ER osmotic 30 MG 24 hr tablet    PROCARDIA XL    180 tablet    Take 1 tablet (30 mg) by mouth 2 times daily    HTN (hypertension)       oxyCODONE 5 MG IR tablet    ROXICODONE    30 tablet    Take 1-2 tablets (5-10 mg) by mouth every 6 hours as needed for moderate to severe pain    CKD (chronic kidney disease) stage 4, GFR 15-29 ml/min (H)       pantoprazole 40 MG EC tablet    PROTONIX    30 tablet    Take 1 tablet (40 mg) by mouth daily    Gastroesophageal reflux disease with esophagitis       predniSONE 5 MG tablet    DELTASONE    30 tablet    Take 1 tablet (5 mg) by mouth daily    Kidney transplanted       sodium bicarbonate 650 MG tablet     180 tablet    Take 1 tablet (650 mg) by mouth 2 times daily    Acidosis       vitamin D 01914 UNIT capsule    ERGOCALCIFEROL    12 capsule    Take 1 capsule (50,000 Units) by mouth once a week    Vitamin D deficiency

## 2017-10-05 ENCOUNTER — TELEPHONE (OUTPATIENT)
Dept: PHARMACY | Facility: CLINIC | Age: 62
End: 2017-10-05

## 2017-10-05 NOTE — TELEPHONE ENCOUNTER
Anemia Management Note  SUBJECTIVE/OBJECTIVE:  Referred by Dr Tyshawn Sexton February 10, 2017  Primary Diagnosis: Anemia in Chronic Kidney Disease (N18.3 D63.1)   Secondary Diagnosis: Chronic Kidney Disease, Stage III (N18.3)   Hgb goal range: 9-10  Epo/Darbo: Aranesp 300 mcg every 14 days - At home   RX will  on 18  Iron regimen: None  Lab orders  18 in EPIC     Anemia Latest Ref Rng & Units 2017 2017 2017 2017 2017 2017 10/4/2017   HGB Goal - - - - - - - -   VALARIE Dose - - 300 mcg - - - - -   Hemoglobin 11.7 - 15.7 g/dL 9.9(L) - 9.6(L) 10.1(L) 9.5(L) 10.2(L) 10.5(L)   TSAT 15 - 46 % - - - - - - 51(H)   Ferritin 8 - 252 ng/mL - - - - - - 838(H)   PRBCs - - - - - - - -     BP Readings from Last 3 Encounters:   10/04/17 164/70   17 145/79   17 146/75     Wt Readings from Last 2 Encounters:   17 218 lb 14.7 oz (99.3 kg)   17 221 lb 3.2 oz (100.3 kg)     Fistula placement done on 17  Eleni is not currently taking Ferrous sulfate  She has an appt w/Dr Sexton on 17 and will get her labs drawn then    ASSESSMENT:  Hgb: Above goal - recommend hold dose  TSat: elevated at >50% Ferritin: At goal (>100ng/mL) - no oral iron is needed at this time per anemia protocol.    PLAN:  Hold Aranesp and RTC for hgb then aranesp if needed in 4 week(s)    Orders needed to be renewed (for next follow-up date) in [facility]: None    Iron labs due:  18    Plan discussed with:  Eleni  Plan provided by:  Winston    NEXT FOLLOW-UP DATE:  17    Anemia Management Service  Yvette Goodwin,GiniD and Preethi Akers CPhT  Phone: [phone]  Fax: [phone]

## 2017-10-20 ENCOUNTER — CARE COORDINATION (OUTPATIENT)
Dept: NEPHROLOGY | Facility: CLINIC | Age: 62
End: 2017-10-20

## 2017-10-23 DIAGNOSIS — D63.1 ANEMIA IN STAGE 3 CHRONIC KIDNEY DISEASE (H): ICD-10-CM

## 2017-10-23 DIAGNOSIS — N18.30 CHRONIC KIDNEY DISEASE, STAGE III (MODERATE) (H): ICD-10-CM

## 2017-10-23 DIAGNOSIS — N18.30 ANEMIA IN STAGE 3 CHRONIC KIDNEY DISEASE (H): ICD-10-CM

## 2017-10-23 LAB
HCT VFR BLD AUTO: 31.8 % (ref 35–47)
HGB BLD-MCNC: 9.8 G/DL (ref 11.7–15.7)

## 2017-10-25 NOTE — PROGRESS NOTES
Spoke with patient's , who said she's currently out of town and will be back around 11/1. Will follow up.    Brittney Quinones RN

## 2017-11-01 ENCOUNTER — TELEPHONE (OUTPATIENT)
Dept: PHARMACY | Facility: CLINIC | Age: 62
End: 2017-11-01

## 2017-11-01 NOTE — TELEPHONE ENCOUNTER
Anemia Management Note  SUBJECTIVE/OBJECTIVE:  Referred by Dr Tyshawn Sexton February 10, 2017  Primary Diagnosis: Anemia in Chronic Kidney Disease (N18.3 D63.1)   Secondary Diagnosis: Chronic Kidney Disease, Stage III (N18.3)   Hgb goal range: 9-10  Epo/Darbo: Aranesp 300 mcg every 14 days - At home   RX will  on 18  Iron regimen:  None  Lab orders  18 in EPIC     Anemia Latest Ref Rng & Units 2017 2017 2017 2017 10/4/2017 10/23/2017 2017   HGB Goal - - - - - - - -   VALARIE Dose - - - - - - - 300 mcg   Hemoglobin 11.7 - 15.7 g/dL 9.6(L) 10.1(L) 9.5(L) 10.2(L) 10.5(L) 9.8(L) -   TSAT 15 - 46 % - - - - 51(H) - -   Ferritin 8 - 252 ng/mL - - - - 838(H) - -   PRBCs - - - - - - - -     BP Readings from Last 3 Encounters:   10/04/17 164/70   17 145/79   17 146/75     Wt Readings from Last 2 Encounters:   17 218 lb 14.7 oz (99.3 kg)   17 221 lb 3.2 oz (100.3 kg)     ASSESSMENT:  Hgb:At goal - recommend dose  TSat: elevated at >50% Ferritin: At goal (>100ng/mL). Not on iron supplement    PLAN:  Dose with aranesp and RTC for hgb then aranesp if needed in 2 week(s)    Orders needed to be renewed (for next follow-up date) in [facility]: None    Iron labs due:  18    Plan discussed with:  Eleni  Plan provided by:  Yvette    NEXT FOLLOW-UP DATE:  11/15    Anemia Management Service  Yvette Goodwin,GiniD and Preethi AkersCPhT  Phone: [phone]  Fax: [phone]

## 2017-11-02 ENCOUNTER — OFFICE VISIT (OUTPATIENT)
Dept: VASCULAR SURGERY | Facility: CLINIC | Age: 62
End: 2017-11-02

## 2017-11-02 ENCOUNTER — CARE COORDINATION (OUTPATIENT)
Dept: NEPHROLOGY | Facility: CLINIC | Age: 62
End: 2017-11-02

## 2017-11-02 VITALS
HEART RATE: 94 BPM | RESPIRATION RATE: 18 BRPM | DIASTOLIC BLOOD PRESSURE: 85 MMHG | SYSTOLIC BLOOD PRESSURE: 148 MMHG | OXYGEN SATURATION: 98 %

## 2017-11-02 DIAGNOSIS — Z94.0 KIDNEY REPLACED BY TRANSPLANT: Primary | ICD-10-CM

## 2017-11-02 DIAGNOSIS — Z99.2 ESRD (END STAGE RENAL DISEASE) ON DIALYSIS (H): Primary | ICD-10-CM

## 2017-11-02 DIAGNOSIS — Z94.0 KIDNEY REPLACED BY TRANSPLANT: ICD-10-CM

## 2017-11-02 DIAGNOSIS — N39.0 URINARY TRACT INFECTION: ICD-10-CM

## 2017-11-02 DIAGNOSIS — J30.1 ACUTE SEASONAL ALLERGIC RHINITIS DUE TO POLLEN: ICD-10-CM

## 2017-11-02 DIAGNOSIS — N18.6 ESRD (END STAGE RENAL DISEASE) ON DIALYSIS (H): Primary | ICD-10-CM

## 2017-11-02 LAB
ALBUMIN UR-MCNC: 100 MG/DL
APPEARANCE UR: ABNORMAL
BACTERIA #/AREA URNS HPF: ABNORMAL /HPF
BILIRUB UR QL STRIP: NEGATIVE
COLOR UR AUTO: YELLOW
GLUCOSE UR STRIP-MCNC: NEGATIVE MG/DL
HGB UR QL STRIP: NEGATIVE
KETONES UR STRIP-MCNC: NEGATIVE MG/DL
LEUKOCYTE ESTERASE UR QL STRIP: ABNORMAL
MUCOUS THREADS #/AREA URNS LPF: PRESENT /LPF
NITRATE UR QL: NEGATIVE
PH UR STRIP: 5 PH (ref 5–7)
RBC #/AREA URNS AUTO: 0 /HPF (ref 0–2)
SOURCE: ABNORMAL
SP GR UR STRIP: 1.01 (ref 1–1.03)
SQUAMOUS #/AREA URNS AUTO: 1 /HPF (ref 0–1)
UROBILINOGEN UR STRIP-MCNC: 0 MG/DL (ref 0–2)
WBC #/AREA URNS AUTO: >182 /HPF (ref 0–2)
WBC CLUMPS #/AREA URNS HPF: PRESENT /HPF

## 2017-11-02 PROCEDURE — 87088 URINE BACTERIA CULTURE: CPT | Performed by: INTERNAL MEDICINE

## 2017-11-02 PROCEDURE — 87186 SC STD MICRODIL/AGAR DIL: CPT | Performed by: INTERNAL MEDICINE

## 2017-11-02 PROCEDURE — 87086 URINE CULTURE/COLONY COUNT: CPT | Performed by: INTERNAL MEDICINE

## 2017-11-02 ASSESSMENT — PAIN SCALES - GENERAL: PAINLEVEL: NO PAIN (0)

## 2017-11-02 NOTE — LETTER
11/2/2017       RE: Eleni Logan  [address]  [address]  [facility] [address]     Dear Colleague,    Thank you for referring your patient, Eleni Logan, to the [facility] at [facility]. Please see a copy of my visit note below.    I am Seeing Eleni Logan back after first stage of right arm basilic vein transposition done 9/19.  At the time had planned cephalic vein  Fistula, but found this had occluded post venogram and IVUS done to evaluate central veins.  Procedure went well.  Attempt to recanalize cephalic vein made - clot cleared.      Done well since.  No issues.  No pain now.  Wounds well healed.  No steal symptoms    EXAM:  /85 (BP Location: Left arm)  Pulse 94  Resp 18  SpO2 98%  Breastfeeding? No    General Appearance: Patient appears well with no distress.      Gross Neuro:  Alert and Oriented.  Appropriate.    Pulse Exam:      Radial:  R:2+ good thrill proximal fistula      Extremity:    Labs:   Lab Results   Component Value Date    CHOL 137 05/19/2016     Lab Results   Component Value Date    HDL 44 05/19/2016     Lab Results   Component Value Date    LDL 52 05/19/2016     Lab Results   Component Value Date    TRIG 202 05/19/2016     Lab Results   Component Value Date    CHOLHDLRATIO 2.3 12/02/2014       Tests:  Duplex right arm:  Adequate caliber vein at 6mm.  Flow volumes 600 cm/sec.      IMP/PLAN:  Eleni Logan is a 61 year old female with    I spent >50% of this 25 min visit in counseling    Again, thank you for allowing me to participate in the care of your patient.      Sincerely,    Brandy Bond MD

## 2017-11-02 NOTE — PROGRESS NOTES
I am Seeing Eleni Logan back after first stage of right arm basilic vein transposition done 9/19.  At the time had planned cephalic vein  Fistula, but found this had occluded post venogram and IVUS done to evaluate central veins.  Procedure went well.  Attempt to recanalize cephalic vein made - clot cleared.      Done well since.  No issues.  No pain now.  Wounds well healed.  No steal symptoms    EXAM:  /85 (BP Location: Left arm)  Pulse 94  Resp 18  SpO2 98%  Breastfeeding? No    General Appearance: Patient appears well with no distress.      Gross Neuro:  Alert and Oriented.  Appropriate.    Pulse Exam:      Radial:  R:2+ good thrill proximal fistula      Extremity:    Labs:   Lab Results   Component Value Date    CHOL 137 05/19/2016     Lab Results   Component Value Date    HDL 44 05/19/2016     Lab Results   Component Value Date    LDL 52 05/19/2016     Lab Results   Component Value Date    TRIG 202 05/19/2016     Lab Results   Component Value Date    CHOLHDLRATIO 2.3 12/02/2014       Tests:  Duplex right arm:  Adequate caliber vein at 6mm.  Flow volumes 600 cm/sec.      IMP/PLAN:  Eleni Logan is a 61 year old female with    I spent >50% of this 25 min visit in counseling

## 2017-11-02 NOTE — MR AVS SNAPSHOT
After Visit Summary   2017    Eleni Logan    MRN: [id number]           Patient Information     Date Of Birth          1955        Visit Information        Provider Department      2017 1:45 PM Brandy Bond MD [facility]        Today's Diagnoses     ESRD (end stage renal disease) on dialysis (H)    -  1       Follow-ups after your visit        Your next 10 appointments already scheduled     2017  2:25 PM CST   (Arrive by 1:55 PM)   Return Kidney Transplant with Tyshawn Sexton MD   [facility] Neph[facility] ([facility])    [address] [place] [address] [phone]              Who to contact     Please call your clinic at [phone] to:    Ask questions about your health    Make or cancel appointments    Discuss your medicines    Learn about your test results    Speak to your doctor   If you have compliments or concerns about an experience at your clinic, or if you wish to file a complaint, please contact [facility] Patient Relations at [phone] or email us at [email or web address]         Additional Information About Your Visit        MyChart Information     [employer]it is an electronic gateway that provides easy, online access to your medical records. With Robert Applebaum MD, you can request a clinic appointment, read your test results, renew a prescription or communicate with your care team.     To sign up for gumit visit the website at [email or web address]   You will be asked to enter the access code listed below, as well as some personal information. Please follow the directions to create your username and password.     Your access code is: [email or web address]  Expires: 2017  9:42 AM     Your access code will  in 90 days. If you need help or a new code, please contact your [facility] Clinic or call [phone] for assistance.        Care  EveryWhere ID     This is your Care EveryWhere ID. This could be used by other organizations to access your [place] medical records  [id number]        Your Vitals Were     Pulse Respirations Pulse Oximetry Breastfeeding?          94 18 98% No         Blood Pressure from Last 3 Encounters:   11/02/17 148/85   10/04/17 164/70   09/20/17 145/79    Weight from Last 3 Encounters:   09/19/17 218 lb 14.7 oz   08/08/17 221 lb 3.2 oz   08/07/17 223 lb 4.8 oz              Today, you had the following     No orders found for display       Primary Care Provider Office Phone # Fax #    Rocio Marianna Saul [phone]       [facility] [address] [place] [facility] [address]        Equal Access to Services     GERBER DAVENPORT : Hadii lani vasquezo Sodioni, waaxda luqadaha, qaybta kaalmada adedahlia, clare chambers . So [facility] [phone].    ATENCIÓN: Si habla español, tiene a stoner disposición servicios gratuitos de asistencia lingüística. Llame al [phone].    We comply with applicable federal civil rights laws and [place] laws. We do not discriminate on the basis of race, color, national origin, age, disability, sex, sexual orientation, or gender identity.            Thank you!     Thank you for choosing [facility] CLINIC  for your care. Our goal is always to provide you with excellent care. Hearing back from our patients is one way we can continue to improve our services. Please take a few minutes to complete the written survey that you may receive in the mail after your visit with us. Thank you!             Your Updated Medication List - Protect others around you: Learn how to safely use, store and throw away your medicines at www.disposemymeds.org.          This list is accurate as of: 11/2/17  8:36 PM.  Always use your most recent med list.                   Brand Name Dispense Instructions for use Diagnosis    acetaminophen 325 MG tablet    TYLENOL    100  tablet    Take 2 tablets (650 mg) by mouth once as needed for mild pain (to moderate pain)    CKD (chronic kidney disease) stage 4, GFR 15-29 ml/min (H)       atorvastatin 20 MG tablet    LIPITOR    90 tablet    Take 1 tablet (20 mg) by mouth daily    Hypercholesteremia       carvedilol 6.25 MG tablet    COREG    360 tablet    Take 2 tablets (12.5 mg) by mouth 2 times daily (with meals)    Hypertension secondary to other renal disorders       cefpodoxime 100 MG tablet    VANTIN    28 tablet    Take 1 tablet (100 mg) by mouth 2 times daily    Urinary tract infection       cholecalciferol 1000 UNITS capsule    vitamin  -D    180 capsule    Take 2 capsules (2,000 Units) by mouth daily    Vitamin D deficiency       cloNIDine 0.1 MG tablet    CATAPRES    60 tablet    Take 1 tablet (0.1 mg) by mouth 2 times daily as needed For systolic BP >180    HTN (hypertension)       darbepoetin bucky 300 MCG/0.6ML injection    ARANESP (ALBUMIN FREE)    0.6 mL    Inject 0.6 mLs (300 mcg) Subcutaneous every 28 days As needed for hgb<10g/dL    Chronic kidney disease, stage III (moderate), Anemia in stage 3 chronic kidney disease       fexofenadine 180 MG tablet    ALLEGRA    30 tablet    Take 1 tablet (180 mg) by mouth daily    Acute seasonal allergic rhinitis due to pollen       folic acid 1 MG tablet    FOLVITE    30 tablet    Take 1 tablet (1 mg) by mouth daily    Preventive measure       hydrALAZINE 25 MG tablet    APRESOLINE    90 tablet    Take 1 tablet (25 mg) by mouth 3 times daily as needed For systolic BP >170    HTN (hypertension)       hydrocortisone 1 % cream    CORTAID    60 g    Apply topically 2 times daily    Rash, skin       mycophenolate 250 MG capsule     120 capsule    Take 2 capsules (500 mg) by mouth 2 times daily    Kidney transplanted       NIFEdipine ER osmotic 30 MG 24 hr tablet    PROCARDIA XL    180 tablet    Take 1 tablet (30 mg) by mouth 2 times daily    HTN (hypertension)       oxyCODONE IR 5 MG tablet     ROXICODONE    30 tablet    Take 1-2 tablets (5-10 mg) by mouth every 6 hours as needed for moderate to severe pain    CKD (chronic kidney disease) stage 4, GFR 15-29 ml/min (H)       pantoprazole 40 MG EC tablet    PROTONIX    30 tablet    Take 1 tablet (40 mg) by mouth daily    Gastroesophageal reflux disease with esophagitis       predniSONE 5 MG tablet    DELTASONE    30 tablet    Take 1 tablet (5 mg) by mouth daily    Kidney transplanted       sodium bicarbonate 650 MG tablet     180 tablet    Take 1 tablet (650 mg) by mouth 2 times daily    Acidosis       vitamin D 25019 UNIT capsule    ERGOCALCIFEROL    12 capsule    Take 1 capsule (50,000 Units) by mouth once a week    Vitamin D deficiency

## 2017-11-02 NOTE — NURSING NOTE
Chief Complaint   Patient presents with     RECHECK     Follow up right arm fistula        Vitals:    11/02/17 1331   BP: 148/85   BP Location: Left arm   Pulse: 94   Resp: 18   SpO2: 98%       There is no height or weight on file to calculate BMI.              Kia Sotomayor LPN

## 2017-11-03 DIAGNOSIS — Z94.0 KIDNEY REPLACED BY TRANSPLANT: Primary | ICD-10-CM

## 2017-11-03 LAB
BACTERIA SPEC CULT: ABNORMAL
Lab: ABNORMAL
SPECIMEN SOURCE: ABNORMAL

## 2017-11-03 RX ORDER — AMOXICILLIN AND CLAVULANATE POTASSIUM 500; 125 MG/1; MG/1
1 TABLET, FILM COATED ORAL 2 TIMES DAILY
Qty: 20 TABLET | Refills: 0 | Status: SHIPPED | OUTPATIENT
Start: 2017-11-03 | End: 2017-11-13

## 2017-11-03 RX ORDER — AMOXICILLIN AND CLAVULANATE POTASSIUM 500; 125 MG/1; MG/1
1 TABLET, FILM COATED ORAL 2 TIMES DAILY
Qty: 20 TABLET | Refills: 0 | Status: SHIPPED | OUTPATIENT
Start: 2017-11-03 | End: 2018-02-19

## 2017-11-03 RX ORDER — FEXOFENADINE HCL 180 MG/1
180 TABLET ORAL DAILY
Qty: 30 TABLET | Refills: 0 | Status: SHIPPED | OUTPATIENT
Start: 2017-11-03 | End: 2017-11-06

## 2017-11-03 NOTE — TELEPHONE ENCOUNTER
Call placed to patient: Patient not available, message given to patient  for patient to begin taking Augmentin 500/125 BID for 10 days. Will try back rx sent

## 2017-11-03 NOTE — TELEPHONE ENCOUNTER
Issue: Pt with WBC in urinalysis.      Plan: Per Dr. Sexton, please start patient on the following antibiotic for treatment. Will wait for culture sensitives.     Notes Recorded by Tyshawn Sexton MD on 11/2/2017 at 2:26 PM  Please start Augmentin 500-125 mg po bid for 10 days   Keep an eye on the final culture report   Thanks  SR

## 2017-11-06 ENCOUNTER — OFFICE VISIT (OUTPATIENT)
Dept: NEPHROLOGY | Facility: CLINIC | Age: 62
End: 2017-11-06
Attending: INTERNAL MEDICINE
Payer: MEDICARE

## 2017-11-06 VITALS
WEIGHT: 219.4 LBS | DIASTOLIC BLOOD PRESSURE: 80 MMHG | SYSTOLIC BLOOD PRESSURE: 139 MMHG | BODY MASS INDEX: 40.37 KG/M2 | HEART RATE: 103 BPM | TEMPERATURE: 98.2 F | HEIGHT: 62 IN | OXYGEN SATURATION: 99 %

## 2017-11-06 DIAGNOSIS — Z48.298 AFTERCARE FOLLOWING ORGAN TRANSPLANT: Primary | ICD-10-CM

## 2017-11-06 DIAGNOSIS — N25.81 SECONDARY RENAL HYPERPARATHYROIDISM (H): ICD-10-CM

## 2017-11-06 PROCEDURE — 99212 OFFICE O/P EST SF 10 MIN: CPT | Mod: ZF

## 2017-11-06 ASSESSMENT — PAIN SCALES - GENERAL: PAINLEVEL: NO PAIN (0)

## 2017-11-06 NOTE — MR AVS SNAPSHOT
"              After Visit Summary   11/6/2017    Eleni Logan    MRN: [id number]           Patient Information     Date Of Birth          1955        Visit Information        Provider Department      11/6/2017 2:25 PM Tyshawn Sexton MD [facility] Nephrology        Today's Diagnoses     Aftercare following organ transplant    -  1    Secondary renal hyperparathyroidism (H)           Follow-ups after your visit        Follow-up notes from your care team     Return in about 3 months (around 2/6/2018).      Future tests that were ordered for you today     Open Standing Orders        Priority Remaining Interval Expires Ordered    Routine UA with micro reflex to culture Routine 6/6 Every 2 Months 11/6/2018 11/6/2017    Renal panel Routine 10/10 Every 2 Months 11/6/2018 11/6/2017          Open Future Orders        Priority Expected Expires Ordered    Uric acid Routine  11/6/2018 11/6/2017    Parathyroid Hormone Intact Routine  12/6/2017 11/6/2017            Who to contact     If you have questions or need follow up information about today's clinic visit or your schedule please contact [facility] NEPHROLOGY directly at [phone].  Normal or non-critical lab and imaging results will be communicated to you by ShopIgniterhart, letter or phone within 4 business days after the clinic has received the results. If you do not hear from us within 7 days, please contact the clinic through [email or web address] or phone. If you have a critical or abnormal lab result, we will notify you by phone as soon as possible.  Submit refill requests through Fave Media or call your pharmacy and they will forward the refill request to us. Please allow 3 business days for your refill to be completed.          Additional Information About Your Visit        ShopIgniterhart Information     [employer] lets you send messages to your doctor, view your test results, renew your prescriptions, schedule appointments and more. To sign up, go to [email or web address] . Click on \"Log " "in\" on the left side of the screen, which will take you to the Welcome page. Then click on \"Sign up Now\" on the right side of the page.     You will be asked to enter the access code listed below, as well as some personal information. Please follow the directions to create your username and password.     Your access code is: [email or web address]  Expires: 2017  8:42 AM     Your access code will  in 90 days. If you need help or a new code, please call your [place] clinic or [phone].        Care EveryWhere ID     This is your Care EveryWhere ID. This could be used by other organizations to access your [place] medical records  [id number]        Your Vitals Were     Pulse Temperature Height Pulse Oximetry BMI (Body Mass Index)       103 98.2  F (36.8  C) (Oral) 1.575 m (5' 2\") 99% 40.13 kg/m2        Blood Pressure from Last 3 Encounters:   17 139/80   17 148/85   10/04/17 164/70    Weight from Last 3 Encounters:   17 99.5 kg (219 lb 6.4 oz)   17 99.3 kg (218 lb 14.7 oz)   17 100.3 kg (221 lb 3.2 oz)                 Today's Medication Changes          These changes are accurate as of: 17  2:48 PM.  If you have any questions, ask your nurse or doctor.               Stop taking these medicines if you haven't already. Please contact your care team if you have questions.     acetaminophen 325 MG tablet   Commonly known as:  TYLENOL   Stopped by:  Tyshawn Sexton MD           cefpodoxime 100 MG tablet   Commonly known as:  VANTIN   Stopped by:  Tyshawn Sexton MD           fexofenadine 180 MG tablet   Commonly known as:  ALLEGRA   Stopped by:  Tyshawn Sexton MD           oxyCODONE IR 5 MG tablet   Commonly known as:  ROXICODONE   Stopped by:  Tyshawn Sexton MD                    Primary Care Provider Office Phone # Fax #    Rocio Marianna Saul [phone]       [facility] [address] [facility] [address]        Equal Access to " Services     [facility]: Hadii aad ku hadlloydmine Kayleydioni, waaxda luqadaha, qaybta kaalmavance wylie, [facility] . So [facility] [place] [phone].    ATENCIÓN: Si habla henrry, tiene a stoner disposición servicios gratuitos de asistencia lingüística. Llame al [phone].    We comply with applicable federal civil rights laws and [place] laws. We do not discriminate on the basis of race, color, national origin, age, disability, sex, sexual orientation, or gender identity.            Thank you!     Thank you for choosing [facility] NEPHROLOGY  for your care. Our goal is always to provide you with excellent care. Hearing back from our patients is one way we can continue to improve our services. Please take a few minutes to complete the written survey that you may receive in the mail after your visit with us. Thank you!             Your Updated Medication List - Protect others around you: Learn how to safely use, store and throw away your medicines at www.disposemymeds.org.          This list is accurate as of: 11/6/17  2:48 PM.  Always use your most recent med list.                   Brand Name Dispense Instructions for use Diagnosis    * amoxicillin-clavulanate 500-125 MG per tablet    AUGMENTIN    20 tablet    Take 1 tablet by mouth 2 times daily for 10 days    Urinary tract infection       * amoxicillin-clavulanate 500-125 MG per tablet    AUGMENTIN    20 tablet    Take 1 tablet by mouth 2 times daily    Kidney replaced by transplant       atorvastatin 20 MG tablet    LIPITOR    90 tablet    Take 1 tablet (20 mg) by mouth daily    Hypercholesteremia       carvedilol 6.25 MG tablet    COREG    360 tablet    Take 2 tablets (12.5 mg) by mouth 2 times daily (with meals)    Hypertension secondary to other renal disorders       cholecalciferol 1000 UNITS capsule    vitamin  -D    180 capsule    Take 2 capsules (2,000 Units) by mouth daily    Vitamin D deficiency       cloNIDine 0.1 MG tablet     CATAPRES    60 tablet    Take 1 tablet (0.1 mg) by mouth 2 times daily as needed For systolic BP >180    HTN (hypertension)       darbepoetin bucky 300 MCG/0.6ML injection    ARANESP (ALBUMIN FREE)    0.6 mL    Inject 0.6 mLs (300 mcg) Subcutaneous every 28 days As needed for hgb<10g/dL    Chronic kidney disease, stage III (moderate), Anemia in stage 3 chronic kidney disease       folic acid 1 MG tablet    FOLVITE    30 tablet    Take 1 tablet (1 mg) by mouth daily    Preventive measure       hydrALAZINE 25 MG tablet    APRESOLINE    90 tablet    Take 1 tablet (25 mg) by mouth 3 times daily as needed For systolic BP >170    HTN (hypertension)       hydrocortisone 1 % cream    CORTAID    60 g    Apply topically 2 times daily    Rash, skin       mycophenolate 250 MG capsule     120 capsule    Take 2 capsules (500 mg) by mouth 2 times daily    Kidney transplanted       NIFEdipine ER osmotic 30 MG 24 hr tablet    PROCARDIA XL    180 tablet    Take 1 tablet (30 mg) by mouth 2 times daily    HTN (hypertension)       pantoprazole 40 MG EC tablet    PROTONIX    30 tablet    Take 1 tablet (40 mg) by mouth daily    Gastroesophageal reflux disease with esophagitis       predniSONE 5 MG tablet    DELTASONE    30 tablet    Take 1 tablet (5 mg) by mouth daily    Kidney transplanted       sodium bicarbonate 650 MG tablet     180 tablet    Take 1 tablet (650 mg) by mouth 2 times daily    Acidosis       vitamin D 74864 UNIT capsule    ERGOCALCIFEROL    12 capsule    Take 1 capsule (50,000 Units) by mouth once a week    Vitamin D deficiency       * Notice:  This list has 2 medication(s) that are the same as other medications prescribed for you. Read the directions carefully, and ask your doctor or other care provider to review them with you.

## 2017-11-06 NOTE — PROGRESS NOTES
Assessment and Plan:  1.  Allograft function.  Her serum creatinine is stable to slightly worse now at 2.5 mg/dL. We discussed re-referral to access clinic for dialysis access planning    2. Immunosuppression management: She is currently on an immunosuppressive regimen that consists of prednisone 5 mg daily in addition to CellCept 500 mg b.i.d.  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 Her kidney function has been anywhere from 1.8-2.2 mg/dL but now slightly worse. She has modest proteinuria.    3.  Recurrent urinary tract infections. Currently dealing with one, will start abx today    4.  Anemia of chronic kidney disease.  She continues to require Aranesp therapy.  5. Hematodialysis Access: s/p placement    6. Obesity: we discussed weight loss specially if she is interested in repeat kidney transplant   7. Renal osteodystrophy will need to recheck her PTH on large dose vitamin D replacement  8. Hypertension near goal, she is on a good regimen with good BP control     Assessment and plan was discussed with patient and she voiced her understanding and agreement.    Reason for Visit:  Ms. Logan is here for routine follow up and immunosuppression management.    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          Transplant Hx:       Tx: LDKT  Date: 1999       Present Maintenance IS: Mycophenolate mofetil and Prednisone       Baseline Creatinine: 2-3 mg/dL        Biopsy: yes with TMA    Since she was seen last, she had her access placed with good bruit and thrill. She will have revision soon. No recent hospitalizations. Developed burning on urination and her UA revealed another bout of a UTI. No NVD. No abdominal pain. Feels tired. She had not started her antibiotic therapy yet since was called  in last week.     Home BP: controlled.      ROS:   A comprehensive review of systems was obtained and negative, except as noted in the HPI or PMH.    Active Medical Problems:  Patient Active Problem List   Diagnosis     Chronic rhinitis     Essential hypertension, benign     S/P kidney transplant     Rash     Asthma exacerbation     UTI (urinary tract infection)     Anemia     Chest pain     Urinary tract infection     Localized pustular psoriasis     CKD (chronic kidney disease) stage 4, GFR 15-29 ml/min (H)     Immunosuppression (H)     Fistula     End-stage renal disease (ESRD) (H)       Personal Hx:  Social History     Social History     Marital status:      Spouse name: N/A     Number of children: N/A     Years of education: N/A     Occupational History     Not on file.     Social History Main Topics     Smoking status: Never Smoker     Smokeless tobacco: Never Used      Comment: Has been around second hand smoke     Alcohol use Yes      Comment: Once in a great while     Drug use: No     Sexual activity: Not on file     Other Topics Concern     Not on file     Social History Narrative       Allergies:  Allergies   Allergen Reactions     Ciprofloxacin Palpitations       Medications:    Current Outpatient Prescriptions on File Prior to Visit:  amoxicillin-clavulanate (AUGMENTIN) 500-125 MG per tablet Take 1 tablet by mouth 2 times daily for 10 days   amoxicillin-clavulanate (AUGMENTIN) 500-125 MG per tablet Take 1 tablet by mouth 2 times daily   fexofenadine (ALLEGRA) 180 MG tablet Take 1 tablet (180 mg) by mouth daily   predniSONE (DELTASONE) 5 MG tablet Take 1 tablet (5 mg) by mouth daily   sodium bicarbonate 650 MG tablet Take 1 tablet (650 mg) by mouth 2 times daily   oxyCODONE (ROXICODONE) 5 MG IR tablet Take 1-2 tablets (5-10 mg) by mouth every 6 hours as needed for moderate to severe pain   acetaminophen (TYLENOL) 325 MG tablet Take 2 tablets (650 mg) by mouth once as needed for mild pain (to  "moderate pain)   hydrALAZINE (APRESOLINE) 25 MG tablet Take 1 tablet (25 mg) by mouth 3 times daily as needed For systolic BP >170   NIFEdipine ER osmotic (PROCARDIA XL) 30 MG 24 hr tablet Take 1 tablet (30 mg) by mouth 2 times daily   cloNIDine (CATAPRES) 0.1 MG tablet Take 1 tablet (0.1 mg) by mouth 2 times daily as needed For systolic BP >180   cefpodoxime (VANTIN) 100 MG tablet Take 1 tablet (100 mg) by mouth 2 times daily   vitamin D (ERGOCALCIFEROL) 83174 UNIT capsule Take 1 capsule (50,000 Units) by mouth once a week   atorvastatin (LIPITOR) 20 MG tablet Take 1 tablet (20 mg) by mouth daily   carvedilol (COREG) 6.25 MG tablet Take 2 tablets (12.5 mg) by mouth 2 times daily (with meals)   cholecalciferol (VITAMIN  -D) 1000 UNITS capsule Take 2 capsules (2,000 Units) by mouth daily   pantoprazole (PROTONIX) 40 MG EC tablet Take 1 tablet (40 mg) by mouth daily   folic acid (FOLVITE) 1 MG tablet Take 1 tablet (1 mg) by mouth daily   darbepoetin bucky (ARANESP, ALBUMIN FREE,) 300 MCG/0.6ML injection Inject 0.6 mLs (300 mcg) Subcutaneous every 28 days As needed for hgb<10g/dL   CELLCEPT 250 MG PO CAPSULE Take 2 capsules (500 mg) by mouth 2 times daily   hydrocortisone (CORTAID) 1 % cream Apply topically 2 times daily     No current facility-administered medications on file prior to visit.       Vitals:  /80  Pulse 103  Temp 98.2  F (36.8  C) (Oral)  Ht 1.575 m (5' 2\")  Wt 99.5 kg (219 lb 6.4 oz)  SpO2 99%  BMI 40.13 kg/m2  Heart rate normalized during the visit  Exam:   GENERAL APPEARANCE: alert and no distress  HENT: mouth without ulcers or lesions  LYMPHATICS: no cervical or supraclavicular nodes  RESP: lungs clear to auscultation - no rales, rhonchi or wheezes  CV: regular rhythm, normal rate, no rub, no murmur  EDEMA: no LE edema bilaterally  ABDOMEN: soft, nondistended, nontender, bowel sounds normal  MS: extremities normal - no gross deformities noted, no evidence of inflammation in joints, no " muscle tenderness  SKIN: no rash  ACCESS: right arm fistula with goo bruit and some thrill     Results:   Reviewed

## 2017-11-06 NOTE — NURSING NOTE
"Chief Complaint   Patient presents with     RECHECK     Kidney transplant follow up       Initial /80  Pulse 103  Temp 98.2  F (36.8  C) (Oral)  Ht 1.575 m (5' 2\")  Wt 99.5 kg (219 lb 6.4 oz)  SpO2 99%  BMI 40.13 kg/m2 Estimated body mass index is 40.13 kg/(m^2) as calculated from the following:    Height as of this encounter: 1.575 m (5' 2\").    Weight as of this encounter: 99.5 kg (219 lb 6.4 oz).  Medication Reconciliation: complete   Pt states there are no changes on medications   HILDA CASTANEDA CMA      "

## 2017-11-06 NOTE — LETTER
11/6/2017      RE: Eleni Logan  [address]  [address]  [facility] [id number]       Assessment and Plan:  1.  Allograft function.  Her serum creatinine is stable to slightly worse now at 2.5 mg/dL. We discussed re-referral to access clinic for dialysis access planning    2. Immunosuppression management: She is currently on an immunosuppressive regimen that consists of prednisone 5 mg daily in addition to CellCept 500 mg b.i.d.  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 Her kidney function has been anywhere from 1.8-2.2 mg/dL but now slightly worse. She has modest proteinuria.    3.  Recurrent urinary tract infections. Currently dealing with one, will start abx today    4.  Anemia of chronic kidney disease.  She continues to require Aranesp therapy.  5. Hematodialysis Access: s/p placement    6. Obesity: we discussed weight loss specially if she is interested in repeat kidney transplant   7. Renal osteodystrophy will need to recheck her PTH on large dose vitamin D replacement  8. Hypertension near goal, she is on a good regimen with good BP control     Assessment and plan was discussed with patient and she voiced her understanding and agreement.    Reason for Visit:  Ms. Logan is here for routine follow up and immunosuppression management.    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          Transplant Hx:       Tx: LDKT  Date: 1999       Present Maintenance IS: Mycophenolate mofetil and Prednisone       Baseline Creatinine: 2-3 mg/dL        Biopsy: yes with TMA    Since she was seen last, she had her access placed with good bruit and thrill. She will have revision soon. No recent hospitalizations. Developed burning on urination and her UA revealed another bout of a UTI. No NVD. No  abdominal pain. Feels tired. She had not started her antibiotic therapy yet since was called in last week.     Home BP: controlled.      ROS:   A comprehensive review of systems was obtained and negative, except as noted in the HPI or PMH.    Active Medical Problems:  Patient Active Problem List   Diagnosis     Chronic rhinitis     Essential hypertension, benign     S/P kidney transplant     Rash     Asthma exacerbation     UTI (urinary tract infection)     Anemia     Chest pain     Urinary tract infection     Localized pustular psoriasis     CKD (chronic kidney disease) stage 4, GFR 15-29 ml/min (H)     Immunosuppression (H)     Fistula     End-stage renal disease (ESRD) (H)       Personal Hx:  Social History     Social History     Marital status:      Spouse name: N/A     Number of children: N/A     Years of education: N/A     Occupational History     Not on file.     Social History Main Topics     Smoking status: Never Smoker     Smokeless tobacco: Never Used      Comment: Has been around second hand smoke     Alcohol use Yes      Comment: Once in a great while     Drug use: No     Sexual activity: Not on file     Other Topics Concern     Not on file     Social History Narrative       Allergies:  Allergies   Allergen Reactions     Ciprofloxacin Palpitations       Medications:    Current Outpatient Prescriptions on File Prior to Visit:  amoxicillin-clavulanate (AUGMENTIN) 500-125 MG per tablet Take 1 tablet by mouth 2 times daily for 10 days   amoxicillin-clavulanate (AUGMENTIN) 500-125 MG per tablet Take 1 tablet by mouth 2 times daily   fexofenadine (ALLEGRA) 180 MG tablet Take 1 tablet (180 mg) by mouth daily   predniSONE (DELTASONE) 5 MG tablet Take 1 tablet (5 mg) by mouth daily   sodium bicarbonate 650 MG tablet Take 1 tablet (650 mg) by mouth 2 times daily   oxyCODONE (ROXICODONE) 5 MG IR tablet Take 1-2 tablets (5-10 mg) by mouth every 6 hours as needed for moderate to severe pain   acetaminophen  "(TYLENOL) 325 MG tablet Take 2 tablets (650 mg) by mouth once as needed for mild pain (to moderate pain)   hydrALAZINE (APRESOLINE) 25 MG tablet Take 1 tablet (25 mg) by mouth 3 times daily as needed For systolic BP >170   NIFEdipine ER osmotic (PROCARDIA XL) 30 MG 24 hr tablet Take 1 tablet (30 mg) by mouth 2 times daily   cloNIDine (CATAPRES) 0.1 MG tablet Take 1 tablet (0.1 mg) by mouth 2 times daily as needed For systolic BP >180   cefpodoxime (VANTIN) 100 MG tablet Take 1 tablet (100 mg) by mouth 2 times daily   vitamin D (ERGOCALCIFEROL) 88130 UNIT capsule Take 1 capsule (50,000 Units) by mouth once a week   atorvastatin (LIPITOR) 20 MG tablet Take 1 tablet (20 mg) by mouth daily   carvedilol (COREG) 6.25 MG tablet Take 2 tablets (12.5 mg) by mouth 2 times daily (with meals)   cholecalciferol (VITAMIN  -D) 1000 UNITS capsule Take 2 capsules (2,000 Units) by mouth daily   pantoprazole (PROTONIX) 40 MG EC tablet Take 1 tablet (40 mg) by mouth daily   folic acid (FOLVITE) 1 MG tablet Take 1 tablet (1 mg) by mouth daily   darbepoetin bucky (ARANESP, ALBUMIN FREE,) 300 MCG/0.6ML injection Inject 0.6 mLs (300 mcg) Subcutaneous every 28 days As needed for hgb<10g/dL   CELLCEPT 250 MG PO CAPSULE Take 2 capsules (500 mg) by mouth 2 times daily   hydrocortisone (CORTAID) 1 % cream Apply topically 2 times daily     No current facility-administered medications on file prior to visit.       Vitals:  /80  Pulse 103  Temp 98.2  F (36.8  C) (Oral)  Ht 1.575 m (5' 2\")  Wt 99.5 kg (219 lb 6.4 oz)  SpO2 99%  BMI 40.13 kg/m2  Heart rate normalized during the visit  Exam:   GENERAL APPEARANCE: alert and no distress  HENT: mouth without ulcers or lesions  LYMPHATICS: no cervical or supraclavicular nodes  RESP: lungs clear to auscultation - no rales, rhonchi or wheezes  CV: regular rhythm, normal rate, no rub, no murmur  EDEMA: no LE edema bilaterally  ABDOMEN: soft, nondistended, nontender, bowel sounds normal  MS: " extremities normal - no gross deformities noted, no evidence of inflammation in joints, no muscle tenderness  SKIN: no rash  ACCESS: right arm fistula with goo bruit and some thrill     Results:   Reviewed     Tyshawn Sexton MD

## 2017-11-09 DIAGNOSIS — T82.590A MALFUNCTION OF ARTERIOVENOUS DIALYSIS FISTULA (H): ICD-10-CM

## 2017-11-09 DIAGNOSIS — Z99.2 ESRD (END STAGE RENAL DISEASE) ON DIALYSIS (H): Primary | ICD-10-CM

## 2017-11-09 DIAGNOSIS — N18.6 ESRD (END STAGE RENAL DISEASE) ON DIALYSIS (H): Primary | ICD-10-CM

## 2017-11-13 ENCOUNTER — TELEPHONE (OUTPATIENT)
Dept: VASCULAR SURGERY | Facility: CLINIC | Age: 62
End: 2017-11-13

## 2017-11-13 NOTE — TELEPHONE ENCOUNTER
Per task, pt needs to schedule surgery with Dr. Bond. LM on cell number that roommate/Gomez gave [phone] asking pt to call back for scheduling. Will try again later

## 2017-11-15 ENCOUNTER — TELEPHONE (OUTPATIENT)
Dept: PHARMACY | Facility: CLINIC | Age: 62
End: 2017-11-15

## 2017-11-15 NOTE — TELEPHONE ENCOUNTER
Follow-up with anemia management service:    I spoke to Eleni, reminding her that she is due for Hgb lab and possibly an aranesp dose.  She has another appt on  so she will get her labs drawn then.  Sent message to schedulers     Anemia Latest Ref Rng & Units 2017 2017 2017 2017 10/4/2017 10/23/2017 2017   HGB Goal - - - - - - - -   VALARIE Dose - - - - - - - 300 mcg   Hemoglobin 11.7 - 15.7 g/dL 9.6(L) 10.1(L) 9.5(L) 10.2(L) 10.5(L) 9.8(L) -   TSAT 15 - 46 % - - - - 51(H) - -   Ferritin 8 - 252 ng/mL - - - - 838(H) - -   PRBCs - - - - - - - -       Orders needed to be renewed (for next follow-up date) in EPIC: None                          Med order expires: 2018                          Lab orders : 2018    Follow-up call date: 17    [facility] [[email or web address]]    Anemia Management Service  Yvette Goodwin PharmD and Preethi Akers CPhT  Phone: [phone]  Fax: [phone]

## 2017-11-16 ENCOUNTER — OFFICE VISIT (OUTPATIENT)
Dept: INTERNAL MEDICINE | Facility: CLINIC | Age: 62
End: 2017-11-16

## 2017-11-16 VITALS
BODY MASS INDEX: 39.93 KG/M2 | SYSTOLIC BLOOD PRESSURE: 119 MMHG | WEIGHT: 218.3 LBS | DIASTOLIC BLOOD PRESSURE: 81 MMHG | RESPIRATION RATE: 20 BRPM | HEART RATE: 90 BPM

## 2017-11-16 DIAGNOSIS — R06.01 ORTHOPNEA: ICD-10-CM

## 2017-11-16 DIAGNOSIS — R68.2 DRY MOUTH: ICD-10-CM

## 2017-11-16 DIAGNOSIS — N18.30 ANEMIA IN STAGE 3 CHRONIC KIDNEY DISEASE (H): ICD-10-CM

## 2017-11-16 DIAGNOSIS — N18.30 CHRONIC KIDNEY DISEASE, STAGE III (MODERATE) (H): ICD-10-CM

## 2017-11-16 DIAGNOSIS — I27.20 PULMONARY HYPERTENSION (H): ICD-10-CM

## 2017-11-16 DIAGNOSIS — G47.00 INSOMNIA, UNSPECIFIED TYPE: ICD-10-CM

## 2017-11-16 DIAGNOSIS — D63.1 ANEMIA IN STAGE 3 CHRONIC KIDNEY DISEASE (H): ICD-10-CM

## 2017-11-16 DIAGNOSIS — G47.33 OSA (OBSTRUCTIVE SLEEP APNEA): ICD-10-CM

## 2017-11-16 DIAGNOSIS — H53.8 BLURRED VISION: Primary | ICD-10-CM

## 2017-11-16 LAB
HCT VFR BLD AUTO: 32.2 % (ref 35–47)
HGB BLD-MCNC: 9.4 G/DL (ref 11.7–15.7)

## 2017-11-16 RX ORDER — LANOLIN ALCOHOL/MO/W.PET/CERES
3 CREAM (GRAM) TOPICAL
Qty: 60 TABLET | Refills: 3 | Status: CANCELLED | OUTPATIENT
Start: 2017-11-16

## 2017-11-16 ASSESSMENT — ENCOUNTER SYMPTOMS
SORE THROAT: 0
DYSPNEA ON EXERTION: 1
DYSURIA: 0
NIGHT SWEATS: 0
DOUBLE VISION: 1
JOINT SWELLING: 1
EYE REDNESS: 1
EYE WATERING: 0
SYNCOPE: 0
SNORES LOUDLY: 1
MYALGIAS: 0
INSOMNIA: 1
HYPERTENSION: 1
FEVER: 0
DEPRESSION: 0
BACK PAIN: 0
DECREASED APPETITE: 0
EXERCISE INTOLERANCE: 1
EYE IRRITATION: 1
CHILLS: 0
COUGH: 0
SHORTNESS OF BREATH: 0

## 2017-11-16 ASSESSMENT — PAIN SCALES - GENERAL: PAINLEVEL: NO PAIN (0)

## 2017-11-16 NOTE — MR AVS SNAPSHOT
After Visit Summary   11/16/2017    Eleni Logan    MRN: [id number]           Patient Information     Date Of Birth          1955        Visit Information        Provider Department      11/16/2017 1:00 PM Preethi Gibson MD [facility]        Today's Diagnoses     Blurred vision    -  1    Orthopnea        Insomnia, unspecified type        RIZWAN (obstructive sleep apnea)        Dry mouth        Pulmonary hypertension          Care Instructions    Charlie Knowles,  Nice to meet you today!  1) Schedule an ECHO of your heart  2) Schedule your eye doctor appointment  3) Schedule an appointment with sleep medicine; this is important to find a better mask for your sleep apnea. This will help you sleep better and also lose weight.  4) Try the artifical saliva spray and see how it goes  5) See you in 3-4months    Sincerely,  Dr. Preethi Gibson  (My clinics are on Thursdays with Dr. Saul usually there in the afternoon)        Primary Care Center Phone Number [phone]  Primary Care Center Medication Refill Request Information:  * Please contact your pharmacy regarding ANY request for medication refills.  ** [facility] Prescription Fax = [phone]  * Please allow 3 business days for routine medication refills.  * Please allow 5 business days for controlled substance medication refills.     Primary Care Center Test Result notification information:  *You will be notified with in 7-10 days of your appointment day regarding the results of your test.  If you are on MyChart you will be notified as soon as the provider has reviewed the results and signed off on them.                   Follow-ups after your visit        Additional Services     OPHTHALMOLOGY ADULT REFERRAL       Your provider has referred you to: [facility]: Eye Clinic [facility] [phone]   [email or web address]    Please be aware that coverage of these services is subject to the terms and limitations of your  health insurance plan.  Call member services at your health plan with any benefit or coverage questions.      Please bring the following with you to your appointment:    (1) Any X-Rays, CTs or MRIs which have been performed.  Contact the facility where they were done to arrange for  prior to your scheduled appointment.    (2) List of current medications  (3) This referral request   (4) Any documents/labs given to you for this referral            SLEEP EVALUATION & MANAGEMENT REFERRAL - [facility]  [phone] (Age 2 and up)       Please be aware that coverage of these services is subject to the terms and limitations of your health insurance plan.  Call member services at your health plan with any benefit or coverage questions.      Please bring the following to your appointment:    >>   List of current medications   >>   This referral request   >>   Any documents/labs given to you for this referral                      Follow-up notes from your care team     Return in about 3 months (around 2/16/2018).      Your next 10 appointments already scheduled     Nov 20, 2017  1:20 PM CST   (Arrive by 1:05 PM)   NEW GENERAL with Moris Polanco OD   [facility] Ophthalmology ([facility])    [address]  [address] [facility] [address] [phone]            Nov 21, 2017   Procedure with Brandy Bond MD   [facility] [place], Same Day Surgery (--)    [address] [address]   [phone]            Dec 04, 2017  1:00 PM CST   Ech Complete with UCECHCR2   [facility] Echo ([facility])    [address]  [address] [facility] [address] [phone]           1. Please bring or wear a comfortable two-piece outfit. 2. You may eat, drink and take your normal medicines. 3. For any questions that cannot be answered, please contact the ordering physician            Dec 06, 2017  1:00 PM  CST   New Sleep Patient with Frankie Bull MD   [facility] [place] [facility], Sleep Study ([facility])    [address] [place] [id number] [phone]            Feb 19, 2018  2:50 PM CST   (Arrive by 2:20 PM)   Return Kidney Transplant with Tyshawn Sexton MD   [facility] Nephrology ([facility])    [address]  3rd Floor  [facility] [address] [phone]            Feb 22, 2018  1:00 PM CST   (Arrive by 12:45 PM)   Return Visit with Preethi Gibson MD   [facility] ([facility])    [address]  [address] [facility] [address] [phone]              Future tests that were ordered for you today     Open Future Orders        Priority Expected Expires Ordered    SLEEP EVALUATION & MANAGEMENT REFERRAL - [facility]  [phone] (Age 2 and up) Routine  11/16/2018 11/16/2017    Echocardiogram Routine  11/16/2018 11/16/2017            Who to contact     Please call your clinic at [phone] to:    Ask questions about your health    Make or cancel appointments    Discuss your medicines    Learn about your test results    Speak to your doctor   If you have compliments or concerns about an experience at your clinic, or if you wish to file a complaint, please contact [facility] Patient Relations at [phone] or email us at [email or web address]         Additional Information About Your Visit        Ravgenhart Information     Ning is an electronic gateway that provides easy, online access to your medical records. With Ning, you can request a clinic appointment, read your test results, renew a prescription or communicate with your care team.     To sign up for Ning visit the website at [email or web address]   You will be asked to enter the access code listed below, as  well as some personal information. Please follow the directions to create your username and password.     Your access code is: [email or web address]  Expires: 2017  8:42 AM     Your access code will  in 90 days. If you need help or a new code, please contact your [facility] or call [phone] for assistance.        Care EveryWhere ID     This is your Care EveryWhere ID. This could be used by other organizations to access your [facility] medical records  [id number]        Your Vitals Were     Pulse Respirations Breastfeeding? BMI (Body Mass Index)          90 20 No 39.93 kg/m2         Blood Pressure from Last 3 Encounters:   17 119/81   17 139/80   17 148/85    Weight from Last 3 Encounters:   17 99 kg (218 lb 4.8 oz)   17 99.5 kg (219 lb 6.4 oz)   17 99.3 kg (218 lb 14.7 oz)              We Performed the Following     OPHTHALMOLOGY ADULT REFERRAL          Today's Medication Changes          These changes are accurate as of: 17  2:02 PM.  If you have any questions, ask your nurse or doctor.               Start taking these medicines.        Dose/Directions    artificial saliva Aers spray   Used for:  Dry mouth   Started by:  Preethi Gibson MD        Dose:  1 spray   Take 1 spray by mouth every 6 hours as needed for dry mouth   Quantity:  1 Bottle   Refills:  3            Where to get your medicines      These medications were sent to [facility] [address]   [address] [facility] [address]    Hours:  TRANSPLANT PHONE NUMBER [phone] Phone:  [phone]     artificial saliva Aers spray                Primary Care Provider Office Phone # Fax #    Preethi Gibson [phone]       [facility] [address] [address]  [facility] [address]        Equal Access to Services     GERBER DAVENPORT AH: nathaniel Black  flavio cinthyacristomartell trishaclare palm. So [facility] [phone].    ATENCIÓN: Si jeff sales, tiene a stoner disposición servicios gratuitos de asistencia lingüística. Celine al [phone].    We comply with applicable federal civil rights laws and [place] laws. We do not discriminate on the basis of race, color, national origin, age, disability, sex, sexual orientation, or gender identity.            Thank you!     Thank you for choosing [facility]  for your care. Our goal is always to provide you with excellent care. Hearing back from our patients is one way we can continue to improve our services. Please take a few minutes to complete the written survey that you may receive in the mail after your visit with us. Thank you!             Your Updated Medication List - Protect others around you: Learn how to safely use, store and throw away your medicines at www.disposemymeds.org.          This list is accurate as of: 11/16/17  2:02 PM.  Always use your most recent med list.                   Brand Name Dispense Instructions for use Diagnosis    amoxicillin-clavulanate 500-125 MG per tablet    AUGMENTIN    20 tablet    Take 1 tablet by mouth 2 times daily    Kidney replaced by transplant       artificial saliva Aers spray     1 Bottle    Take 1 spray by mouth every 6 hours as needed for dry mouth    Dry mouth       atorvastatin 20 MG tablet    LIPITOR    90 tablet    Take 1 tablet (20 mg) by mouth daily    Hypercholesteremia       carvedilol 6.25 MG tablet    COREG    360 tablet    Take 2 tablets (12.5 mg) by mouth 2 times daily (with meals)    Hypertension secondary to other renal disorders       cholecalciferol 1000 UNITS capsule    vitamin  -D    180 capsule    Take 2 capsules (2,000 Units) by mouth daily    Vitamin D deficiency       cloNIDine 0.1 MG tablet    CATAPRES    60 tablet    Take 1 tablet (0.1 mg) by mouth 2 times daily as needed For systolic BP  >180    HTN (hypertension)       darbepoetin bucky 300 MCG/0.6ML injection    ARANESP (ALBUMIN FREE)    0.6 mL    Inject 0.6 mLs (300 mcg) Subcutaneous every 28 days As needed for hgb<10g/dL    Chronic kidney disease, stage III (moderate), Anemia in stage 3 chronic kidney disease       folic acid 1 MG tablet    FOLVITE    30 tablet    Take 1 tablet (1 mg) by mouth daily    Preventive measure       hydrALAZINE 25 MG tablet    APRESOLINE    90 tablet    Take 1 tablet (25 mg) by mouth 3 times daily as needed For systolic BP >170    HTN (hypertension)       hydrocortisone 1 % cream    CORTAID    60 g    Apply topically 2 times daily    Rash, skin       mycophenolate 250 MG capsule     120 capsule    Take 2 capsules (500 mg) by mouth 2 times daily    Kidney transplanted       NIFEdipine ER osmotic 30 MG 24 hr tablet    PROCARDIA XL    180 tablet    Take 1 tablet (30 mg) by mouth 2 times daily    HTN (hypertension)       pantoprazole 40 MG EC tablet    PROTONIX    30 tablet    Take 1 tablet (40 mg) by mouth daily    Gastroesophageal reflux disease with esophagitis       predniSONE 5 MG tablet    DELTASONE    30 tablet    Take 1 tablet (5 mg) by mouth daily    Kidney transplanted       sodium bicarbonate 650 MG tablet     180 tablet    Take 1 tablet (650 mg) by mouth 2 times daily    Acidosis       vitamin D 35398 UNIT capsule    ERGOCALCIFEROL    12 capsule    Take 1 capsule (50,000 Units) by mouth once a week    Vitamin D deficiency

## 2017-11-16 NOTE — PATIENT INSTRUCTIONS
Charlie Knowles,  Nice to meet you today!  1) Schedule an ECHO of your heart  2) Schedule your eye doctor appointment  3) Schedule an appointment with sleep medicine; this is important to find a better mask for your sleep apnea. This will help you sleep better and also lose weight.  4) Try the artifical saliva spray and see how it goes  5) See you in 3-4months    Sincerely,  Dr. Preethi Gibson  (My clinics are on Thursdays with Dr. Saul usually there in the afternoon)        Primary Care Center Phone Number [phone]  Primary Care Center Medication Refill Request Information:  * Please contact your pharmacy regarding ANY request for medication refills.  ** [facility] Prescription Fax = [phone]  * Please allow 3 business days for routine medication refills.  * Please allow 5 business days for controlled substance medication refills.     Primary Care Center Test Result notification information:  *You will be notified with in 7-10 days of your appointment day regarding the results of your test.  If you are on MyChart you will be notified as soon as the provider has reviewed the results and signed off on them.

## 2017-11-16 NOTE — NURSING NOTE
Chief Complaint   Patient presents with     Establish Care     pt is here to re establish care with new PCP       Leisa Schwab CMA at 12:49 PM on 11/16/2017

## 2017-11-16 NOTE — PROGRESS NOTES
"  [facility]         HPI:       Eleni Logan is a 61 year old female who presents for the following  Patient presents with: Establish Care (pt is here to re establish care with new PCP)    Patient Active Problem List   Diagnosis     Chronic rhinitis     Essential hypertension, benign     S/P kidney transplant     Rash     Asthma exacerbation     UTI (urinary tract infection)     Anemia     Chest pain     Urinary tract infection     Localized pustular psoriasis     CKD (chronic kidney disease) stage 4, GFR 15-29 ml/min (H)     Immunosuppression (H)     Fistula     End-stage renal disease (ESRD) (H)       1.  History of Kidney transplant in 1999 for IgA Nephropathy. Follows with Dr. Sexton, last seen on 11/6.  Allograft function per Renal: her serum creatinine is stable to slightly worse now at 2.5 mg/dL. She is hemodialysis access on right arm now.    2. Immunosuppression management: Per renal, \"She is currently on an immunosuppressive regimen that consists of prednisone 5 mg daily in addition to CellCept 500 mg b.i.d.  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 Her kidney function has been anywhere from 1.8-2.2 mg/dL but now slightly worse. She has modest proteinuria.\"      3.  Recurrent urinary tract infections: Prescribed Augmentin 10day course (500-125mg BID) by Nephrology on 11/6, she is on her last day for this.     4.  Anemia of chronic kidney disease.  She continues to require Aranesp therapy.    5. Obesity: Per renal, she will need to have weight loss if she is interested in repeat kidney transplant. However, pt has met with weight management team and did not feel like she benefited from their services.     6. Hypertension: Well-controlled today in clinic. On Clonidine 0.1, Nifedipine 30mg ER, hydralazine 25, Coreg 6.25 BID, per " "renal.     7. Sleep Apnea: Has been prescribed a sleep apnea mask, but doesn't wear mask .     8. Insomnia: Has been waking every hour at night for years. Her brain won't shut down and sleep. If she drinks wine, she will sleep for 3 hours at a time. If she doesn't drink anything, then every hour awakening. She has used sleeping pills years ago.     9. Shortness of breath, inability to lie flat. States that she has needed to use 2-3 pillows to sleep. She sleeps sitting up, because when she lies flat, she can't \"breathe good.\"  Last ECHO was 10/1/2016. Showed normal EF with moderate pulmonary htn.      10. Dyspnea on exertion: She says that she is not exercising as much as she should. She will try to join the [email or web address].     11. Both eyes sometimes blurry and sometimes double vision. Wants eye doctor referral.     Problem, Medication and Allergy Lists were    Patient Active Problem List    Diagnosis Date Noted     Fistula 09/19/2017     Priority: Medium     End-stage renal disease (ESRD) (H) 09/19/2017     Priority: Medium     Immunosuppression (H) 04/02/2017     Priority: Medium     CKD (chronic kidney disease) stage 4, GFR 15-29 ml/min (H) 01/10/2017     Priority: Medium     Localized pustular psoriasis 10/06/2016     Priority: Medium     Chest pain 10/02/2016     Priority: Medium     Urinary tract infection 10/02/2016     Priority: Medium     Anemia 02/16/2016     Priority: Medium     UTI (urinary tract infection) 04/13/2015     Priority: Medium     Asthma exacerbation 09/02/2014     Priority: Medium     Rash 03/14/2013     Priority: Medium     Chronic rhinitis 05/18/2012     Priority: Medium     Essential hypertension, benign 05/18/2012     Priority: Medium     S/P kidney transplant 05/18/2012     Priority: Medium         Current Outpatient Prescriptions   Medication Sig Dispense Refill     artificial saliva (BIOTENE MT) AERS spray Take 1 spray by mouth every 6 hours as needed for dry mouth 1 Bottle 3     " amoxicillin-clavulanate (AUGMENTIN) 500-125 MG per tablet Take 1 tablet by mouth 2 times daily 20 tablet 0     predniSONE (DELTASONE) 5 MG tablet Take 1 tablet (5 mg) by mouth daily 30 tablet 11     sodium bicarbonate 650 MG tablet Take 1 tablet (650 mg) by mouth 2 times daily 180 tablet 3     hydrALAZINE (APRESOLINE) 25 MG tablet Take 1 tablet (25 mg) by mouth 3 times daily as needed For systolic BP >170 90 tablet 3     NIFEdipine ER osmotic (PROCARDIA XL) 30 MG 24 hr tablet Take 1 tablet (30 mg) by mouth 2 times daily 180 tablet 3     cloNIDine (CATAPRES) 0.1 MG tablet Take 1 tablet (0.1 mg) by mouth 2 times daily as needed For systolic BP >180 60 tablet 3     vitamin D (ERGOCALCIFEROL) 36078 UNIT capsule Take 1 capsule (50,000 Units) by mouth once a week 12 capsule 0     atorvastatin (LIPITOR) 20 MG tablet Take 1 tablet (20 mg) by mouth daily 90 tablet 0     carvedilol (COREG) 6.25 MG tablet Take 2 tablets (12.5 mg) by mouth 2 times daily (with meals) 360 tablet 1     cholecalciferol (VITAMIN  -D) 1000 UNITS capsule Take 2 capsules (2,000 Units) by mouth daily 180 capsule 3     pantoprazole (PROTONIX) 40 MG EC tablet Take 1 tablet (40 mg) by mouth daily 30 tablet 11     folic acid (FOLVITE) 1 MG tablet Take 1 tablet (1 mg) by mouth daily 30 tablet 11     darbepoetin bucky (ARANESP, ALBUMIN FREE,) 300 MCG/0.6ML injection Inject 0.6 mLs (300 mcg) Subcutaneous every 28 days As needed for hgb<10g/dL 0.6 mL 11     CELLCEPT 250 MG PO CAPSULE Take 2 capsules (500 mg) by mouth 2 times daily 120 capsule 11     hydrocortisone (CORTAID) 1 % cream Apply topically 2 times daily 60 g 1         Allergies   Allergen Reactions     Ciprofloxacin Palpitations     Patient is   a new patient to this clinic and so  I reviewed/updated the Past Medical History, the Family History and the Social History. ,   Past Medical History:   Diagnosis Date     Anatomical narrow angle      Anemia      Gout      HTN (hypertension)       Hyperlipidaemia      IgA nephropathy      Immunosuppressed status (H)     Prednisone and cellcept     Nonsenile cataract      Obstructive sleep apnea    ,   Family History     Problem (# of Occurrences) Relation (Name,Age of Onset)    KIDNEY DISEASE (1) Mother       Negative family history of: Macular Degeneration, Eye Surgery, Glaucoma, DIABETES, Hypertension, Amblyopia, Skin Cancer, Melanoma       and   Social History     Social History     Marital status:      Spouse name: N/A     Number of children: N/A     Years of education: N/A     Social History Main Topics     Smoking status: Never Smoker     Smokeless tobacco: Never Used      Comment: Has been around second hand smoke     Alcohol use Yes      Comment: Once in a great while     Drug use: No     Sexual activity: Not Currently     Other Topics Concern     None     Social History Narrative            Review of Systems:   Review of Systems     Constitutional:  Negative for fever, chills, decreased appetite and night sweats.   HENT:  Negative for hearing loss, nosebleeds, sore throat and bleeding gums.    Eyes:  Positive for double vision, redness, eye dryness, flashing lights and eye irritation. Negative for decreased vision and eye watering.   Respiratory:   Positive for snores loudly and dyspnea on exertion. Negative for cough and shortness of breath.    Cardiovascular:  Positive for dyspnea on exertion, hypertension and exercise intolerance. Negative for chest pain, syncope and edema.   Genitourinary:  Negative for dysuria.   Musculoskeletal:  Positive for joint swelling. Negative for myalgias and back pain.   Skin:  Negative for itching and rash.   Psychiatric/Behavioral:  Negative for depression.      I have personally reviewed and updated the complete ROS on the day of the visit.           Physical Exam:   /81  Pulse 90  Resp 20  Wt 99 kg (218 lb 4.8 oz)  Breastfeeding? No  BMI 39.93 kg/m2  Body mass index is 39.93 kg/(m^2).  Vitals  were reviewed       GENERAL APPEARANCE: alert and no distress     EYES: EOMI, PERRL     HENT: ear canals and TM's normal and nose and mouth without ulcers or lesions     NECK: no adenopathy, no asymmetry, masses, or scars and thyroid normal to palpation     RESP: lungs clear to auscultation - no rales, rhonchi or wheezes     CV: regular rates and rhythm, normal S1 S2, no S3 or S4 and no murmur, click or rub     ABDOMEN:  soft, nontender, no HSM or masses and bowel sounds normal     MS: extremities normal- no gross deformities noted, no evidence of inflammation in joints, FROM in all extremities. No lower extremity edema.      SKIN: no suspicious lesions or rashes     NEURO: Normal strength and tone, sensory exam grossly normal, mentation intact and speech normal     PSYCH: mentation appears normal. and affect normal/bright     LYMPHATICS: No axillary, cervical, or supraclavicular nodes        Results:     Hgb 9.4    Assessment and Plan     Eleni is a 62yo  female w/ history of kidney transplant (1999) for IgA Nephropathy c/b Stage 4 CKD s/p fistula on right arm, HTN, moderate pulmonary hypertension, and obesity, who presents to establish care.     History of Kidney transplant in 1999 for IgA Nephropathy: Follows with Dr. Sexton of nephrology. Continue immunosuppression, per renal.  Has Stage 4 CKD now, s/p HD access in right arm. Pt is encouraged to lose weight if she is interested in candidacy for 2nd kidney transplant.     Blurred vision: History of narrow angle, though no formal diagnosis of glaucoma.   -     OPHTHALMOLOGY ADULT REFERRAL    Orthopnea:   Pulmonary hypertension  Longstanding history of sleeping with 2-3 pillows, however, given ongoing dyspnea on exertion, will assess heart function.  No overt chest pain, sudden onset edema, or other acute heart failure symptoms during this visit. Has had history of moderate pulmonary hypertension on last ECHO.   -     Echocardiogram; Future    Hypertension:  Well-controlled today in clinic. On Clonidine 0.1, Nifedipine 30mg ER, hydralazine 25, Coreg 6.25 BID, per renal.     Anemia of chronic kidney disease.  She continues to require Aranesp therapy.    Insomnia, unspecified type:  RIZWAN (obstructive sleep apnea)   Endorses multiple awakenings throughout night in the setting of non-compliance with sleep apnea mask (feels like she is being suffocated). Will refer to sleep evaluation for re-assessment and optimal mask fitting.   -     SLEEP EVALUATION & MANAGEMENT REFERRAL - ADULT -[facility] / [facility]  [phone] (Age 2 and up); Future    Dry mouth  -     artificial saliva (BIOTENE MT) AERS spray; Take 1 spray by mouth every 6 hours as needed for dry mouth    Options for treatment and follow-up care were reviewed with the patient. Eleni Logan engaged in the decision making process and verbalized understanding of the options discussed and agreed with the final plan.    Preethi Gibson MD  Nov 16, 2017    Pt was seen and plan of care discussed with Dr. Saul.         I was present during the visit and the patient was seen and examined by me in conjunction with the resident.  I discussed the pertinent history, exam, results and plan with the resident and agree with the documentation above with the following exceptions: none.    Rocio Saul MD

## 2017-11-16 NOTE — PROGRESS NOTES
Rooming Note  Patient is up to date with all health maintenance items    Leisa Schwab CMA at 12:50 PM on 11/16/2017

## 2017-11-17 ENCOUNTER — TELEPHONE (OUTPATIENT)
Dept: PHARMACY | Facility: CLINIC | Age: 62
End: 2017-11-17

## 2017-11-17 NOTE — TELEPHONE ENCOUNTER
Per task,pt needs to schedule surgery with Dr. Bond. Talked with pt and offered her 11/21 at 220pm. Pt ok with that. Will call if anything changes

## 2017-11-20 ENCOUNTER — ANESTHESIA EVENT (OUTPATIENT)
Dept: SURGERY | Facility: CLINIC | Age: 62
End: 2017-11-20
Payer: MEDICARE

## 2017-11-20 ENCOUNTER — CARE COORDINATION (OUTPATIENT)
Dept: NEPHROLOGY | Facility: CLINIC | Age: 62
End: 2017-11-20

## 2017-11-20 NOTE — PROGRESS NOTES
Reason for Call    Spoke with patient's . Said she's been doing well since last appointment, no new symptoms or concerns. Scheduled for fistula revision on 11/21. Will follow up after procedure.    Plan    1. Complete fistula revision 11/21  2. Follow up after procedure    Patient was given an opportunity to ask questions and have those questions answered to her satisfaction.  Patient verbalized understanding of instructions provided and agreed to plan of care.    Brittney Quinones RN

## 2017-11-21 ENCOUNTER — ANESTHESIA (OUTPATIENT)
Dept: SURGERY | Facility: CLINIC | Age: 62
End: 2017-11-21
Payer: MEDICARE

## 2017-11-21 ENCOUNTER — HOSPITAL ENCOUNTER (OUTPATIENT)
Facility: CLINIC | Age: 62
Discharge: HOME | End: 2017-11-21
Attending: SURGERY | Admitting: SURGERY
Payer: MEDICARE

## 2017-11-21 VITALS
RESPIRATION RATE: 22 BRPM | OXYGEN SATURATION: 97 % | SYSTOLIC BLOOD PRESSURE: 145 MMHG | HEIGHT: 62 IN | WEIGHT: 215.39 LBS | TEMPERATURE: 97.4 F | BODY MASS INDEX: 39.64 KG/M2 | DIASTOLIC BLOOD PRESSURE: 83 MMHG

## 2017-11-21 DIAGNOSIS — G89.18 POST-OP PAIN: Primary | ICD-10-CM

## 2017-11-21 LAB
BASOPHILS # BLD AUTO: 0 10E9/L (ref 0–0.2)
BASOPHILS NFR BLD AUTO: 0.4 %
CREAT SERPL-MCNC: 2.2 MG/DL (ref 0.52–1.04)
DIFFERENTIAL METHOD BLD: ABNORMAL
EOSINOPHIL # BLD AUTO: 0.1 10E9/L (ref 0–0.7)
EOSINOPHIL NFR BLD AUTO: 1.3 %
ERYTHROCYTE [DISTWIDTH] IN BLOOD BY AUTOMATED COUNT: 16.3 % (ref 10–15)
GFR SERPL CREATININE-BSD FRML MDRD: 22 ML/MIN/1.7M2
GLUCOSE BLDC GLUCOMTR-MCNC: 133 MG/DL (ref 70–99)
HBA1C MFR BLD: 5.6 % (ref 4.3–6)
HCT VFR BLD AUTO: 31.1 % (ref 35–47)
HGB BLD-MCNC: 9.6 G/DL (ref 11.7–15.7)
IMM GRANULOCYTES # BLD: 0 10E9/L (ref 0–0.4)
IMM GRANULOCYTES NFR BLD: 0.3 %
INR PPP: 0.86 (ref 0.86–1.14)
LYMPHOCYTES # BLD AUTO: 1 10E9/L (ref 0.8–5.3)
LYMPHOCYTES NFR BLD AUTO: 13.9 %
MCH RBC QN AUTO: 24.7 PG (ref 26.5–33)
MCHC RBC AUTO-ENTMCNC: 30.9 G/DL (ref 31.5–36.5)
MCV RBC AUTO: 80 FL (ref 78–100)
MONOCYTES # BLD AUTO: 0.3 10E9/L (ref 0–1.3)
MONOCYTES NFR BLD AUTO: 4.5 %
NEUTROPHILS # BLD AUTO: 5.5 10E9/L (ref 1.6–8.3)
NEUTROPHILS NFR BLD AUTO: 79.6 %
NRBC # BLD AUTO: 0 10*3/UL
NRBC BLD AUTO-RTO: 0 /100
PLATELET # BLD AUTO: 270 10E9/L (ref 150–450)
POTASSIUM SERPL-SCNC: 5 MMOL/L (ref 3.4–5.3)
RBC # BLD AUTO: 3.89 10E12/L (ref 3.8–5.2)
WBC # BLD AUTO: 6.9 10E9/L (ref 4–11)

## 2017-11-21 PROCEDURE — 25000128 H RX IP 250 OP 636: Performed by: NURSE ANESTHETIST, CERTIFIED REGISTERED

## 2017-11-21 PROCEDURE — 37000009 ZZH ANESTHESIA TECHNICAL FEE, EACH ADDTL 15 MIN: Performed by: SURGERY

## 2017-11-21 PROCEDURE — 85025 COMPLETE CBC W/AUTO DIFF WBC: CPT | Performed by: CLINICAL NURSE SPECIALIST

## 2017-11-21 PROCEDURE — 25000125 ZZHC RX 250: Performed by: STUDENT IN AN ORGANIZED HEALTH CARE EDUCATION/TRAINING PROGRAM

## 2017-11-21 PROCEDURE — 25000128 H RX IP 250 OP 636: Performed by: STUDENT IN AN ORGANIZED HEALTH CARE EDUCATION/TRAINING PROGRAM

## 2017-11-21 PROCEDURE — 82962 GLUCOSE BLOOD TEST: CPT

## 2017-11-21 PROCEDURE — 84132 ASSAY OF SERUM POTASSIUM: CPT | Performed by: CLINICAL NURSE SPECIALIST

## 2017-11-21 PROCEDURE — 36415 COLL VENOUS BLD VENIPUNCTURE: CPT | Performed by: CLINICAL NURSE SPECIALIST

## 2017-11-21 PROCEDURE — S0020 INJECTION, BUPIVICAINE HYDRO: HCPCS | Performed by: STUDENT IN AN ORGANIZED HEALTH CARE EDUCATION/TRAINING PROGRAM

## 2017-11-21 PROCEDURE — 25000128 H RX IP 250 OP 636: Performed by: SURGERY

## 2017-11-21 PROCEDURE — 85610 PROTHROMBIN TIME: CPT | Performed by: CLINICAL NURSE SPECIALIST

## 2017-11-21 PROCEDURE — 36000057 ZZH SURGERY LEVEL 3 1ST 30 MIN - UMMC: Performed by: SURGERY

## 2017-11-21 PROCEDURE — 40000170 ZZH STATISTIC PRE-PROCEDURE ASSESSMENT II: Performed by: SURGERY

## 2017-11-21 PROCEDURE — 25000128 H RX IP 250 OP 636: Performed by: CLINICAL NURSE SPECIALIST

## 2017-11-21 PROCEDURE — 25000125 ZZHC RX 250: Performed by: NURSE ANESTHETIST, CERTIFIED REGISTERED

## 2017-11-21 PROCEDURE — 25000132 ZZH RX MED GY IP 250 OP 250 PS 637: Mod: GY | Performed by: STUDENT IN AN ORGANIZED HEALTH CARE EDUCATION/TRAINING PROGRAM

## 2017-11-21 PROCEDURE — 36000059 ZZH SURGERY LEVEL 3 EA 15 ADDTL MIN UMMC: Performed by: SURGERY

## 2017-11-21 PROCEDURE — 37000008 ZZH ANESTHESIA TECHNICAL FEE, 1ST 30 MIN: Performed by: SURGERY

## 2017-11-21 PROCEDURE — 82565 ASSAY OF CREATININE: CPT | Performed by: CLINICAL NURSE SPECIALIST

## 2017-11-21 PROCEDURE — 71000027 ZZH RECOVERY PHASE 2 EACH 15 MINS: Performed by: SURGERY

## 2017-11-21 PROCEDURE — 83036 HEMOGLOBIN GLYCOSYLATED A1C: CPT | Performed by: CLINICAL NURSE SPECIALIST

## 2017-11-21 PROCEDURE — A9270 NON-COVERED ITEM OR SERVICE: HCPCS | Mod: GY | Performed by: STUDENT IN AN ORGANIZED HEALTH CARE EDUCATION/TRAINING PROGRAM

## 2017-11-21 PROCEDURE — 27210794 ZZH OR GENERAL SUPPLY STERILE: Performed by: SURGERY

## 2017-11-21 PROCEDURE — 25000125 ZZHC RX 250: Performed by: SURGERY

## 2017-11-21 RX ORDER — NALOXONE HYDROCHLORIDE 0.4 MG/ML
.1-.4 INJECTION, SOLUTION INTRAMUSCULAR; INTRAVENOUS; SUBCUTANEOUS
Status: DISCONTINUED | OUTPATIENT
Start: 2017-11-21 | End: 2017-11-21 | Stop reason: HOSPADM

## 2017-11-21 RX ORDER — OXYCODONE HYDROCHLORIDE 5 MG/1
5 TABLET ORAL
Status: DISCONTINUED | OUTPATIENT
Start: 2017-11-21 | End: 2017-11-21 | Stop reason: HOSPADM

## 2017-11-21 RX ORDER — ONDANSETRON 2 MG/ML
INJECTION INTRAMUSCULAR; INTRAVENOUS PRN
Status: DISCONTINUED | OUTPATIENT
Start: 2017-11-21 | End: 2017-11-21

## 2017-11-21 RX ORDER — ACETAMINOPHEN 325 MG/1
975 TABLET ORAL ONCE
Status: COMPLETED | OUTPATIENT
Start: 2017-11-21 | End: 2017-11-21

## 2017-11-21 RX ORDER — ONDANSETRON 4 MG/1
4 TABLET, ORALLY DISINTEGRATING ORAL EVERY 30 MIN PRN
Status: DISCONTINUED | OUTPATIENT
Start: 2017-11-21 | End: 2017-11-21 | Stop reason: HOSPADM

## 2017-11-21 RX ORDER — LIDOCAINE 40 MG/G
CREAM TOPICAL
Status: DISCONTINUED | OUTPATIENT
Start: 2017-11-21 | End: 2017-11-21 | Stop reason: HOSPADM

## 2017-11-21 RX ORDER — BUPIVACAINE HYDROCHLORIDE 5 MG/ML
INJECTION, SOLUTION EPIDURAL; INTRACAUDAL PRN
Status: DISCONTINUED | OUTPATIENT
Start: 2017-11-21 | End: 2017-11-21

## 2017-11-21 RX ORDER — SODIUM CHLORIDE, SODIUM LACTATE, POTASSIUM CHLORIDE, CALCIUM CHLORIDE 600; 310; 30; 20 MG/100ML; MG/100ML; MG/100ML; MG/100ML
INJECTION, SOLUTION INTRAVENOUS CONTINUOUS PRN
Status: DISCONTINUED | OUTPATIENT
Start: 2017-11-21 | End: 2017-11-21

## 2017-11-21 RX ORDER — FLUMAZENIL 0.1 MG/ML
0.2 INJECTION, SOLUTION INTRAVENOUS
Status: DISCONTINUED | OUTPATIENT
Start: 2017-11-21 | End: 2017-11-21 | Stop reason: HOSPADM

## 2017-11-21 RX ORDER — SODIUM CHLORIDE, SODIUM LACTATE, POTASSIUM CHLORIDE, CALCIUM CHLORIDE 600; 310; 30; 20 MG/100ML; MG/100ML; MG/100ML; MG/100ML
INJECTION, SOLUTION INTRAVENOUS CONTINUOUS
Status: DISCONTINUED | OUTPATIENT
Start: 2017-11-21 | End: 2017-11-21 | Stop reason: HOSPADM

## 2017-11-21 RX ORDER — OXYCODONE AND ACETAMINOPHEN 5; 325 MG/1; MG/1
1-2 TABLET ORAL EVERY 4 HOURS PRN
Qty: 30 TABLET | Refills: 0 | Status: SHIPPED | OUTPATIENT
Start: 2017-11-21 | End: 2017-11-28

## 2017-11-21 RX ORDER — FENTANYL CITRATE 50 UG/ML
25-50 INJECTION, SOLUTION INTRAMUSCULAR; INTRAVENOUS
Status: DISCONTINUED | OUTPATIENT
Start: 2017-11-21 | End: 2017-11-21 | Stop reason: HOSPADM

## 2017-11-21 RX ORDER — HEPARIN SODIUM 1000 [USP'U]/ML
INJECTION, SOLUTION INTRAVENOUS; SUBCUTANEOUS PRN
Status: DISCONTINUED | OUTPATIENT
Start: 2017-11-21 | End: 2017-11-21

## 2017-11-21 RX ORDER — LIDOCAINE HYDROCHLORIDE 20 MG/ML
INJECTION, SOLUTION INFILTRATION; PERINEURAL PRN
Status: DISCONTINUED | OUTPATIENT
Start: 2017-11-21 | End: 2017-11-21

## 2017-11-21 RX ORDER — ONDANSETRON 2 MG/ML
4 INJECTION INTRAMUSCULAR; INTRAVENOUS EVERY 30 MIN PRN
Status: DISCONTINUED | OUTPATIENT
Start: 2017-11-21 | End: 2017-11-21 | Stop reason: HOSPADM

## 2017-11-21 RX ORDER — GABAPENTIN 300 MG/1
300 CAPSULE ORAL ONCE
Status: COMPLETED | OUTPATIENT
Start: 2017-11-21 | End: 2017-11-21

## 2017-11-21 RX ORDER — CEFAZOLIN SODIUM 2 G/100ML
2 INJECTION, SOLUTION INTRAVENOUS
Status: COMPLETED | OUTPATIENT
Start: 2017-11-21 | End: 2017-11-21

## 2017-11-21 RX ORDER — PROPOFOL 10 MG/ML
INJECTION, EMULSION INTRAVENOUS CONTINUOUS PRN
Status: DISCONTINUED | OUTPATIENT
Start: 2017-11-21 | End: 2017-11-21

## 2017-11-21 RX ADMIN — PROPOFOL 30 MCG/KG/MIN: 10 INJECTION, EMULSION INTRAVENOUS at 16:51

## 2017-11-21 RX ADMIN — CEFAZOLIN SODIUM 1 G: 2 INJECTION, SOLUTION INTRAVENOUS at 19:06

## 2017-11-21 RX ADMIN — HEPARIN SODIUM 5000 UNITS: 1000 INJECTION, SOLUTION INTRAVENOUS; SUBCUTANEOUS at 18:54

## 2017-11-21 RX ADMIN — BUPIVACAINE HYDROCHLORIDE 30 ML: 5 INJECTION, SOLUTION EPIDURAL; INTRACAUDAL at 14:35

## 2017-11-21 RX ADMIN — MIDAZOLAM HYDROCHLORIDE 0.5 MG: 1 INJECTION, SOLUTION INTRAMUSCULAR; INTRAVENOUS at 16:59

## 2017-11-21 RX ADMIN — HYDROCORTISONE SODIUM SUCCINATE 50 MG: 100 INJECTION, POWDER, FOR SOLUTION INTRAMUSCULAR; INTRAVENOUS at 17:46

## 2017-11-21 RX ADMIN — GABAPENTIN 300 MG: 300 CAPSULE ORAL at 13:54

## 2017-11-21 RX ADMIN — MIDAZOLAM HYDROCHLORIDE 0.5 MG: 1 INJECTION, SOLUTION INTRAMUSCULAR; INTRAVENOUS at 16:51

## 2017-11-21 RX ADMIN — SODIUM CHLORIDE, POTASSIUM CHLORIDE, SODIUM LACTATE AND CALCIUM CHLORIDE: 600; 310; 30; 20 INJECTION, SOLUTION INTRAVENOUS at 16:44

## 2017-11-21 RX ADMIN — ACETAMINOPHEN 975 MG: 325 TABLET, FILM COATED ORAL at 13:54

## 2017-11-21 RX ADMIN — MIDAZOLAM 1 MG: 1 INJECTION INTRAMUSCULAR; INTRAVENOUS at 14:22

## 2017-11-21 RX ADMIN — LIDOCAINE HYDROCHLORIDE 40 MG: 20 INJECTION, SOLUTION INFILTRATION; PERINEURAL at 16:48

## 2017-11-21 RX ADMIN — ONDANSETRON 4 MG: 2 INJECTION INTRAMUSCULAR; INTRAVENOUS at 20:31

## 2017-11-21 RX ADMIN — CEFAZOLIN SODIUM 2 G: 2 INJECTION, SOLUTION INTRAVENOUS at 17:10

## 2017-11-21 NOTE — TELEPHONE ENCOUNTER
Anemia Management Note  SUBJECTIVE/OBJECTIVE:  Referred by Dr Tyshawn Sexton February 10, 2017  Primary Diagnosis: Anemia in Chronic Kidney Disease (N18.3 D63.1)   Secondary Diagnosis: Chronic Kidney Disease, Stage III (N18.3)   Hgb goal range: 9-10  Epo/Darbo: Aranesp 300 mcg every 14 days - At home   RX will  on 18  Iron regimen:  None  Lab orders  18 in EPIC     Anemia Latest Ref Rng & Units 2017 2017 2017 10/4/2017 10/23/2017 2017 2017   HGB Goal - - - - - - - -   VALARIE Dose - - - - - - 300 mcg -   Hemoglobin 11.7 - 15.7 g/dL 10.1(L) 9.5(L) 10.2(L) 10.5(L) 9.8(L) - 9.4(L)   TSAT 15 - 46 % - - - 51(H) - - -   Ferritin 8 - 252 ng/mL - - - 838(H) - - -   PRBCs - - - - - - - -     BP Readings from Last 3 Encounters:   17 119/81   17 139/80   17 148/85     Wt Readings from Last 2 Encounters:   17 218 lb 4.8 oz (99 kg)   17 219 lb 6.4 oz (99.5 kg)     ASSESSMENT:  Hgb:At goal - recommend dose  TSat: elevated at >50% Ferritin: At goal (>100ng/mL). Not on iron supplement.    PLAN:  Dose with aranesp and RTC for hgb then aranesp if needed in 2 week(s)    Orders needed to be renewed (for next follow-up date) in [facility]: None    Iron labs due:      Plan discussed with:  Gomez  Plan provided by:  Yvette    NEXT FOLLOW-UP DATE:      Anemia Management Service  Yvette Goodwin,GiniD and Preethi Akers CPhT  Phone: [phone]  Fax: [phone]

## 2017-11-21 NOTE — ANESTHESIA PREPROCEDURE EVALUATION
Anesthesia Evaluation     .             ROS/MED HX    ENT/Pulmonary:       Neurologic:       Cardiovascular:     (+) ----. : . . . :. . pulmonary hypertension, Previous cardiac testing date:results:Stress Testdate:10/2016 results:Interpretation Summary  Normal dobutamine stress echocardiogram without evidence of inducible  ischemia. Target heart rate was achieved. Heart rate and blood pressure  response to dobutamine were normal. With stress, the left ventricular  ejection fraction increased from 55-60% to greater than 65% and the left  ventricular size decreased appropriately. There was no ECG evidence of  ischemia.  There is mild to moderate (1-2+) tricuspid regurgitation.  Mild-moderate pulmonary hypertension is present. date: results: date: results:          METS/Exercise Tolerance:     Hematologic:         Musculoskeletal:         GI/Hepatic:         Renal/Genitourinary:     (+) chronic renal disease, type: ESRD, Pt requires dialysis,       Endo:         Psychiatric:         Infectious Disease:         Malignancy:         Other:                     Physical Exam  Normal systems: cardiovascular and pulmonary    Airway   Mallampati: III  TM distance: >3 FB  Neck ROM: limited    Dental   (+) chipped    Cardiovascular       Pulmonary     Other findings: Thick neck, large tongue                Anesthesia Plan      History & Physical Review  History and physical reviewed and following examination; no interval change.    ASA Status:  3 .    NPO Status:  > 8 hours    Plan for MAC with Intravenous induction. Reason for MAC:  Chronic cardiopulmonary disease (G9)  PONV prophylaxis:  Ondansetron (or other 5HT-3)  I have examined the patient and reviewed the medical record    Plan:  Mac with block, routine monitors    Boyd Phipps MD      Postoperative Care  Postoperative pain management:  IV analgesics.      Consents  Anesthetic plan, risks, benefits and alternatives discussed with:  Patient..                           .

## 2017-11-21 NOTE — ANESTHESIA PROCEDURE NOTES
Peripheral Nerve Block Procedure Note    Staff:     Anesthesiologist:  HECTOR ESCUDERO    Resident/CRNA:  TRISTEN GRESHAM    Block performed by resident/CRNA in the presence of a teaching physician    Location: Pre-op  Procedure Start/Stop TImes:     patient identified, IV checked, site marked, risks and benefits discussed, informed consent, monitors and equipment checked, pre-op evaluation, at physician/surgeon's request and post-op pain management      Correct Patient: Yes      Correct Position: Yes      Correct Site: Yes      Correct Procedure: Yes      Correct Laterality:  Yes    Site Marked:  Yes  Procedure details:     Procedure:  Supraclavicular    ASA:  3    Diagnosis:  Post op pain, intraoperative anesthesia    Laterality:  Right    Position:  Supine    Sterile Prep: chloraprep      Needle gauge:  21    Needle length (mm):  100    Ultrasound: Yes      Ultrasound used to identify targeted nerve, plexus, or vascular structure and placed a needle adjacent to it      A permanent image is NOT entered into the patient's record.      Abnormal pain on injection: No      Blood Aspirated: No      Paresthesias:  No    Bleeding at site: No      Bolus via:  Needle    Infusion Method:  Single Shot    Complications:  None  Assessment/Narrative:     Injection made incrementally with aspirations every (mL):  5

## 2017-11-21 NOTE — OR NURSING
Pre-op block performed without complications.  Time out done prior to start of procedure.  VSS.  Pt tolerated procedure well.  Will continue to monitor.

## 2017-11-21 NOTE — IP AVS SNAPSHOT
[facility] OR    [address] [address]    Phone:  [phone]                                       After Visit Summary   11/21/2017    Eleni Logan    MRN: [id number]           After Visit Summary Signature Page     I have received my discharge instructions, and my questions have been answered. I have discussed any challenges I see with this plan with the nurse or doctor.    ..........................................................................................................................................  Patient/Patient Representative Signature      ..........................................................................................................................................  Patient Representative Print Name and Relationship to Patient    ..................................................               ................................................  Date                                            Time    ..........................................................................................................................................  Reviewed by Signature/Title    ...................................................              ..............................................  Date                                                            Time

## 2017-11-21 NOTE — OR NURSING
Writer contacted Gomez (friend) confirmed will be picking up pt and staying with pt for 24 hours post-op.

## 2017-11-21 NOTE — IP AVS SNAPSHOT
MRN:[id number]                      After Visit Summary   11/21/2017    Eleni Logan    MRN: [id number]           Thank you!     Thank you for choosing [facility] for your care. Our goal is always to provide you with excellent care. Hearing back from our patients is one way we can continue to improve our services. Please take a few minutes to complete the written survey that you may receive in the mail after you visit with us. Thank you!        Patient Information     Date Of Birth          1955        About your hospital stay     You were admitted on:  November 21, 2017 You last received care in the:  [facility] MAIN OR    You were discharged on:  November 21, 2017       Who to Call     For medical emergencies, please call 911.  For non-urgent questions about your medical care, please call your primary care provider or clinic, [phone]  For questions related to your surgery, please call your surgery clinic        Attending Provider     Provider Brandy Kaiser MD Vascular Surgery       Primary Care Provider Office Phone # Fax #    Preethi Gibson [phone]      After Care Instructions     Do not - immerse incision in water until sutures removed       Do not immerse incision in water until sutures removed            Dressing       Keep dressing clean and dry.  Dressing / incisional care as instructed by RN and or Surgeon            No lifting        No lifting over 5 lbs and no strenuous physical activity for 4 weeks                  Your next 10 appointments already scheduled     Dec 01, 2017 11:40 AM CST   (Arrive by 11:25 AM)   NEW GENERAL with Moris Polanco OD   [facility] Ophthalmology ([facility])    [address] [place] [address]  4th Floor  [facility] [address]   [phone]            Dec 04, 2017  1:00 PM CST   Ech Complete with UCECHCR2   [facility] Echo ([facility])    [address] [place] [address]  [facility] [address] [phone]           1. Please bring or wear a comfortable two-piece outfit. 2. You may eat, drink and take your normal medicines. 3. For any questions that cannot be answered, please contact the ordering physician            Dec 06, 2017  1:00 PM CST   New Sleep Patient with Frankie Bull MD   [facility], Sleep Study ([facility])    [address] [place] [id number] [phone]            Feb 19, 2018  2:50 PM CST   (Arrive by 2:20 PM)   Return Kidney Transplant with Tyshawn Sexton MD   [facility] Nephrology ([facility])    [address] [place] [address]  3rd [facility] [address] [phone]            Feb 22, 2018  1:00 PM CST   (Arrive by 12:45 PM)   Return Visit with Preethi Gibson MD   [facility] Primary Care Clinic ([facility])    [address] [place] [address]  4th [facility] [id number] [phone]              Further instructions from your care team       [facility]  Same-Day Surgery   Adult Discharge Orders & Instructions     For 24 hours after surgery    1. Get plenty of rest.  A responsible adult must stay with you for at least 24 hours after you leave the hospital.   2. Do not drive or use heavy equipment.  If you have weakness or tingling, don't drive or use heavy equipment until this feeling goes away.  3. Do not drink alcohol.  4. Avoid strenuous or risky activities.  Ask for help when climbing stairs.   5. You may feel lightheaded.  IF so, sit for a few minutes before standing.  Have someone help you get up.   6. If you have nausea (feel sick to your stomach): Drink only clear liquids such as apple juice, ginger ale, broth or 7-Up.  Rest may also help.  Be sure to drink enough fluids.  Move to a regular diet as you feel able.  7. You may have a slight fever. Call the doctor if your fever is over  "100 F (37.7 C) (taken under the tongue) or lasts longer than 24 hours.  8. You may have a dry mouth, a sore throat, muscle aches or trouble sleeping.  These should go away after 24 hours.  9. Do not make important or legal decisions.   Call your doctor for any of the followin.  Signs of infection (fever, growing tenderness at the surgery site, a large amount of drainage or bleeding, severe pain, foul-smelling drainage, redness, swelling).    2. It has been over 8 to 10 hours since surgery and you are still not able to urinate (pass water).    3.  Headache for over 24 hours.    To contact a doctor, call Dr Bond's office at [phone]   or:        [phone] and ask for the resident on call for   Vascular Surgery (answered 24 hours a day)      Emergency Department:    [facility]: [phone]       (TTY for hearing impaired: [phone])              Pending Results     No orders found from 2017 to 2017.            Admission Information     Date & Time Provider Department Dept. Phone    2017 Brandy Bond MD [facility] MAIN [phone]      Your Vitals Were     Blood Pressure Temperature Respirations Height Weight Pulse Oximetry    138/58 97.9  F (36.6  C) (Oral) 22 1.575 m (5' 2\") 97.7 kg (215 lb 6.2 oz) 99%    BMI (Body Mass Index)                   39.4 kg/m2           Early[employer] Information     [employer] lets you send messages to your doctor, view your test results, renew your prescriptions, schedule appointments and more. To sign up, go to [email or web address] . Click on \"Log in\" on the left side of the screen, which will take you to the Welcome page. Then click on \"Sign up Now\" on the right side of the page.     You will be asked to enter the access code listed below, as well as some personal information. Please follow the directions to create your username and password.     Your access code is: [email or web address]  Expires: 2017  8:42 AM     Your access code will  in 90 " days. If you need help or a new code, please call your [facility] clinic or [phone].        Care EveryWhere ID     This is your Care EveryWhere ID. This could be used by other organizations to access your [facility] medical records  [id number]        Equal Access to Services     CHULACARLOS ENRIQUE ISSAC : Hadii aad ku hadlloydmine Sodioni, waaxda luqadaha, qaybta kaalmada adekaitlynnvance, clare tuckertravischaka garrison. So [facility] [phone].    ATENCIÓN: Si habla español, tiene a stoner disposición servicios gratuitos de asistencia lingüística. Llame al [phone].    We comply with applicable federal civil rights laws and [place] laws. We do not discriminate on the basis of race, color, national origin, age, disability, sex, sexual orientation, or gender identity.               Review of your medicines      START taking        Dose / Directions    oxyCODONE-acetaminophen 5-325 MG per tablet   Commonly known as:  PERCOCET   Used for:  Post-op pain        Dose:  1-2 tablet   Take 1-2 tablets by mouth every 4 hours as needed for pain (moderate to severe)   Quantity:  30 tablet   Refills:  0         CONTINUE these medicines which have NOT CHANGED        Dose / Directions    amoxicillin-clavulanate 500-125 MG per tablet   Commonly known as:  AUGMENTIN   Used for:  Kidney replaced by transplant        Dose:  1 tablet   Take 1 tablet by mouth 2 times daily   Quantity:  20 tablet   Refills:  0       artificial saliva Aers spray   Used for:  Dry mouth        Dose:  1 spray   Take 1 spray by mouth every 6 hours as needed for dry mouth   Quantity:  1 Bottle   Refills:  3       atorvastatin 20 MG tablet   Commonly known as:  LIPITOR   Used for:  Hypercholesteremia        Dose:  20 mg   Take 1 tablet (20 mg) by mouth daily   Quantity:  90 tablet   Refills:  0       carvedilol 6.25 MG tablet   Commonly known as:  COREG   Used for:  Hypertension secondary to other renal disorders        Dose:  12.5 mg   Take 2 tablets (12.5 mg) by mouth  2 times daily (with meals)   Quantity:  360 tablet   Refills:  1       cholecalciferol 1000 UNITS capsule   Commonly known as:  vitamin  -D   Used for:  Vitamin D deficiency        Dose:  2 capsule   Take 2 capsules (2,000 Units) by mouth daily   Quantity:  180 capsule   Refills:  3       cloNIDine 0.1 MG tablet   Commonly known as:  CATAPRES   Used for:  HTN (hypertension)        Dose:  0.1 mg   Take 1 tablet (0.1 mg) by mouth 2 times daily as needed For systolic BP >180   Quantity:  60 tablet   Refills:  3       darbepoetin bucky 300 MCG/0.6ML injection   Commonly known as:  ARANESP (ALBUMIN FREE)   Used for:  Chronic kidney disease, stage III (moderate), Anemia in stage 3 chronic kidney disease        Dose:  300 mcg   Inject 0.6 mLs (300 mcg) Subcutaneous every 28 days As needed for hgb<10g/dL   Quantity:  0.6 mL   Refills:  11       folic acid 1 MG tablet   Commonly known as:  FOLVITE   Used for:  Preventive measure        Dose:  1 mg   Take 1 tablet (1 mg) by mouth daily   Quantity:  30 tablet   Refills:  11       hydrALAZINE 25 MG tablet   Commonly known as:  APRESOLINE   Used for:  HTN (hypertension)        Dose:  25 mg   Take 1 tablet (25 mg) by mouth 3 times daily as needed For systolic BP >170   Quantity:  90 tablet   Refills:  3       hydrocortisone 1 % cream   Commonly known as:  CORTAID   Used for:  Rash, skin        Apply topically 2 times daily   Quantity:  60 g   Refills:  1       mycophenolate 250 MG capsule   Used for:  Kidney transplanted        Dose:  500 mg   Take 2 capsules (500 mg) by mouth 2 times daily   Quantity:  120 capsule   Refills:  11       NIFEdipine ER osmotic 30 MG 24 hr tablet   Commonly known as:  PROCARDIA XL   Used for:  HTN (hypertension)        Dose:  30 mg   Take 1 tablet (30 mg) by mouth 2 times daily   Quantity:  180 tablet   Refills:  3       pantoprazole 40 MG EC tablet   Commonly known as:  PROTONIX   Used for:  Gastroesophageal reflux disease with esophagitis         Dose:  40 mg   Take 1 tablet (40 mg) by mouth daily   Quantity:  30 tablet   Refills:  11       predniSONE 5 MG tablet   Commonly known as:  DELTASONE   Used for:  Kidney transplanted        Dose:  5 mg   Take 1 tablet (5 mg) by mouth daily   Quantity:  30 tablet   Refills:  11       sodium bicarbonate 650 MG tablet   Used for:  Acidosis        Dose:  650 mg   Take 1 tablet (650 mg) by mouth 2 times daily   Quantity:  180 tablet   Refills:  3       vitamin D 84885 UNIT capsule   Commonly known as:  ERGOCALCIFEROL   Used for:  Vitamin D deficiency        Dose:  20368 Units   Take 1 capsule (50,000 Units) by mouth once a week   Quantity:  12 capsule   Refills:  0            Where to get your medicines      Some of these will need a paper prescription and others can be bought over the counter. Ask your nurse if you have questions.     Bring a paper prescription for each of these medications     oxyCODONE-acetaminophen 5-325 MG per tablet                Protect others around you: Learn how to safely use, store and throw away your medicines at www.disposemymeds.org.             Medication List: This is a list of all your medications and when to take them. Check marks below indicate your daily home schedule. Keep this list as a reference.      Medications           Morning Afternoon Evening Bedtime As Needed    amoxicillin-clavulanate 500-125 MG per tablet   Commonly known as:  AUGMENTIN   Take 1 tablet by mouth 2 times daily                                artificial saliva Aers spray   Take 1 spray by mouth every 6 hours as needed for dry mouth                                atorvastatin 20 MG tablet   Commonly known as:  LIPITOR   Take 1 tablet (20 mg) by mouth daily                                carvedilol 6.25 MG tablet   Commonly known as:  COREG   Take 2 tablets (12.5 mg) by mouth 2 times daily (with meals)                                cholecalciferol 1000 UNITS capsule   Commonly known as:  vitamin  -D   Take 2  capsules (2,000 Units) by mouth daily                                cloNIDine 0.1 MG tablet   Commonly known as:  CATAPRES   Take 1 tablet (0.1 mg) by mouth 2 times daily as needed For systolic BP >180                                darbepoetin bucky 300 MCG/0.6ML injection   Commonly known as:  ARANESP (ALBUMIN FREE)   Inject 0.6 mLs (300 mcg) Subcutaneous every 28 days As needed for hgb<10g/dL                                folic acid 1 MG tablet   Commonly known as:  FOLVITE   Take 1 tablet (1 mg) by mouth daily                                hydrALAZINE 25 MG tablet   Commonly known as:  APRESOLINE   Take 1 tablet (25 mg) by mouth 3 times daily as needed For systolic BP >170                                hydrocortisone 1 % cream   Commonly known as:  CORTAID   Apply topically 2 times daily                                mycophenolate 250 MG capsule   Take 2 capsules (500 mg) by mouth 2 times daily                                NIFEdipine ER osmotic 30 MG 24 hr tablet   Commonly known as:  PROCARDIA XL   Take 1 tablet (30 mg) by mouth 2 times daily                                oxyCODONE-acetaminophen 5-325 MG per tablet   Commonly known as:  PERCOCET   Take 1-2 tablets by mouth every 4 hours as needed for pain (moderate to severe)                                pantoprazole 40 MG EC tablet   Commonly known as:  PROTONIX   Take 1 tablet (40 mg) by mouth daily                                predniSONE 5 MG tablet   Commonly known as:  DELTASONE   Take 1 tablet (5 mg) by mouth daily                                sodium bicarbonate 650 MG tablet   Take 1 tablet (650 mg) by mouth 2 times daily                                vitamin D 45650 UNIT capsule   Commonly known as:  ERGOCALCIFEROL   Take 1 capsule (50,000 Units) by mouth once a week

## 2017-11-22 NOTE — ANESTHESIA POSTPROCEDURE EVALUATION
Patient: Eleni Logan    Procedure(s):  Right Arm Basilic Vein Transposition,  Anesthesia Block - Wound Class: I-Clean    Diagnosis:End Stage Renal Disease   Diagnosis Additional Information: No value filed.    Anesthesia Type:  MAC    Note:  Anesthesia Post Evaluation    Patient location during evaluation: PACU  Patient participation: Able to fully participate in evaluation  Level of consciousness: awake and alert  Pain management: adequate  Airway patency: patent  Cardiovascular status: acceptable  Respiratory status: acceptable  Hydration status: acceptable  PONV: none             Last vitals:  Vitals:    11/21/17 2100 11/21/17 2115 11/21/17 2130   BP: 133/82 131/75 145/83   Resp: 24 24 22   Temp: 36.7  C (98.1  F) 36.6  C (97.9  F) 36.3  C (97.4  F)   SpO2:  98% 97%         Electronically Signed By: Boyd Phipps MD  November 21, 2017  9:35 PM

## 2017-11-22 NOTE — DISCHARGE INSTRUCTIONS
[facility]  Same-Day Surgery   Adult Discharge Orders & Instructions     For 24 hours after surgery    1. Get plenty of rest.  A responsible adult must stay with you for at least 24 hours after you leave the hospital.   2. Do not drive or use heavy equipment.  If you have weakness or tingling, don't drive or use heavy equipment until this feeling goes away.  3. Do not drink alcohol.  4. Avoid strenuous or risky activities.  Ask for help when climbing stairs.   5. You may feel lightheaded.  IF so, sit for a few minutes before standing.  Have someone help you get up.   6. If you have nausea (feel sick to your stomach): Drink only clear liquids such as apple juice, ginger ale, broth or 7-Up.  Rest may also help.  Be sure to drink enough fluids.  Move to a regular diet as you feel able.  7. You may have a slight fever. Call the doctor if your fever is over 100 F (37.7 C) (taken under the tongue) or lasts longer than 24 hours.  8. You may have a dry mouth, a sore throat, muscle aches or trouble sleeping.  These should go away after 24 hours.  9. Do not make important or legal decisions.   Call your doctor for any of the followin.  Signs of infection (fever, growing tenderness at the surgery site, a large amount of drainage or bleeding, severe pain, foul-smelling drainage, redness, swelling).    2. It has been over 8 to 10 hours since surgery and you are still not able to urinate (pass water).    3.  Headache for over 24 hours.    To contact a doctor, call Dr Bond's office at [phone]   or:        [phone] and ask for the resident on call for   Vascular Surgery (answered 24 hours a day)      Emergency Department:    [facility]: [phone]       (TTY for hearing impaired: [phone])

## 2017-11-22 NOTE — PROGRESS NOTES
Discharge instructions reviewed with Gomez Logan - Friend over phone, no questions or concern at this time.

## 2017-11-22 NOTE — OP NOTE
DATE OF OPERATION:  11/21/2017.      LOCATION:  [facility] [place], main operating room.      PREOPERATIVE DIAGNOSIS:  End-stage renal failure.      POSTOPERATIVE DIAGNOSIS:  End-stage renal failure.      PROCEDURE:  Right arm basilic vein transposition.  This is the second component after initial basilic fistula creation, but this is a full transposition procedure including transection of the vein and reanastomosis after tunneling.      ESTIMATED BLOOD LOSS:  Less than 50 mL.      Procedure was well tolerated.      ANESTHESIA:  Regional block with IV sedation was used for anesthesia.      SURGEON:  Brandy Bond MD      FIRST ASSISTANT:  Tristan Fragoso MD      BRIEF CLINICAL HISTORY:  Ms. Eleni Logan is a 61-year-old woman who underwent right arm brachial artery to basilic vein fistula creation by me more than a month ago.  She has had vein mapping showing that the fistula is now of good caliber and with adequate flows for fistula creation.  Plan therefore for mobilization of the fistula to create an accessible conduit as the vein is more than 2-3 cm deep in several locations.      DESCRIPTION OF PROCEDURE:  The patient was prepped and draped for access to her right arm and the vein was marked out using ultrasound.  The skin incision was made on the medial aspect of the upper arm down to the subcutaneous tissue and fascia to expose the basilic vein.  The vein was circumferentially dissected out.  The antecubital nerve of the forearm crossed the vein in at least 2 areas and we protected it in the field.  Once the vein was circumferentially dissected out and branches to it were ligated with 3-0 silk ties, we created a new tunnel using Alondra-Wick tunneler more anteriorly on the skin from the proximal vein near the arterial anastomosis out to the axilla in a very subcutaneous plane.  We then heparinized the patient and taking care not to twist the vein after carefully marking and  distending it, secured it to the Alondra-Wick tunneler and brought it back through so that it was in its new path.  We took care once we transected the vein to pass it under the nerve fibers of the antecubital nerve and therefore protect those and not transect them.  With the vein now tunneled in its new path, we created a spatulated anastomosis to its origin with 6-0 Prolene running suture.  Prior to completing anastomosis, we back flushed and forward flushed the vein and we completed an anastomosis and restored flow.  There was an excellent thrill.  I had some concern that the vein was quite tight in the tunnel and that we needed to do some mobilization of it within the tunnel, and therefore I made a counterincision on the mid aspect of the upper arm using ultrasound to center over the vein.  We also used ultrasound to confirm that there were no twists or kinks in the vein and confirmed this nicely.  We now irrigated the wound, ensured we had good hemostasis and closed it in 2 layers with 2-0 Vicryl for fascia and subcutaneous tissue, and then a 4 subcuticular for skin.  The procedure was well tolerated.         JOHAN CUEVAS MD             D: 2017 20:10   T: 2017 07:02   MT: bandar      Name:     ISABELLE VAZQUEZ   MRN:      [id number]        Account:        [id number]   :      1955           Procedure Date: 2017      Document: [id number]       cc: MICHAEL DEUTSCH MD

## 2017-11-22 NOTE — ANESTHESIA CARE TRANSFER NOTE
Patient: Eleni Vazquez    Procedure(s):  Right Arm Basilic Vein Transposition,  Anesthesia Block - Wound Class: I-Clean    Diagnosis: End Stage Renal Disease   Diagnosis Additional Information: No value filed.    Anesthesia Type:   MAC     Note:  Airway :Room Air  Patient transferred to:PACU  Comments: ELENI VAZQUEZ was transferred to PACU for Phase II coverage, breathing from face mask 3 L O2.   Oxygen saturation 98%  /82  HR 89  RR 24  Pain well controlled, nerve block still effective.    Jose Alberto Geiger MD CA2  November 21, 2017 8:55 PMHandoff Report: Identifed the Patient, Identified the Reponsible Provider, Reviewed the pertinent medical history, Discussed the surgical course, Reviewed Intra-OP anesthesia mangement and issues during anesthesia, Set expectations for post-procedure period and Allowed opportunity for questions and acknowledgement of understanding      Vitals: (Last set prior to Anesthesia Care Transfer)    CRNA VITALS  11/21/2017 2018 - 11/21/2017 2055 11/21/2017             NIBP: 128/82    Pulse: 89    Temp: 36.9  C (98.4  F)    SpO2: 98 %    Resp Rate (observed): 24    EKG: NSR                Electronically Signed By: Jose Alberto Geiger MD  November 21, 2017  8:55 PM

## 2017-11-22 NOTE — BRIEF OP NOTE
[facility] Brief Operative Note    Pre-operative diagnosis: End Stage Renal Disease    Post-operative diagnosis Same   Procedure: Procedure(s):  Right Arm Basilic Vein Transposition,  Anesthesia Block - Wound Class: I-Clean   Surgeon(s): Surgeon(s) and Role:     * Brandy Bond MD - Primary     * Tristan Fragoso MD - Resident - Assisting   Estimated blood loss: 50 mL    Specimens: * No specimens in log *   Findings: Left upper extremity Stage II brachiobasilic. Good thrill. Palpable radial pulse postoperatively.      Tristan Fragoso MD   Vascular Surgery Fellow  Pager: [phone]

## 2017-11-24 ENCOUNTER — CARE COORDINATION (OUTPATIENT)
Dept: NEPHROLOGY | Facility: CLINIC | Age: 62
End: 2017-11-24

## 2017-11-29 ENCOUNTER — CARE COORDINATION (OUTPATIENT)
Dept: NEPHROLOGY | Facility: CLINIC | Age: 62
End: 2017-11-29

## 2017-11-29 NOTE — PROGRESS NOTES
Reason for Call    Patient called for follow up. She's doing well following fistula revision last week. Said site is healing well, no signs of infection. Did have some constipation, but that resolved after OTC meds and prune juice. Otherwise asymptomatic at this time.      Plan    1. Call if any change with symptoms or concerns  2. Follow up in 3-4 weeks    Patient was given an opportunity to ask questions and have those questions answered to her satisfaction.  Patient verbalized understanding of instructions provided and agreed to plan of care.    Brittney Quinones RN

## 2017-12-01 ENCOUNTER — OFFICE VISIT (OUTPATIENT)
Dept: OPHTHALMOLOGY | Facility: CLINIC | Age: 62
End: 2017-12-01

## 2017-12-01 DIAGNOSIS — H25.13 SENILE NUCLEAR SCLEROSIS, BILATERAL: Primary | ICD-10-CM

## 2017-12-01 DIAGNOSIS — H52.4 PRESBYOPIA: ICD-10-CM

## 2017-12-01 DIAGNOSIS — E78.00 HYPERCHOLESTEREMIA: ICD-10-CM

## 2017-12-01 DIAGNOSIS — H35.363 DOMINANT DRUSEN, BILATERAL: ICD-10-CM

## 2017-12-01 ASSESSMENT — VISUAL ACUITY
OS_SC+: -1
OS_SC: 20/30
METHOD: SNELLEN - LINEAR
OD_SC: 20/25

## 2017-12-01 ASSESSMENT — CUP TO DISC RATIO
OD_RATIO: 0.2
OS_RATIO: 0.2

## 2017-12-01 ASSESSMENT — REFRACTION_MANIFEST
OS_CYLINDER: SPHERE
OD_SPHERE: -0.25
OS_SPHERE: +0.25
OD_AXIS: 120
OD_ADD: +2.50
OS_ADD: +2.50
OD_CYLINDER: +0.75

## 2017-12-01 ASSESSMENT — TONOMETRY
IOP_METHOD: ICARE
OS_IOP_MMHG: 11
OD_IOP_MMHG: 10

## 2017-12-01 ASSESSMENT — EXTERNAL EXAM - RIGHT EYE: OD_EXAM: NORMAL

## 2017-12-01 ASSESSMENT — CONF VISUAL FIELD
OD_NORMAL: 1
OS_NORMAL: 1
METHOD: COUNTING FINGERS

## 2017-12-01 ASSESSMENT — EXTERNAL EXAM - LEFT EYE: OS_EXAM: NORMAL

## 2017-12-01 ASSESSMENT — SLIT LAMP EXAM - LIDS
COMMENTS: NORMAL
COMMENTS: NORMAL

## 2017-12-01 NOTE — MR AVS SNAPSHOT
After Visit Summary   12/1/2017    Eleni Logan    MRN: [id number]           Patient Information     Date Of Birth          1955        Visit Information        Provider Department      12/1/2017 11:40 AM Moris Polanco OD [facility] Ophthalmology        Today's Diagnoses     Senile nuclear sclerosis, bilateral    -  1    Dominant drusen, bilateral        Presbyopia           Follow-ups after your visit        Follow-up notes from your care team     Return in about 1 year (around 12/1/2018) for Comprehensive Exam.      Your next 10 appointments already scheduled     Dec 04, 2017  1:00 PM CST   Ech Complete with [id number]   [facility] Echo ([facility])    [address] [place] [id number] [phone]           1. Please bring or wear a comfortable two-piece outfit. 2. You may eat, drink and take your normal medicines. 3. For any questions that cannot be answered, please contact the ordering physician            Dec 06, 2017  1:00 PM CST   New Sleep Patient with Frankie Bull MD   [facility] [place], Sleep Study ([facility])    [address] [place] [id number] [phone]            Dec 08, 2017  1:30 PM CST   (Arrive by 1:15 PM)   Return Visit with GONZALEZ Funez [facility] Solid Organ Transplant ([facility])    [address] [place] [address] [id number] [phone]            Feb 19, 2018  2:50 PM CST   (Arrive by 2:20 PM)   Return Kidney Transplant with Tyshawn Sexton MD   [facility] Nephrology ([facility])    [address] [place] [id number] [phone]            Feb 22, 2018  1:00 PM CST   (Arrive by 12:45 PM)   Return Visit with Preethi Gibson MD   [facility] Primary Care Clinic ([facility])    [address] [place] [address]  [id number] [phone]              Who to contact     Please call your clinic at [phone] to:    Ask questions about your health    Make or cancel appointments    Discuss your medicines    Learn about your test results    Speak to your doctor   If you have compliments or concerns about an experience at your clinic, or if you wish to file a complaint, please contact [facility] Patient Relations at [phone] or email us at [email or web address]         Additional Information About Your Visit        [employer] Information     [employer] is an electronic gateway that provides easy, online access to your medical records. With [employer], you can request a clinic appointment, read your test results, renew a prescription or communicate with your care team.     To sign up for [employer] visit the website at [email or web address]   You will be asked to enter the access code listed below, as well as some personal information. Please follow the directions to create your username and password.     Your access code is: [email or web address]  Expires: 2017  8:42 AM     Your access code will  in 90 days. If you need help or a new code, please contact your [facility] or call [phone] for assistance.        Care EveryWhere ID     This is your Care EveryWhere ID. This could be used by other organizations to access your [facility] medical records  [id number]         Blood Pressure from Last 3 Encounters:   17 145/83   17 119/81   17 139/80    Weight from Last 3 Encounters:   17 97.7 kg (215 lb 6.2 oz)   17 99 kg (218 lb 4.8 oz)   17 99.5 kg (219 lb 6.4 oz)              We Performed the Following     Refraction        Primary Care Provider Office Phone # Fax #    Preethi Gibson [phone]       [address] [address]        Equal Access to Services     GERBER  GAAR : Hadii aad ku beltran [facility], waaxda luqadaha, qaybta kagustavoda inessa, clare ju cammykit grey thomas trish . So [facility] [phone].    ATENCIÓN: Si habla español, tiene a stoner disposición servicios gratuitos de asistencia lingüística. Llame al [phone].    We comply with applicable federal civil rights laws and [place] laws. We do not discriminate on the basis of race, color, national origin, age, disability, sex, sexual orientation, or gender identity.            Thank you!     Thank you for choosing [facility] OPHTHALMOLOGY  for your care. Our goal is always to provide you with excellent care. Hearing back from our patients is one way we can continue to improve our services. Please take a few minutes to complete the written survey that you may receive in the mail after your visit with us. Thank you!             Your Updated Medication List - Protect others around you: Learn how to safely use, store and throw away your medicines at www.disposemymeds.org.          This list is accurate as of: 12/1/17 12:16 PM.  Always use your most recent med list.                   Brand Name Dispense Instructions for use Diagnosis    amoxicillin-clavulanate 500-125 MG per tablet    AUGMENTIN    20 tablet    Take 1 tablet by mouth 2 times daily    Kidney replaced by transplant       artificial saliva Aers spray     1 Bottle    Take 1 spray by mouth every 6 hours as needed for dry mouth    Dry mouth       atorvastatin 20 MG tablet    LIPITOR    90 tablet    Take 1 tablet (20 mg) by mouth daily    Hypercholesteremia       carvedilol 6.25 MG tablet    COREG    360 tablet    Take 2 tablets (12.5 mg) by mouth 2 times daily (with meals)    Hypertension secondary to other renal disorders       cholecalciferol 1000 UNITS capsule    vitamin  -D    180 capsule    Take 2 capsules (2,000 Units) by mouth daily    Vitamin D deficiency       cloNIDine 0.1 MG tablet    CATAPRES    60 tablet    Take 1 tablet (0.1 mg) by mouth 2  times daily as needed For systolic BP >180    HTN (hypertension)       darbepoetin bucky 300 MCG/0.6ML injection    ARANESP (ALBUMIN FREE)    0.6 mL    Inject 0.6 mLs (300 mcg) Subcutaneous every 28 days As needed for hgb<10g/dL    Chronic kidney disease, stage III (moderate), Anemia in stage 3 chronic kidney disease       folic acid 1 MG tablet    FOLVITE    30 tablet    Take 1 tablet (1 mg) by mouth daily    Preventive measure       hydrALAZINE 25 MG tablet    APRESOLINE    90 tablet    Take 1 tablet (25 mg) by mouth 3 times daily as needed For systolic BP >170    HTN (hypertension)       hydrocortisone 1 % cream    CORTAID    60 g    Apply topically 2 times daily    Rash, skin       mycophenolate 250 MG capsule     120 capsule    Take 2 capsules (500 mg) by mouth 2 times daily    Kidney transplanted       NIFEdipine ER osmotic 30 MG 24 hr tablet    PROCARDIA XL    180 tablet    Take 1 tablet (30 mg) by mouth 2 times daily    HTN (hypertension)       pantoprazole 40 MG EC tablet    PROTONIX    30 tablet    Take 1 tablet (40 mg) by mouth daily    Gastroesophageal reflux disease with esophagitis       predniSONE 5 MG tablet    DELTASONE    30 tablet    Take 1 tablet (5 mg) by mouth daily    Kidney transplanted       sodium bicarbonate 650 MG tablet     180 tablet    Take 1 tablet (650 mg) by mouth 2 times daily    Acidosis       vitamin D 73444 UNIT capsule    ERGOCALCIFEROL    12 capsule    Take 1 capsule (50,000 Units) by mouth once a week    Vitamin D deficiency

## 2017-12-01 NOTE — NURSING NOTE
Chief Complaints and History of Present Illnesses   Patient presents with     Blurred Vision Both Eyes     HPI    Affected eye(s):  Both   Symptoms:     Blurred vision   No difficulty with reading   Floaters   No flashes   Dryness (Comment: patient uses AT 3-4 hours per pt)   No itching   No burning         Do you have eye pain now?:  No      Comments:    Patient notes she feels her lids get in the way of seeing, has to lift brows to open eyes more.    Charissa Castro December 1, 2017 11:36 AM\

## 2017-12-01 NOTE — PROGRESS NOTES
Assessment/Plan  (H25.13) Senile nuclear sclerosis, bilateral  (primary encounter diagnosis)  Comment: Not at surgical level  Plan: Monitor at this time. Cataracts are not yet affecting ADLs. Patient should RTC with any increase in blurred vision or difficulty with nighttime vision.    (H35.363) Dominant drusen, bilateral  Comment: Longstanding- documented previously in ophthalmology notes  Plan: Stable VA today. Recommend annual monitoring at this time. Patient should call/RTC with any worsening of vision or new flashes/floaters/distortion of vision.     (H52.4) Presbyopia  Comment: Minimal refractive error at distance   Plan: SRx updated and released. +2.50 OTC reading specs should also work well. Monitor each year.       Complete documentation of historical and exam elements from today's encounter can  be found in the full encounter summary report (not reduplicated in this progress  note). I personally obtained the chief complaint(s) and history of present illness. I  confirmed and edited as necessary the review of systems, past medical/surgical  history, family history, social history, and examination findings as documented by  others; and I examined the patient myself. I personally reviewed the relevant tests,  images, and reports as documented above. I formulated and edited as necessary the  assessment and plan and discussed the findings and management plan with the  patient and family.    Moris Polanco OD

## 2017-12-04 ENCOUNTER — TELEPHONE (OUTPATIENT)
Dept: PHARMACY | Facility: CLINIC | Age: 62
End: 2017-12-04

## 2017-12-04 NOTE — TELEPHONE ENCOUNTER
Follow-up with anemia management service:    Tried to call Eleni to remind her that she is due for Hgb lab and possibly an aranesp dose.  She was unavailable.  I will try calling her on     Anemia Latest Ref Rng & Units 2017 2017 10/4/2017 10/23/2017 2017 2017 2017   HGB Goal - - - - - - - -   VALARIE Dose - - - - - 300 mcg - 300 mcg   Hemoglobin 11.7 - 15.7 g/dL 9.5(L) 10.2(L) 10.5(L) 9.8(L) - 9.4(L) 9.6(L)   TSAT 15 - 46 % - - 51(H) - - - -   Ferritin 8 - 252 ng/mL - - 838(H) - - - -   PRBCs - - - - - - - -       Orders needed to be renewed (for next follow-up date) in EPIC: None                          Med order expires: 2018                          Lab orders : 2018    Follow-up call date: 17    [facility]    Anemia Management Service  Yvette Goodwin,GiniD and Preethi Akers CPhT  Phone: [phone]  Fax: [phone]

## 2017-12-05 NOTE — TELEPHONE ENCOUNTER
atorvastatin (LIPITOR) 20 MG tablet     Last Written Prescription Date:  8/16/17  Last Fill Quantity: 90,   # refills: 0  Last Office Visit : 11/16/17  Future Office visit:  2/22/18      Routing because:  LDL past due.

## 2017-12-06 ENCOUNTER — OFFICE VISIT (OUTPATIENT)
Dept: SLEEP MEDICINE | Facility: CLINIC | Age: 62
End: 2017-12-06
Attending: INTERNAL MEDICINE
Payer: MEDICARE

## 2017-12-06 VITALS
HEART RATE: 99 BPM | RESPIRATION RATE: 16 BRPM | OXYGEN SATURATION: 99 % | DIASTOLIC BLOOD PRESSURE: 82 MMHG | BODY MASS INDEX: 39.93 KG/M2 | HEIGHT: 62 IN | SYSTOLIC BLOOD PRESSURE: 150 MMHG | WEIGHT: 217 LBS

## 2017-12-06 DIAGNOSIS — G47.33 OSA (OBSTRUCTIVE SLEEP APNEA): ICD-10-CM

## 2017-12-06 DIAGNOSIS — G47.00 INSOMNIA, UNSPECIFIED TYPE: Primary | ICD-10-CM

## 2017-12-06 PROCEDURE — 99211 OFF/OP EST MAY X REQ PHY/QHP: CPT | Mod: ZF

## 2017-12-06 RX ORDER — ATORVASTATIN CALCIUM 20 MG/1
20 TABLET, FILM COATED ORAL DAILY
Qty: 30 TABLET | Refills: 0 | Status: SHIPPED | OUTPATIENT
Start: 2017-12-06 | End: 2018-01-29

## 2017-12-06 NOTE — NURSING NOTE
"Chief Complaint   Patient presents with     Consult       Initial /82  Pulse 99  Resp 16  Ht 1.575 m (5' 2\")  Wt 98.4 kg (217 lb)  SpO2 99%  BMI 39.69 kg/m2 Estimated body mass index is 39.69 kg/(m^2) as calculated from the following:    Height as of this encounter: 1.575 m (5' 2\").    Weight as of this encounter: 98.4 kg (217 lb).  Medication Reconciliation: complete   Padmini Hammond [facility] [place] [facility]      "

## 2017-12-06 NOTE — MR AVS SNAPSHOT
After Visit Summary   12/6/2017    Eleni Logan    MRN: [id number]           Patient Information     Date Of Birth          1955        Visit Information        Provider Department      12/6/2017 1:00 PM Frankie Bull MD [facility], [place], Sleep Study        Today's Diagnoses     Insomnia, unspecified type    -  1    RIZWAN (obstructive sleep apnea)          Care Instructions      Your BMI is Body mass index is 39.69 kg/(m^2).  Weight management is a personal decision.  If you are interested in exploring weight loss strategies, the following discussion covers the approaches that may be successful. Body mass index (BMI) is one way to tell whether you are at a healthy weight, overweight, or obese. It measures your weight in relation to your height.  A BMI of 18.5 to 24.9 is in the healthy range. A person with a BMI of 25 to 29.9 is considered overweight, and someone with a BMI of 30 or greater is considered obese. More than two-thirds of American adults are considered overweight or obese.  Being overweight or obese increases the risk for further weight gain. Excess weight may lead to heart disease and diabetes.  Creating and following plans for healthy eating and physical activity may help you improve your health.  Weight control is part of healthy lifestyle and includes exercise, emotional health, and healthy eating habits. Careful eating habits lifelong are the mainstay of weight control. Though there are significant health benefits from weight loss, long-term weight loss with diet alone may be very difficult to achieve- studies show long-term success with dietary management in less than 10% of people. Attaining a healthy weight may be especially difficult to achieve in those with severe obesity. In some cases, medications, devices and surgical management might be considered.  What can you do?  If you are overweight or obese and are interested in methods for weight loss, you should discuss this  with your provider.     Consider reducing daily calorie intake by 500 calories.     Keep a food journal.     Avoiding skipping meals, consider cutting portions instead.    Diet combined with exercise helps maintain muscle while optimizing fat loss. Strength training is particularly important for building and maintaining muscle mass. Exercise helps reduce stress, increase energy, and improves fitness. Increasing exercise without diet control, however, may not burn enough calories to loose weight.       Start walking three days a week 10-20 minutes at a time    Work towards walking thirty minutes five days a week     Eventually, increase the speed of your walking for 1-2 minutes at time    In addition, we recommend that you review healthy lifestyles and methods for weight loss available through the National Institutes of Health patient information sites:  http://win.niddk.nih.gov/publications/index.htm    And look into health and wellness programs that may be available through your health insurance provider, employer, local community center, or brian club.    Weight management plan: Patient was referred to their PCP to discuss a diet and exercise plan.    Your blood pressure was checked while you were in clinic today.  Please read the guidelines below about what these numbers mean and what you should do about them.  Your systolic blood pressure is the top number.  This is the pressure when the heart is pumping.  Your diastolic blood pressure is the bottom number.  This is the pressure in between beats.  If your systolic blood pressure is less than 120 and your diastolic blood pressure is less than 80, then your blood pressure is normal. There is nothing more that you need to do about it  If your systolic blood pressure is 120-139 or your diastolic blood pressure is 80-89, your blood pressure may be higher than it should be.  You should have your blood pressure re-checked within a year by a primary care provider.  If  "your systolic blood pressure is 140 or greater or your diastolic blood pressure is 90 or greater, you may have high blood pressure.  High blood pressure is treatable, but if left untreated over time it can put you at risk for heart attack, stroke, or kidney failure.  You should have your blood pressure re-checked by a primary care provider within the next four weeks.  MY INFORMATION ON SLEEP APNEA-  Eleni Logan    DOCTOR : Frankie Bull  SLEEP CENTER :      MY CONTACT NUMBER:   [facility]  [phone]  [facility]   [phone]  [facility]   [phone]      [facility]  [phone]  [facility]   [phone]      Shah Points:  1. What is Obstructive Sleep Apnea (RIZWAN)? RIZWAN is the most common type of sleep apnea. Apnea literally means, \"without breath.\" It is characterized by repetitive pauses in breathing, despite continued effort to breathe, and is usually associated with a reduction in blood oxygen saturation. Apneas can last 10 to over 60 seconds. It is caused by narrowing or collapse of the upper airway as muscles relax during sleep.   2. What are the consequences of RIZWAN? Symptoms include: daytime sleepiness- possibly increasing the risk of falling asleep while driving, unrefreshing/restless sleep, snoring, insomnia, waking frequently to urinate, waking with heartburn or reflux, reduced concentration and memory, and morning headaches. Other health consequences may include development of high blood pressure. Untreated RIZWAN also can contribute to heart disease, stroke and diabetes.   3. What are the treatment options? In most situations, sleep apnea is a lifelong disease that must be managed with daily therapy. Continuous Positive Airway (CPAP) is the most reliable treatment. A mouthguard to hold your jaw forward is usually the next most reliable option. Other options include postioning devices " (to keep you off your back), nasal valves, tongue retaining device, weight loss, surgery. There is more detail about these options toward the end of this document.  4. What are the most important things to remember about using CPAP?     WHERE CAN I FIND MORE INFORMATION?    American Academy of Sleep Medicine Patient information on sleep disorders:  http://yoursleep.aasmnet.org    CPAP -  WHY AND HOW?                 Continuous positive airway pressure, or CPAP, is the most effective treatment for obstructive sleep apnea. It works by blowing room air, through a mask, to hold your throat open. A decision to use CPAP is a major step forward in the pursuit of a healthier life. The successful use of CPAP will help you breathe easier, sleep better and live healthier. Using CPAP can be a positive experience if you keep these martinez points in mind:  1. Commitment  CPAP is not a quick fix for your problem. It involves a long-term commitment to improve your sleep and your health.    2. Communication  Stay in close communication with both your sleep doctor and your CPAP supplier. Ask lots of questions and seek help when you need it.    3. Consistency  Use CPAP all night, every night and for every nap. You will receive the maximum health benefits from CPAP when you use it every time that you sleep. This will also make it easier for your body to adjust to the treatment.    4. Correction  The first machine and mask that you try may not be the best ones for you. Work with your sleep doctor and your CPAP supplier to make corrections to your equipment selection. Ask about trying a different type of machine or mask if you have ongoing problems. Make sure that your mask is a good fit and learn to use your equipment properly.    5. Challenge  Tell a family member or close friend to ask you each morning if you used your CPAP the previous night. Have someone to challenge you to give it your best effort.    6. Connection   Your adjustment to  "CPAP will be easier if you are able to connect with others who use the same treatment. Ask your sleep doctor if there is a support group in your area for people who have sleep apnea, or look for one on the Internet.  7. Comfort   Increase your level of comfort by using a saline spray, decongestant or heated humidifier if CPAP irritates your nose, mouth or throat. Use your unit's \"ramp\" setting to slowly get used to the air pressure level. There may be soft pads you can buy that will fit over your mask straps. Look on www.CPAP.com for accessories that can help make CPAP use more comfortable.  8. Cleaning   Clean your mask, tubing and headgear on a regular basis. Put this time in your schedule so that you don't forget to do it. Check and replace the filters for your CPAP unit and humidifier.    9. Completion   Although you are never finished with CPAP therapy, you should reward yourself by celebrating the completion of your first month of treatment. Expect this first month to be your hardest period of adjustment. It will involve some trial and error as you find the machine, mask and pressure settings that are right for you.    10. Continuation  After your first month of treatment, continue to make a daily commitment to use your CPAP all night, every night and for every nap.    CPAP-Tips to starting with success:  Begin using your CPAP for short periods of time during the day while you watch TV or read.    Use CPAP every night and for every nap. Using it less often reduces the health benefits and makes it harder for your body to get used to it.    Newer CPAP models are virtually silent; however, if you find the sound of your CPAP machine to be bothersome, place the unit under your bed to dampen the sound.     Make small adjustments to your mask, tubing, straps and headgear until you get the right fit. Tightening the mask may actually worsen the leak.  If it leaves significant marks on your face or irritates the bridge " of your nose, it may not be the best mask for you.  Speak with the person who supplied the mask and consider trying other masks. Insurances will allow you to try different masks during the first month of starting CPAP.  Insurance also covers a new mask, hose and filter about every 6 months.    Use a saline nasal spray to ease mild nasal congestion. Neti-Pot or saline nasal rinses may also help. Nasal gel sprays can help reduce nasal dryness.  Biotene mouthwash can be helpful to protect your teeth if you experience frequent dry mouth.  Dry mouth may be a sign of air escaping out of your mouth or out of the mask in the case of a full face mask.  Speak with your provider if you expect that is the case.     Take a nasal decongestant to relieve more severe nasal or sinus congestion.  Do not use Afrin (oxymetazoline) nasal spray more than 3 days in a row.  Speak with your sleep doctor if your nasal congestion is chronic.    Use a heated humidifier that fits your CPAP model to enhance your breathing comfort. Adjust the heat setting up if you get a dry nose or throat, down if you get condensation in the hose or mask.  Position the CPAP lower than you so that any condensation in the hose drains back into the machine rather than towards the mask.    Try a system that uses nasal pillows if traditional masks give you problems.    Clean your mask, tubing and headgear once a week. Make sure the equipment dries fully.    Regularly check and replace the filters for your CPAP unit and humidifier.    Work closely with your sleep provider and your CPAP supplier to make sure that you have the machine, mask and air pressure setting that works best for you. It is better to stop using it and call your provider to solve problems than to lay awake all night frustrated with the device.    BESIDES CPAP, WHAT OTHER THERAPIES ARE THERE?    Postioning devices if you only have the snoring or apnea while on your back    Dental devices if your  condition is mild    Nasal valves may be effective though experience is limited    Weight loss if you are overweight    Surgery in limited cases where devices are not acceptable or there are problems with structures in the nose and throat  If treated with one of these alternative options, further evaluation is necessary to ensure that the therapy is effective. This may require some form of repeat testing.      Healthy Lifestyle:  Healthy diet, exercise and limit alcohol: Not only will excessive alcohol increase your weight over time, but it irritates the throat tissues and make them swell, shrinking the airway and causing snoring. Drinking alcohol should be limited and stopped within 3-4 hours before going to bed.   Stop smoking: (Red swollen throat, heat, nicotine), also irritates and swells the airway, among numerous other negative health consequences.    Positioning Device  This example shows a pillow that straps around the waist. It may be appropriate for those whose sleep study shows milder sleep apnea that occurs primarily when lying flat on one's back. Preliminary studies have shown benefit but effectiveness at home should be verified.                      Oral Appliance  These are examples of two of many custom-made devices that are more likely to work in mild sleep apnea   Oral appliances are dental mouth pieces that fit very much like a sports mouth guards or removable orthodontic retainers. They are used to treat snoring and obstructive sleep apnea . The device prevents the airway from collapsing by either supporting the jaw in a forward position. Since oral appliances are non-invasive and easy to use, they may be considered as an early treatment option. Oral appliance therapy (OAT) involves the customization, selection, fabrication, fitting, adjustments and long-term follow-up care.  Custom made oral appliances are proven to be more effective than over-the-counter devices. Therefore, the over-the-counter  devices are recommended not to be used as a screening tool nor as a therapeutic option.     Who gets a dental device?  Oral appliance therapy can be used as an alternative to CPAP therapy for the treatment of mild to moderate sleep apnea and for those patients who prefer OAT to CPAP. Oral appliance therapy is a first line therapy for the treatment of primary snoring. Additionally, OAT is an option for those that cannot tolerate CPAP as therapy or who have experienced insufficient surgical results.                 Possible side effects?  Frequent but minor side effects include: excessive salivation, dry mouth, discomfort of teeth and jaw and temporary changes in the patient s bite.  Potential complications include: jaw pain, permanent occlusal changes and TMJ symptoms.  The above mentioned side effects and complications can be recognized and managed by dentists trained in dental sleep medicine.   Finding a dentist that practices dental sleep medicine  Specific training is available through the American Academy of Dental Sleep Medicine for dentists interested in working in the field of sleep. To find a dentist who is educated in the field of sleep and the use of oral appliances, near you, visit the Web site of the American Academy of Dental Sleep Medicine; also see http://www.accpstorage.org/newOrganization/patients/oralAppliances.pdf   To search for a dentist certified in these practices:  Http://aadsm.org/FindADentist.aspx?1  Nasal Valves              Nasal valves may be an option for mild apnea if other options are not well tolerated. The efficacy of these devices is generally less than CPAP or oral appliances.They may not be effective if you have frequent nasal congestion or have difficulty breathing through your nose.   Weight Loss:    Weight loss decreases severity of sleep apnea in most people with obesity. For those with mild obesity who have developed snoring with weight gain, even 15-30 pound weight loss can  improve and occasionally eliminate sleep apnea.  Structured and life-long dietary and health habits are necessary to lose weight and keep healthier weight levels.     Though there are significant health benefits from weight loss, long-term weight loss is very difficult to achieve- studies show success with dietary management in less than 10% of people. In addition, substantial weight loss may require years of dietary control and may be difficult if patients have severe obesity. In these cases, surgical management may be considered.    If you are interested in methods for weight loss, you should review the options discussed at the National Institutes of Health patient information sites:     http://win.niddk.nih.gov/publications/index.htm  http:/www.health.nih.gov/topic/WeightLossDieting    Bariatric programs offer counseling in all methods of weight loss:    Http:/www.uofedicalcenter.org/Specialties/WeightLossSurgeryandMedicalMgmt/htm    Your BMI is Body mass index is 39.69 kg/(m^2).    Weight management plan: Patient was referred to their PCP to discuss a diet and exercise plan.    Body mass index (BMI) is one way to tell whether you are at a healthy weight, overweight, or obese. It measures your weight in relation to your height.  A BMI of 18.5 to 24.9 is in the healthy range. A person with a BMI of 25 to 29.9 is considered overweight, and someone with a BMI of 30 or greater is considered obese.  Another way to find out if you are at risk for health problems caused by overweight and obesity is to measure your waist. If you are a woman and your waist is more than 35 inches, or if you are a man and your waist is more than 40 inches, your risk of disease may be higher.  More than two-thirds of American adults are considered overweight or obese. Being overweight or obese increases the risk for further weight gain.  Excess weight may lead to heart disease and diabetes. Creating and following plans for healthy eating  and physical activity may help you improve your health.    Methods for maintaining or losing weight.    Weight control is part of healthy lifestyle and includes exercise, emotional health, and healthy eating habits.  Careful eating habits lifelong is the mainstay of weight control.  Though there are significant health benefits from weight loss, long-term weight loss with diet alone may be very difficult to achieve- studies show long-term success with dietary management in less than 10% of people. Attaining a healthy weight may be especially difficult to achieve in those with severe obesity. In some cases, medications, devices and surgical management might be considered.    What can you do?    If you are overweight or obese and are interested in methods for weight loss, you should discuss this with your provider. In addition, we recommend that you review healthy life styles and methods for weight loss available through the National Institutes of Health patient information sites:     http://win.niddk.nih.gov/publications/index.htm      Surgery:  There are a number of surgeries that have been attempted to treat apnea. In general, surgical options are usually reserved for cases in which there is a physical abnormality contributing to obstruction or other treatment options are ineffective or not tolerated. Most surgical options are either unreliable or quite invasive. One of the more common procedures is:  Uvulopalatopharyngoplasty: In this procedure, the uvula (the finger-like tissue that hangs in the back of the throat), part of the soft palate (the tissue that the uvula is attached to), and sometimes the tonsils or adenoids are removed. The efficacy of this surgery is around 30-50% .  After surgery, complications may include:  Sleepiness and sleep apnea related to post-surgery medication   Swelling, infection and bleeding   A sore throat and/or difficulty swallowing   Drainage of secretions into the nose and a nasal  "quality to the voice. English language speech does not seem to be affected by this surgery.   Narrowing of the airway in the nose and throat (hence constricting breathing) snoring and even iatrogenically caused sleep apnea. By cutting the tissues, excess scar tissue can \"tighten\" the airway and make it even smaller than it was before UPPP.  Patients who have had the uvula removed will become unable to correctly speak [place] or any other language that has a uvular 'r' phoneme.    Surgeries to help resolve nasal congestion may help reduce the severity of apnea slightly. Nasal congestion does not cause apnea on its own, so these surgeries are usually not performed just for RIZWAN.  They may be worth considering if the nasal congestion is significantly bothersome independent of apnea.      Stress, tension, and anxiety can be big factors contributing to insomnia.  If you can t relax your mind and body, then you won t be able to sleep.  You would likely benefit from trying some of the relaxation exercises that we ve assembled below.  These are all internet based links.  If you don t have or use a computer, you may benefit from one of the stress management books listed below that you can get at your public library or at a local bookstore for a relatively low price.      Copy and paste into web browser address window   https://www.youComHearube.com/watch?v=Gpxq5jaQgGu    Progressive Muscle Relaxation (PMR):     http://www.[employer].Angel Alerts/progressive-muscle-relaxation-exercise.html     http://studentsupport.[facility]/counseling/resources/self-help/relaxation-and-stress-management/   Deep Breathing Exercises:    http://www.[employer].Angel Alerts/breathing-awareness.html     Meditation:     www.CoContestrantheartA.P Avanashiappa Silk    www.theGoal Zeroguided-meditation-site.com You may have to pay for some of these resources.    Guided Imagery:    http://www.PatientsLikeMe/guided-imagery-scripts.html "     http://[employer]/library/wfmcjkrjff-tfrfmb-bypcmnu/    [place] site under construction : http://www.fairCoopkanics.org/Services/SleepMedicine/Audio/index.htm      Please contact the sleep psychologist for an appointment for insomnia treatment.    Complete your sleep diary every morning.    Please have regular sleep/wake times.    Consider having another sleep study to evaluate your breathing while you sleep.    Sometimes insomnia can be made worse by our own habits or situation.  You should consider making some of the following changes to help improve your sleep:     If there is excessive noise in your house / neighborhood use a fan or similar device to make a quiet background noise that can drown out other noises    If your room is too bright early in the morning, hang a blanket or extra curtain over the windows to keep the light out or use a sleeping mask to cover your eyes    If your room is too warm during the night, set the temperature in the house down a few degrees for the night    Spend a little time before bedtime trying to relax.  Don t work right up until bedtime.  Take 30-60 minutes to relax and unwind before bedtime with a book or watching TV    Do not drink anything with alcohol or caffeine in them for at least 4-5 hours before bedtime    Do not smoke right before bedtime or during the night    No strenuous exercise for 2-3 hours before bedtime            Follow-ups after your visit        Additional Services     SLEEP PSYCHOLOGY REFERRAL       Referral Urgency: Next available    Dr. Avery Cherry is at the following clinics on the following days:  [facility] - [address] [place] [address]        Thursday and Friday (PLEASE CALL [phone] to schedule an appointment).  [facility] [address] [place]       Wednesdays   (PLEASE CALL [phone] to schedule an appointment).     Please be aware that coverage of these services is subject to the terms and  limitations of your health insurance plan. Call member services at your health plan with any benefit or coverage questions.     Please bring the following to your appointment:  >> List of current medications   >> This referral request   >> Any documents/labs given to you for this referral                  Follow-up notes from your care team     Return in about 6 weeks (around 1/17/2018).      Your next 10 appointments already scheduled     Dec 08, 2017  1:30 PM CST   (Arrive by 1:15 PM)   Return Visit with GONZALEZ Funez   [facility] Solid Organ Transplant ([facility])    [address] [place] [address]  3rd [facility] [id number] [phone]            Feb 19, 2018  2:50 PM CST   (Arrive by 2:20 PM)   Return Kidney Transplant with Tyshawn Sexton MD   [facility] Nephrology ([facility])    [address] [place] [id number] [phone]            Feb 22, 2018  1:00 PM CST   (Arrive by 12:45 PM)   Return Visit with Preethi Gibson MD   [facility] ([facility])    [address] [place] [address]  4th [facility] [id number] [phone]              Who to contact     If you have questions or need follow up information about today's clinic visit or your schedule please contact [facility] directly at [phone].  Normal or non-critical lab and imaging results will be communicated to you by MyChart, letter or phone within 4 business days after the clinic has received the results. If you do not hear from us within 7 days, please contact the clinic through MyChart or phone. If you have a critical or abnormal lab result, we will notify you by phone as soon as possible.  Submit refill requests through [employer] or call your pharmacy and they will forward the refill request to us. Please allow 3 business days for your refill to be completed.          Additional Information About  "Your Visit        MyChart Information     [employer] lets you send messages to your doctor, view your test results, renew your prescriptions, schedule appointments and more. To sign up, go to [email or web address] . Click on \"Log in\" on the left side of the screen, which will take you to the Welcome page. Then click on \"Sign up Now\" on the right side of the page.     You will be asked to enter the access code listed below, as well as some personal information. Please follow the directions to create your username and password.     Your access code is: [email or web address]  Expires: 2017  8:42 AM     Your access code will  in 90 days. If you need help or a new code, please call your [place] [facility] clinic or [phone].        Care EveryWhere ID     This is your Care EveryWhere ID. This could be used by other organizations to access your [place] [facility] medical records  [id number]        Your Vitals Were     Pulse Respirations Height Pulse Oximetry BMI (Body Mass Index)       99 16 1.575 m (5' 2\") 99% 39.69 kg/m2        Blood Pressure from Last 3 Encounters:   17 150/82   17 145/83   17 119/81    Weight from Last 3 Encounters:   17 98.4 kg (217 lb)   17 97.7 kg (215 lb 6.2 oz)   17 99 kg (218 lb 4.8 oz)              We Performed the Following     SLEEP EVALUATION & MANAGEMENT REFERRAL - ADULT -[facility]  [phone] (Age 2 and up)     SLEEP PSYCHOLOGY REFERRAL        Primary Care Provider Office Phone # Fax #    Preethi Gibson [phone]       [address] [address]        Equal Access to Services     GERBER DAVENPORT : Luís Anderson, nathaniel muniz, clare buchanan. So [facility] [phone].    ATENCIÓN: Si habla español, tiene a stoner disposición servicios gratuitos de asistencia lingüística. Llame al " [phone].    We comply with applicable federal civil rights laws and [place] laws. We do not discriminate on the basis of race, color, national origin, age, disability, sex, sexual orientation, or gender identity.            Thank you!     [facility]  [facility]. Our goal is always to provide you with excellent care. Hearing back from our patients is one way we can continue to improve our services. Please take a few minutes to complete the written survey that you may receive in the mail after your visit with us. Thank you!             Your Updated Medication List - Protect others around you: Learn how to safely use, store and throw away your medicines at www.disposemymeds.org.          This list is accurate as of: 12/6/17  2:31 PM.  Always use your most recent med list.                   Brand Name Dispense Instructions for use Diagnosis    amoxicillin-clavulanate 500-125 MG per tablet    AUGMENTIN    20 tablet    Take 1 tablet by mouth 2 times daily    Kidney replaced by transplant       artificial saliva Aers spray     1 Bottle    Take 1 spray by mouth every 6 hours as needed for dry mouth    Dry mouth       atorvastatin 20 MG tablet    LIPITOR    30 tablet    Take 1 tablet (20 mg) by mouth daily Pt need labs done prior to next refill    Hypercholesteremia       carvedilol 6.25 MG tablet    COREG    360 tablet    Take 2 tablets (12.5 mg) by mouth 2 times daily (with meals)    Hypertension secondary to other renal disorders       cholecalciferol 1000 UNITS capsule    vitamin  -D    180 capsule    Take 2 capsules (2,000 Units) by mouth daily    Vitamin D deficiency       cloNIDine 0.1 MG tablet    CATAPRES    60 tablet    Take 1 tablet (0.1 mg) by mouth 2 times daily as needed For systolic BP >180    HTN (hypertension)       darbepoetin bucky 300 MCG/0.6ML injection    ARANESP (ALBUMIN FREE)    0.6 mL    Inject 0.6 mLs (300 mcg) Subcutaneous every 28 days As needed for  hgb<10g/dL    Chronic kidney disease, stage III (moderate), Anemia in stage 3 chronic kidney disease       folic acid 1 MG tablet    FOLVITE    30 tablet    Take 1 tablet (1 mg) by mouth daily    Preventive measure       hydrALAZINE 25 MG tablet    APRESOLINE    90 tablet    Take 1 tablet (25 mg) by mouth 3 times daily as needed For systolic BP >170    HTN (hypertension)       hydrocortisone 1 % cream    CORTAID    60 g    Apply topically 2 times daily    Rash, skin       mycophenolate 250 MG capsule     120 capsule    Take 2 capsules (500 mg) by mouth 2 times daily    Kidney transplanted       NIFEdipine ER osmotic 30 MG 24 hr tablet    PROCARDIA XL    180 tablet    Take 1 tablet (30 mg) by mouth 2 times daily    HTN (hypertension)       pantoprazole 40 MG EC tablet    PROTONIX    30 tablet    Take 1 tablet (40 mg) by mouth daily    Gastroesophageal reflux disease with esophagitis       predniSONE 5 MG tablet    DELTASONE    30 tablet    Take 1 tablet (5 mg) by mouth daily    Kidney transplanted       sodium bicarbonate 650 MG tablet     180 tablet    Take 1 tablet (650 mg) by mouth 2 times daily    Acidosis       vitamin D 85649 UNIT capsule    ERGOCALCIFEROL    12 capsule    Take 1 capsule (50,000 Units) by mouth once a week    Vitamin D deficiency

## 2017-12-06 NOTE — TELEPHONE ENCOUNTER
I spoke to Eleni  to remind her that she is due for Hgb lab and possibly an aranesp dose, but she said she forgot to dose on 11/21 because she went to [place],  She dosed on 11/30 and will RTC for her Hgb lab and aranesp dose if needed on 12/18/17  I corrected the flowsheet.    Anemia Latest Ref Rng & Units 9/19/2017 10/4/2017 10/23/2017 11/1/2017 11/16/2017 11/21/2017 11/30/2017   HGB Goal - - - - - - - -   VALARIE Dose - - - - 300 mcg - - 300 mcg   Hemoglobin 11.7 - 15.7 g/dL 10.2(L) 10.5(L) 9.8(L) - 9.4(L) 9.6(L) -   TSAT 15 - 46 % - 51(H) - - - - -   Ferritin 8 - 252 ng/mL - 838(H) - - - - -   PRBCs - - - - - - - -       Follow up Date:  12/18/17    [place]

## 2017-12-06 NOTE — PATIENT INSTRUCTIONS
Your BMI is Body mass index is 39.69 kg/(m^2).  Weight management is a personal decision.  If you are interested in exploring weight loss strategies, the following discussion covers the approaches that may be successful. Body mass index (BMI) is one way to tell whether you are at a healthy weight, overweight, or obese. It measures your weight in relation to your height.  A BMI of 18.5 to 24.9 is in the healthy range. A person with a BMI of 25 to 29.9 is considered overweight, and someone with a BMI of 30 or greater is considered obese. More than two-thirds of American adults are considered overweight or obese.  Being overweight or obese increases the risk for further weight gain. Excess weight may lead to heart disease and diabetes.  Creating and following plans for healthy eating and physical activity may help you improve your health.  Weight control is part of healthy lifestyle and includes exercise, emotional health, and healthy eating habits. Careful eating habits lifelong are the mainstay of weight control. Though there are significant health benefits from weight loss, long-term weight loss with diet alone may be very difficult to achieve- studies show long-term success with dietary management in less than 10% of people. Attaining a healthy weight may be especially difficult to achieve in those with severe obesity. In some cases, medications, devices and surgical management might be considered.  What can you do?  If you are overweight or obese and are interested in methods for weight loss, you should discuss this with your provider.     Consider reducing daily calorie intake by 500 calories.     Keep a food journal.     Avoiding skipping meals, consider cutting portions instead.    Diet combined with exercise helps maintain muscle while optimizing fat loss. Strength training is particularly important for building and maintaining muscle mass. Exercise helps reduce stress, increase energy, and improves fitness.  Increasing exercise without diet control, however, may not burn enough calories to loose weight.       Start walking three days a week 10-20 minutes at a time    Work towards walking thirty minutes five days a week     Eventually, increase the speed of your walking for 1-2 minutes at time    In addition, we recommend that you review healthy lifestyles and methods for weight loss available through the National Institutes of Health patient information sites:  http://win.niddk.nih.gov/publications/index.htm    And look into health and wellness programs that may be available through your health insurance provider, employer, local community center, or brian club.    Weight management plan: Patient was referred to their PCP to discuss a diet and exercise plan.    Your blood pressure was checked while you were in clinic today.  Please read the guidelines below about what these numbers mean and what you should do about them.  Your systolic blood pressure is the top number.  This is the pressure when the heart is pumping.  Your diastolic blood pressure is the bottom number.  This is the pressure in between beats.  If your systolic blood pressure is less than 120 and your diastolic blood pressure is less than 80, then your blood pressure is normal. There is nothing more that you need to do about it  If your systolic blood pressure is 120-139 or your diastolic blood pressure is 80-89, your blood pressure may be higher than it should be.  You should have your blood pressure re-checked within a year by a primary care provider.  If your systolic blood pressure is 140 or greater or your diastolic blood pressure is 90 or greater, you may have high blood pressure.  High blood pressure is treatable, but if left untreated over time it can put you at risk for heart attack, stroke, or kidney failure.  You should have your blood pressure re-checked by a primary care provider within the next four weeks.  MY INFORMATION ON SLEEP APNEA-   "Eleni Logan    DOCTOR : Frankie Bull  SLEEP CENTER :      MY CONTACT NUMBER:   [facility]  [phone]  [facility]   [phone]  [facility]   [phone]      [facility]  [phone]  [facility]   [phone]      Shah Points:  1. What is Obstructive Sleep Apnea (RIZWAN)? RIZWAN is the most common type of sleep apnea. Apnea literally means, \"without breath.\" It is characterized by repetitive pauses in breathing, despite continued effort to breathe, and is usually associated with a reduction in blood oxygen saturation. Apneas can last 10 to over 60 seconds. It is caused by narrowing or collapse of the upper airway as muscles relax during sleep.   2. What are the consequences of RIZWAN? Symptoms include: daytime sleepiness- possibly increasing the risk of falling asleep while driving, unrefreshing/restless sleep, snoring, insomnia, waking frequently to urinate, waking with heartburn or reflux, reduced concentration and memory, and morning headaches. Other health consequences may include development of high blood pressure. Untreated RIZWAN also can contribute to heart disease, stroke and diabetes.   3. What are the treatment options? In most situations, sleep apnea is a lifelong disease that must be managed with daily therapy. Continuous Positive Airway (CPAP) is the most reliable treatment. A mouthguard to hold your jaw forward is usually the next most reliable option. Other options include postioning devices (to keep you off your back), nasal valves, tongue retaining device, weight loss, surgery. There is more detail about these options toward the end of this document.  4. What are the most important things to remember about using CPAP?     WHERE CAN I FIND MORE INFORMATION?    American Academy of Sleep Medicine Patient information on sleep disorders:  http://yoursleep.aasmnet.org    CPAP -  WHY AND HOW?   " "              Continuous positive airway pressure, or CPAP, is the most effective treatment for obstructive sleep apnea. It works by blowing room air, through a mask, to hold your throat open. A decision to use CPAP is a major step forward in the pursuit of a healthier life. The successful use of CPAP will help you breathe easier, sleep better and live healthier. Using CPAP can be a positive experience if you keep these martinez points in mind:  1. Commitment  CPAP is not a quick fix for your problem. It involves a long-term commitment to improve your sleep and your health.    2. Communication  Stay in close communication with both your sleep doctor and your CPAP supplier. Ask lots of questions and seek help when you need it.    3. Consistency  Use CPAP all night, every night and for every nap. You will receive the maximum health benefits from CPAP when you use it every time that you sleep. This will also make it easier for your body to adjust to the treatment.    4. Correction  The first machine and mask that you try may not be the best ones for you. Work with your sleep doctor and your CPAP supplier to make corrections to your equipment selection. Ask about trying a different type of machine or mask if you have ongoing problems. Make sure that your mask is a good fit and learn to use your equipment properly.    5. Challenge  Tell a family member or close friend to ask you each morning if you used your CPAP the previous night. Have someone to challenge you to give it your best effort.    6. Connection   Your adjustment to CPAP will be easier if you are able to connect with others who use the same treatment. Ask your sleep doctor if there is a support group in your area for people who have sleep apnea, or look for one on the Internet.  7. Comfort   Increase your level of comfort by using a saline spray, decongestant or heated humidifier if CPAP irritates your nose, mouth or throat. Use your unit's \"ramp\" setting to " slowly get used to the air pressure level. There may be soft pads you can buy that will fit over your mask straps. Look on www.CPAP.com for accessories that can help make CPAP use more comfortable.  8. Cleaning   Clean your mask, tubing and headgear on a regular basis. Put this time in your schedule so that you don't forget to do it. Check and replace the filters for your CPAP unit and humidifier.    9. Completion   Although you are never finished with CPAP therapy, you should reward yourself by celebrating the completion of your first month of treatment. Expect this first month to be your hardest period of adjustment. It will involve some trial and error as you find the machine, mask and pressure settings that are right for you.    10. Continuation  After your first month of treatment, continue to make a daily commitment to use your CPAP all night, every night and for every nap.    CPAP-Tips to starting with success:  Begin using your CPAP for short periods of time during the day while you watch TV or read.    Use CPAP every night and for every nap. Using it less often reduces the health benefits and makes it harder for your body to get used to it.    Newer CPAP models are virtually silent; however, if you find the sound of your CPAP machine to be bothersome, place the unit under your bed to dampen the sound.     Make small adjustments to your mask, tubing, straps and headgear until you get the right fit. Tightening the mask may actually worsen the leak.  If it leaves significant marks on your face or irritates the bridge of your nose, it may not be the best mask for you.  Speak with the person who supplied the mask and consider trying other masks. Insurances will allow you to try different masks during the first month of starting CPAP.  Insurance also covers a new mask, hose and filter about every 6 months.    Use a saline nasal spray to ease mild nasal congestion. Neti-Pot or saline nasal rinses may also help.  Nasal gel sprays can help reduce nasal dryness.  Biotene mouthwash can be helpful to protect your teeth if you experience frequent dry mouth.  Dry mouth may be a sign of air escaping out of your mouth or out of the mask in the case of a full face mask.  Speak with your provider if you expect that is the case.     Take a nasal decongestant to relieve more severe nasal or sinus congestion.  Do not use Afrin (oxymetazoline) nasal spray more than 3 days in a row.  Speak with your sleep doctor if your nasal congestion is chronic.    Use a heated humidifier that fits your CPAP model to enhance your breathing comfort. Adjust the heat setting up if you get a dry nose or throat, down if you get condensation in the hose or mask.  Position the CPAP lower than you so that any condensation in the hose drains back into the machine rather than towards the mask.    Try a system that uses nasal pillows if traditional masks give you problems.    Clean your mask, tubing and headgear once a week. Make sure the equipment dries fully.    Regularly check and replace the filters for your CPAP unit and humidifier.    Work closely with your sleep provider and your CPAP supplier to make sure that you have the machine, mask and air pressure setting that works best for you. It is better to stop using it and call your provider to solve problems than to lay awake all night frustrated with the device.    BESIDES CPAP, WHAT OTHER THERAPIES ARE THERE?    Postioning devices if you only have the snoring or apnea while on your back    Dental devices if your condition is mild    Nasal valves may be effective though experience is limited    Weight loss if you are overweight    Surgery in limited cases where devices are not acceptable or there are problems with structures in the nose and throat  If treated with one of these alternative options, further evaluation is necessary to ensure that the therapy is effective. This may require some form of repeat  testing.      Healthy Lifestyle:  Healthy diet, exercise and limit alcohol: Not only will excessive alcohol increase your weight over time, but it irritates the throat tissues and make them swell, shrinking the airway and causing snoring. Drinking alcohol should be limited and stopped within 3-4 hours before going to bed.   Stop smoking: (Red swollen throat, heat, nicotine), also irritates and swells the airway, among numerous other negative health consequences.    Positioning Device  This example shows a pillow that straps around the waist. It may be appropriate for those whose sleep study shows milder sleep apnea that occurs primarily when lying flat on one's back. Preliminary studies have shown benefit but effectiveness at home should be verified.                      Oral Appliance  These are examples of two of many custom-made devices that are more likely to work in mild sleep apnea   Oral appliances are dental mouth pieces that fit very much like a sports mouth guards or removable orthodontic retainers. They are used to treat snoring and obstructive sleep apnea . The device prevents the airway from collapsing by either supporting the jaw in a forward position. Since oral appliances are non-invasive and easy to use, they may be considered as an early treatment option. Oral appliance therapy (OAT) involves the customization, selection, fabrication, fitting, adjustments and long-term follow-up care.  Custom made oral appliances are proven to be more effective than over-the-counter devices. Therefore, the over-the-counter devices are recommended not to be used as a screening tool nor as a therapeutic option.     Who gets a dental device?  Oral appliance therapy can be used as an alternative to CPAP therapy for the treatment of mild to moderate sleep apnea and for those patients who prefer OAT to CPAP. Oral appliance therapy is a first line therapy for the treatment of primary snoring. Additionally, OAT is an  option for those that cannot tolerate CPAP as therapy or who have experienced insufficient surgical results.                 Possible side effects?  Frequent but minor side effects include: excessive salivation, dry mouth, discomfort of teeth and jaw and temporary changes in the patient s bite.  Potential complications include: jaw pain, permanent occlusal changes and TMJ symptoms.  The above mentioned side effects and complications can be recognized and managed by dentists trained in dental sleep medicine.   Finding a dentist that practices dental sleep medicine  Specific training is available through the American Academy of Dental Sleep Medicine for dentists interested in working in the field of sleep. To find a dentist who is educated in the field of sleep and the use of oral appliances, near you, visit the Web site of the American Academy of Dental Sleep Medicine; also see http://www.accpstorage.org/newOrganization/patients/oralAppliances.pdf   To search for a dentist certified in these practices:  Http://aadsm.org/FindADentist.aspx?1  Nasal Valves              Nasal valves may be an option for mild apnea if other options are not well tolerated. The efficacy of these devices is generally less than CPAP or oral appliances.They may not be effective if you have frequent nasal congestion or have difficulty breathing through your nose.   Weight Loss:    Weight loss decreases severity of sleep apnea in most people with obesity. For those with mild obesity who have developed snoring with weight gain, even 15-30 pound weight loss can improve and occasionally eliminate sleep apnea.  Structured and life-long dietary and health habits are necessary to lose weight and keep healthier weight levels.     Though there are significant health benefits from weight loss, long-term weight loss is very difficult to achieve- studies show success with dietary management in less than 10% of people. In addition, substantial weight loss  may require years of dietary control and may be difficult if patients have severe obesity. In these cases, surgical management may be considered.    If you are interested in methods for weight loss, you should review the options discussed at the National Institutes of Health patient information sites:     http://win.niddk.nih.gov/publications/index.htm  http:/www.health.nih.gov/topic/WeightLossDieting    Bariatric programs offer counseling in all methods of weight loss:    Http:/www.[facility].org/Specialties/WeightLossSurgeryandMedicalMgmt/htm    Your BMI is Body mass index is 39.69 kg/(m^2).    Weight management plan: Patient was referred to their PCP to discuss a diet and exercise plan.    Body mass index (BMI) is one way to tell whether you are at a healthy weight, overweight, or obese. It measures your weight in relation to your height.  A BMI of 18.5 to 24.9 is in the healthy range. A person with a BMI of 25 to 29.9 is considered overweight, and someone with a BMI of 30 or greater is considered obese.  Another way to find out if you are at risk for health problems caused by overweight and obesity is to measure your waist. If you are a woman and your waist is more than 35 inches, or if you are a man and your waist is more than 40 inches, your risk of disease may be higher.  More than two-thirds of American adults are considered overweight or obese. Being overweight or obese increases the risk for further weight gain.  Excess weight may lead to heart disease and diabetes. Creating and following plans for healthy eating and physical activity may help you improve your health.    Methods for maintaining or losing weight.    Weight control is part of healthy lifestyle and includes exercise, emotional health, and healthy eating habits.  Careful eating habits lifelong is the mainstay of weight control.  Though there are significant health benefits from weight loss, long-term weight loss with diet alone may be  "very difficult to achieve- studies show long-term success with dietary management in less than 10% of people. Attaining a healthy weight may be especially difficult to achieve in those with severe obesity. In some cases, medications, devices and surgical management might be considered.    What can you do?    If you are overweight or obese and are interested in methods for weight loss, you should discuss this with your provider. In addition, we recommend that you review healthy life styles and methods for weight loss available through the National Institutes of Health patient information sites:     http://win.niddk.nih.gov/publications/index.htm      Surgery:  There are a number of surgeries that have been attempted to treat apnea. In general, surgical options are usually reserved for cases in which there is a physical abnormality contributing to obstruction or other treatment options are ineffective or not tolerated. Most surgical options are either unreliable or quite invasive. One of the more common procedures is:  Uvulopalatopharyngoplasty: In this procedure, the uvula (the finger-like tissue that hangs in the back of the throat), part of the soft palate (the tissue that the uvula is attached to), and sometimes the tonsils or adenoids are removed. The efficacy of this surgery is around 30-50% .  After surgery, complications may include:  Sleepiness and sleep apnea related to post-surgery medication   Swelling, infection and bleeding   A sore throat and/or difficulty swallowing   Drainage of secretions into the nose and a nasal quality to the voice. English language speech does not seem to be affected by this surgery.   Narrowing of the airway in the nose and throat (hence constricting breathing) snoring and even iatrogenically caused sleep apnea. By cutting the tissues, excess scar tissue can \"tighten\" the airway and make it even smaller than it was before UPPP.  Patients who have had the uvula removed will " become unable to correctly speak U[place] or any other language that has a uvular 'r' phoneme.    Surgeries to help resolve nasal congestion may help reduce the severity of apnea slightly. Nasal congestion does not cause apnea on its own, so these surgeries are usually not performed just for RIZWAN.  They may be worth considering if the nasal congestion is significantly bothersome independent of apnea.      Stress, tension, and anxiety can be big factors contributing to insomnia.  If you can t relax your mind and body, then you won t be able to sleep.  You would likely benefit from trying some of the relaxation exercises that we ve assembled below.  These are all internet based links.  If you don t have or use a computer, you may benefit from one of the stress management books listed below that you can get at your public library or at a local bookstore for a relatively low price.      Copy and paste into web browser address window   https://www.TalentClick.com/watch?v=Xpmw7meMuAu    Progressive Muscle Relaxation (PMR):     http://www.[employer]/progressive-muscle-relaxation-exercise.html     http://studentsupport.[facility]/counseling/resources/self-help/relaxation-and-stress-management/   Deep Breathing Exercises:    http://www.[employer]/breathing-awareness.html     Meditation:     wwwMoven    www.YapertmeditationStudyCloudsite.com You may have to pay for some of these resources.    Guided Imagery:    http://www.[employer]/guided-imagery-scripts.html     http://S[employer]obe/library/ukpajelpzy-oevztd-ywuhsif/    [place] site under construction : http://www.[facility].org/Services/SleepMedicine/Audio/index.htm      Please contact the sleep psychologist for an appointment for insomnia treatment.    Complete your sleep diary every morning.    Please have regular sleep/wake times.    Consider having another sleep study to evaluate your breathing while you  sleep.    Sometimes insomnia can be made worse by our own habits or situation.  You should consider making some of the following changes to help improve your sleep:     If there is excessive noise in your house / neighborhood use a fan or similar device to make a quiet background noise that can drown out other noises    If your room is too bright early in the morning, hang a blanket or extra curtain over the windows to keep the light out or use a sleeping mask to cover your eyes    If your room is too warm during the night, set the temperature in the house down a few degrees for the night    Spend a little time before bedtime trying to relax.  Don t work right up until bedtime.  Take 30-60 minutes to relax and unwind before bedtime with a book or watching TV    Do not drink anything with alcohol or caffeine in them for at least 4-5 hours before bedtime    Do not smoke right before bedtime or during the night    No strenuous exercise for 2-3 hours before bedtime

## 2017-12-06 NOTE — PROGRESS NOTES
"Sleep Consultation Note:    Date on this visit: 12/6/2017    Eleni Logan is sent by Preethi Gibson for a sleep consultation regarding insomnia and sleep apnea.    Primary Physician: Preethi Gibson     CC:  \"I have problem.  I can't sleep.\"    Eleni Logan is a 61 year old female with PMH HTN, asthma, CKD s/p kidney transplant, obesity who reported difficulty falling asleep since 1999 when she stopped working and had kidney transplant. She has had a previous sleep study, in the early 2000's at [facility] and was diagnosed with RIZWAN but she said she didn't tolerate CPAP (used it for one month) .    Sleep Disordered Breathing  Eleni does report snoring,  dry mouth,  non-refreshing sleep, fatigue.  Eleni has gained 50 lbs in last 20 years    Sleep Schedule/Sleep Complaints  She does report difficulty with falling asleep. Eleni goes to bed at midnight-2 AM, and it usually takes 60 minutes to fall asleep.    Eleni does not report restlessness feelings in the legs.  She does not complain of spontaneous leg movements/jerks in the middle of the night.    Patient does report ruminating thoughtswhich affect the onset of sleep.    Patient does not use electronics in bed - Ipad  Patient does not  have a regular bed partner.  Patient sleeps on her side.  Patient does not have any pets in the bedroom at night during sleep.  She does not use a sleep aid.  In the past, used to take zolpidem 5mg and she said it was helpful .    She does complain of difficulty with staying asleep.  She wakes up 3 times throughout the night.  Night time awakenings occurs due to need to urinate.    Patient doespain, ruminating thought which affects the maintenance of sleep.  After awakening, She is able to fall back asleep after 30-60 minutes.  She estimates she sleeps about 3 hours/night.    She wakes up at 5 AM, with out an alarm.  She would naturally to go to sleep at midnight and up at 6am.    Father had insomnia.    Sleep " Behaviors  She denies any cataplexy, sleep paralysis, sleep hallucinations.    She denies any night time behaviors - sleep walking, sleep talking, sleep eating.    She does not report history of acting out dreams.  Patient denies any injury to oneself or others while sleeping.    Daytime Functioning  Eleni denies napping.    She does not doze off during the day.    She denies drowsy driving.    Patient's [place] Sleepiness score 7/24.    Social History  Eleni currently is currently disabled.    She doesn't usually drink caffeine.  Last caffeine intake is not within 6 hours of bed time.  She drinks alcohol, 2 drinks/month.  Does use alcohol as a sleep aid a couple times a month.  She said she will drink one of wine if she hasn't slept for multiple night in a row. Advised not to use alcohol for sleep, and that it can worsen breathing during sleep..  Patient is a never smoker. Denies any secondhand exposure.  Patient reported she does not use drugs.  No future surgeries planned.    Studies/labs:  Results for ELENI VAZQUEZ (MRN [id number]) as of 12/6/2017 13:34   Ref. Range 11/21/2017 13:40   Potassium Latest Ref Range: 3.4 - 5.3 mmol/L 5.0   Creatinine Latest Ref Range: 0.52 - 1.04 mg/dL 2.20 (H)   GFR Estimate Latest Ref Range: >60 mL/min/1.7m2 22 (L)   GFR Estimate If Black Latest Ref Range: >60 mL/min/1.7m2 27 (L)   Hemoglobin A1C Latest Ref Range: 4.3 - 6.0 % 5.6   WBC Latest Ref Range: 4.0 - 11.0 10e9/L 6.9   Hemoglobin Latest Ref Range: 11.7 - 15.7 g/dL 9.6 (L)   Hematocrit Latest Ref Range: 35.0 - 47.0 % 31.1 (L)   Platelet Count Latest Ref Range: 150 - 450 10e9/L 270   RBC Count Latest Ref Range: 3.8 - 5.2 10e12/L 3.89   MCV Latest Ref Range: 78 - 100 fl 80   MCH Latest Ref Range: 26.5 - 33.0 pg 24.7 (L)   MCHC Latest Ref Range: 31.5 - 36.5 g/dL 30.9 (L)   RDW Latest Ref Range: 10.0 - 15.0 % 16.3 (H)   Diff Method Unknown Automated Method   % Neutrophils Latest Units: % 79.6   % Lymphocytes Latest Units: %  13.9   % Monocytes Latest Units: % 4.5   % Eosinophils Latest Units: % 1.3   % Basophils Latest Units: % 0.4   % Immature Granulocytes Latest Units: % 0.3   Nucleated RBCs Latest Ref Range: 0 /100 0   Absolute Neutrophil Latest Ref Range: 1.6 - 8.3 10e9/L 5.5   Absolute Lymphocytes Latest Ref Range: 0.8 - 5.3 10e9/L 1.0   Absolute Monocytes Latest Ref Range: 0.0 - 1.3 10e9/L 0.3   Absolute Eosinophils Latest Ref Range: 0.0 - 0.7 10e9/L 0.1   Absolute Basophils Latest Ref Range: 0.0 - 0.2 10e9/L 0.0   Abs Immature Granulocytes Latest Ref Range: 0 - 0.4 10e9/L 0.0   Absolute Nucleated RBC Unknown 0.0     Echo 10/1/16:  Normal dobutamine stress echocardiogram without evidence of inducible  ischemia. Target heart rate was achieved. Heart rate and blood pressure  response to dobutamine were normal. With stress, the left ventricular  ejection fraction increased from 55-60% to greater than 65% and the left  ventricular size decreased appropriately. There was no ECG evidence of  ischemia.  There is mild to moderate (1-2+) tricuspid regurgitation.  Mild-moderate pulmonary hypertension is present.  PFT's 3/11/16: The FVC, FEV1 are minimally reduced but the FEV1/FVC ratio are normal.  The diffusing capacity is normal.  IMPRESSION:  Minimally reduced Spirometry  No Diffusion Defect    Allergies:    Allergies   Allergen Reactions     Ciprofloxacin Palpitations       Medications:    Current Outpatient Prescriptions   Medication Sig Dispense Refill     artificial saliva (BIOTENE MT) AERS spray Take 1 spray by mouth every 6 hours as needed for dry mouth 1 Bottle 3     predniSONE (DELTASONE) 5 MG tablet Take 1 tablet (5 mg) by mouth daily 30 tablet 11     sodium bicarbonate 650 MG tablet Take 1 tablet (650 mg) by mouth 2 times daily 180 tablet 3     hydrALAZINE (APRESOLINE) 25 MG tablet Take 1 tablet (25 mg) by mouth 3 times daily as needed For systolic BP >170 90 tablet 3     NIFEdipine ER osmotic (PROCARDIA XL) 30 MG 24 hr tablet  Take 1 tablet (30 mg) by mouth 2 times daily 180 tablet 3     cloNIDine (CATAPRES) 0.1 MG tablet Take 1 tablet (0.1 mg) by mouth 2 times daily as needed For systolic BP >180 60 tablet 3     vitamin D (ERGOCALCIFEROL) 68278 UNIT capsule Take 1 capsule (50,000 Units) by mouth once a week 12 capsule 0     atorvastatin (LIPITOR) 20 MG tablet Take 1 tablet (20 mg) by mouth daily 90 tablet 0     carvedilol (COREG) 6.25 MG tablet Take 2 tablets (12.5 mg) by mouth 2 times daily (with meals) 360 tablet 1     cholecalciferol (VITAMIN  -D) 1000 UNITS capsule Take 2 capsules (2,000 Units) by mouth daily 180 capsule 3     pantoprazole (PROTONIX) 40 MG EC tablet Take 1 tablet (40 mg) by mouth daily 30 tablet 11     folic acid (FOLVITE) 1 MG tablet Take 1 tablet (1 mg) by mouth daily 30 tablet 11     darbepoetin bucky (ARANESP, ALBUMIN FREE,) 300 MCG/0.6ML injection Inject 0.6 mLs (300 mcg) Subcutaneous every 28 days As needed for hgb<10g/dL 0.6 mL 11     CELLCEPT 250 MG PO CAPSULE Take 2 capsules (500 mg) by mouth 2 times daily 120 capsule 11     hydrocortisone (CORTAID) 1 % cream Apply topically 2 times daily 60 g 1     amoxicillin-clavulanate (AUGMENTIN) 500-125 MG per tablet Take 1 tablet by mouth 2 times daily (Patient not taking: Reported on 12/6/2017) 20 tablet 0       Problem List:  Patient Active Problem List    Diagnosis Date Noted     Fistula 09/19/2017     Priority: Medium     End-stage renal disease (ESRD) (H) 09/19/2017     Priority: Medium     Immunosuppression (H) 04/02/2017     Priority: Medium     CKD (chronic kidney disease) stage 4, GFR 15-29 ml/min (H) 01/10/2017     Priority: Medium     Localized pustular psoriasis 10/06/2016     Priority: Medium     Chest pain 10/02/2016     Priority: Medium     Urinary tract infection 10/02/2016     Priority: Medium     Anemia 02/16/2016     Priority: Medium     UTI (urinary tract infection) 04/13/2015     Priority: Medium     Asthma exacerbation 09/02/2014     Priority:  Medium     Rash 03/14/2013     Priority: Medium     Chronic rhinitis 05/18/2012     Priority: Medium     Essential hypertension, benign 05/18/2012     Priority: Medium     S/P kidney transplant 05/18/2012     Priority: Medium        Past Medical/Surgical History:  Past Medical History:   Diagnosis Date     Anatomical narrow angle      Anemia      Gout      HTN (hypertension)      Hyperlipidaemia      IgA nephropathy      Immunosuppressed status (H)     Prednisone and cellcept     Nonsenile cataract      Obstructive sleep apnea      Past Surgical History:   Procedure Laterality Date     ARTHROPLASTY KNEE       COLONOSCOPY N/A 2/18/2016    Procedure: COLONOSCOPY;  Surgeon: Markie Squires MD;  Location: [facility]     CREATE FISTULA ARTERIOVENOUS UPPER EXTREMITY Right 9/19/2017    Procedure: CREATE FISTULA ARTERIOVENOUS UPPER EXTREMITY;  Right Rm Basilic Vein Fistula, Cephalic Vein Thrombectomy.;  Surgeon: Brandy Bond MD;  Location: [facility]     ESOPHAGOSCOPY, GASTROSCOPY, DUODENOSCOPY (EGD), COMBINED N/A 2/17/2016    Procedure: COMBINED ESOPHAGOSCOPY, GASTROSCOPY, DUODENOSCOPY (EGD);  Surgeon: Markie Squires MD;  [facility]     HC CAPSULE ENDOSCOPY N/A 2/18/2016    Procedure: CAPSULE/PILL CAM ENDOSCOPY;  Surgeon: Markie Squires MD;  Location: [facility]     LASER YAG IRIDOTOMY BILATERAL  2008     RENAL TRANSPLANT PROGRAM ADULT IP CONSULT       REVISION FISTULA ARTERIOVENOUS UPPER EXTREMITY Right 11/21/2017    Procedure: REVISION FISTULA ARTERIOVENOUS UPPER EXTREMITY;  Right Arm Basilic Vein Transposition,  Anesthesia Block;  Surgeon: Brandy Bond MD;  Location: [facility]     TRANSPLANT  07/1999    kidney       Social History:  Social History     Social History     Marital status:      Spouse name: N/A     Number of children: N/A     Years of education: N/A     Occupational History     Not on file.     Social History Main Topics     Smoking status: Never Smoker     Smokeless tobacco:  "Never Used      Comment: Has been around second hand smoke     Alcohol use Yes      Comment: Once in a great while     Drug use: No     Sexual activity: Not Currently     Other Topics Concern     Not on file     Social History Narrative       Family History:  Family History   Problem Relation Age of Onset     KIDNEY DISEASE Mother      Macular Degeneration No family hx of      Eye Surgery No family hx of      Glaucoma No family hx of      DIABETES No family hx of      Hypertension No family hx of      Amblyopia No family hx of      Skin Cancer No family hx of      Melanoma No family hx of        Review of Systems:    CONSTITUTIONAL: NEGATIVE for weight gain/loss, fever, chills, sweats or night sweats  EYES: NEGATIVE for changes in vision, blind spots, double vision.  ENT: NEGATIVE for ear pain, sore throat, sinus pain, post-nasal drip, runny nose, bloody nose  CARDIAC:  breathlessness when lying flat (uses 2 pillows)  NEUROLOGIC: NEGATIVE headaches, weakness or numbness in the arms or legs.  DERMATOLOGIC: NEGATIVE for rashes, new moles or change in mole(s)  PULMONARY:  sob with activity  GASTROINTESTINAL: NEGATIVE for nausea or vomitting, loose or watery stools, fat or grease in stools, constipation, abdominal pain, bowel movements black in color or blood noted.  GENITOURINARY: NEGATIVE for pain during urination, blood in urine, urinating more frequently than usual  MUSCULOSKELETAL:  bone or joint pain if walks very much  ENDOCRINE:  increased thirst or urination  LYMPHATIC: NEGATIVE for swollen lymph nodes, lumps or bumps in the breasts or nipple discharge.  PSYCHE:  anxiety    Physical Examination:  Vitals: /82  Pulse 99  Resp 16  Ht 1.575 m (5' 2\")  Wt 98.4 kg (217 lb)  SpO2 99%  BMI 39.69 kg/m2  BMI= Body mass index is 39.69 kg/(m^2).         Backus Total Score 12/6/2017   Total score - Backus 7       General: No apparent distress, appropriately groomed  Head: Normocephalic, atraumatic  Eyes: no " icterus, EOMI  Nose: Nares patent. No exudate/erythema. No septal deviation noted.  Orophraynx: Opening is very narrowed   Mallampati Class: IV   Tonsillar Stage: unable to visualize  Neck: Supple, Circumference: 16 inches  Cardiac: Regular rate and rhythm  Chest: Symmetric air movement, lungs clear to auscultation bilaterally  GI: Abdomen obese  Musculoskeletal: no edema noted  Skin: Warm, dry, intact  Psych: pleasant, mood she denied depression, affect was slightly restricted  Mental status: Awake, alert, attentive, oriented.    Impression/Plan:    Snoring  Past diagnosis Obstructive sleep apnea  HTN    Eleni Logan is a 61 year old female with PMH HTN, asthma, CKD s/p kidney transplant, obesity who has a past diagnosis of obstructive sleep apnea and CPAP intolerance (severity of RIZWAN is not known because we were unable to locate past PSG).  In-lab poloysomnogram was recommended as she has gained weight and had multiple changes in medical conditions.  However, Ms. Logan declined to do a sleep study at this time.  When she returns for follow-up, discussion of the need to have sleep study should be discussed again.  I recommended that she have a referral for treatment of anxiety as this likely is affecting her insomnia, but she declined the offer for a referral. She will continue to sleep with head of bed elevated.Treatment for RIZWAN have been discussed.     Chronic insomnia, delayed sleep phase?, poor sleep hygeine    Insomnia is multifactorial (anxiety, psychophysiologic, and poor sleep hygiene).  Strongly encouraged to follow good sleep hygiene/behavioral techniques including regular/sleep wake times.  She would benefit from Cognitive Behavioral Therapy for Insomnia and sleep restriction, referral has been placed. Patient advised to bring sleep logs to the CBT-I appointment.  She was advised that she should not drink alcohol to induce sleep.    Encourage weight loss, healthy diet, and exercise.    Patient was  strongly advised to avoid driving, operating any heavy machinery or other hazardous situations while drowsy or sleepy.  Patient was counseled on the importance of driving while alert, to pull over if drowsy, or nap before getting into the vehicle if sleepy.      She will follow up in approximately 6 weeks if she chooses not to proceed with formal CBT I    CC: Preethi Gibson      Seen and examined with Dr. India Bull MD  Clinical Sleep Medicine Fellow  Pager #[phone]    Attending: Patient seen and examined and personally counseled.  Multiple sleep disorders with poor insight/motivation for change. Encouraging high yield schedule change (mild sleep restriction) continue to educate and encourage repeat sleep study. This note reflects our mutual assessment and plan.  Karie Osborne MD  Pulmonary, Critical Care, and Sleep Medicine

## 2017-12-08 ENCOUNTER — OFFICE VISIT (OUTPATIENT)
Dept: TRANSPLANT | Facility: CLINIC | Age: 62
End: 2017-12-08
Attending: CLINICAL NURSE SPECIALIST
Payer: MEDICARE

## 2017-12-08 VITALS
SYSTOLIC BLOOD PRESSURE: 135 MMHG | OXYGEN SATURATION: 98 % | DIASTOLIC BLOOD PRESSURE: 80 MMHG | HEART RATE: 111 BPM | RESPIRATION RATE: 20 BRPM | TEMPERATURE: 98.1 F

## 2017-12-08 DIAGNOSIS — T82.9XXA COMPLICATION OF VASCULAR ACCESS FOR DIALYSIS, INITIAL ENCOUNTER: Primary | ICD-10-CM

## 2017-12-08 PROCEDURE — 99212 OFFICE O/P EST SF 10 MIN: CPT

## 2017-12-08 PROCEDURE — 99211 OFF/OP EST MAY X REQ PHY/QHP: CPT | Mod: ZF

## 2017-12-08 ASSESSMENT — PAIN SCALES - GENERAL: PAINLEVEL: MILD PAIN (2)

## 2017-12-08 NOTE — LETTER
12/8/2017       RE: Eleni Logan  [address]  [address]  [place] [facility] [place] [address]     Dear Colleague,    Thank you for referring your patient, Eleni Logan, to the [facility] SOLID ORGAN TRANSPLANT at [facility]. Please see a copy of my visit note below.    Dialysis Access Service   Progress Note    S:  Ms. Logan is being seen today for surgical followup of her dialysis access.  She reports no issues with the wound, and  no steal syndrome of the distal extremity. C/O mild pain in right upper arm and patchy numbness in forearm. Denies swelling in arm, tingling/numbness, a change of color/temperature in right hand and fingers. S/P  Right   Upper arm basilic vein transposition of AV fistula on 11/21/2017.    O:  Temp:  [98.1  F (36.7  C)] 98.1  F (36.7  C)  Pulse:  [111] 111  Resp:  [20] 20  BP: (135)/(80) 135/80  SpO2:  [98 %] 98 %  GENERAL: no distress  Circulation:   Radial pulse 3+  Ulnar pulse  3+   Capillary refill:  capillary refill < 3 sec    Sensory exam:   arm: Normal   []           Abnormal   [x]          Comment:  patchy numbness in right forearm    hand: Normal   [x]           Abnormal   []          Comment:   Motor exam:   arm: Normal   [x]           Abnormal   []          Comment:    hand: Normal   [x]           Abnormal   []          Comment:    Access: R upper extremity wound(s) healed. non-tender. Capillary refill < 3 sec, ++thrill and bruit via hand held doppler present. AV fistula is deepen to palpate at proximal region. No edema in right arm, without apparent infection. Visible collateral veins appear in right arm and right chest wall.    Assessment & Plan: Ms. Logan's dialysis access has matured well at this time point.    1. Continue hammer curl exercise in right arm as tolerated  2. Keep right hand warm with warm glove in cold temperature  3. F/U with Dr. Bond in 4 weeks    We would like to see the patient back in the clinic in 4 weeks time to assess  progress. The patient was counselled to contact our nurse coordinator, GONZALEZ Booker CNS (Sum) at [phone] with any questions or concerns.  Thank you for the opportunity to participate in Ms. Logan's care.    TT: 15 min  CT: 15 min    TALYA Yancey (Sum)  Dialysis access program   [facility]  Pager # [phone]    Again, thank you for allowing me to participate in the care of your patient.      Sincerely,    GONZALEZ Pulido

## 2017-12-08 NOTE — NURSING NOTE
"Chief Complaint   Patient presents with     Vascular Access Problem       Initial /80  Pulse 111  Temp 98.1  F (36.7  C) (Oral)  Resp 20  SpO2 98% Estimated body mass index is 39.69 kg/(m^2) as calculated from the following:    Height as of 12/6/17: 1.575 m (5' 2\").    Weight as of 12/6/17: 98.4 kg (217 lb).      "

## 2017-12-08 NOTE — LETTER
12/8/2017       RE: Eleni Logan  [address]  [address]  [place] [address]     Dear Colleague,    Thank you for referring your patient, Eleni Logan, to the [facility] SOLID ORGAN TRANSPLANT at [facility]. Please see a copy of my visit note below.    Dialysis Access Service   Progress Note    S:  Ms. Logan is being seen today for surgical followup of her dialysis access.  She reports no issues with the wound, and  no steal syndrome of the distal extremity. C/O mild pain in right upper arm and patchy numbness in forearm. Denies swelling in arm, tingling/numbness, a change of color/temperature in right hand and fingers. S/P  Right   Upper arm basilic vein transposition of AV fistula on 11/21/2017.    O:  Temp:  [98.1  F (36.7  C)] 98.1  F (36.7  C)  Pulse:  [111] 111  Resp:  [20] 20  BP: (135)/(80) 135/80  SpO2:  [98 %] 98 %  GENERAL: no distress  Circulation:   Radial pulse 3+  Ulnar pulse  3+   Capillary refill:  capillary refill < 3 sec    Sensory exam:   arm: Normal   []           Abnormal   [x]          Comment:  patchy numbness in right forearm    hand: Normal   [x]           Abnormal   []          Comment:   Motor exam:   arm: Normal   [x]           Abnormal   []          Comment:    hand: Normal   [x]           Abnormal   []          Comment:    Access: R upper extremity wound(s) healed. non-tender. Capillary refill < 3 sec, ++thrill and bruit via hand held doppler present. AV fistula is deepen to palpate at proximal region. No edema in right arm, without apparent infection. Visible collateral veins appear in right arm and right chest wall.    Assessment & Plan: Ms. Logan's dialysis access has matured well at this time point.    1. Continue hammer curl exercise in right arm as tolerated  2. Keep right hand warm with warm glove in cold temperature  3. F/U with Dr. Bond in 4 weeks    We would like to see the patient back in the clinic in 4 weeks time to assess  progress. The patient was counselled to contact our nurse coordinator, GONZALEZ Booker CNS (Sum) at [phone] with any questions or concerns.  Thank you for the opportunity to participate in Ms. Logan's care.    TT: 15 min  CT: 15 min    TALYA Yancey (Sum)  Dialysis access program   [facility]  Pager # [phone]

## 2017-12-08 NOTE — PROGRESS NOTES
Dialysis Access Service   Progress Note    S:  Ms. Logan is being seen today for surgical followup of her dialysis access.  She reports no issues with the wound, and  no steal syndrome of the distal extremity. C/O mild pain in right upper arm and patchy numbness in forearm. Denies swelling in arm, tingling/numbness, a change of color/temperature in right hand and fingers. S/P  Right  Upper arm basilic vein transposition of AV fistula on 11/21/2017.    O:  Temp:  [98.1  F (36.7  C)] 98.1  F (36.7  C)  Pulse:  [111] 111  Resp:  [20] 20  BP: (135)/(80) 135/80  SpO2:  [98 %] 98 %  GENERAL: no distress  Circulation:   Radial pulse 3+  Ulnar pulse  3+   Capillary refill:  capillary refill < 3 sec    Sensory exam:   arm: Normal   []           Abnormal   [x]          Comment:  patchy numbness in right forearm    hand: Normal   [x]           Abnormal   []          Comment:   Motor exam:   arm: Normal   [x]           Abnormal   []          Comment:    hand: Normal   [x]           Abnormal   []          Comment:    Access: R upper extremity wound(s) healed. non-tender. Capillary refill < 3 sec, ++thrill and bruit via hand held doppler present. AV fistula is deepen to palpate at proximal region. No edema in right arm, without apparent infection. Visible collateral veins appear in right arm and right chest wall.    Assessment & Plan: Ms. Ritchie dialysis access has matured well at this time point.    1. Continue hammer curl exercise in right arm as tolerated  2. Keep right hand warm with warm glove in cold temperature  3. F/U with Dr. Bond in 4 weeks    We would like to see the patient back in the clinic in 4 weeks time to assess progress. The patient was counselled to contact our nurse coordinator, GONZALEZ Booker CNS (Sum) at [phone] with any questions or concerns.  Thank you for the opportunity to participate in Ms. Logan's care.    TT: 15 min  CT: 15 min    TALYA Yancey (Sum)  Dialysis access program    [facility]  Pager # [phone]

## 2017-12-12 ENCOUNTER — RADIANT APPOINTMENT (OUTPATIENT)
Dept: CARDIOLOGY | Facility: CLINIC | Age: 62
End: 2017-12-12

## 2017-12-12 DIAGNOSIS — R06.01 ORTHOPNEA: ICD-10-CM

## 2017-12-12 DIAGNOSIS — I27.20 PULMONARY HYPERTENSION (H): ICD-10-CM

## 2017-12-13 ENCOUNTER — CARE COORDINATION (OUTPATIENT)
Dept: NEPHROLOGY | Facility: CLINIC | Age: 62
End: 2017-12-13

## 2017-12-13 NOTE — PROGRESS NOTES
Reason for Call    Followed up with patient. States fistula site is still a little sore, but improving. Denied any other concerns at this time.    Plan    1. Follow up with vascular team as scheduled  2. Call if any new symptoms or concerns    Patient was given an opportunity to ask questions and have those questions answered to her satisfaction.  Patient verbalized understanding of instructions provided and agreed to plan of care.    Brittney Quinones RN

## 2017-12-19 ENCOUNTER — TELEPHONE (OUTPATIENT)
Dept: PHARMACY | Facility: CLINIC | Age: 62
End: 2017-12-19

## 2017-12-19 ENCOUNTER — CARE COORDINATION (OUTPATIENT)
Dept: NEPHROLOGY | Facility: CLINIC | Age: 62
End: 2017-12-19

## 2017-12-19 DIAGNOSIS — E78.00 HYPERCHOLESTEREMIA: ICD-10-CM

## 2017-12-19 DIAGNOSIS — Z94.0 KIDNEY REPLACED BY TRANSPLANT: ICD-10-CM

## 2017-12-19 DIAGNOSIS — N39.0 URINARY TRACT INFECTION: ICD-10-CM

## 2017-12-19 DIAGNOSIS — N18.30 CHRONIC KIDNEY DISEASE, STAGE III (MODERATE) (H): ICD-10-CM

## 2017-12-19 DIAGNOSIS — N18.30 ANEMIA IN STAGE 3 CHRONIC KIDNEY DISEASE (H): ICD-10-CM

## 2017-12-19 DIAGNOSIS — Z48.298 AFTERCARE FOLLOWING ORGAN TRANSPLANT: ICD-10-CM

## 2017-12-19 DIAGNOSIS — R35.0 URINARY FREQUENCY: Primary | ICD-10-CM

## 2017-12-19 DIAGNOSIS — D63.1 ANEMIA IN STAGE 3 CHRONIC KIDNEY DISEASE (H): ICD-10-CM

## 2017-12-19 DIAGNOSIS — Z79.899 ENCOUNTER FOR LONG-TERM CURRENT USE OF MEDICATION: ICD-10-CM

## 2017-12-19 DIAGNOSIS — R35.0 URINARY FREQUENCY: ICD-10-CM

## 2017-12-19 LAB
ALBUMIN SERPL-MCNC: 3.4 G/DL (ref 3.4–5)
ALBUMIN UR-MCNC: >499 MG/DL
ANION GAP SERPL CALCULATED.3IONS-SCNC: 11 MMOL/L (ref 3–14)
APPEARANCE UR: ABNORMAL
BACTERIA #/AREA URNS HPF: ABNORMAL /HPF
BILIRUB UR QL STRIP: NEGATIVE
BUN SERPL-MCNC: 55 MG/DL (ref 7–30)
CALCIUM SERPL-MCNC: 8.6 MG/DL (ref 8.5–10.1)
CHLORIDE SERPL-SCNC: 111 MMOL/L (ref 94–109)
CHOLEST SERPL-MCNC: 144 MG/DL
CO2 SERPL-SCNC: 20 MMOL/L (ref 20–32)
COLOR UR AUTO: YELLOW
CREAT SERPL-MCNC: 2.01 MG/DL (ref 0.52–1.04)
ERYTHROCYTE [DISTWIDTH] IN BLOOD BY AUTOMATED COUNT: 16.1 % (ref 10–15)
GFR SERPL CREATININE-BSD FRML MDRD: 25 ML/MIN/1.7M2
GLUCOSE SERPL-MCNC: 97 MG/DL (ref 70–99)
GLUCOSE UR STRIP-MCNC: NEGATIVE MG/DL
HCT VFR BLD AUTO: 33.4 % (ref 35–47)
HDLC SERPL-MCNC: 60 MG/DL
HGB BLD-MCNC: 9.9 G/DL (ref 11.7–15.7)
HGB UR QL STRIP: NEGATIVE
HYALINE CASTS #/AREA URNS LPF: 1 /LPF (ref 0–2)
KETONES UR STRIP-MCNC: NEGATIVE MG/DL
LDLC SERPL CALC-MCNC: 50 MG/DL
LEUKOCYTE ESTERASE UR QL STRIP: ABNORMAL
MCH RBC QN AUTO: 24.1 PG (ref 26.5–33)
MCHC RBC AUTO-ENTMCNC: 29.6 G/DL (ref 31.5–36.5)
MCV RBC AUTO: 81 FL (ref 78–100)
MUCOUS THREADS #/AREA URNS LPF: PRESENT /LPF
NITRATE UR QL: NEGATIVE
NONHDLC SERPL-MCNC: 84 MG/DL
PH UR STRIP: 5 PH (ref 5–7)
PHOSPHATE SERPL-MCNC: 4.4 MG/DL (ref 2.5–4.5)
PLATELET # BLD AUTO: 242 10E9/L (ref 150–450)
POTASSIUM SERPL-SCNC: 4.2 MMOL/L (ref 3.4–5.3)
RBC # BLD AUTO: 4.11 10E12/L (ref 3.8–5.2)
RBC #/AREA URNS AUTO: 2 /HPF (ref 0–2)
SODIUM SERPL-SCNC: 141 MMOL/L (ref 133–144)
SOURCE: ABNORMAL
SP GR UR STRIP: 1.01 (ref 1–1.03)
SQUAMOUS #/AREA URNS AUTO: <1 /HPF (ref 0–1)
TRIGL SERPL-MCNC: 168 MG/DL
URATE SERPL-MCNC: 7.5 MG/DL (ref 2.6–6)
UROBILINOGEN UR STRIP-MCNC: 0 MG/DL (ref 0–2)
WBC # BLD AUTO: 7.1 10E9/L (ref 4–11)
WBC #/AREA URNS AUTO: 108 /HPF (ref 0–2)
WBC CLUMPS #/AREA URNS HPF: PRESENT /HPF

## 2017-12-19 PROCEDURE — 87088 URINE BACTERIA CULTURE: CPT | Performed by: INTERNAL MEDICINE

## 2017-12-19 PROCEDURE — 87186 SC STD MICRODIL/AGAR DIL: CPT | Performed by: INTERNAL MEDICINE

## 2017-12-19 PROCEDURE — 87086 URINE CULTURE/COLONY COUNT: CPT | Performed by: INTERNAL MEDICINE

## 2017-12-19 NOTE — PROGRESS NOTES
Reason for Call    Patient called for a UA/UC order. Reports urinary frequency, itching, and pain for 1 week. Denied any blood in the urine.    Collaboration    Above discussed with Dr. Sexton.  Orders received.    Plan    1. Go to lab    Patient was given an opportunity to ask questions and have those questions answered to her satisfaction.  Patient verbalized understanding of instructions provided and agreed to plan of care.    Brittney Quinones RN

## 2017-12-21 LAB
BACTERIA SPEC CULT: ABNORMAL
Lab: ABNORMAL
SPECIMEN SOURCE: ABNORMAL

## 2017-12-21 RX ORDER — CEFPODOXIME PROXETIL 100 MG/1
100 TABLET, FILM COATED ORAL 2 TIMES DAILY
Qty: 28 TABLET | Refills: 0 | Status: SHIPPED | OUTPATIENT
Start: 2017-12-21 | End: 2018-01-04

## 2017-12-21 NOTE — PROGRESS NOTES
Reviewed results with Dr. Sexton:  Lets giver cefopodaxime 100 mg po bid for 14 days   Needs to be arranged with urology   After this course if her culture is sensitive keep her on keflex 250 mg po nightly     Called patient. Rx sent, referral ordered.    Brittney Quinones RN

## 2017-12-22 NOTE — PROGRESS NOTES
Per Dr. Sexton, patient to stay on keflex after course of vantin. Left detailed voicemail updating patient.    Brittney Quinones RN

## 2017-12-27 ENCOUNTER — PRE VISIT (OUTPATIENT)
Dept: UROLOGY | Facility: CLINIC | Age: 62
End: 2017-12-27

## 2018-01-03 ENCOUNTER — CARE COORDINATION (OUTPATIENT)
Dept: NEPHROLOGY | Facility: CLINIC | Age: 63
End: 2018-01-03

## 2018-01-03 ENCOUNTER — TELEPHONE (OUTPATIENT)
Dept: PHARMACY | Facility: CLINIC | Age: 63
End: 2018-01-03

## 2018-01-03 NOTE — PROGRESS NOTES
Reason for Call    Patient called back. Has had cold symptoms (congestion, cough, minimal appetite) since Monday. She's not eating much, but tries to push fluids as she can. Also notes fevers, chills, and ongoing urinary frequency. Discussed importance of staying hydrated. Encouraged her to be seen if no improvement in symptoms.   Collaboration    Updated Dr. Sexton and Ayala (transplant coordinator). Per Dr. Sexton, patient needs to be evaluated at an ER or UC.    Plan    1. Go to UC or ER for evaluation    Patient was given an opportunity to ask questions and have those questions answered to her satisfaction.  Patient verbalized understanding of instructions provided and agreed to plan of care.    Brittney Quinones RN

## 2018-01-04 NOTE — TELEPHONE ENCOUNTER
Follow-up with anemia management service:    LM for Eleni reminding her that she is due for a Hgb lab and possibly an aranesp dose    Anemia Latest Ref Rng & Units 10/4/2017 10/23/2017 2017 2017 2017 2017 2017   HGB Goal - - - - - - - -   VALARIE Dose - - - 300 mcg - - 300 mcg 300 mcg   Hemoglobin 11.7 - 15.7 g/dL 10.5(L) 9.8(L) - 9.4(L) 9.6(L) - 9.9(L)   TSAT 15 - 46 % 51(H) - - - - - -   Ferritin 8 - 252 ng/mL 838(H) - - - - - -   PRBCs - - - - - - - -       Orders needed to be renewed (for next follow-up date) in EPIC: None                          Med order expires: 2018                          Lab orders : 2018    Follow-up call date: 18    Winston    Anemia Management Service  Yvette Goodwin,Doug and Preethi Akers CPhT  Phone: [phone]  Fax: [phone]

## 2018-01-08 ENCOUNTER — TELEPHONE (OUTPATIENT)
Dept: PHARMACY | Facility: CLINIC | Age: 63
End: 2018-01-08

## 2018-01-08 NOTE — TELEPHONE ENCOUNTER
Follow-up with anemia management service:    I spoke to Eleni, she has a doctor appt on 18 and she will get her labs drawn then.    Anemia Latest Ref Rng & Units 10/4/2017 10/23/2017 2017 2017 2017 2017 2017   HGB Goal - - - - - - - -   VALARIE Dose - - - 300 mcg - - 300 mcg 300 mcg   Hemoglobin 11.7 - 15.7 g/dL 10.5(L) 9.8(L) - 9.4(L) 9.6(L) - 9.9(L)   TSAT 15 - 46 % 51(H) - - - - - -   Ferritin 8 - 252 ng/mL 838(H) - - - - - -   PRBCs - - - - - - - -       Orders needed to be renewed (for next follow-up date) in EPIC: None   Med order expires: 18   Lab orders : 18    Follow-up call date: 18    Winston    Anemia Management Service  Yvette Goodwin,PharmD and Preethi Akers CPhT  Phone: [phone]  Fax: [phone]

## 2018-01-11 ENCOUNTER — OFFICE VISIT (OUTPATIENT)
Dept: VASCULAR SURGERY | Facility: CLINIC | Age: 63
End: 2018-01-11
Payer: MEDICARE

## 2018-01-11 ENCOUNTER — TELEPHONE (OUTPATIENT)
Dept: PHARMACY | Facility: CLINIC | Age: 63
End: 2018-01-11

## 2018-01-11 VITALS
HEART RATE: 89 BPM | OXYGEN SATURATION: 98 % | SYSTOLIC BLOOD PRESSURE: 147 MMHG | DIASTOLIC BLOOD PRESSURE: 92 MMHG | RESPIRATION RATE: 20 BRPM

## 2018-01-11 DIAGNOSIS — Z48.298 AFTERCARE FOLLOWING ORGAN TRANSPLANT: ICD-10-CM

## 2018-01-11 DIAGNOSIS — Z94.0 KIDNEY REPLACED BY TRANSPLANT: ICD-10-CM

## 2018-01-11 DIAGNOSIS — N18.30 CHRONIC KIDNEY DISEASE, STAGE III (MODERATE) (H): ICD-10-CM

## 2018-01-11 DIAGNOSIS — D63.1 ANEMIA IN STAGE 3 CHRONIC KIDNEY DISEASE (H): ICD-10-CM

## 2018-01-11 DIAGNOSIS — N18.30 ANEMIA IN STAGE 3 CHRONIC KIDNEY DISEASE (H): ICD-10-CM

## 2018-01-11 DIAGNOSIS — Z99.2 ESRD (END STAGE RENAL DISEASE) ON DIALYSIS (H): Primary | ICD-10-CM

## 2018-01-11 DIAGNOSIS — Z79.899 ENCOUNTER FOR LONG-TERM CURRENT USE OF MEDICATION: ICD-10-CM

## 2018-01-11 DIAGNOSIS — N18.6 ESRD (END STAGE RENAL DISEASE) ON DIALYSIS (H): Primary | ICD-10-CM

## 2018-01-11 LAB
ALBUMIN UR-MCNC: >499 MG/DL
ANION GAP SERPL CALCULATED.3IONS-SCNC: 8 MMOL/L (ref 3–14)
APPEARANCE UR: ABNORMAL
BACTERIA #/AREA URNS HPF: ABNORMAL /HPF
BILIRUB UR QL STRIP: NEGATIVE
BUN SERPL-MCNC: 46 MG/DL (ref 7–30)
CALCIUM SERPL-MCNC: 8.1 MG/DL (ref 8.5–10.1)
CHLORIDE SERPL-SCNC: 109 MMOL/L (ref 94–109)
CO2 SERPL-SCNC: 22 MMOL/L (ref 20–32)
COLOR UR AUTO: YELLOW
CREAT SERPL-MCNC: 2.08 MG/DL (ref 0.52–1.04)
CREAT UR-MCNC: 136 MG/DL
ERYTHROCYTE [DISTWIDTH] IN BLOOD BY AUTOMATED COUNT: 16.7 % (ref 10–15)
FERRITIN SERPL-MCNC: 1268 NG/ML (ref 8–252)
GFR SERPL CREATININE-BSD FRML MDRD: 24 ML/MIN/1.7M2
GLUCOSE SERPL-MCNC: 168 MG/DL (ref 70–99)
GLUCOSE UR STRIP-MCNC: NEGATIVE MG/DL
HCT VFR BLD AUTO: 31.4 % (ref 35–47)
HGB BLD-MCNC: 9.3 G/DL (ref 11.7–15.7)
HGB UR QL STRIP: NEGATIVE
IRON SATN MFR SERPL: 25 % (ref 15–46)
IRON SERPL-MCNC: 46 UG/DL (ref 35–180)
KETONES UR STRIP-MCNC: NEGATIVE MG/DL
LEUKOCYTE ESTERASE UR QL STRIP: ABNORMAL
MCH RBC QN AUTO: 23.9 PG (ref 26.5–33)
MCHC RBC AUTO-ENTMCNC: 29.6 G/DL (ref 31.5–36.5)
MCV RBC AUTO: 81 FL (ref 78–100)
NITRATE UR QL: NEGATIVE
PH UR STRIP: 5 PH (ref 5–7)
PLATELET # BLD AUTO: 297 10E9/L (ref 150–450)
POTASSIUM SERPL-SCNC: 4 MMOL/L (ref 3.4–5.3)
PROT UR-MCNC: 2.26 G/L
PROT/CREAT 24H UR: 1.66 G/G CR (ref 0–0.2)
RBC # BLD AUTO: 3.89 10E12/L (ref 3.8–5.2)
RBC #/AREA URNS AUTO: 6 /HPF (ref 0–2)
SODIUM SERPL-SCNC: 138 MMOL/L (ref 133–144)
SOURCE: ABNORMAL
SP GR UR STRIP: 1.01 (ref 1–1.03)
SQUAMOUS #/AREA URNS AUTO: 1 /HPF (ref 0–1)
TIBC SERPL-MCNC: 186 UG/DL (ref 240–430)
TRANS CELLS #/AREA URNS HPF: 1 /HPF
UROBILINOGEN UR STRIP-MCNC: 0 MG/DL (ref 0–2)
WBC # BLD AUTO: 7.8 10E9/L (ref 4–11)
WBC #/AREA URNS AUTO: >182 /HPF (ref 0–2)
WBC CLUMPS #/AREA URNS HPF: PRESENT /HPF

## 2018-01-11 PROCEDURE — 87088 URINE BACTERIA CULTURE: CPT | Performed by: INTERNAL MEDICINE

## 2018-01-11 PROCEDURE — 87086 URINE CULTURE/COLONY COUNT: CPT | Performed by: INTERNAL MEDICINE

## 2018-01-11 PROCEDURE — 87186 SC STD MICRODIL/AGAR DIL: CPT | Performed by: INTERNAL MEDICINE

## 2018-01-11 ASSESSMENT — PAIN SCALES - GENERAL: PAINLEVEL: MILD PAIN (2)

## 2018-01-11 NOTE — MR AVS SNAPSHOT
After Visit Summary   1/11/2018    Eleni Logan    MRN: [id number]           Patient Information     Date Of Birth          1955        Visit Information        Provider Department      1/11/2018 2:30 PM Brandy Bond MD [facility]        Care Instructions    Follow up with Sum in 3 weeks with ultrasound    You will be contacted to schedule this appointment           Follow-ups after your visit        Your next 10 appointments already scheduled     Feb 07, 2018 10:00 AM CST   (Arrive by 9:45 AM)   New Patient Visit with Lynnette Garcia MD   [facility] ([facility])    [address]  4th [address]  [place] [id number] [phone]            Feb 19, 2018  2:50 PM CST   (Arrive by 2:20 PM)   Return Kidney Transplant with Tyshawn Sexton MD   [facility] ([facility])    [address]  [address]  [place] [id number] [phone]            Feb 22, 2018  1:00 PM CST   (Arrive by 12:45 PM)   Return Visit with Preethi Gibson MD   [facility] ([facility])    [address]  4th [address]  [place] [address] [phone]              Who to contact     Please call your clinic at [phone] to:    Ask questions about your health    Make or cancel appointments    Discuss your medicines    Learn about your test results    Speak to your doctor   If you have compliments or concerns about an experience at your clinic, or if you wish to file a complaint, please contact [facility] Patient Relations at [phone] or email us at [email or web address]         Additional Information About Your Visit        Style[employer] Information     [employer] is an electronic gateway that provides easy, online access to your medical records. With [employer], you can request a clinic appointment,  read your test results, renew a prescription or communicate with your care team.     To sign up for [employer]wn[employer] visit the website at [email or web address]   You will be asked to enter the access code listed below, as well as some personal information. Please follow the directions to create your username and password.     Your access code is: [email or web address]  Expires: 3/28/2018  6:31 AM     Your access code will  in 90 days. If you need help or a new code, please contact your [facility] or call [phone] for assistance.        Care EveryWhere ID     This is your Care EveryWhere ID. This could be used by other organizations to access your [facility] medical records  [id number]        Your Vitals Were     Pulse Respirations Pulse Oximetry Breastfeeding?          89 20 98% No         Blood Pressure from Last 3 Encounters:   18 (!) 147/92   17 135/80   17 150/82    Weight from Last 3 Encounters:   17 98.4 kg (217 lb)   17 97.7 kg (215 lb 6.2 oz)   17 99 kg (218 lb 4.8 oz)              Today, you had the following     No orders found for display       Primary Care Provider Office Phone # Fax #    Preethi Gibson [phone]       [facility] [address]  [facility] [address]        Equal Access to Services     GERBER DAVENPORT AH: Hadii aad ku hadasho Soomaali, waaxda luqadaha, qaybta kaalmada adeegyada, clare garrison. So [facility] [phone].    ATENCIÓN: Si habla español, tiene a stoner disposición servicios gratuitos de asistencia lingüística. Llame al [phone].    We comply with applicable federal civil rights laws and [place] laws. We do not discriminate on the basis of race, color, national origin, age, disability, sex, sexual orientation, or gender identity.            Thank you!     Thank you for choosing [facility]  for your care. Our goal is always to provide you  with excellent care. Hearing back from our patients is one way we can continue to improve our services. Please take a few minutes to complete the written survey that you may receive in the mail after your visit with us. Thank you!             Your Updated Medication List - Protect others around you: Learn how to safely use, store and throw away your medicines at www.disposemymeds.org.          This list is accurate as of: 1/11/18  3:02 PM.  Always use your most recent med list.                   Brand Name Dispense Instructions for use Diagnosis    amoxicillin-clavulanate 500-125 MG per tablet    AUGMENTIN    20 tablet    Take 1 tablet by mouth 2 times daily    Kidney replaced by transplant       artificial saliva Aers spray     1 Bottle    Take 1 spray by mouth every 6 hours as needed for dry mouth    Dry mouth       atorvastatin 20 MG tablet    LIPITOR    30 tablet    Take 1 tablet (20 mg) by mouth daily Pt need labs done prior to next refill    Hypercholesteremia       carvedilol 6.25 MG tablet    COREG    360 tablet    Take 2 tablets (12.5 mg) by mouth 2 times daily (with meals)    Hypertension secondary to other renal disorders       cephalexin 250 MG capsule    KEFLEX    90 capsule    Take 1 capsule (250 mg) by mouth At Bedtime    Urinary frequency, Urinary tract infection       cholecalciferol 1000 UNITS capsule    vitamin  -D    180 capsule    Take 2 capsules (2,000 Units) by mouth daily    Vitamin D deficiency       cloNIDine 0.1 MG tablet    CATAPRES    60 tablet    Take 1 tablet (0.1 mg) by mouth 2 times daily as needed For systolic BP >180    HTN (hypertension)       darbepoetin bucky 300 MCG/0.6ML injection    ARANESP (ALBUMIN FREE)    0.6 mL    Inject 0.6 mLs (300 mcg) Subcutaneous every 28 days As needed for hgb<10g/dL    Chronic kidney disease, stage III (moderate), Anemia in stage 3 chronic kidney disease       folic acid 1 MG tablet    FOLVITE    30 tablet    Take 1 tablet (1 mg) by mouth daily     Preventive measure       hydrALAZINE 25 MG tablet    APRESOLINE    90 tablet    Take 1 tablet (25 mg) by mouth 3 times daily as needed For systolic BP >170    HTN (hypertension)       hydrocortisone 1 % cream    CORTAID    60 g    Apply topically 2 times daily    Rash, skin       mycophenolate 250 MG capsule     120 capsule    Take 2 capsules (500 mg) by mouth 2 times daily    Kidney transplanted       NIFEdipine ER osmotic 30 MG 24 hr tablet    PROCARDIA XL    180 tablet    Take 1 tablet (30 mg) by mouth 2 times daily    HTN (hypertension)       pantoprazole 40 MG EC tablet    PROTONIX    30 tablet    Take 1 tablet (40 mg) by mouth daily    Gastroesophageal reflux disease with esophagitis       predniSONE 5 MG tablet    DELTASONE    30 tablet    Take 1 tablet (5 mg) by mouth daily    Kidney transplanted       sodium bicarbonate 650 MG tablet     180 tablet    Take 1 tablet (650 mg) by mouth 2 times daily    Acidosis       vitamin D 18680 UNIT capsule    ERGOCALCIFEROL    12 capsule    Take 1 capsule (50,000 Units) by mouth once a week    Vitamin D deficiency

## 2018-01-11 NOTE — LETTER
1/11/2018       RE: Eleni Logan  [address]  [address]  [place] [address]     Dear Colleague,    Thank you for referring your patient, Eleni Logan, to the [facility] at [facility]. Please see a copy of my visit note below.    VASCULAR SURGERY CLINIC ESTABLISHED PATIENT NOTE    HPI:    Eleni Logan is a 62 year old female who is status post right arm second stage right upper arm basilic vein transposition with Dr. Bond on 11/21/2017.     SUBJECTIVE:  Patient complains of occasional right arm pain and forearm numbness. Denies any finger tip numbness or right arm swelling.     OBJECTIVE:  Vital signs:  BP (!) 147/92 (BP Location: Left arm)  Pulse 89  Resp 20  SpO2 98%  Breastfeeding? No      Prior to Admission medications    Medication Sig Start Date End Date Taking? Authorizing Provider   cephalexin (KEFLEX) 250 MG capsule Take 1 capsule (250 mg) by mouth At Bedtime 12/22/17   Tyshawn Sexton MD   atorvastatin (LIPITOR) 20 MG tablet Take 1 tablet (20 mg) by mouth daily Pt need labs done prior to next refill 12/6/17   Rocio Saul MD   artificial saliva (BIOTENE MT) AERS spray Take 1 spray by mouth every 6 hours as needed for dry mouth 11/16/17   Rocio Saul MD   amoxicillin-clavulanate (AUGMENTIN) 500-125 MG per tablet Take 1 tablet by mouth 2 times daily  Patient not taking: Reported on 12/6/2017 11/3/17   Tyshawn Sexton MD   predniSONE (DELTASONE) 5 MG tablet Take 1 tablet (5 mg) by mouth daily 10/2/17   Tyshawn Sexton MD   sodium bicarbonate 650 MG tablet Take 1 tablet (650 mg) by mouth 2 times daily 10/2/17   Tyshawn Sexton MD   hydrALAZINE (APRESOLINE) 25 MG tablet Take 1 tablet (25 mg) by mouth 3 times daily as needed For systolic BP >170 9/18/17   Tyshawn Sexton MD   NIFEdipine ER osmotic (PROCARDIA XL) 30 MG 24 hr tablet Take 1 tablet (30 mg) by mouth 2 times daily 9/15/17   Tyshawn Sexton MD   cloNIDine  (CATAPRES) 0.1 MG tablet Take 1 tablet (0.1 mg) by mouth 2 times daily as needed For systolic BP >180 9/15/17   Tyshawn Sexton MD   vitamin D (ERGOCALCIFEROL) 49852 UNIT capsule Take 1 capsule (50,000 Units) by mouth once a week 8/25/17   Tyshawn Sexton MD   carvedilol (COREG) 6.25 MG tablet Take 2 tablets (12.5 mg) by mouth 2 times daily (with meals) 8/8/17   Tyshawn Sexton MD   cholecalciferol (VITAMIN  -D) 1000 UNITS capsule Take 2 capsules (2,000 Units) by mouth daily 8/8/17   Tyshawn Sexton MD   pantoprazole (PROTONIX) 40 MG EC tablet Take 1 tablet (40 mg) by mouth daily 5/7/17   Rocio Saul MD   folic acid (FOLVITE) 1 MG tablet Take 1 tablet (1 mg) by mouth daily 4/6/17   Rocio Saul MD   darbepoetin bucky (ARANESP, ALBUMIN FREE,) 300 MCG/0.6ML injection Inject 0.6 mLs (300 mcg) Subcutaneous every 28 days As needed for hgb<10g/dL 2/15/17   Tyshawn Sexton MD   CELLCEPT 250 MG PO CAPSULE Take 2 capsules (500 mg) by mouth 2 times daily 2/7/17   Sakina Huntley MD   hydrocortisone (CORTAID) 1 % cream Apply topically 2 times daily 7/5/16   Taryn Green, APRN CNP       PHYSICAL EXAM:  NEURO/PSYCH: The patient is alert and oriented.  Appropriate.  Moves all extremities.   SKIN:  Warm and dry.  PULMONARY: non-labored breathing   EXTREMITIES: palpable right radial and ulnar pulses, right arm finger tips sensation intact      ASSESSMENT:  Patient Active Problem List   Diagnosis     Chronic rhinitis     Essential hypertension, benign     S/P kidney transplant     Rash     Asthma exacerbation     UTI (urinary tract infection)     Anemia     Chest pain     Urinary tract infection     Localized pustular psoriasis     CKD (chronic kidney disease) stage 4, GFR 15-29 ml/min (H)     Immunosuppression (H)     Fistula     End-stage renal disease (ESRD) (H)         PLAN:  - right arm fistula maturing nicely.    - right arm AVF should be ready to use once dialysis is initiated    - follow up  ultrasound of AVF and office visit with Sum in 3 weeks.      Yvette ROTH, CNS  Division of Vascular Surgery  [facility]  Pager [phone]      I have seen and assessed this patient with the above provider and agree with her above documentation, impression and plan. Fistula with good thrill. Forearm numbness likely related to antebrachial nerve of the forearm inflammation.  Should get better with ongoing healing.  Will see Sum in a couple of weeks and get U/S then.  Should be able to use fistula when she needs dialysis.      I spent>50% of this 15 min visit in counseling.    Again, thank you for allowing me to participate in the care of your patient.      Sincerely,    Brandy Bond MD

## 2018-01-11 NOTE — NURSING NOTE
Chief Complaint   Patient presents with     RECHECK     Follow up right arm fisutla        Vitals:    01/11/18 1438   BP: (!) 147/92   BP Location: Left arm   Pulse: 89   Resp: 20   SpO2: 98%       There is no height or weight on file to calculate BMI.         Kia Sotomayor LPN

## 2018-01-11 NOTE — PATIENT INSTRUCTIONS
Follow up with Richard in 3 weeks with ultrasound    You will be contacted to schedule this appointment

## 2018-01-11 NOTE — PROGRESS NOTES
VASCULAR SURGERY CLINIC ESTABLISHED PATIENT NOTE    HPI:    Eleni Logan is a 62 year old female who is status post right arm second stage right upper arm basilic vein transposition with Dr. Bond on 11/21/2017.     SUBJECTIVE:  Patient complains of occasional right arm pain and forearm numbness. Denies any finger tip numbness or right arm swelling.     OBJECTIVE:  Vital signs:  BP (!) 147/92 (BP Location: Left arm)  Pulse 89  Resp 20  SpO2 98%  Breastfeeding? No      Prior to Admission medications    Medication Sig Start Date End Date Taking? Authorizing Provider   cephalexin (KEFLEX) 250 MG capsule Take 1 capsule (250 mg) by mouth At Bedtime 12/22/17   Tyshawn Sexton MD   atorvastatin (LIPITOR) 20 MG tablet Take 1 tablet (20 mg) by mouth daily Pt need labs done prior to next refill 12/6/17   Rocio Saul MD   artificial saliva (BIOTENE MT) AERS spray Take 1 spray by mouth every 6 hours as needed for dry mouth 11/16/17   Rocio aSul MD   amoxicillin-clavulanate (AUGMENTIN) 500-125 MG per tablet Take 1 tablet by mouth 2 times daily  Patient not taking: Reported on 12/6/2017 11/3/17   Tyshawn Sexton MD   predniSONE (DELTASONE) 5 MG tablet Take 1 tablet (5 mg) by mouth daily 10/2/17   Tyshawn Sexton MD   sodium bicarbonate 650 MG tablet Take 1 tablet (650 mg) by mouth 2 times daily 10/2/17   Tyshawn Sexton MD   hydrALAZINE (APRESOLINE) 25 MG tablet Take 1 tablet (25 mg) by mouth 3 times daily as needed For systolic BP >170 9/18/17   Tyshawn Sexton MD   NIFEdipine ER osmotic (PROCARDIA XL) 30 MG 24 hr tablet Take 1 tablet (30 mg) by mouth 2 times daily 9/15/17   Tyshawn Sexton MD   cloNIDine (CATAPRES) 0.1 MG tablet Take 1 tablet (0.1 mg) by mouth 2 times daily as needed For systolic BP >180 9/15/17   Tyshawn Sexton MD   vitamin D (ERGOCALCIFEROL) 21174 UNIT capsule Take 1 capsule (50,000 Units) by mouth once a week 8/25/17   Tyshawn Sexton MD   carvedilol (COREG) 6.25  MG tablet Take 2 tablets (12.5 mg) by mouth 2 times daily (with meals) 8/8/17   Tyshawn Sexton MD   cholecalciferol (VITAMIN  -D) 1000 UNITS capsule Take 2 capsules (2,000 Units) by mouth daily 8/8/17   Tyshawn Sexton MD   pantoprazole (PROTONIX) 40 MG EC tablet Take 1 tablet (40 mg) by mouth daily 5/7/17   Rocio Saul MD   folic acid (FOLVITE) 1 MG tablet Take 1 tablet (1 mg) by mouth daily 4/6/17   Rocio Saul MD   darbepoetin bucky (ARANESP, ALBUMIN FREE,) 300 MCG/0.6ML injection Inject 0.6 mLs (300 mcg) Subcutaneous every 28 days As needed for hgb<10g/dL 2/15/17   Tyshawn Sexton MD   CELLCEPT 250 MG PO CAPSULE Take 2 capsules (500 mg) by mouth 2 times daily 2/7/17   Sakina Huntley MD   hydrocortisone (CORTAID) 1 % cream Apply topically 2 times daily 7/5/16   Taryn Green APRN CNP       PHYSICAL EXAM:  NEURO/PSYCH: The patient is alert and oriented.  Appropriate.  Moves all extremities.   SKIN:  Warm and dry.  PULMONARY: non-labored breathing   EXTREMITIES: palpable right radial and ulnar pulses, right arm finger tips sensation intact      ASSESSMENT:  Patient Active Problem List   Diagnosis     Chronic rhinitis     Essential hypertension, benign     S/P kidney transplant     Rash     Asthma exacerbation     UTI (urinary tract infection)     Anemia     Chest pain     Urinary tract infection     Localized pustular psoriasis     CKD (chronic kidney disease) stage 4, GFR 15-29 ml/min (H)     Immunosuppression (H)     Fistula     End-stage renal disease (ESRD) (H)         PLAN:  - right arm fistula maturing nicely.    - right arm AVF should be ready to use once dialysis is initiated    - follow up ultrasound of AVF and office visit with Sum in 3 weeks.      Yvette ROTH, CNS  Division of Vascular Surgery  [facility]  Pager [phone]

## 2018-01-12 ENCOUNTER — CARE COORDINATION (OUTPATIENT)
Dept: NEPHROLOGY | Facility: CLINIC | Age: 63
End: 2018-01-12

## 2018-01-12 DIAGNOSIS — N39.0 URINARY TRACT INFECTION: Primary | ICD-10-CM

## 2018-01-12 RX ORDER — CEFPODOXIME PROXETIL 100 MG/1
100 TABLET, FILM COATED ORAL 2 TIMES DAILY
Qty: 28 TABLET | Refills: 0 | Status: SHIPPED | OUTPATIENT
Start: 2018-01-12 | End: 2018-01-26

## 2018-01-12 NOTE — TELEPHONE ENCOUNTER
Anemia Management Note  SUBJECTIVE/OBJECTIVE:  Referred by Dr Tyshawn Sexton February 10, 2017  Primary Diagnosis: Anemia in Chronic Kidney Disease (N18.3 D63.1)   Secondary Diagnosis: Chronic Kidney Disease, Stage III (N18.3)   Hgb goal range: 9-10  Epo/Darbo: Aranesp 300 mcg every 14 days - At home   RX will  on 18  Iron regimen:  None  Lab orders  18 in EPIC   Contact:                      Ok to leave message on home phone regarding (no selection made) per consent to communicate dated 8/15/13                                       OK to speak with NONE regarding  per consent to communicate dated 8/15/13    Anemia Latest Ref Rng & Units 10/23/2017 2017 2017 2017 2017 2017 2018   HGB Goal - - - - - - - -   VALARIE Dose - - 300 mcg - - 300 mcg 300 mcg -   Hemoglobin 11.7 - 15.7 g/dL 9.8(L) - 9.4(L) 9.6(L) - 9.9(L) 9.3(L)   TSAT 15 - 46 % - - - - - - 25   Ferritin 8 - 252 ng/mL - - - - - - 1268(H)   PRBCs - - - - - - - -     BP Readings from Last 3 Encounters:   18 (!) 147/92   17 135/80   17 150/82     Wt Readings from Last 2 Encounters:   17 217 lb (98.4 kg)   17 215 lb 6.2 oz (97.7 kg)     Verify Aranesp RX frequency.  The rx says Aranesp (300 mcg)  every 28 days As needed for hgb<10g/dL but the information above says every 14 days.  Eleni would like a new rx to dose every 14 days if needed like she had in the past. Occasionally she has needed to dose more often than 28 days     Her current RX expires     ASSESSMENT:  Hgb: At goal - recommend dose  TSat: not at goal (>30%) but ferritin >1000ng/mL.  PO iron not indicated at this time per anemia protocol.   Ferritin: Elevated (>1000ng/mL)    PLAN:  Usanee will dose with aranesp today and RTC for hgb then aranesp if needed in 2 week(s)    Orders needed to be renewed (for next follow-up date) in [facility]: Aranesp med order    Iron labs due:  18    Plan discussed with:  Eleni Suazo  provided by: Winston    NEXT FOLLOW-UP DATE:  1/25/18    Anemia Management Service  Yvette Goodwin PharmD and Preethi Akers CPhT  Phone: [phone]  Fax: [phone]

## 2018-01-12 NOTE — PROGRESS NOTES
I have seen and assessed this patient with the above provider and agree with her above documentation, impression and plan. Fistula with good thrill. Forearm numbness likely related to antebrachial nerve of the forearm inflammation.  Should get better with ongoing healing.  Will see Sum in a couple of weeks and get U/S then.  Should be able to use fistula when she needs dialysis.      I spent>50% of this 15 min visit in counseling.

## 2018-01-12 NOTE — PROGRESS NOTES
Spoke with patient to follow up on urine culture. Said she continues to have urinary frequency (unchanged), but is having new urgency. Denied pain or blood in urine. She requested new rx as recommended by Dr. Sexton.    Per Dr. Sexton:    If she is symptomatic, I would like her to place her on cefpodoxime 100 mg po bid x 14 days and hold keflex until the course if over then resume     Rx sent.    MARIA D Rashid

## 2018-01-14 LAB
BACTERIA SPEC CULT: ABNORMAL
BACTERIA SPEC CULT: ABNORMAL
SPECIMEN SOURCE: ABNORMAL

## 2018-01-23 DIAGNOSIS — T82.898D PROBLEM WITH DIALYSIS ACCESS, SUBSEQUENT ENCOUNTER: ICD-10-CM

## 2018-01-23 DIAGNOSIS — N18.5 CKD (CHRONIC KIDNEY DISEASE) STAGE 5, GFR LESS THAN 15 ML/MIN (H): Primary | ICD-10-CM

## 2018-01-25 ENCOUNTER — TELEPHONE (OUTPATIENT)
Dept: PHARMACY | Facility: CLINIC | Age: 63
End: 2018-01-25

## 2018-01-25 DIAGNOSIS — N18.30 ANEMIA IN STAGE 3 CHRONIC KIDNEY DISEASE (H): ICD-10-CM

## 2018-01-25 DIAGNOSIS — N18.4 CKD (CHRONIC KIDNEY DISEASE) STAGE 4, GFR 15-29 ML/MIN (H): Primary | ICD-10-CM

## 2018-01-25 DIAGNOSIS — N18.30 CHRONIC KIDNEY DISEASE, STAGE III (MODERATE) (H): ICD-10-CM

## 2018-01-25 DIAGNOSIS — D64.9 ANEMIA: ICD-10-CM

## 2018-01-25 DIAGNOSIS — D63.1 ANEMIA IN STAGE 3 CHRONIC KIDNEY DISEASE (H): ICD-10-CM

## 2018-01-25 NOTE — TELEPHONE ENCOUNTER
Follow-up with anemia management service:    I spoke to Eleni reminding her that she is due for a Hgb lab and possibly an aranesp dose.  She will get her lab drawn on 18 when she is at the [facility] for another appt.    Anemia Latest Ref Rng & Units 10/23/2017 2017 2017 2017 2017 2017 2018   HGB Goal - - - - - - - -   VALARIE Dose - - 300 mcg - - 300 mcg 300 mcg 300 mcg   Hemoglobin 11.7 - 15.7 g/dL 9.8(L) - 9.4(L) 9.6(L) - 9.9(L) 9.3(L)   TSAT 15 - 46 % - - - - - - 25   Ferritin 8 - 252 ng/mL - - - - - - 1268(H)   PRBCs - - - - - - - -       Orders needed to be renewed (for next follow-up date) in EPIC: Med and lab orders                          Med order expires: 18                          Lab orders : 18  Med order and lab orders renewed 2018 GLORIA  Follow-up call date: 2018  Reviewed 2018 MAJ Montero    Anemia Management Service  Shea Sotomayor PharmD and Preethi Akers CPhT  Phone: [phone]  Fax: [phone]

## 2018-01-29 DIAGNOSIS — Z94.0 KIDNEY TRANSPLANTED: ICD-10-CM

## 2018-01-29 DIAGNOSIS — E78.00 HYPERCHOLESTEREMIA: ICD-10-CM

## 2018-01-29 RX ORDER — MYCOPHENOLATE MOFETIL 250 MG/1
CAPSULE ORAL
Qty: 120 CAPSULE | Refills: 6 | Status: SHIPPED | OUTPATIENT
Start: 2018-01-29 | End: 2018-09-10

## 2018-01-30 RX ORDER — ATORVASTATIN CALCIUM 20 MG/1
20 TABLET, FILM COATED ORAL DAILY
Qty: 90 TABLET | Refills: 2 | Status: SHIPPED | OUTPATIENT
Start: 2018-01-30 | End: 2018-08-06

## 2018-01-31 ENCOUNTER — PRE VISIT (OUTPATIENT)
Dept: UROLOGY | Facility: CLINIC | Age: 63
End: 2018-01-31

## 2018-01-31 NOTE — TELEPHONE ENCOUNTER
Patient with history of urinary frequency and rUTIs coming in for consult with Dr. Garcia. Patient chart reviewed, no need for call, all records available and ready for appointment.

## 2018-02-01 ENCOUNTER — OFFICE VISIT (OUTPATIENT)
Dept: TRANSPLANT | Facility: CLINIC | Age: 63
End: 2018-02-01
Attending: CLINICAL NURSE SPECIALIST
Payer: MEDICARE

## 2018-02-01 ENCOUNTER — TELEPHONE (OUTPATIENT)
Dept: PHARMACY | Facility: CLINIC | Age: 63
End: 2018-02-01

## 2018-02-01 ENCOUNTER — RADIANT APPOINTMENT (OUTPATIENT)
Dept: ULTRASOUND IMAGING | Facility: CLINIC | Age: 63
End: 2018-02-01
Attending: CLINICAL NURSE SPECIALIST
Payer: MEDICARE

## 2018-02-01 VITALS
OXYGEN SATURATION: 96 % | DIASTOLIC BLOOD PRESSURE: 93 MMHG | TEMPERATURE: 97.8 F | SYSTOLIC BLOOD PRESSURE: 183 MMHG | RESPIRATION RATE: 20 BRPM | WEIGHT: 215.4 LBS | HEIGHT: 62 IN | BODY MASS INDEX: 39.64 KG/M2 | HEART RATE: 96 BPM

## 2018-02-01 DIAGNOSIS — N18.4 CHRONIC KIDNEY DISEASE, STAGE 4, SEVERELY DECREASED GFR (H): ICD-10-CM

## 2018-02-01 DIAGNOSIS — I10 HTN (HYPERTENSION): Primary | ICD-10-CM

## 2018-02-01 DIAGNOSIS — N18.4 CKD (CHRONIC KIDNEY DISEASE) STAGE 4, GFR 15-29 ML/MIN (H): ICD-10-CM

## 2018-02-01 DIAGNOSIS — T82.898D PROBLEM WITH DIALYSIS ACCESS, SUBSEQUENT ENCOUNTER: ICD-10-CM

## 2018-02-01 DIAGNOSIS — N18.5 CKD (CHRONIC KIDNEY DISEASE) STAGE 5, GFR LESS THAN 15 ML/MIN (H): Primary | ICD-10-CM

## 2018-02-01 DIAGNOSIS — N18.5 CKD (CHRONIC KIDNEY DISEASE) STAGE 5, GFR LESS THAN 15 ML/MIN (H): ICD-10-CM

## 2018-02-01 DIAGNOSIS — D64.9 ANEMIA: ICD-10-CM

## 2018-02-01 LAB
FERRITIN SERPL-MCNC: 964 NG/ML (ref 8–252)
HCT VFR BLD AUTO: 32.5 % (ref 35–47)
HGB BLD-MCNC: 9.7 G/DL (ref 11.7–15.7)
IRON SATN MFR SERPL: 36 % (ref 15–46)
IRON SERPL-MCNC: 70 UG/DL (ref 35–180)
TIBC SERPL-MCNC: 195 UG/DL (ref 240–430)

## 2018-02-01 PROCEDURE — 80053 COMPREHEN METABOLIC PANEL: CPT

## 2018-02-01 PROCEDURE — 87799 DETECT AGENT NOS DNA QUANT: CPT

## 2018-02-01 PROCEDURE — G0463 HOSPITAL OUTPT CLINIC VISIT: HCPCS | Mod: ZF

## 2018-02-01 PROCEDURE — 85027 COMPLETE CBC AUTOMATED: CPT

## 2018-02-01 PROCEDURE — 80158 DRUG ASSAY CYCLOSPORINE: CPT

## 2018-02-01 RX ORDER — CLONIDINE HYDROCHLORIDE 0.1 MG/1
0.1 TABLET ORAL 2 TIMES DAILY PRN
Qty: 60 TABLET | Refills: 3 | Status: SHIPPED | OUTPATIENT
Start: 2018-02-01 | End: 2018-06-06

## 2018-02-01 ASSESSMENT — PAIN SCALES - GENERAL: PAINLEVEL: NO PAIN (0)

## 2018-02-01 NOTE — PROGRESS NOTES
Dialysis Access Service   Progress Note    S:  Ms. Logan is being seen today for surgical followup of her dialysis access.  She reports no issues with the wound, and  no steal syndrome of the distal extremity.  Denies pain and swelling in right arm, tingling/numbness or a change of color/temperature in right hand and fingers.  She is not yet on dialysis. S/P Right upper arm basilic vein transposition 2nd stage on 11/21/2017    O:  Temp:  [97.8  F (36.6  C)] 97.8  F (36.6  C)  Pulse:  [96] 96  Resp:  [20] 20  BP: (183)/(93) 183/93  SpO2:  [96 %] 96 %  GENERAL: no distress  Circulation:   Radial pulse 3+  Ulnar pulse  3+   Capillary refill:  capillary refill < 3 sec    Sensory exam:   arm: Normal   [x]           Abnormal   []          Comment:    hand: Normal   [x]           Abnormal   []          Comment:   Motor exam:   arm: Normal   [x]           Abnormal   []          Comment:    hand: Normal   [x]           Abnormal   []          Comment:    Access: R upper extremity wound(s) healed. non-tender. Capillary refill < 3 sec, ++ thrill and bruit via hand held doppler present. No edema in right arm, without apparent infection. Visible collateral veins appear on right upper arm and right chest wall.     RUE US of AV fistula on 2/1/2018  Findings: Right upper extremity  Innominate artery: 102 cm/sec  Subclavian artery: 212 cm/sec  Brachial artery, proximal to inflow anastomosis: 93 cm/s, Flow volume proximal multiple inflow anastomosis is 363 cc/min  Right brachial artery, distal to the inflow anastomosis: 52 cm/s  Radial artery in the AC fossa, distal to the inflow anastomosis: 31 cm/s.   Radial artery at the wrist: 36 cm/s  Ulnar artery at the wrist: 24 cm/s.  Right basilic vein anastomosis diameter: 0.6 cm.  Right basilic vein, arterial anastomosis: 124 cm/s  Basilic vein at the level of the anastomosis: 641 cm/s. Flow volume is 1960 cc/m.  Right distal arm basilic vein: 165 cm/s, diameter of 0.7 cm.  Mid arm: 188  cm/s, diameter of 0.7 cm.  Proximal arm: 110 cm/s, diameter of 0.9 cm.  Right axillary vein: 193 cm/s  Right subclavian vein: 166 cm/s  Right innominate vein: 52 cm/s.  Left innominate vein: 70 cm/s.     There is an incidental right thyroid nodule which measures 1.1 x 1.2 x 0.9 cm. There is a prominent solid component, otherwise the lesion is predominantly cystic.   Impression:  1. Arterial inflow: Patent  2. Fistula anastomosis evaluation: Elevated velocities suggesting mild narrowing but patent.  3. Venous outflow: Patent with mildly elevated velocities just central to the anastomosis in the distal arm.  4. Incidental right thyroid nodule which is primarily cystic with a solid mural nodule.  Consider close follow up in 3 months.       [Consider Follow Up: Right thyroid nodule.  ]    Assessment & Plan: Ms. Ritchie dialysis access has matured very well at this time point.    1. May use RUE AV fistula for dialysis when is needed  2. F/U with Dr. Bond in 4 weeks  3. RUE US of AV fistula today  We would like to see the patient back in the clinic in 4 weeks time to assess progress. The patient was counselled to contact our nurse coordinator, GONZALEZ Booker CNS (Sum) at [phone] with any questions or concerns.  Thank you for the opportunity to participate in Ms. Logan's care.    TT: 15 min  CT: 10 min    TALYA Yancey (Sum)  Dialysis access program   [facility]  Pager # [phone]

## 2018-02-01 NOTE — TELEPHONE ENCOUNTER
Anemia Management Note  SUBJECTIVE/OBJECTIVE:  Referred by Dr Tyshawn Sexton February 10, 2017  Primary Diagnosis: Anemia in Chronic Kidney Disease (N18.3 D63.1)   Secondary Diagnosis: Chronic Kidney Disease, Stage III (N18.3)   Hgb goal range: 9-10  Epo/Darbo: Aranesp 300 mcg every 14 days - At home   RX will  on 18  Iron regimen:  None  Lab orders  18 in EPIC   Contact:                      Ok to leave message on home phone regarding (no selection made) per consent to communicate dated 8/15/13                                       OK to speak with NONE regarding  per consent to communicate dated 8/15/13  Anemia Latest Ref Rng & Units 2017   HGB Goal - - - - - - - -   VALARIE Dose - - - 300 mcg 300 mcg 300 mcg - 300 mcg   Hemoglobin 11.7 - 15.7 g/dL 9.4(L) 9.6(L) - 9.9(L) 9.3(L) 9.7(L) -   TSAT 15 - 46 % - - - - 25 36 -   Ferritin 8 - 252 ng/mL - - - - 1268(H) 964(H) -   PRBCs - - - - - - - -     BP Readings from Last 3 Encounters:   18 (!) 183/93   18 (!) 147/92   17 135/80     Wt Readings from Last 2 Encounters:   18 215 lb 6.4 oz (97.7 kg)   17 217 lb (98.4 kg)     Verify Aranesp RX frequency.  The rx says Aranesp (300 mcg)  every 28 days As needed for hgb<10g/dL but the information above says every 14 days.  Eleni would like a new rx to dose every 14 days if needed like she had in the past. Occasionally she has needed to dose more often than 28 days     Aranesp is scheduled to be delivered on 18  ASSESSMENT:  Hgb:At goal - recommend dose  TSat: at goal >30% Ferritin: At goal (>100ng/mL)    PLAN:  Dose with aranesp on  and RTC for hgb, ferritin and iron labs then aranesp if needed in 4 week(s)    Orders needed to be renewed (for next follow-up date) in EPIC: None    Iron labs due:  3/1/18    Plan discussed with:  Eleni  Plan provided by:  Winston  Reviewed 2018 GLORIA  NEXT FOLLOW-UP DATE:    to document dose then 3/1/18    Anemia Management Service  Shea Sotomayor,PharmD and Preethi Akers CPhT  Phone: [phone]  Fax: [phone]

## 2018-02-01 NOTE — LETTER
2/1/2018       RE: Eleni Logan  [address]  [address]  [place] [address]     Dear Colleague,    Thank you for referring your patient, Eleni Logan, to the [facility] SOLID ORGAN TRANSPLANT at [facility]. Please see a copy of my visit note below.    Dialysis Access Service   Progress Note    S:  Ms. Logan is being seen today for surgical followup of her dialysis access.  She reports no issues with the wound, and  no steal syndrome of the distal extremity.  Denies pain and swelling in right arm, tingling/numbness or a change of color/temperature in right hand and fingers.  She is not yet on dialysis. S/P Right upper arm basilic vein transposition 2nd stage on 11/21/2017    O:  Temp:  [97.8  F (36.6  C)] 97.8  F (36.6  C)  Pulse:  [96] 96  Resp:  [20] 20  BP: (183)/(93) 183/93  SpO2:  [96 %] 96 %  GENERAL: no distress  Circulation:   Radial pulse 3+  Ulnar pulse  3+   Capillary refill:  capillary refill < 3 sec    Sensory exam:   arm: Normal   [x]           Abnormal   []          Comment:    hand: Normal   [x]           Abnormal   []          Comment:   Motor exam:   arm: Normal   [x]           Abnormal   []          Comment:    hand: Normal   [x]           Abnormal   []          Comment:    Access: R upper extremity wound(s) healed. non-tender. Capillary refill < 3 sec, ++ thrill and bruit via hand held doppler present. No edema in right arm, without apparent infection. Visible collateral veins appear on right upper arm and right chest wall.     RUE US of AV fistula on 2/1/2018  Findings: Right upper extremity  Innominate artery: 102 cm/sec  Subclavian artery: 212 cm/sec  Brachial artery, proximal to inflow anastomosis: 93 cm/s, Flow volume proximal multiple inflow anastomosis is 363 cc/min  Right brachial artery, distal to the inflow anastomosis: 52 cm/s  Radial artery in the AC fossa, distal to the inflow anastomosis: 31 cm/s.   Radial artery at the wrist: 36  cm/s  Ulnar artery at the wrist: 24 cm/s.  Right basilic vein anastomosis diameter: 0.6 cm.  Right basilic vein, arterial anastomosis: 124 cm/s  Basilic vein at the level of the anastomosis: 641 cm/s. Flow volume is 1960 cc/m.  Right distal arm basilic vein: 165 cm/s, diameter of 0.7 cm.  Mid arm: 188 cm/s, diameter of 0.7 cm.  Proximal arm: 110 cm/s, diameter of 0.9 cm.  Right axillary vein: 193 cm/s  Right subclavian vein: 166 cm/s  Right innominate vein: 52 cm/s.  Left innominate vein: 70 cm/s.     There is an incidental right thyroid nodule which measures 1.1 x 1.2 x 0.9 cm. There is a prominent solid component, otherwise the lesion is predominantly cystic.   Impression:  1. Arterial inflow: Patent  2. Fistula anastomosis evaluation: Elevated velocities suggesting mild narrowing but patent.  3. Venous outflow: Patent with mildly elevated velocities just central to the anastomosis in the distal arm.  4. Incidental right thyroid nodule which is primarily cystic with a solid mural nodule.  Consider close follow up in 3 months.       [Consider Follow Up: Right thyroid nodule.  ]    Assessment & Plan: Ms. Ritchie dialysis access has matured very well at this time point.    1. May use RUE AV fistula for dialysis when is needed  2. F/U with Dr. Bond in 4 weeks  3. RUE US of AV fistula today  We would like to see the patient back in the clinic in 4 weeks time to assess progress. The patient was counselled to contact our nurse coordinator, GONZALEZ Booker CNS (Sum) at [phone] with any questions or concerns.  Thank you for the opportunity to participate in Ms. Logan's care.    TT: 15 min  CT: 10 min    TALYA Yancey (Sum)  Dialysis access program   [facility]  Pager # [phone]

## 2018-02-01 NOTE — LETTER
2/1/2018       RE: Eleni Logan  [address]  [address]  [place] [facility] [facility] [address]     Dear Colleague,    Thank you for referring your patient, Eleni Logan, to the [facility] SOLID ORGAN TRANSPLANT at [facility]. Please see a copy of my visit note below.    Dialysis Access Service   Progress Note    S:  Ms. Logan is being seen today for surgical followup of her dialysis access.  She reports no issues with the wound, and  no steal syndrome of the distal extremity.  Denies pain and swelling in right arm, tingling/numbness or a change of color/temperature in right hand and fingers.  She is not yet on dialysis. S/P Right upper arm basilic vein transposition 2nd stage on 11/21/2017    O:  Temp:  [97.8  F (36.6  C)] 97.8  F (36.6  C)  Pulse:  [96] 96  Resp:  [20] 20  BP: (183)/(93) 183/93  SpO2:  [96 %] 96 %  GENERAL: no distress  Circulation:   Radial pulse 3+  Ulnar pulse  3+   Capillary refill:  capillary refill < 3 sec    Sensory exam:   arm: Normal   [x]           Abnormal   []          Comment:    hand: Normal   [x]           Abnormal   []          Comment:   Motor exam:   arm: Normal   [x]           Abnormal   []          Comment:    hand: Normal   [x]           Abnormal   []          Comment:    Access: R upper extremity wound(s) healed. non-tender. Capillary refill < 3 sec, ++ thrill and bruit via hand held doppler present. No edema in right arm, without apparent infection. Visible collateral veins appear on right upper arm and right chest wall.     RUE US of AV fistula on 2/1/2018  Findings: Right upper extremity  Innominate artery: 102 cm/sec  Subclavian artery: 212 cm/sec  Brachial artery, proximal to inflow anastomosis: 93 cm/s, Flow volume proximal multiple inflow anastomosis is 363 cc/min  Right brachial artery, distal to the inflow anastomosis: 52 cm/s  Radial artery in the AC fossa, distal to the inflow anastomosis: 31 cm/s.   Radial artery at the wrist: 36  cm/s  Ulnar artery at the wrist: 24 cm/s.  Right basilic vein anastomosis diameter: 0.6 cm.  Right basilic vein, arterial anastomosis: 124 cm/s  Basilic vein at the level of the anastomosis: 641 cm/s. Flow volume is 1960 cc/m.  Right distal arm basilic vein: 165 cm/s, diameter of 0.7 cm.  Mid arm: 188 cm/s, diameter of 0.7 cm.  Proximal arm: 110 cm/s, diameter of 0.9 cm.  Right axillary vein: 193 cm/s  Right subclavian vein: 166 cm/s  Right innominate vein: 52 cm/s.  Left innominate vein: 70 cm/s.     There is an incidental right thyroid nodule which measures 1.1 x 1.2 x 0.9 cm. There is a prominent solid component, otherwise the lesion is predominantly cystic.   Impression:  1. Arterial inflow: Patent  2. Fistula anastomosis evaluation: Elevated velocities suggesting mild narrowing but patent.  3. Venous outflow: Patent with mildly elevated velocities just central to the anastomosis in the distal arm.  4. Incidental right thyroid nodule which is primarily cystic with a solid mural nodule.  Consider close follow up in 3 months.       [Consider Follow Up: Right thyroid nodule.  ]    Assessment & Plan: Ms. Ritchie dialysis access has matured very well at this time point.    1. May use RUE AV fistula for dialysis when is needed  2. F/U with Dr. Bond in 4 weeks  3. RUE US of AV fistula today  We would like to see the patient back in the clinic in 4 weeks time to assess progress. The patient was counselled to contact our nurse coordinator, GONZALEZ Booker CNS (Sum) at [phone] with any questions or concerns.  Thank you for the opportunity to participate in Ms. Logan's care.    TT: 15 min  CT: 10 min    TALYA Yancey (Sum)  Dialysis access program   [facility]  Pager # [phone]    Again, thank you for allowing me to participate in the care of your patient.      Sincerely,    GONZALEZ Pulido

## 2018-02-01 NOTE — NURSING NOTE
"Chief Complaint   Patient presents with     Allied Health Visit     consult       Initial BP (!) 183/93  Pulse 96  Temp 97.8  F (36.6  C) (Oral)  Resp 20  Ht 1.575 m (5' 2\")  Wt 97.7 kg (215 lb 6.4 oz)  SpO2 96%  BMI 39.4 kg/m2 Estimated body mass index is 39.4 kg/(m^2) as calculated from the following:    Height as of this encounter: 1.575 m (5' 2\").    Weight as of this encounter: 97.7 kg (215 lb 6.4 oz).  Medication Reconciliation: complete    "

## 2018-02-01 NOTE — MR AVS SNAPSHOT
After Visit Summary   2/1/2018    Eleni Logan    MRN: [id number]           Patient Information     Date Of Birth          1955        Visit Information        Provider Department      2/1/2018 2:30 PM Sophie Rosario APRN CNS [facility] Solid Organ Transplant        Today's Diagnoses     CKD (chronic kidney disease) stage 5, GFR less than 15 ml/min (H)    -  1    Chronic kidney disease, stage 4, severely decreased GFR (H)           Follow-ups after your visit        Your next 10 appointments already scheduled     Feb 07, 2018 10:00 AM CST   (Arrive by 9:45 AM)   New Patient Visit with Lynnette Garcia MD   [facility] Urology and [facility] ([facility])    [address] [place] [address]  [address] [facility] [id number] [phone]            Feb 19, 2018  2:50 PM CST   (Arrive by 2:20 PM)   Return Kidney Transplant with Tyshawn Sexton MD   [facility] Nephrology ([facility])    [address] [place] [address]  [address] [place] [id number] [phone]            Feb 22, 2018  1:00 PM CST   (Arrive by 12:45 PM)   Return Visit with Preethi Gibson MD   [facility] Primary Care Clinic ([facility])    [address] [place] [address]  [address] [facility] [id number] [phone]            Mar 01, 2018  4:45 PM CST   (Arrive by 4:30 PM)   Return Vascular Visit with Brandy Bond MD   [facility] Vascular Clinic ([facility])    [address] [place] [address]  [address]rd [facility] [address] [phone]              Who to contact     If you have questions or need follow up information about today's clinic visit or your schedule please contact [facility] SOL[facility] TRANSPLANT directly at [phone].  Normal or non-critical lab and imaging results will be communicated to you by MyChart, letter or phone within 4 business days after the clinic has received the results. If you do not hear from us  "within 7 days, please contact the clinic through [employer] or phone. If you have a critical or abnormal lab result, we will notify you by phone as soon as possible.  Submit refill requests through [employer] or call your pharmacy and they will forward the refill request to us. Please allow 3 business days for your refill to be completed.          Additional Information About Your Visit        [email or web address] Information     [employer] lets you send messages to your doctor, view your test results, renew your prescriptions, schedule appointments and more. To sign up, go to [email or web address] . Click on \"Log in\" on the left side of the screen, which will take you to the Welcome page. Then click on \"Sign up Now\" on the right side of the page.     You will be asked to enter the access code listed below, as well as some personal information. Please follow the directions to create your username and password.     Your access code is: [email or web address]  Expires: 3/28/2018  6:31 AM     Your access code will  in 90 days. If you need help or a new code, please call your [place] clinic or [phone].        Care EveryWhere ID     This is your Care EveryWhere ID. This could be used by other organizations to access your [place] medical records  [id number]        Your Vitals Were     Pulse Temperature Respirations Height Pulse Oximetry BMI (Body Mass Index)    96 97.8  F (36.6  C) (Oral) 20 1.575 m (5' 2\") 96% 39.4 kg/m2       Blood Pressure from Last 3 Encounters:   18 (!) 183/93   18 (!) 147/92   17 135/80    Weight from Last 3 Encounters:   18 97.7 kg (215 lb 6.4 oz)   17 98.4 kg (217 lb)   17 97.7 kg (215 lb 6.2 oz)              Today, you had the following     No orders found for display         Where to get your medicines      These medications were sent to [facility] - [place], [place] - [address]  [address], [facility] [address]    Hours:  " Mon-Fri 8:30am-5:00pm Toll Free [phone] Phone:  [phone]     cloNIDine 0.1 MG tablet          Primary Care Provider Office Phone # Fax #    Preethi Gibson [phone]       [address]  [facility] [address]        Equal Access to Services     GERBER [place] : Hadii aad ku hadasho Soomaali, waaxda luqadaha, qaybta kaalmada adeegyada, waxay idiin hayaan adeeg khtravissh la'aan ah. So [facility] [phone].    ATENCIÓN: Si habla español, tiene a stoner disposición servicios gratuitos de asistencia lingüística. Celine al [phone].    We comply with applicable federal civil rights laws and [place] laws. We do not discriminate on the basis of race, color, national origin, age, disability, sex, sexual orientation, or gender identity.            Thank you!     Thank you for choosing [facility] SOLID ORGAN TRANSPLANT  for your care. Our goal is always to provide you with excellent care. Hearing back from our patients is one way we can continue to improve our services. Please take a few minutes to complete the written survey that you may receive in the mail after your visit with us. Thank you!             Your Updated Medication List - Protect others around you: Learn how to safely use, store and throw away your medicines at www.disposemymeds.org.          This list is accurate as of 2/1/18 11:59 PM.  Always use your most recent med list.                   Brand Name Dispense Instructions for use Diagnosis    amoxicillin-clavulanate 500-125 MG per tablet    AUGMENTIN    20 tablet    Take 1 tablet by mouth 2 times daily    Kidney replaced by transplant       artificial saliva Aers spray     1 Bottle    Take 1 spray by mouth every 6 hours as needed for dry mouth    Dry mouth       atorvastatin 20 MG tablet    LIPITOR    90 tablet    Take 1 tablet (20 mg) by mouth daily    Hypercholesteremia       carvedilol 6.25 MG tablet    COREG    360 tablet    Take 2 tablets (12.5 mg) by  mouth 2 times daily (with meals)    Hypertension secondary to other renal disorders       cephalexin 250 MG capsule    KEFLEX    90 capsule    Take 1 capsule (250 mg) by mouth At Bedtime    Urinary frequency, Urinary tract infection       cholecalciferol 1000 UNITS capsule    vitamin  -D    180 capsule    Take 2 capsules (2,000 Units) by mouth daily    Vitamin D deficiency       cloNIDine 0.1 MG tablet    CATAPRES    60 tablet    Take 1 tablet (0.1 mg) by mouth 2 times daily as needed For systolic BP >180    HTN (hypertension)       darbepoetin bucky 300 MCG/0.6ML injection    ARANESP (ALBUMIN FREE)    0.6 mL    Inject 0.6 mLs (300 mcg) Subcutaneous every 28 days As needed for hgb<10g/dL    Chronic kidney disease, stage III (moderate), Anemia in stage 3 chronic kidney disease       folic acid 1 MG tablet    FOLVITE    30 tablet    Take 1 tablet (1 mg) by mouth daily    Preventive measure       hydrALAZINE 25 MG tablet    APRESOLINE    90 tablet    Take 1 tablet (25 mg) by mouth 3 times daily as needed For systolic BP >170    HTN (hypertension)       hydrocortisone 1 % cream    CORTAID    60 g    Apply topically 2 times daily    Rash, skin       mycophenolate 250 MG capsule     120 capsule    TAKE TWO CAPSULES (500MG) BY MOUTH TWICE A DAY (DAW1)    Kidney transplanted       NIFEdipine ER osmotic 30 MG 24 hr tablet    PROCARDIA XL    180 tablet    Take 1 tablet (30 mg) by mouth 2 times daily    HTN (hypertension)       pantoprazole 40 MG EC tablet    PROTONIX    30 tablet    Take 1 tablet (40 mg) by mouth daily    Gastroesophageal reflux disease with esophagitis       predniSONE 5 MG tablet    DELTASONE    30 tablet    Take 1 tablet (5 mg) by mouth daily    Kidney transplanted       sodium bicarbonate 650 MG tablet     180 tablet    Take 1 tablet (650 mg) by mouth 2 times daily    Acidosis       vitamin D 33387 UNIT capsule    ERGOCALCIFEROL    12 capsule    Take 1 capsule (50,000 Units) by mouth once a week    Vitamin  D deficiency

## 2018-02-02 LAB — RADIOLOGIST FLAGS: NORMAL

## 2018-02-06 ENCOUNTER — TELEPHONE (OUTPATIENT)
Dept: ENDOCRINOLOGY | Facility: CLINIC | Age: 63
End: 2018-02-06

## 2018-02-06 ENCOUNTER — TELEPHONE (OUTPATIENT)
Dept: NEPHROLOGY | Facility: CLINIC | Age: 63
End: 2018-02-06

## 2018-02-06 DIAGNOSIS — Z94.0 STATUS POST KIDNEY TRANSPLANT: Primary | ICD-10-CM

## 2018-02-06 DIAGNOSIS — E04.1 THYROID NODULE: ICD-10-CM

## 2018-02-06 NOTE — TELEPHONE ENCOUNTER
To schedulers: Please schedule  for lst available endocrine. Preferred provider Chase Pollack MD  Endocrine triage

## 2018-02-06 NOTE — TELEPHONE ENCOUNTER
Upon review of US of AV fistula, incidental finding of a thyroid nodule. Per Dr. Sexton:    Please refer her to endocrine with Dr. Pollack     Called patient, who verbalized understanding. Referral placed.    Brittney Quinones RN

## 2018-02-06 NOTE — TELEPHONE ENCOUNTER
----- Message from Stefani Milner sent at 2/6/2018 12:13 PM CST -----  Regarding: new endo referral   Contact: [phone]  Patient has a new referral to be seen in endo DX Status post kidney transplant  Thyroid nodule    Please call patient @ [phone] for schduling     Thank you!    Stefani Milner  Intake representative   Call Center

## 2018-02-07 ENCOUNTER — RADIANT APPOINTMENT (OUTPATIENT)
Dept: ULTRASOUND IMAGING | Facility: CLINIC | Age: 63
End: 2018-02-07
Attending: INTERNAL MEDICINE
Payer: MEDICARE

## 2018-02-07 ENCOUNTER — OFFICE VISIT (OUTPATIENT)
Dept: UROLOGY | Facility: CLINIC | Age: 63
End: 2018-02-07
Payer: MEDICARE

## 2018-02-07 VITALS
HEART RATE: 82 BPM | HEIGHT: 62 IN | DIASTOLIC BLOOD PRESSURE: 74 MMHG | SYSTOLIC BLOOD PRESSURE: 139 MMHG | BODY MASS INDEX: 39.56 KG/M2 | WEIGHT: 215 LBS

## 2018-02-07 DIAGNOSIS — N39.0 RECURRENT UTI: Primary | ICD-10-CM

## 2018-02-07 DIAGNOSIS — E04.1 THYROID NODULE: ICD-10-CM

## 2018-02-07 DIAGNOSIS — N95.2 ATROPHIC VAGINITIS: Primary | ICD-10-CM

## 2018-02-07 DIAGNOSIS — N95.2 ATROPHIC VAGINITIS: ICD-10-CM

## 2018-02-07 ASSESSMENT — PAIN SCALES - GENERAL: PAINLEVEL: NO PAIN (0)

## 2018-02-07 NOTE — PATIENT INSTRUCTIONS
Prescription for estrogen cream will be mailed to your home.    Please consider probiotics.      It was a pleasure meeting with you today.  Thank you for allowing me and my team the privilege of caring for you today.  YOU are the reason we are here, and I truly hope we provided you with the excellent service you deserve.  Please let us know if there is anything else we can do for you so that we can be sure you are leaving completely satisfied with your care experience.

## 2018-02-07 NOTE — LETTER
2018       RE: Eleni Logan  [address]  [address]  [facility] [address]     Dear Colleague,    Thank you for referring your patient, Eleni Logan, to the [facility] UROLOGY AND INST FOR PROSTATE AND UROLOGIC CANCERS at [facility]. Please see a copy of my visit note below.    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    The patient notes that prior to her transplant, she had urinary tract infections but these were quite infrequent.  Over the past few years, she has beenhaving them every few months.  These have always treated with antibiotics at home; she has not required hospitalization.  Review of recent cultures demonstrate the following over the past year:  2018: E coli (pan-sensitive) & enterobacter (resistances present) - while on prophylactic antibiotic therapy  2017: E coli (pan-sensitive)  2017: E coli (pan-sensitive)  2017: E coli (pan-sensitive)  2017: 50-100K E coli (pan-sensitive)    In 2017, she was started on prophylactic antibiotics.  She had a single breakthrough UTI while on prophylaxis.  Sounds like she has had a percutaneous aspiration in the past (~2000) as well as a stent around the same time in the past.  No hydro on CT in 2017.   No symptoms currently.    Past Medical History:  Past Medical History:   Diagnosis Date     Anatomical narrow angle      Anemia      Gout      HTN (hypertension)      Hyperlipidaemia      IgA nephropathy      Immunosuppressed status (H)     Prednisone and cellcept     Nonsenile cataract      Obstructive sleep apnea         Past Surgical History:  Past Surgical History:   Procedure Laterality Date     ARTHROPLASTY KNEE       COLONOSCOPY N/A 2/18/2016    Procedure: COLONOSCOPY;  Surgeon: Markie Squires MD;  Location: [facility]     CREATE FISTULA ARTERIOVENOUS UPPER EXTREMITY Right 9/19/2017    Procedure: CREATE FISTULA ARTERIOVENOUS UPPER EXTREMITY;  Right Rm Basilic Vein Fistula, Cephalic Vein Thrombectomy.;  Surgeon: Brandy Bond MD;  Location: [facility]     ESOPHAGOSCOPY, GASTROSCOPY, DUODENOSCOPY (EGD), COMBINED N/A 2/17/2016    Procedure: COMBINED ESOPHAGOSCOPY, GASTROSCOPY, DUODENOSCOPY (EGD);  Surgeon: Markie Squires MD;  [facility]     HC CAPSULE ENDOSCOPY N/A 2/18/2016    Procedure: CAPSULE/PILL CAM ENDOSCOPY;  Surgeon: Markie Squires MD;  Location: [facility]     LASER YAG IRIDOTOMY BILATERAL  2008     RENAL TRANSPLANT PROGRAM ADULT IP CONSULT       REVISION FISTULA ARTERIOVENOUS UPPER EXTREMITY Right 11/21/2017    Procedure: REVISION FISTULA ARTERIOVENOUS UPPER EXTREMITY;  Right Arm Basilic Vein Transposition,  Anesthesia Block;  Surgeon: Brandy Bond MD;  Location: [facility]     TRANSPLANT  07/1999    kidney       Allergies:     Allergies   Allergen Reactions     Ciprofloxacin Palpitations       Medications:    Current Outpatient Prescriptions on File Prior to Visit:  cloNIDine (CATAPRES) 0.1 MG tablet Take 1 tablet (0.1 mg) by mouth 2 times daily as needed For systolic BP >180   atorvastatin (LIPITOR) 20 MG tablet Take 1 tablet (20 mg) by mouth daily   CELLCEPT (BRAND) 250 MG CAPSULE TAKE TWO CAPSULES (500MG) BY MOUTH TWICE A DAY (DAW1)   darbepoetin bucky (ARANESP, ALBUMIN FREE,) 300 MCG/0.6ML injection Inject 0.6 mLs (300 mcg) Subcutaneous every 28 days As needed for hgb<10g/dL   cephalexin (KEFLEX) 250 MG capsule Take 1 capsule (250 mg) by mouth At Bedtime   artificial saliva (BIOTENE MT) AERS spray Take 1 spray by mouth every 6 hours as needed for dry mouth   amoxicillin-clavulanate  (AUGMENTIN) 500-125 MG per tablet Take 1 tablet by mouth 2 times daily   predniSONE (DELTASONE) 5 MG tablet Take 1 tablet (5 mg) by mouth daily   sodium bicarbonate 650 MG tablet Take 1 tablet (650 mg) by mouth 2 times daily   hydrALAZINE (APRESOLINE) 25 MG tablet Take 1 tablet (25 mg) by mouth 3 times daily as needed For systolic BP >170   NIFEdipine ER osmotic (PROCARDIA XL) 30 MG 24 hr tablet Take 1 tablet (30 mg) by mouth 2 times daily   vitamin D (ERGOCALCIFEROL) 27185 UNIT capsule Take 1 capsule (50,000 Units) by mouth once a week   carvedilol (COREG) 6.25 MG tablet Take 2 tablets (12.5 mg) by mouth 2 times daily (with meals)   cholecalciferol (VITAMIN  -D) 1000 UNITS capsule Take 2 capsules (2,000 Units) by mouth daily   pantoprazole (PROTONIX) 40 MG EC tablet Take 1 tablet (40 mg) by mouth daily   folic acid (FOLVITE) 1 MG tablet Take 1 tablet (1 mg) by mouth daily   hydrocortisone (CORTAID) 1 % cream Apply topically 2 times daily     No current facility-administered medications on file prior to visit.     Social History:  Social History     Social History     Marital status:      Spouse name: N/A     Number of children: N/A     Years of education: N/A     Occupational History     Not on file.     Social History Main Topics     Smoking status: Never Smoker     Smokeless tobacco: Never Used      Comment: Has been around second hand smoke     Alcohol use Yes      Comment: Once in a great while     Drug use: No     Sexual activity: Not Currently     Other Topics Concern     Not on file     Social History Narrative       Family History:  Family History   Problem Relation Age of Onset     KIDNEY DISEASE Mother      Macular Degeneration No family hx of      Eye Surgery No family hx of      Glaucoma No family hx of      DIABETES No family hx of      Hypertension No family hx of      Amblyopia No family hx of      Skin Cancer No family hx of      Melanoma No family hx of        ROS:  The remainder of the  "complete ROS was negative unless noted in the HPI.    Exam:  /74  Pulse 82  Ht 1.575 m (5' 2\")  Wt 97.5 kg (215 lb)  BMI 39.32 kg/m2  General: Alert, interactive, & in NAD; obese  Resp: CTAB, no crackles or wheezes  Cardiac: Regular rate; extremities warm  Abdomen: Nondistended.  Extremities: No LE edema or obvious joint abnormalities  Skin: Warm and dry, no jaundice or rash    Labs:  All laboratory data reviewed    Imaging: Reviewed CT scan from June 2017    Assessment and Plan: Eleni Logan is a(n) 62 year old female with a history of ESRD 2/2 IgA nephropathy s/p kidney transplant in 1999 with recurrent urinary tract infections.  Now on daily prophylaxis with one breakthrough UTI since initiation of therapy.     - Will have patient begin estrogen cream   - Continue daily Keflex   - She will also begin daily probiotics   - Return to clinic next available for in-office cystoscopy    --    oRn Bro MD  Urology Resident    11:06 AM, 02/07/2018    Patient was seen, evaluated and plan was formulated in conjunction with me and I agree with the above.    Again, thank you for allowing me to participate in the care of your patient.      Sincerely,    Lynnette Garcia MD      "

## 2018-02-07 NOTE — NURSING NOTE
"Chief Complaint   Patient presents with     Consult     urinary frequency and rUTIs       Blood pressure 139/74, pulse 82, height 1.575 m (5' 2\"), weight 97.5 kg (215 lb), not currently breastfeeding. Body mass index is 39.32 kg/(m^2).    Patient Active Problem List   Diagnosis     Chronic rhinitis     Essential hypertension, benign     S/P kidney transplant     Rash     Asthma exacerbation     UTI (urinary tract infection)     Anemia     Chest pain     Urinary tract infection     Localized pustular psoriasis     CKD (chronic kidney disease) stage 4, GFR 15-29 ml/min (H)     Immunosuppression (H)     Fistula     End-stage renal disease (ESRD) (H)       Allergies   Allergen Reactions     Ciprofloxacin Palpitations       Current Outpatient Prescriptions   Medication Sig Dispense Refill     cloNIDine (CATAPRES) 0.1 MG tablet Take 1 tablet (0.1 mg) by mouth 2 times daily as needed For systolic BP >180 60 tablet 3     atorvastatin (LIPITOR) 20 MG tablet Take 1 tablet (20 mg) by mouth daily 90 tablet 2     CELLCEPT (BRAND) 250 MG CAPSULE TAKE TWO CAPSULES (500MG) BY MOUTH TWICE A DAY (DAW1) 120 capsule 6     darbepoetin bucky (ARANESP, ALBUMIN FREE,) 300 MCG/0.6ML injection Inject 0.6 mLs (300 mcg) Subcutaneous every 28 days As needed for hgb<10g/dL 0.6 mL 11     cephalexin (KEFLEX) 250 MG capsule Take 1 capsule (250 mg) by mouth At Bedtime 90 capsule 1     artificial saliva (BIOTENE MT) AERS spray Take 1 spray by mouth every 6 hours as needed for dry mouth 1 Bottle 3     amoxicillin-clavulanate (AUGMENTIN) 500-125 MG per tablet Take 1 tablet by mouth 2 times daily 20 tablet 0     predniSONE (DELTASONE) 5 MG tablet Take 1 tablet (5 mg) by mouth daily 30 tablet 11     sodium bicarbonate 650 MG tablet Take 1 tablet (650 mg) by mouth 2 times daily 180 tablet 3     hydrALAZINE (APRESOLINE) 25 MG tablet Take 1 tablet (25 mg) by mouth 3 times daily as needed For systolic BP >170 90 tablet 3     NIFEdipine ER osmotic (PROCARDIA " XL) 30 MG 24 hr tablet Take 1 tablet (30 mg) by mouth 2 times daily 180 tablet 3     vitamin D (ERGOCALCIFEROL) 17335 UNIT capsule Take 1 capsule (50,000 Units) by mouth once a week 12 capsule 0     carvedilol (COREG) 6.25 MG tablet Take 2 tablets (12.5 mg) by mouth 2 times daily (with meals) 360 tablet 1     cholecalciferol (VITAMIN  -D) 1000 UNITS capsule Take 2 capsules (2,000 Units) by mouth daily 180 capsule 3     pantoprazole (PROTONIX) 40 MG EC tablet Take 1 tablet (40 mg) by mouth daily 30 tablet 11     folic acid (FOLVITE) 1 MG tablet Take 1 tablet (1 mg) by mouth daily 30 tablet 11     hydrocortisone (CORTAID) 1 % cream Apply topically 2 times daily 60 g 1       Social History   Substance Use Topics     Smoking status: Never Smoker     Smokeless tobacco: Never Used      Comment: Has been around second hand smoke     Alcohol use Yes      Comment: Once in a great while       Leisa Greer LPN  2/7/2018  9:58 AM

## 2018-02-07 NOTE — MR AVS SNAPSHOT
After Visit Summary   2/7/2018    Eleni Logan    MRN: [id number]           Patient Information     Date Of Birth          1955        Visit Information        Provider Department      2/7/2018 10:00 AM Lynnette Garcia MD [facility] Urology and Inst for Prostate and Urologic Cancers        Today's Diagnoses     Recurrent UTI    -  1    Atrophic vaginitis          Care Instructions    Prescription for estrogen cream will be mailed to your home.    Please consider probiotics.      It was a pleasure meeting with you today.  Thank you for allowing me and my team the privilege of caring for you today.  YOU are the reason we are here, and I truly hope we provided you with the excellent service you deserve.  Please let us know if there is anything else we can do for you so that we can be sure you are leaving completely satisfied with your care experience.                  Follow-ups after your visit        Follow-up notes from your care team     Return for f/u for cysto.      Your next 10 appointments already scheduled     Feb 07, 2018 11:15 AM CST   US THYROID with UCUS2   [facility] US ([facility])    [address] [place] [address] [phone]           Please bring a list of your medicines (including vitamins, minerals and over-the-counter drugs). Also, tell your doctor about any allergies you may have. Wear comfortable clothes and leave your valuables at home.  You do not need to do anything special to prepare for your exam.  Please call the Imaging Department at your exam site with any questions.            Feb 15, 2018 11:00 AM CST   (Arrive by 10:45 AM)   NEW ENDOCRINE with Ana Pollack MD   [facility] Endocrinology ([facility])    [address] [place] [address] [phone]            Feb 19, 2018  2:50 PM CST   (Arrive by 2:20 PM)   Return Kidney Transplant  with Tyshawn Sexton MD   [facility] Nephrology ([facility])    [address]  [address]  [facility] [address]   [phone]            Feb 22, 2018  1:00 PM CST   (Arrive by 12:45 PM)   Return Visit with Preethi Gibson MD   [facility] ([facility])    [address]  4th Floor  [facility] [address]   [phone]            Mar 01, 2018  4:45 PM CST   (Arrive by 4:30 PM)   Return Vascular Visit with Brandy Bond MD   [facility] Vascular [facility] ([facility])    [address]  3rd Floor  [facility] [address]   [phone]            May 02, 2018 10:45 AM CDT   (Arrive by 10:30 AM)   Cystoscopy with Lynnette Garcia MD   [facility] Urology and [facility] for Prostate and Urologic Cancers ([facility])    [address]  4th Floor  [facility] [id number] [phone]              Future tests that were ordered for you today     Open Future Orders        Priority Expected Expires Ordered    US Thyroid Routine  2/7/2019 2/7/2018            Who to contact     Please call your clinic at [phone] to:    Ask questions about your health    Make or cancel appointments    Discuss your medicines    Learn about your test results    Speak to your doctor   If you have compliments or concerns about an experience at your clinic, or if you wish to file a complaint, please contact [facility] Patient Relations at [phone] or email us at [email or web address]         Additional Information About Your Visit        [employer] Information     [employer] is an electronic gateway that provides easy, online access to your medical records. With Splice, you can request a clinic appointment, read your test results, renew a prescription or communicate with your care team.     To sign up for [employer] visit the website at [email or web address]   You  "will be asked to enter the access code listed below, as well as some personal information. Please follow the directions to create your username and password.     Your access code is: [email or web address]  Expires: 3/28/2018  6:31 AM     Your access code will  in 90 days. If you need help or a new code, please contact your [facility] or call [phone] for assistance.        Care EveryWhere ID     This is your Care EveryWhere ID. This could be used by other organizations to access your [facility] medical records  [id number]        Your Vitals Were     Pulse Height BMI (Body Mass Index)             82 1.575 m (5' 2\") 39.32 kg/m2          Blood Pressure from Last 3 Encounters:   18 139/74   18 (!) 183/93   18 (!) 147/92    Weight from Last 3 Encounters:   18 97.5 kg (215 lb)   18 97.7 kg (215 lb 6.4 oz)   17 98.4 kg (217 lb)              We Performed the Following     POST-VOID RESIDUAL BLADDER SCAN          Today's Medication Changes          These changes are accurate as of 18 11:11 AM.  If you have any questions, ask your nurse or doctor.               Start taking these medicines.        Dose/Directions    COMPOUNDED NON-CONTROLLED SUBSTANCE - PHARMACY TO MIX COMPOUNDED MEDICATION   Commonly known as:  CMPD RX   Used for:  Atrophic vaginitis   Started by:  Lynnette Garcia MD        Estriol 1 mg/g Apply small amount to finger and apply to inside vagina daily for 2 weeks then twice weekly Route: vaginally   Quantity:  30 g   Refills:  11            Where to get your medicines      Some of these will need a paper prescription and others can be bought over the counter.  Ask your nurse if you have questions.     Bring a paper prescription for each of these medications     COMPOUNDED NON-CONTROLLED SUBSTANCE - PHARMACY TO MIX COMPOUNDED MEDICATION                Primary Care Provider Office Phone # Fax #    Preethi Gibson [phone] " [phone]       [address]  [facility] [address]        Equal Access to Services     GERBER DAV[place] : Hadii aad ku hadlloydmine Anderson, wajaymeda brianrizwanaha, kelimartell ricogustavoda queyesicavance, clare dodd cammykit romanohayder caitlintravischaka garrison. So [facility] [phone].    ATENCIÓN: Si habla español, tiene a stoner disposición servicios gratuitos de asistencia lingüística. Celine al [phone].    We comply with applicable federal civil rights laws and [place] laws. We do not discriminate on the basis of race, color, national origin, age, disability, sex, sexual orientation, or gender identity.            Thank you!     Thank you for choosing [facility] UROLOGY AND [facility] FOR PROSTATE AND UROLOGIC CANCERS  for your care. Our goal is always to provide you with excellent care. Hearing back from our patients is one way we can continue to improve our services. Please take a few minutes to complete the written survey that you may receive in the mail after your visit with us. Thank you!             Your Updated Medication List - Protect others around you: Learn how to safely use, store and throw away your medicines at www.disposemymeds.org.          This list is accurate as of 2/7/18 11:11 AM.  Always use your most recent med list.                   Brand Name Dispense Instructions for use Diagnosis    amoxicillin-clavulanate 500-125 MG per tablet    AUGMENTIN    20 tablet    Take 1 tablet by mouth 2 times daily    Kidney replaced by transplant       artificial saliva Aers spray     1 Bottle    Take 1 spray by mouth every 6 hours as needed for dry mouth    Dry mouth       atorvastatin 20 MG tablet    LIPITOR    90 tablet    Take 1 tablet (20 mg) by mouth daily    Hypercholesteremia       carvedilol 6.25 MG tablet    COREG    360 tablet    Take 2 tablets (12.5 mg) by mouth 2 times daily (with meals)    Hypertension secondary to other renal disorders       cephalexin 250 MG capsule    KEFLEX    90 capsule    Take 1 capsule  (250 mg) by mouth At Bedtime    Urinary frequency, Urinary tract infection       cholecalciferol 1000 UNITS capsule    vitamin  -D    180 capsule    Take 2 capsules (2,000 Units) by mouth daily    Vitamin D deficiency       cloNIDine 0.1 MG tablet    CATAPRES    60 tablet    Take 1 tablet (0.1 mg) by mouth 2 times daily as needed For systolic BP >180    HTN (hypertension)       COMPOUNDED NON-CONTROLLED SUBSTANCE - PHARMACY TO MIX COMPOUNDED MEDICATION    CMPD RX    30 g    Estriol 1 mg/g Apply small amount to finger and apply to inside vagina daily for 2 weeks then twice weekly Route: vaginally    Atrophic vaginitis       darbepoetin bucky 300 MCG/0.6ML injection    ARANESP (ALBUMIN FREE)    0.6 mL    Inject 0.6 mLs (300 mcg) Subcutaneous every 28 days As needed for hgb<10g/dL    Chronic kidney disease, stage III (moderate), Anemia in stage 3 chronic kidney disease       folic acid 1 MG tablet    FOLVITE    30 tablet    Take 1 tablet (1 mg) by mouth daily    Preventive measure       hydrALAZINE 25 MG tablet    APRESOLINE    90 tablet    Take 1 tablet (25 mg) by mouth 3 times daily as needed For systolic BP >170    HTN (hypertension)       hydrocortisone 1 % cream    CORTAID    60 g    Apply topically 2 times daily    Rash, skin       mycophenolate 250 MG capsule     120 capsule    TAKE TWO CAPSULES (500MG) BY MOUTH TWICE A DAY (DAW1)    Kidney transplanted       NIFEdipine ER osmotic 30 MG 24 hr tablet    PROCARDIA XL    180 tablet    Take 1 tablet (30 mg) by mouth 2 times daily    HTN (hypertension)       pantoprazole 40 MG EC tablet    PROTONIX    30 tablet    Take 1 tablet (40 mg) by mouth daily    Gastroesophageal reflux disease with esophagitis       predniSONE 5 MG tablet    DELTASONE    30 tablet    Take 1 tablet (5 mg) by mouth daily    Kidney transplanted       sodium bicarbonate 650 MG tablet     180 tablet    Take 1 tablet (650 mg) by mouth 2 times daily    Acidosis       vitamin D 60299 UNIT capsule     ERGOCALCIFEROL    12 capsule    Take 1 capsule (50,000 Units) by mouth once a week    Vitamin D deficiency

## 2018-02-07 NOTE — PROGRESS NOTES
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    The patient notes that prior to her transplant, she had urinary tract infections but these were quite infrequent.  Over the past few years, she has beenhaving them every few months.  These have always treated with antibiotics at home; she has not required hospitalization.  Review of recent cultures demonstrate the following over the past year:  2018: E coli (pan-sensitive) & enterobacter (resistances present) - while on prophylactic antibiotic therapy  2017: E coli (pan-sensitive)  2017: E coli (pan-sensitive)  2017: E coli (pan-sensitive)  2017: 50-100K E coli (pan-sensitive)    In 2017, she was started on prophylactic antibiotics.  She had a single breakthrough UTI while on prophylaxis.  Sounds like she has had a percutaneous aspiration in the past (~2000) as well as a stent around the same time in the past.  No hydro on CT in 2017.   No symptoms currently.    Past Medical History:  Past Medical History:   Diagnosis Date     Anatomical narrow angle      Anemia      Gout      HTN (hypertension)      Hyperlipidaemia      IgA nephropathy      Immunosuppressed status (H)     Prednisone and cellcept     Nonsenile cataract      Obstructive sleep apnea        Past Surgical History:  Past Surgical History:   Procedure Laterality Date     ARTHROPLASTY KNEE       COLONOSCOPY N/A 2016    Procedure: COLONOSCOPY;  Surgeon: Markie Squires MD;  Location: [facility]     CREATE FISTULA ARTERIOVENOUS UPPER EXTREMITY Right 2017    Procedure: CREATE FISTULA ARTERIOVENOUS UPPER  EXTREMITY;  Right Rm Basilic Vein Fistula, Cephalic Vein Thrombectomy.;  Surgeon: Brandy Bond MD;  Location: [facility] OR     ESOPHAGOSCOPY, GASTROSCOPY, DUODENOSCOPY (EGD), COMBINED N/A 2/17/2016    Procedure: COMBINED ESOPHAGOSCOPY, GASTROSCOPY, DUODENOSCOPY (EGD);  Surgeon: Markie Squires MD;  Location:  GI     HC CAPSULE ENDOSCOPY N/A 2/18/2016    Procedure: CAPSULE/PILL CAM ENDOSCOPY;  Surgeon: Markie Squries MD;  Location:  GI     LASER YAG IRIDOTOMY BILATERAL  2008     RENAL TRANSPLANT PROGRAM ADULT IP CONSULT       REVISION FISTULA ARTERIOVENOUS UPPER EXTREMITY Right 11/21/2017    Procedure: REVISION FISTULA ARTERIOVENOUS UPPER EXTREMITY;  Right Arm Basilic Vein Transposition,  Anesthesia Block;  Surgeon: Brandy Bond MD;  Location:  OR     TRANSPLANT  07/1999    kidney       Allergies:     Allergies   Allergen Reactions     Ciprofloxacin Palpitations       Medications:    Current Outpatient Prescriptions on File Prior to Visit:  cloNIDine (CATAPRES) 0.1 MG tablet Take 1 tablet (0.1 mg) by mouth 2 times daily as needed For systolic BP >180   atorvastatin (LIPITOR) 20 MG tablet Take 1 tablet (20 mg) by mouth daily   CELLCEPT (BRAND) 250 MG CAPSULE TAKE TWO CAPSULES (500MG) BY MOUTH TWICE A DAY (DAW1)   darbepoetin bucky (ARANESP, ALBUMIN FREE,) 300 MCG/0.6ML injection Inject 0.6 mLs (300 mcg) Subcutaneous every 28 days As needed for hgb<10g/dL   cephalexin (KEFLEX) 250 MG capsule Take 1 capsule (250 mg) by mouth At Bedtime   artificial saliva (BIOTENE MT) AERS spray Take 1 spray by mouth every 6 hours as needed for dry mouth   amoxicillin-clavulanate (AUGMENTIN) 500-125 MG per tablet Take 1 tablet by mouth 2 times daily   predniSONE (DELTASONE) 5 MG tablet Take 1 tablet (5 mg) by mouth daily   sodium bicarbonate 650 MG tablet Take 1 tablet (650 mg) by mouth 2 times daily   hydrALAZINE (APRESOLINE) 25 MG tablet Take 1 tablet (25 mg) by mouth 3 times daily as needed For systolic BP >170  "  NIFEdipine ER osmotic (PROCARDIA XL) 30 MG 24 hr tablet Take 1 tablet (30 mg) by mouth 2 times daily   vitamin D (ERGOCALCIFEROL) 67380 UNIT capsule Take 1 capsule (50,000 Units) by mouth once a week   carvedilol (COREG) 6.25 MG tablet Take 2 tablets (12.5 mg) by mouth 2 times daily (with meals)   cholecalciferol (VITAMIN  -D) 1000 UNITS capsule Take 2 capsules (2,000 Units) by mouth daily   pantoprazole (PROTONIX) 40 MG EC tablet Take 1 tablet (40 mg) by mouth daily   folic acid (FOLVITE) 1 MG tablet Take 1 tablet (1 mg) by mouth daily   hydrocortisone (CORTAID) 1 % cream Apply topically 2 times daily     No current facility-administered medications on file prior to visit.     Social History:  Social History     Social History     Marital status:      Spouse name: N/A     Number of children: N/A     Years of education: N/A     Occupational History     Not on file.     Social History Main Topics     Smoking status: Never Smoker     Smokeless tobacco: Never Used      Comment: Has been around second hand smoke     Alcohol use Yes      Comment: Once in a great while     Drug use: No     Sexual activity: Not Currently     Other Topics Concern     Not on file     Social History Narrative       Family History:  Family History   Problem Relation Age of Onset     KIDNEY DISEASE Mother      Macular Degeneration No family hx of      Eye Surgery No family hx of      Glaucoma No family hx of      DIABETES No family hx of      Hypertension No family hx of      Amblyopia No family hx of      Skin Cancer No family hx of      Melanoma No family hx of        ROS:  The remainder of the complete ROS was negative unless noted in the HPI.    Exam:  /74  Pulse 82  Ht 1.575 m (5' 2\")  Wt 97.5 kg (215 lb)  BMI 39.32 kg/m2  General: Alert, interactive, & in NAD; obese  Resp: CTAB, no crackles or wheezes  Cardiac: Regular rate; extremities warm  Abdomen: Nondistended.  Extremities: No LE edema or obvious joint " abnormalities  Skin: Warm and dry, no jaundice or rash    Labs:  All laboratory data reviewed    Imaging: Reviewed CT scan from June 2017    Assessment and Plan: Eleni Logan is a(n) 62 year old female with a history of ESRD 2/2 IgA nephropathy s/p kidney transplant in 1999 with recurrent urinary tract infections.  Now on daily prophylaxis with one breakthrough UTI since initiation of therapy.     - Will have patient begin estrogen cream   - Continue daily Keflex   - She will also begin daily probiotics   - Return to clinic next available for in-office cystoscopy    --    Ron Bro MD  Urology Resident    11:06 AM, 02/07/2018    Patient was seen, evaluated and plan was formulated in conjunction with me and I agree with the above.  Lynnette Garcia MD

## 2018-02-07 NOTE — TELEPHONE ENCOUNTER
At the request of Dr. Pollack, Dr. Sexton approved ordering a thyroid ultrasound to be completed prior to endo appointment. Order placed, called patient with update.    Brittney Quinones RN

## 2018-02-15 ENCOUNTER — OFFICE VISIT (OUTPATIENT)
Dept: ENDOCRINOLOGY | Facility: CLINIC | Age: 63
End: 2018-02-15
Payer: MEDICARE

## 2018-02-15 VITALS
BODY MASS INDEX: 39.14 KG/M2 | HEART RATE: 98 BPM | SYSTOLIC BLOOD PRESSURE: 158 MMHG | DIASTOLIC BLOOD PRESSURE: 82 MMHG | OXYGEN SATURATION: 95 % | WEIGHT: 214 LBS

## 2018-02-15 DIAGNOSIS — E04.1 THYROID NODULE: ICD-10-CM

## 2018-02-15 DIAGNOSIS — E66.01 MORBID OBESITY DUE TO EXCESS CALORIES (H): Primary | ICD-10-CM

## 2018-02-15 DIAGNOSIS — M81.0 OSTEOPOROSIS, UNSPECIFIED OSTEOPOROSIS TYPE, UNSPECIFIED PATHOLOGICAL FRACTURE PRESENCE: ICD-10-CM

## 2018-02-15 ASSESSMENT — PAIN SCALES - GENERAL: PAINLEVEL: NO PAIN (0)

## 2018-02-15 NOTE — PATIENT INSTRUCTIONS
Hydrocortisone (CORTAID) 1 % cream was prescribed by your primary doctor. Use this instead of Triamcinolone.     Citracal 1 tab daily.     Labs in about 3 months

## 2018-02-15 NOTE — MR AVS SNAPSHOT
After Visit Summary   2/15/2018    Eleni Logan    MRN: [id number]           Patient Information     Date Of Birth          1955        Visit Information        Provider Department      2/15/2018 11:00 AM Ana Pollack MD [facility]        Today's Diagnoses     Morbid obesity due to excess calories (H)    -  1    Thyroid nodule        Osteoporosis, unspecified osteoporosis type, unspecified pathological fracture presence          Care Instructions    Hydrocortisone (CORTAID) 1 % cream was prescribed by your primary doctor. Use this instead of Triamcinolone.     Citracal 1 tab daily.     Labs in about 3 months                Follow-ups after your visit        Follow-up notes from your care team     Return in about 3 months (around 5/15/2018).      Your next 10 appointments already scheduled     Mar 01, 2018  4:45 PM CST   (Arrive by 4:30 PM)   Return Vascular Visit with Brnady Bond MD   [facility] ([facility])    [address] [place] [address] [id number] [phone]            Mar 08, 2018  1:40 PM CST   (Arrive by 1:25 PM)   Return Visit with Preethi Gibson MD   [facility] ([facility])    [address] [place] [id number] [phone]            May 16, 2018 10:45 AM CDT   (Arrive by 10:30 AM)   Cystoscopy with Lynnette Garcia MD   [facility] ([facility])    [address] [place] [address] [id number] [phone]            May 21, 2018  2:30 PM CDT   (Arrive by 2:15 PM)   RETURN ENDOCRINE with Ana Pollack MD   [facility] ([facility])    [address] [place] [id number] [phone]              Who to contact     Please call your clinic at [phone]  to:    Ask questions about your health    Make or cancel appointments    Discuss your medicines    Learn about your test results    Speak to your doctor            Additional Information About Your Visit        [email or web address] Information     [employer] is an electronic gateway that provides easy, online access to your medical records. With [employer], you can request a clinic appointment, read your test results, renew a prescription or communicate with your care team.     To sign up for [employer] visit the website at [email or web address]   You will be asked to enter the access code listed below, as well as some personal information. Please follow the directions to create your username and password.     Your access code is: [email or web address]  Expires: 3/28/2018  6:31 AM     Your access code will  in 90 days. If you need help or a new code, please contact your [facility] or call [phone] for assistance.        Care EveryWhere ID     This is your Care EveryWhere ID. This could be used by other organizations to access your [facility] medical records  [id number]        Your Vitals Were     Pulse Pulse Oximetry BMI (Body Mass Index)             98 95% 39.14 kg/m2          Blood Pressure from Last 3 Encounters:   18 134/75   18 139/80   02/15/18 158/82    Weight from Last 3 Encounters:   18 99 kg (218 lb 4.8 oz)   18 98.2 kg (216 lb 9.6 oz)   02/15/18 97.1 kg (214 lb)                 Today's Medication Changes          These changes are accurate as of 2/15/18 11:59 PM.  If you have any questions, ask your nurse or doctor.               Start taking these medicines.        Dose/Directions    calcium citrate-vitamin D 315-250 MG-UNIT Tabs per tablet   Commonly known as:  CITRACAL   Used for:  Osteoporosis, unspecified osteoporosis type, unspecified pathological fracture presence   Started by:  Ana Pollack MD        Dose:  1 tablet   Take 1 tablet by mouth  daily   Quantity:  120 tablet   Refills:  3            Where to get your medicines      These medications were sent to [facility] [place], [place] - [address]  [address], [facility] [address]    Hours:  TRANSPLANT PHONE NUMBER [phone] Phone:  [phone]     calcium citrate-vitamin D 315-250 MG-UNIT Tabs per tablet                Primary Care Provider Office Phone # Fax #    Preethi Gibson [phone]       [facility] [address]  [facility] [address]        Equal Access to Services     CARLOS ENRIQUE DAVENPORT : Hadii aad ku hadasho Soomaali, waaxda luqadaha, qaybta kaalmada adeegyada, clare dodd haymodeston adehayder chambers . So [facility] [phone].    ATENCIÓN: Si habla español, tiene a stoner disposición servicios gratuitos de asistencia lingüística. [facility] [phone].    We comply with applicable federal civil rights laws and [place] laws. We do not discriminate on the basis of race, color, national origin, age, disability, sex, sexual orientation, or gender identity.            Thank you!     Thank you for choosing [facility]  for your care. Our goal is always to provide you with excellent care. Hearing back from our patients is one way we can continue to improve our services. Please take a few minutes to complete the written survey that you may receive in the mail after your visit with us. Thank you!             Your Updated Medication List - Protect others around you: Learn how to safely use, store and throw away your medicines at www.disposemymeds.org.          This list is accurate as of 2/15/18 11:59 PM.  Always use your most recent med list.                   Brand Name Dispense Instructions for use Diagnosis    amoxicillin-clavulanate 500-125 MG per tablet    AUGMENTIN    20 tablet    Take 1 tablet by mouth 2 times daily    Kidney replaced by transplant       artificial saliva Aers spray     1  Bottle    Take 1 spray by mouth every 6 hours as needed for dry mouth    Dry mouth       atorvastatin 20 MG tablet    LIPITOR    90 tablet    Take 1 tablet (20 mg) by mouth daily    Hypercholesteremia       calcium citrate-vitamin D 315-250 MG-UNIT Tabs per tablet    CITRACAL    120 tablet    Take 1 tablet by mouth daily    Osteoporosis, unspecified osteoporosis type, unspecified pathological fracture presence       carvedilol 6.25 MG tablet    COREG    360 tablet    Take 2 tablets (12.5 mg) by mouth 2 times daily (with meals)    Hypertension secondary to other renal disorders       cephalexin 250 MG capsule    KEFLEX    90 capsule    Take 1 capsule (250 mg) by mouth At Bedtime    Urinary frequency, Urinary tract infection       cholecalciferol 1000 UNITS capsule    vitamin  -D    180 capsule    Take 2 capsules (2,000 Units) by mouth daily    Vitamin D deficiency       cloNIDine 0.1 MG tablet    CATAPRES    60 tablet    Take 1 tablet (0.1 mg) by mouth 2 times daily as needed For systolic BP >180    HTN (hypertension)       COMPOUNDED NON-CONTROLLED SUBSTANCE - PHARMACY TO MIX COMPOUNDED MEDICATION    CMPD RX    30 g    Estriol 1 mg/g Apply small amount to finger and apply to inside vagina daily for 2 weeks then twice weekly Route: vaginally    Atrophic vaginitis       darbepoetin bucky 300 MCG/0.6ML injection    ARANESP (ALBUMIN FREE)    0.6 mL    Inject 0.6 mLs (300 mcg) Subcutaneous every 28 days As needed for hgb<10g/dL    Chronic kidney disease, stage III (moderate), Anemia in stage 3 chronic kidney disease       folic acid 1 MG tablet    FOLVITE    30 tablet    Take 1 tablet (1 mg) by mouth daily    Preventive measure       hydrALAZINE 25 MG tablet    APRESOLINE    90 tablet    Take 1 tablet (25 mg) by mouth 3 times daily as needed For systolic BP >170    HTN (hypertension)       hydrocortisone 1 % cream    CORTAID    60 g    Apply topically 2 times daily    Rash, skin       mycophenolate 250 MG capsule     120  capsule    TAKE TWO CAPSULES (500MG) BY MOUTH TWICE A DAY (DAW1)    Kidney transplanted       pantoprazole 40 MG EC tablet    PROTONIX    30 tablet    Take 1 tablet (40 mg) by mouth daily    Gastroesophageal reflux disease with esophagitis       predniSONE 5 MG tablet    DELTASONE    30 tablet    Take 1 tablet (5 mg) by mouth daily    Kidney transplanted       sodium bicarbonate 650 MG tablet     180 tablet    Take 1 tablet (650 mg) by mouth 2 times daily    Acidosis       vitamin D 46739 UNIT capsule    ERGOCALCIFEROL    12 capsule    Take 1 capsule (50,000 Units) by mouth once a week    Vitamin D deficiency

## 2018-02-15 NOTE — NURSING NOTE
"Chief Complaint   Patient presents with     Consult     New Nodules       Initial There were no vitals taken for this visit. Estimated body mass index is 39.32 kg/(m^2) as calculated from the following:    Height as of 2/7/18: 1.575 m (5' 2\").    Weight as of 2/7/18: 97.5 kg (215 lb).  Medication Reconciliation: complete    "

## 2018-02-15 NOTE — NURSING NOTE
Attempted to review all medications but patient was unaware of some of the names or if she was taking them.

## 2018-02-15 NOTE — LETTER
"2/15/2018       RE: Eleni Logan  [address]  [address]  [facility] [address]     Dear Colleague,    Thank you for referring your patient, Eleni Logan, to the [facility] ENDOCRINOLOGY at [facility]. Please see a copy of my visit note below.    Endocrine Clinic Consult:     Reason for consult: Thyroid nodule  Date: February 15, 2018  Referring Physician: Preethi Gibson    HPI:   Eleni Logan is a 62 year old female who is seen for initial consultation.  she  has a past medical history of Anatomical narrow angle; Anemia; Gout; HTN (hypertension); Hyperlipidaemia; IgA nephropathy; Immunosuppressed status (H); Nonsenile cataract; and Obstructive sleep apnea..  1999: LDKT    2/2018:  incidentally discovered thyroid nodules on US of AV fistula. She did not prior knowledge of this.   2/7/18: US thyroid: 2 nodules measuring 0.5 x 0.3 x 0.4 cm and has no internal vascularity. There is a 1.1 x 1.1 x 1.3 cm hypoechoic nodule with internal soft tissue component and no internal vascularity;  benign \"colloid with clot\" morphology.    No compressive symptoms - neck pain, problems with swallowing, voice change.   Prior TSH normal. Denies temp intolerance, palpitation, new or worse constipation, anorexia, sleep disturbance. Feels fatigued at times, no change in energy levels recently. No SOB / fevers.      She was diagnosed with osteoporosis 1/2017 in the setting of CKD, postmenopausal state and low calcium intake. She has morbid obesity followed up at weight management clinic.       Osteoporosis:   She was diagnosed with osteoporosis 1/2017 in the setting of CKD, postmenopausal state and low calcium intake.   1/2017: DXA showed osteoporosis. Significant decline compared to prior DXA on all 3 sites  high fracture risk due to steroid use.        Morbid obesity: Encouraged today for a FU with Dr Salgado.      Other ROS:   No eye symptoms  No headache, change in vision, loss of " peripheral vision  Complete ROS that were obtained were negative.     Past Medical History:   Diagnosis Date     Anatomical narrow angle      Anemia      Gout      HTN (hypertension)      Hyperlipidaemia      IgA nephropathy      Immunosuppressed status (H)     Prednisone and cellcept     Nonsenile cataract      Obstructive sleep apnea      Past Surgical History:   Procedure Laterality Date     ARTHROPLASTY KNEE       COLONOSCOPY N/A 2/18/2016    Procedure: COLONOSCOPY;  Surgeon: Markie Squires MD;  Location: [facility]     CREATE FISTULA ARTERIOVENOUS UPPER EXTREMITY Right 9/19/2017    Procedure: CREATE FISTULA ARTERIOVENOUS UPPER EXTREMITY;  Right Rm Basilic Vein Fistula, Cephalic Vein Thrombectomy.;  Surgeon: Brandy Bond MD;  Location: [facility]     ESOPHAGOSCOPY, GASTROSCOPY, DUODENOSCOPY (EGD), COMBINED N/A 2/17/2016    Procedure: COMBINED ESOPHAGOSCOPY, GASTROSCOPY, DUODENOSCOPY (EGD);  Surgeon: Markie Squires MD;  [facility]     HC CAPSULE ENDOSCOPY N/A 2/18/2016    Procedure: CAPSULE/PILL CAM ENDOSCOPY;  Surgeon: Markie Squires MD;  Location: [facility]     LASER YAG IRIDOTOMY BILATERAL  2008     RENAL TRANSPLANT PROGRAM ADULT IP CONSULT       REVISION FISTULA ARTERIOVENOUS UPPER EXTREMITY Right 11/21/2017    Procedure: REVISION FISTULA ARTERIOVENOUS UPPER EXTREMITY;  Right Arm Basilic Vein Transposition,  Anesthesia Block;  Surgeon: Brandy Bond MD;  Location: [facility]     TRANSPLANT  07/1999    kidney     Family History   Problem Relation Age of Onset     KIDNEY DISEASE Mother      Macular Degeneration No family hx of      Eye Surgery No family hx of      Glaucoma No family hx of      DIABETES No family hx of      Hypertension No family hx of      Amblyopia No family hx of      Skin Cancer No family hx of      Melanoma No family hx of      Social History     Social History     Marital status:      Spouse name: N/A     Number of children: N/A     Years of education: N/A      Occupational History     Not on file.     Social History Main Topics     Smoking status: Never Smoker     Smokeless tobacco: Never Used      Comment: Has been around second hand smoke     Alcohol use Yes      Comment: Once in a great while     Drug use: No     Sexual activity: Not Currently     Other Topics Concern     Not on file     Social History Narrative        Allergies   Allergen Reactions     Ciprofloxacin Palpitations     Current Outpatient Prescriptions   Medication     calcium citrate-vitamin D (CITRACAL) 315-250 MG-UNIT TABS per tablet     atorvastatin (LIPITOR) 20 MG tablet     CELLCEPT (BRAND) 250 MG CAPSULE     cephalexin (KEFLEX) 250 MG capsule     hydrALAZINE (APRESOLINE) 25 MG tablet     NIFEdipine ER osmotic (PROCARDIA XL) 30 MG 24 hr tablet     vitamin D (ERGOCALCIFEROL) 40225 UNIT capsule     carvedilol (COREG) 6.25 MG tablet     cholecalciferol (VITAMIN  -D) 1000 UNITS capsule     hydrocortisone (CORTAID) 1 % cream     COMPOUNDED NON-CONTROLLED SUBSTANCE (CMPD RX) - PHARMACY TO MIX COMPOUNDED MEDICATION     cloNIDine (CATAPRES) 0.1 MG tablet     darbepoetin bucky (ARANESP, ALBUMIN FREE,) 300 MCG/0.6ML injection     artificial saliva (BIOTENE MT) AERS spray     amoxicillin-clavulanate (AUGMENTIN) 500-125 MG per tablet     predniSONE (DELTASONE) 5 MG tablet     sodium bicarbonate 650 MG tablet     pantoprazole (PROTONIX) 40 MG EC tablet     folic acid (FOLVITE) 1 MG tablet     No current facility-administered medications for this visit.            Exam:  /82 (BP Location: Right arm, Patient Position: Sitting, Cuff Size: Adult Large)  Pulse 98  Wt 97.1 kg (214 lb)  SpO2 95%  BMI 39.14 kg/m2   Constitutional: obese female.   Head: Normocephalic. No masses, lesions, tenderness or abnormalities  Neck: Neck supple. No adenopathy. Thyroid slightly irregular with no discrete palpable nodule. Carotids without bruits.  ENT: No throat congestion, no neck nodes or sinus  tenderness  Cardiovascular: regular rhythm, no tachycardia  Respiratory: Lungs clear  Gastrointestinal: Abdomen soft, non-tender. BS normal. No striae  Musculoskeletal: gait normal and normal muscle tone  Neurologic: Normal speech, reflexes normal.   Psychiatric: mentation appears normal       TSH   Date Value Ref Range Status   04/18/2016 1.26 0.40 - 4.00 mU/L Final   10/03/2013 4.26 0.4 - 5.0 mU/L Final   11/23/2010 3.17 0.4 - 5.0 mU/L Final   03/09/2009 1.95 0.4 - 5.0 mU/L Final   04/01/2008 3.18 0.4 - 5.0 mU/L Final       No results found for: T4]    CBC RESULTS:   Recent Labs   Lab Test  02/01/18   1356  01/11/18   1423   WBC   --   7.8   RBC   --   3.89   HGB  9.7*  9.3*   HCT  32.5*  31.4*   MCV   --   81   MCH   --   23.9*   MCHC   --   29.6*   RDW   --   16.7*   PLT   --   297     Recent Labs   Lab Test  01/11/18   1423  12/19/17   1155   NA  138  141   POTASSIUM  4.0  4.2   CHLORIDE  109  111*   CO2  22  20   ANIONGAP  8  11   GLC  168*  97   BUN  46*  55*   CR  2.08*  2.01*   GLORIA  8.1*  8.6         Assessment   Eleni Logan is a 62 year old female who is here for evaluation of incidentally discovered thyroid nodules on US neck. She is clinically euthyroid, and has no compressive symptoms. Her labs in the past showed normal TSH. US done prior to visit showed 2 cystic nodule with very small solid component.     She was diagnosed with osteoporosis 1/2017 in the setting of CKD, postmenopausal state and low calcium intake. She has morbid obesity followed up at weight management clinic.       Thyroid Nodules  We discussed about the risk of malignancy with thyroid nodules. Given the US findings of very low risk nodule, risk of malignancy is less than 5% and therefore, at this size FNA is not indicated. She could have annual ultrasounds for increase in size, or changes in US features and if there are any new features, we can consider FNA.   Prior thyroid function testings were normal. Repeat labs in 3 months.      Osteoporosis:   1/2017: DXA showed osteoporosis. Significant decline compared to prior DXA on all 3 sites  Age related but high fracture risk due to steroid use.   Plan to normalize calcium intake, continue Vit D levels and repeat labs in 3 months   We may need to consider treatment     Morbid obesity: Encouraged today for a FU with Dr Salgado.      Ana Pollack MD  [id number]  Endocrinology Service      Patient Instructions   Hydrocortisone (CORTAID) 1 % cream was prescribed by your primary doctor. Use this instead of Triamcinolone.     Citracal 1 tab daily.     Labs in about 3 months              Orders Placed This Encounter   Procedures     TSH     T4 free     Calcium     Phosphorus     Albumin level     25 Hydroxyvitamin D2 and D3     1,25 Dihydroxyvitamin D     Parathyroid Hormone Intact     Urea nitrogen     Creatinine     Alkaline phosphatase     Protein electrophoresis random urine

## 2018-02-15 NOTE — PROGRESS NOTES
"Endocrine Clinic Consult:     Reason for consult: Thyroid nodule  Date: February 15, 2018  Referring Physician: Preethi Gibson    HPI:   Eleni Logan is a 62 year old female who is seen for initial consultation.  she  has a past medical history of Anatomical narrow angle; Anemia; Gout; HTN (hypertension); Hyperlipidaemia; IgA nephropathy; Immunosuppressed status (H); Nonsenile cataract; and Obstructive sleep apnea..  1999: LDKT    2/2018:  incidentally discovered thyroid nodules on US of AV fistula. She did not prior knowledge of this.   2/7/18: US thyroid: 2 nodules measuring 0.5 x 0.3 x 0.4 cm and has no internal vascularity. There is a 1.1 x 1.1 x 1.3 cm hypoechoic nodule with internal soft tissue component and no internal vascularity;  benign \"colloid with clot\" morphology.    No compressive symptoms - neck pain, problems with swallowing, voice change.   Prior TSH normal. Denies temp intolerance, palpitation, new or worse constipation, anorexia, sleep disturbance. Feels fatigued at times, no change in energy levels recently. No SOB / fevers.      She was diagnosed with osteoporosis 1/2017 in the setting of CKD, postmenopausal state and low calcium intake. She has morbid obesity followed up at weight management clinic.       Osteoporosis:   She was diagnosed with osteoporosis 1/2017 in the setting of CKD, postmenopausal state and low calcium intake.   1/2017: DXA showed osteoporosis. Significant decline compared to prior DXA on all 3 sites  high fracture risk due to steroid use.        Morbid obesity: Encouraged today for a FU with Dr Salgado.      Other ROS:   No eye symptoms  No headache, change in vision, loss of peripheral vision  Complete ROS that were obtained were negative.     Past Medical History:   Diagnosis Date     Anatomical narrow angle      Anemia      Gout      HTN (hypertension)      Hyperlipidaemia      IgA nephropathy      Immunosuppressed status (H)     Prednisone and cellcept     " Nonsenile cataract      Obstructive sleep apnea      Past Surgical History:   Procedure Laterality Date     ARTHROPLASTY KNEE       COLONOSCOPY N/A 2/18/2016    Procedure: COLONOSCOPY;  Surgeon: Markie Squires MD;  Location: [facility]     CREATE FISTULA ARTERIOVENOUS UPPER EXTREMITY Right 9/19/2017    Procedure: CREATE FISTULA ARTERIOVENOUS UPPER EXTREMITY;  Right Rm Basilic Vein Fistula, Cephalic Vein Thrombectomy.;  Surgeon: Brandy Bond MD;  [facility]     ESOPHAGOSCOPY, GASTROSCOPY, DUODENOSCOPY (EGD), COMBINED N/A 2/17/2016    Procedure: COMBINED ESOPHAGOSCOPY, GASTROSCOPY, DUODENOSCOPY (EGD);  Surgeon: Markie Squires MD;  Location:  GI     HC CAPSULE ENDOSCOPY N/A 2/18/2016    Procedure: CAPSULE/PILL CAM ENDOSCOPY;  Surgeon: Markie Squires MD;  Location:  GI     LASER YAG IRIDOTOMY BILATERAL  2008     RENAL TRANSPLANT PROGRAM ADULT IP CONSULT       REVISION FISTULA ARTERIOVENOUS UPPER EXTREMITY Right 11/21/2017    Procedure: REVISION FISTULA ARTERIOVENOUS UPPER EXTREMITY;  Right Arm Basilic Vein Transposition,  Anesthesia Block;  Surgeon: Brandy Bond MD;  Location: [facility]     TRANSPLANT  07/1999    kidney     Family History   Problem Relation Age of Onset     KIDNEY DISEASE Mother      Macular Degeneration No family hx of      Eye Surgery No family hx of      Glaucoma No family hx of      DIABETES No family hx of      Hypertension No family hx of      Amblyopia No family hx of      Skin Cancer No family hx of      Melanoma No family hx of      Social History     Social History     Marital status:      Spouse name: N/A     Number of children: N/A     Years of education: N/A     Occupational History     Not on file.     Social History Main Topics     Smoking status: Never Smoker     Smokeless tobacco: Never Used      Comment: Has been around second hand smoke     Alcohol use Yes      Comment: Once in a great while     Drug use: No     Sexual activity: Not Currently      Other Topics Concern     Not on file     Social History Narrative        Allergies   Allergen Reactions     Ciprofloxacin Palpitations     Current Outpatient Prescriptions   Medication     calcium citrate-vitamin D (CITRACAL) 315-250 MG-UNIT TABS per tablet     atorvastatin (LIPITOR) 20 MG tablet     CELLCEPT (BRAND) 250 MG CAPSULE     cephalexin (KEFLEX) 250 MG capsule     hydrALAZINE (APRESOLINE) 25 MG tablet     NIFEdipine ER osmotic (PROCARDIA XL) 30 MG 24 hr tablet     vitamin D (ERGOCALCIFEROL) 61855 UNIT capsule     carvedilol (COREG) 6.25 MG tablet     cholecalciferol (VITAMIN  -D) 1000 UNITS capsule     hydrocortisone (CORTAID) 1 % cream     COMPOUNDED NON-CONTROLLED SUBSTANCE (CMPD RX) - PHARMACY TO MIX COMPOUNDED MEDICATION     cloNIDine (CATAPRES) 0.1 MG tablet     darbepoetin bucky (ARANESP, ALBUMIN FREE,) 300 MCG/0.6ML injection     artificial saliva (BIOTENE MT) AERS spray     amoxicillin-clavulanate (AUGMENTIN) 500-125 MG per tablet     predniSONE (DELTASONE) 5 MG tablet     sodium bicarbonate 650 MG tablet     pantoprazole (PROTONIX) 40 MG EC tablet     folic acid (FOLVITE) 1 MG tablet     No current facility-administered medications for this visit.            Exam:  /82 (BP Location: Right arm, Patient Position: Sitting, Cuff Size: Adult Large)  Pulse 98  Wt 97.1 kg (214 lb)  SpO2 95%  BMI 39.14 kg/m2   Constitutional: obese female.   Head: Normocephalic. No masses, lesions, tenderness or abnormalities  Neck: Neck supple. No adenopathy. Thyroid slightly irregular with no discrete palpable nodule. Carotids without bruits.  ENT: No throat congestion, no neck nodes or sinus tenderness  Cardiovascular: regular rhythm, no tachycardia  Respiratory: Lungs clear  Gastrointestinal: Abdomen soft, non-tender. BS normal. No striae  Musculoskeletal: gait normal and normal muscle tone  Neurologic: Normal speech, reflexes normal.   Psychiatric: mentation appears normal       TSH   Date Value Ref  Range Status   04/18/2016 1.26 0.40 - 4.00 mU/L Final   10/03/2013 4.26 0.4 - 5.0 mU/L Final   11/23/2010 3.17 0.4 - 5.0 mU/L Final   03/09/2009 1.95 0.4 - 5.0 mU/L Final   04/01/2008 3.18 0.4 - 5.0 mU/L Final       No results found for: T4]    CBC RESULTS:   Recent Labs   Lab Test  02/01/18   1356  01/11/18   1423   WBC   --   7.8   RBC   --   3.89   HGB  9.7*  9.3*   HCT  32.5*  31.4*   MCV   --   81   MCH   --   23.9*   MCHC   --   29.6*   RDW   --   16.7*   PLT   --   297     Recent Labs   Lab Test  01/11/18   1423  12/19/17   1155   NA  138  141   POTASSIUM  4.0  4.2   CHLORIDE  109  111*   CO2  22  20   ANIONGAP  8  11   GLC  168*  97   BUN  46*  55*   CR  2.08*  2.01*   GLORIA  8.1*  8.6         Assessment   Eleni Logan is a 62 year old female who is here for evaluation of incidentally discovered thyroid nodules on US neck. She is clinically euthyroid, and has no compressive symptoms. Her labs in the past showed normal TSH. US done prior to visit showed 2 cystic nodule with very small solid component.     She was diagnosed with osteoporosis 1/2017 in the setting of CKD, postmenopausal state and low calcium intake. She has morbid obesity followed up at weight management clinic.       Thyroid Nodules  We discussed about the risk of malignancy with thyroid nodules. Given the US findings of very low risk nodule, risk of malignancy is less than 5% and therefore, at this size FNA is not indicated. She could have annual ultrasounds for increase in size, or changes in US features and if there are any new features, we can consider FNA.   Prior thyroid function testings were normal. Repeat labs in 3 months.     Osteoporosis:   1/2017: DXA showed osteoporosis. Significant decline compared to prior DXA on all 3 sites  Age related but high fracture risk due to steroid use.   Plan to normalize calcium intake, continue Vit D levels and repeat labs in 3 months   We may need to consider treatment     Morbid obesity: Encouraged  today for a FU with Dr Salgado.      Ana Pollack MD  [id number]  Endocrinology Service      Patient Instructions   Hydrocortisone (CORTAID) 1 % cream was prescribed by your primary doctor. Use this instead of Triamcinolone.     Citracal 1 tab daily.     Labs in about 3 months              Orders Placed This Encounter   Procedures     TSH     T4 free     Calcium     Phosphorus     Albumin level     25 Hydroxyvitamin D2 and D3     1,25 Dihydroxyvitamin D     Parathyroid Hormone Intact     Urea nitrogen     Creatinine     Alkaline phosphatase     Protein electrophoresis random urine

## 2018-02-19 ENCOUNTER — OFFICE VISIT (OUTPATIENT)
Dept: NEPHROLOGY | Facility: CLINIC | Age: 63
End: 2018-02-19
Attending: INTERNAL MEDICINE
Payer: MEDICARE

## 2018-02-19 VITALS
DIASTOLIC BLOOD PRESSURE: 80 MMHG | HEART RATE: 70 BPM | OXYGEN SATURATION: 98 % | BODY MASS INDEX: 39.86 KG/M2 | HEIGHT: 62 IN | WEIGHT: 216.6 LBS | SYSTOLIC BLOOD PRESSURE: 139 MMHG

## 2018-02-19 DIAGNOSIS — N39.0 URINARY TRACT INFECTION WITH HEMATURIA, SITE UNSPECIFIED: ICD-10-CM

## 2018-02-19 DIAGNOSIS — N39.0 RECURRENT UTI: Primary | ICD-10-CM

## 2018-02-19 DIAGNOSIS — I15.1 HYPERTENSION SECONDARY TO OTHER RENAL DISORDERS: ICD-10-CM

## 2018-02-19 DIAGNOSIS — R31.9 URINARY TRACT INFECTION WITH HEMATURIA, SITE UNSPECIFIED: ICD-10-CM

## 2018-02-19 DIAGNOSIS — D84.9 IMMUNOSUPPRESSION (H): ICD-10-CM

## 2018-02-19 DIAGNOSIS — N18.4 CKD (CHRONIC KIDNEY DISEASE) STAGE 4, GFR 15-29 ML/MIN (H): ICD-10-CM

## 2018-02-19 PROCEDURE — G0463 HOSPITAL OUTPT CLINIC VISIT: HCPCS | Mod: ZF

## 2018-02-19 RX ORDER — NIFEDIPINE 60 MG/1
60 TABLET, EXTENDED RELEASE ORAL
Qty: 180 TABLET | Refills: 3 | Status: SHIPPED | OUTPATIENT
Start: 2018-02-19 | End: 2018-09-10

## 2018-02-19 ASSESSMENT — PAIN SCALES - GENERAL: PAINLEVEL: NO PAIN (0)

## 2018-02-19 NOTE — NURSING NOTE
"Chief Complaint   Patient presents with     RECHECK     Kidney tx follow up       Initial /80  Pulse 70  Ht 1.575 m (5' 2\")  Wt 98.2 kg (216 lb 9.6 oz)  SpO2 98%  BMI 39.62 kg/m2 Estimated body mass index is 39.62 kg/(m^2) as calculated from the following:    Height as of this encounter: 1.575 m (5' 2\").    Weight as of this encounter: 98.2 kg (216 lb 9.6 oz).  Medication Reconciliation: complete   HILDA CASTANEDA CMA      "

## 2018-02-19 NOTE — LETTER
2/19/2018      RE: Eleni Logan  [address]  [address]  [facility] [address]       Assessment and Plan:  1.  Allograft function.  Her serum creatinine is stable to slightly better 2.0 mg/dL. Now with mature access. Will monitor closely for initiation   2. Immunosuppression management: She is currently on an immunosuppressive regimen that consists of prednisone 5 mg daily in addition to CellCept 500 mg b.i.d.  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 Her kidney function has been anywhere from 1.8-2.2 mg/dL but now slightly worse. She has modest proteinuria.    3.  Recurrent urinary tract infections. Recently completed treatment for another UTI. Will repeat UC and UA with next draw. On prophylaxis.   4.  Anemia of chronic kidney disease.  She continues to require Aranesp therapy.  5. Hematodialysis Access: s/p placement    6. Obesity: we discussed weight loss specially if she is interested in repeat kidney transplant   7. Renal osteodystrophy will need to recheck her PTH on large dose vitamin D replacement  8. Hypertension near goal, much better controlled with recent changes will increase nifedipine to 60 mg po bid and will reduce hydralazine and eventually stop it if possible     Assessment and plan was discussed with patient and she voiced her understanding and agreement.    Reason for Visit:  Ms. Logan is here for routine follow up and immunosuppression management.    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          Transplant Hx:       Tx: LDKT  Date: 1999       Present Maintenance IS: Mycophenolate mofetil and Prednisone       Baseline Creatinine: 2-3 mg/dL        Biopsy: yes with TMA    Since she was seen last, she had her access placed with good bruit and thrill. She had  revision ad now in good condition. No recent hospitalizations. Developed burning on urination and her UA revealed another bout of a UTI which was treated and currently asymptomatic. No NVD. No abdominal pain. Feels less tired.     Home BP: controlled.      ROS:   A comprehensive review of systems was obtained and negative, except as noted in the HPI or PMH.    Active Medical Problems:  Patient Active Problem List   Diagnosis     Chronic rhinitis     Essential hypertension, benign     S/P kidney transplant     Rash     Asthma exacerbation     UTI (urinary tract infection)     Anemia     Chest pain     Urinary tract infection     Localized pustular psoriasis     CKD (chronic kidney disease) stage 4, GFR 15-29 ml/min (H)     Immunosuppression (H)     Fistula     End-stage renal disease (ESRD) (H)       Personal Hx:  Social History     Social History     Marital status:      Spouse name: N/A     Number of children: N/A     Years of education: N/A     Occupational History     Not on file.     Social History Main Topics     Smoking status: Never Smoker     Smokeless tobacco: Never Used      Comment: Has been around second hand smoke     Alcohol use Yes      Comment: Once in a great while     Drug use: No     Sexual activity: Not Currently     Other Topics Concern     Not on file     Social History Narrative       Allergies:  Allergies   Allergen Reactions     Ciprofloxacin Palpitations       Medications:    Current Outpatient Prescriptions on File Prior to Visit:  calcium citrate-vitamin D (CITRACAL) 315-250 MG-UNIT TABS per tablet Take 1 tablet by mouth daily   COMPOUNDED NON-CONTROLLED SUBSTANCE (CMPD RX) - PHARMACY TO MIX COMPOUNDED MEDICATION Estriol 1 mg/gApply small amount to finger and apply to inside vagina daily for 2 weeks then twice weeklyRoute: vaginally   cloNIDine (CATAPRES) 0.1 MG tablet Take 1 tablet (0.1 mg) by mouth 2 times daily as needed For systolic BP >180   atorvastatin (LIPITOR) 20 MG  "tablet Take 1 tablet (20 mg) by mouth daily   CELLCEPT (BRAND) 250 MG CAPSULE TAKE TWO CAPSULES (500MG) BY MOUTH TWICE A DAY (DAW1)   darbepoetin bucky (ARANESP, ALBUMIN FREE,) 300 MCG/0.6ML injection Inject 0.6 mLs (300 mcg) Subcutaneous every 28 days As needed for hgb<10g/dL   cephalexin (KEFLEX) 250 MG capsule Take 1 capsule (250 mg) by mouth At Bedtime   artificial saliva (BIOTENE MT) AERS spray Take 1 spray by mouth every 6 hours as needed for dry mouth   amoxicillin-clavulanate (AUGMENTIN) 500-125 MG per tablet Take 1 tablet by mouth 2 times daily   predniSONE (DELTASONE) 5 MG tablet Take 1 tablet (5 mg) by mouth daily   sodium bicarbonate 650 MG tablet Take 1 tablet (650 mg) by mouth 2 times daily   hydrALAZINE (APRESOLINE) 25 MG tablet Take 1 tablet (25 mg) by mouth 3 times daily as needed For systolic BP >170   NIFEdipine ER osmotic (PROCARDIA XL) 30 MG 24 hr tablet Take 1 tablet (30 mg) by mouth 2 times daily   vitamin D (ERGOCALCIFEROL) 12805 UNIT capsule Take 1 capsule (50,000 Units) by mouth once a week   carvedilol (COREG) 6.25 MG tablet Take 2 tablets (12.5 mg) by mouth 2 times daily (with meals)   cholecalciferol (VITAMIN  -D) 1000 UNITS capsule Take 2 capsules (2,000 Units) by mouth daily   pantoprazole (PROTONIX) 40 MG EC tablet Take 1 tablet (40 mg) by mouth daily   folic acid (FOLVITE) 1 MG tablet Take 1 tablet (1 mg) by mouth daily   hydrocortisone (CORTAID) 1 % cream Apply topically 2 times daily     No current facility-administered medications on file prior to visit.       Vitals:  /80  Pulse 70  Ht 1.575 m (5' 2\")  Wt 98.2 kg (216 lb 9.6 oz)  SpO2 98%  BMI 39.62 kg/m2  Heart rate normalized during the visit  Exam:   GENERAL APPEARANCE: alert and no distress  HENT: mouth without ulcers or lesions  LYMPHATICS: no cervical or supraclavicular nodes  RESP: lungs clear to auscultation - no rales, rhonchi or wheezes  CV: regular rhythm, normal rate, no rub, no murmur  EDEMA: no LE edema " bilaterally  ABDOMEN: soft, nondistended, nontender, bowel sounds normal  MS: extremities normal - no gross deformities noted, no evidence of inflammation in joints, no muscle tenderness  SKIN: no rash  ACCESS: right arm fistula with good bruit and some thrill     Results:   Reviewed     Tyshawn Sexton MD

## 2018-02-19 NOTE — PROGRESS NOTES
Assessment and Plan:  1.  Allograft function.  Her serum creatinine is stable to slightly better 2.0 mg/dL. Now with mature access. Will monitor closely for initiation   2. Immunosuppression management: She is currently on an immunosuppressive regimen that consists of prednisone 5 mg daily in addition to CellCept 500 mg b.i.d.  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 Her kidney function has been anywhere from 1.8-2.2 mg/dL but now slightly worse. She has modest proteinuria.    3.  Recurrent urinary tract infections. Recently completed treatment for another UTI. Will repeat UC and UA with next draw. On prophylaxis.   4.  Anemia of chronic kidney disease.  She continues to require Aranesp therapy.  5. Hematodialysis Access: s/p placement    6. Obesity: we discussed weight loss specially if she is interested in repeat kidney transplant   7. Renal osteodystrophy will need to recheck her PTH on large dose vitamin D replacement  8. Hypertension near goal, much better controlled with recent changes will increase nifedipine to 60 mg po bid and will reduce hydralazine and eventually stop it if possible     Assessment and plan was discussed with patient and she voiced her understanding and agreement.    Reason for Visit:  Ms. Logan is here for routine follow up and immunosuppression management.    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          Transplant Hx:       Tx: LDKT  Date: 1999       Present Maintenance IS: Mycophenolate mofetil and Prednisone       Baseline Creatinine: 2-3 mg/dL        Biopsy: yes with TMA    Since she was seen last, she had her access placed with good bruit and thrill. She had revision ad now in good condition. No recent hospitalizations. Developed burning on urination  and her UA revealed another bout of a UTI which was treated and currently asymptomatic. No NVD. No abdominal pain. Feels less tired.     Home BP: controlled.      ROS:   A comprehensive review of systems was obtained and negative, except as noted in the HPI or PMH.    Active Medical Problems:  Patient Active Problem List   Diagnosis     Chronic rhinitis     Essential hypertension, benign     S/P kidney transplant     Rash     Asthma exacerbation     UTI (urinary tract infection)     Anemia     Chest pain     Urinary tract infection     Localized pustular psoriasis     CKD (chronic kidney disease) stage 4, GFR 15-29 ml/min (H)     Immunosuppression (H)     Fistula     End-stage renal disease (ESRD) (H)       Personal Hx:  Social History     Social History     Marital status:      Spouse name: N/A     Number of children: N/A     Years of education: N/A     Occupational History     Not on file.     Social History Main Topics     Smoking status: Never Smoker     Smokeless tobacco: Never Used      Comment: Has been around second hand smoke     Alcohol use Yes      Comment: Once in a great while     Drug use: No     Sexual activity: Not Currently     Other Topics Concern     Not on file     Social History Narrative       Allergies:  Allergies   Allergen Reactions     Ciprofloxacin Palpitations       Medications:    Current Outpatient Prescriptions on File Prior to Visit:  calcium citrate-vitamin D (CITRACAL) 315-250 MG-UNIT TABS per tablet Take 1 tablet by mouth daily   COMPOUNDED NON-CONTROLLED SUBSTANCE (CMPD RX) - PHARMACY TO MIX COMPOUNDED MEDICATION Estriol 1 mg/gApply small amount to finger and apply to inside vagina daily for 2 weeks then twice weeklyRoute: vaginally   cloNIDine (CATAPRES) 0.1 MG tablet Take 1 tablet (0.1 mg) by mouth 2 times daily as needed For systolic BP >180   atorvastatin (LIPITOR) 20 MG tablet Take 1 tablet (20 mg) by mouth daily   CELLCEPT (BRAND) 250 MG CAPSULE TAKE TWO CAPSULES  "(500MG) BY MOUTH TWICE A DAY (DAW1)   darbepoetin bucky (ARANESP, ALBUMIN FREE,) 300 MCG/0.6ML injection Inject 0.6 mLs (300 mcg) Subcutaneous every 28 days As needed for hgb<10g/dL   cephalexin (KEFLEX) 250 MG capsule Take 1 capsule (250 mg) by mouth At Bedtime   artificial saliva (BIOTENE MT) AERS spray Take 1 spray by mouth every 6 hours as needed for dry mouth   amoxicillin-clavulanate (AUGMENTIN) 500-125 MG per tablet Take 1 tablet by mouth 2 times daily   predniSONE (DELTASONE) 5 MG tablet Take 1 tablet (5 mg) by mouth daily   sodium bicarbonate 650 MG tablet Take 1 tablet (650 mg) by mouth 2 times daily   hydrALAZINE (APRESOLINE) 25 MG tablet Take 1 tablet (25 mg) by mouth 3 times daily as needed For systolic BP >170   NIFEdipine ER osmotic (PROCARDIA XL) 30 MG 24 hr tablet Take 1 tablet (30 mg) by mouth 2 times daily   vitamin D (ERGOCALCIFEROL) 34935 UNIT capsule Take 1 capsule (50,000 Units) by mouth once a week   carvedilol (COREG) 6.25 MG tablet Take 2 tablets (12.5 mg) by mouth 2 times daily (with meals)   cholecalciferol (VITAMIN  -D) 1000 UNITS capsule Take 2 capsules (2,000 Units) by mouth daily   pantoprazole (PROTONIX) 40 MG EC tablet Take 1 tablet (40 mg) by mouth daily   folic acid (FOLVITE) 1 MG tablet Take 1 tablet (1 mg) by mouth daily   hydrocortisone (CORTAID) 1 % cream Apply topically 2 times daily     No current facility-administered medications on file prior to visit.       Vitals:  /80  Pulse 70  Ht 1.575 m (5' 2\")  Wt 98.2 kg (216 lb 9.6 oz)  SpO2 98%  BMI 39.62 kg/m2  Heart rate normalized during the visit  Exam:   GENERAL APPEARANCE: alert and no distress  HENT: mouth without ulcers or lesions  LYMPHATICS: no cervical or supraclavicular nodes  RESP: lungs clear to auscultation - no rales, rhonchi or wheezes  CV: regular rhythm, normal rate, no rub, no murmur  EDEMA: no LE edema bilaterally  ABDOMEN: soft, nondistended, nontender, bowel sounds normal  MS: extremities normal - " no gross deformities noted, no evidence of inflammation in joints, no muscle tenderness  SKIN: no rash  ACCESS: right arm fistula with good bruit and some thrill     Results:   Reviewed

## 2018-02-19 NOTE — MR AVS SNAPSHOT
After Visit Summary   2/19/2018    Eleni Logan    MRN: [id number]           Patient Information     Date Of Birth          1955        Visit Information        Provider Department      2/19/2018 2:50 PM Tyshawn Sexton MD [facility] Nephrology        Today's Diagnoses     Recurrent UTI    -  1    Hypertension secondary to other renal disorders        CKD (chronic kidney disease) stage 4, GFR 15-29 ml/min (H)        Urinary tract infection with hematuria, site unspecified        Immunosuppression (H)           Follow-ups after your visit        Your next 10 appointments already scheduled     Mar 01, 2018  4:45 PM CST   (Arrive by 4:30 PM)   Return Vascular Visit with Brandy Bond MD   [facility] ([facility])    [address] [place] [id number] [phone]            Mar 08, 2018  1:40 PM CST   (Arrive by 1:25 PM)   Return Visit with Preethi Gibson MD   [facility] ([facility])    [address] [place] [id number] [phone]            May 16, 2018 10:45 AM CDT   (Arrive by 10:30 AM)   Cystoscopy with Lynnette Garcia MD   [facility] ([facility])    [address] [place] [id number] [phone]            May 21, 2018  2:30 PM CDT   (Arrive by 2:15 PM)   RETURN ENDOCRINE with Ana Pollack MD   [facility] Endocrinology ([facility])    [address] [place] [id number] [phone]              Who to contact     If you have questions or need follow up information about today's clinic visit or your schedule please contact [facility] NEPHROLOGY directly at [phone].  Normal or non-critical lab and imaging results will be communicated to you by MyChart, letter or phone within 4 business days  "after the clinic has received the results. If you do not hear from us within 7 days, please contact the clinic through [facility] or phone. If you have a critical or abnormal lab result, we will notify you by phone as soon as possible.  Submit refill requests through [facility] or call your pharmacy and they will forward the refill request to us. Please allow 3 business days for your refill to be completed.          Additional Information About Your Visit        [facility] Information     [facility] lets you send messages to your doctor, view your test results, renew your prescriptions, schedule appointments and more. To sign up, go to [email or web address] . Click on \"Log in\" on the left side of the screen, which will take you to the Welcome page. Then click on \"Sign up Now\" on the right side of the page.     You will be asked to enter the access code listed below, as well as some personal information. Please follow the directions to create your username and password.     Your access code is: [email or web address]  Expires: 3/28/2018  6:31 AM     Your access code will  in 90 days. If you need help or a new code, please call your [facility] clinic or [phone].        Care EveryWhere ID     This is your Care EveryWhere ID. This could be used by other organizations to access your [facility] medical records  [id number]        Your Vitals Were     Pulse Height Pulse Oximetry BMI (Body Mass Index)          70 1.575 m (5' 2\") 98% 39.62 kg/m2         Blood Pressure from Last 3 Encounters:   18 134/75   18 139/80   02/15/18 158/82    Weight from Last 3 Encounters:   18 99 kg (218 lb 4.8 oz)   18 98.2 kg (216 lb 9.6 oz)   02/15/18 97.1 kg (214 lb)                 Today's Medication Changes          These changes are accurate as of 18 11:59 PM.  If you have any questions, ask your nurse or doctor.               These medicines have changed or have updated prescriptions.        Dose/Directions    NIFEdipine " ER osmotic 60 MG Tb24   Commonly known as:  PROCARDIA XL   This may have changed:    - medication strength  - how much to take   Used for:  Hypertension secondary to other renal disorders   Changed by:  Tyshawn Sexton MD        Dose:  60 mg   Take 1 tablet (60 mg) by mouth 2 times daily   Quantity:  180 tablet   Refills:  3         Stop taking these medicines if you haven't already. Please contact your care team if you have questions.     amoxicillin-clavulanate 500-125 MG per tablet   Commonly known as:  AUGMENTIN   Stopped by:  Tyshawn Sexton MD                Where to get your medicines      These medications were sent to [facility] - [address] [address]    Hours:  Mon-Fri 8:30am-5:00pm Toll Free [phone] Phone:  [phone]     NIFEdipine ER osmotic 60 MG Tb24                Primary Care Provider Office Phone # Fax #    Preethi Gibson [phone]       [facility] [address]  [facility] [address]        Equal Access to Services     CARLOS ENRIQUE [facility] AH: Hadii lani castaneda hadasho Soomaali, waaxda luqadaha, qaybta kaalmada adeegyada, [facility] . So [facility] [phone].    ATENCIÓN: Si habla español, tiene a stoner disposición servicios gratuitos de asistencia lingüística. [facility] [phone].    We comply with applicable federal civil rights laws and [place] laws. We do not discriminate on the basis of race, color, national origin, age, disability, sex, sexual orientation, or gender identity.            Thank you!     Thank you for choosing [facility] NEPHROLOGY  for your care. Our goal is always to provide you with excellent care. Hearing back from our patients is one way we can continue to improve our services. Please take a few minutes to complete the written survey that you may receive in the mail after your visit with us. Thank you!             Your  Updated Medication List - Protect others around you: Learn how to safely use, store and throw away your medicines at www.disposemymeds.org.          This list is accurate as of 2/19/18 11:59 PM.  Always use your most recent med list.                   Brand Name Dispense Instructions for use Diagnosis    artificial saliva Aers spray     1 Bottle    Take 1 spray by mouth every 6 hours as needed for dry mouth    Dry mouth       atorvastatin 20 MG tablet    LIPITOR    90 tablet    Take 1 tablet (20 mg) by mouth daily    Hypercholesteremia       calcium citrate-vitamin D 315-250 MG-UNIT Tabs per tablet    CITRACAL    120 tablet    Take 1 tablet by mouth daily    Osteoporosis, unspecified osteoporosis type, unspecified pathological fracture presence       carvedilol 6.25 MG tablet    COREG    360 tablet    Take 2 tablets (12.5 mg) by mouth 2 times daily (with meals)    Hypertension secondary to other renal disorders       cephalexin 250 MG capsule    KEFLEX    90 capsule    Take 1 capsule (250 mg) by mouth At Bedtime    Urinary frequency, Urinary tract infection       cholecalciferol 1000 UNITS capsule    vitamin  -D    180 capsule    Take 2 capsules (2,000 Units) by mouth daily    Vitamin D deficiency       cloNIDine 0.1 MG tablet    CATAPRES    60 tablet    Take 1 tablet (0.1 mg) by mouth 2 times daily as needed For systolic BP >180    HTN (hypertension)       COMPOUNDED NON-CONTROLLED SUBSTANCE - PHARMACY TO MIX COMPOUNDED MEDICATION    CMPD RX    30 g    Estriol 1 mg/g Apply small amount to finger and apply to inside vagina daily for 2 weeks then twice weekly Route: vaginally    Atrophic vaginitis       darbepoetin bucky 300 MCG/0.6ML injection    ARANESP (ALBUMIN FREE)    0.6 mL    Inject 0.6 mLs (300 mcg) Subcutaneous every 28 days As needed for hgb<10g/dL    Chronic kidney disease, stage III (moderate), Anemia in stage 3 chronic kidney disease       folic acid 1 MG tablet    FOLVITE    30 tablet    Take 1 tablet (1  mg) by mouth daily    Preventive measure       hydrALAZINE 25 MG tablet    APRESOLINE    90 tablet    Take 1 tablet (25 mg) by mouth 3 times daily as needed For systolic BP >170    HTN (hypertension)       hydrocortisone 1 % cream    CORTAID    60 g    Apply topically 2 times daily    Rash, skin       mycophenolate 250 MG capsule     120 capsule    TAKE TWO CAPSULES (500MG) BY MOUTH TWICE A DAY (DAW1)    Kidney transplanted       NIFEdipine ER osmotic 60 MG Tb24    PROCARDIA XL    180 tablet    Take 1 tablet (60 mg) by mouth 2 times daily    Hypertension secondary to other renal disorders       pantoprazole 40 MG EC tablet    PROTONIX    30 tablet    Take 1 tablet (40 mg) by mouth daily    Gastroesophageal reflux disease with esophagitis       predniSONE 5 MG tablet    DELTASONE    30 tablet    Take 1 tablet (5 mg) by mouth daily    Kidney transplanted       sodium bicarbonate 650 MG tablet     180 tablet    Take 1 tablet (650 mg) by mouth 2 times daily    Acidosis       vitamin D 45622 UNIT capsule    ERGOCALCIFEROL    12 capsule    Take 1 capsule (50,000 Units) by mouth once a week    Vitamin D deficiency

## 2018-02-22 ENCOUNTER — OFFICE VISIT (OUTPATIENT)
Dept: INTERNAL MEDICINE | Facility: CLINIC | Age: 63
End: 2018-02-22
Payer: MEDICARE

## 2018-02-22 VITALS
WEIGHT: 218.3 LBS | BODY MASS INDEX: 39.93 KG/M2 | DIASTOLIC BLOOD PRESSURE: 75 MMHG | SYSTOLIC BLOOD PRESSURE: 134 MMHG | HEART RATE: 74 BPM

## 2018-02-22 DIAGNOSIS — R25.2 CRAMPING OF HANDS: ICD-10-CM

## 2018-02-22 DIAGNOSIS — Z12.31 VISIT FOR SCREENING MAMMOGRAM: Primary | ICD-10-CM

## 2018-02-22 ASSESSMENT — PAIN SCALES - GENERAL: PAINLEVEL: NO PAIN (0)

## 2018-02-22 NOTE — PROGRESS NOTES
PRIMARY CARE CENTER       SUBJECTIVE:  [place] woman (moved here in ), gout, ESRD due to IgA nephropathy s/p LDKT in , hypertension, asthma, hyperlipidemia, RIZWAN (non-compliant with CPAP) who is here for followup.     Dyspnea on exertion:  ECHO on 2017, EF 60-65%, Grade 1 or early diastolic dysfunction. Mild pulmonary HTN.  Discussed importance of wearing a CPAP.  She states that she is sleeping well now, after drinking umair/garlic. Dyspnea on exertion has occurred for years (ECHO  stress test negative 10/2/16).     Weight loss: Has been challenging. She will lose 5lbs, but then her friends come over to cook and she gains it all back.     Updates since last visit:  -17 Ophthalmology (senior nuclear sclerosis, dominant drusen, presbyopia  -17 Sleep Medicine (chronic insomnia, delayed sleep phase, poor sleep hygiene)  -17: SOT (s/p right upper arm basilic vein transposition of AV fistula on 17)  -17: Vascular Surgery (occasional right arm pain and forearm numbness), fistula maturing well  -2/15/17: Endocrinology (US findings of thyroid nodules were low risk, risk of malignance <5%, plan or repeat labs in 3 months with followup appt already scheduled; also diagnosed with osteoporosis based on DXA 2017)  -17: Nephrology (Seen for allograft function monitoring, immunosuppression management, and treatment of recurrent UTI's and just completed an antibiotic course)    Lives at home with ex-. They have been  since . He just moved back with her 10 years ago, and he has had a couple of heart attacks. (he is seen at [facility]). She can drive him to his appointments. Feels safe at home. Son is not in good health, has already had open heart surgery.    Endorses some cramping of her hands when she cuts food or holds something for a whole time.  No fevers, chills.  Had a cough that has improved greatly. No chest pain or shortness of breath.  "    Medications and allergies reviewed by me today.     ROS:   Constitutional, neuro, ENT, endocrine, pulmonary, cardiac, gastrointestinal, genitourinary, musculoskeletal, integument and psychiatric systems are negative, except as otherwise noted.      OBJECTIVE:    /75  Pulse 74  Wt 99 kg (218 lb 4.8 oz)  BMI 39.93 kg/m2   Wt Readings from Last 1 Encounters:   02/22/18 99 kg (218 lb 4.8 oz)       GENERAL APPEARANCE: Obese, healthy, alert and no distress     EYES: EOMI, PERRL     HENT: ear canals and TM's normal and nose and mouth without ulcers or lesions     NECK: no adenopathy, no asymmetry, masses, or scars and thyroid normal to palpation     RESP: lungs clear to auscultation - no rales, rhonchi or wheezes     CV: regular rates and rhythm, normal S1 S2, no S3 or S4 and no murmur, click or rub     ABDOMEN:  Bulging of abdomen R>L, soft, nontender, no HSM or masses and bowel sounds normal     EXT: Warm, well-perfused, no edema        ASSESSMENT/PLAN:    Eleni was seen today for hypertension and recheck medication.    Cramping of hands: Potentially 2/2 to hypocalcemia. Pt does not find it particularly bothersome and does not want to draw labs today to check for electrolytes. Given that pt was recently    Goals of Care:  Over the course of the visit, pt made it very clear that she did not like seeing so many doctors and having so many tests done. When counseled on the importance of wearing a CPAP for RIZWAN (helping with mortality, weight loss, HTN, heart failure), she replied, \"I don't care if I don't live long. I don't want to wear that mask, it makes me feel like I'm suffocating. It's not worth it.\" She stated that she was Hinduism, and \"if they want me to go, then I can go... I'm not afraid to die.\" Code status was discussed, and patient wishes to be DNR/DNI.  She also hinted that she didn't really want a kidney transplant. When asked if she would be interested in seeing Palliative Care to discuss " streamlining care and prioritizing comfort, pt requested that I help do that for her instead of her seeing a different doctor. At the end of the visit, social work was able to meet with pt to discuss Advanced Directive. I will bring her back in 2 weeks to complete POLST form and more thoroughly delineate goals of care.  We are not able to change pt's code status to DNR/DNI until the POLST form is completed, per social work.     Return to clinic for more formal assessment of goals of care, POLST completion, and formal screen for depression in 2 weeks.     Preethi Gibson MD  Feb 22, 2018    Pt was discussed with Dr. Allen    While the patient was in clinic, I reviewed the pertinent medical history and results.  I discussed the current findings on physical examination, as well as the patient s diagnosis and treatment plan with the resident and agree with the information as documented with the following exceptions: none.   Mirza Allen MD

## 2018-02-22 NOTE — PATIENT INSTRUCTIONS
[facility]: [phone]     [facility] Medication Refill Request Information:  * Please contact your pharmacy regarding ANY request for medication refills.  ** [facility] Prescription Fax = [phone]  * Please allow 3 business days for routine medication refills.  * Please allow 5 business days for controlled substance medication refills.     [facility] Test Result notification information:  *You will be notified with in 7-10 days of your appointment day regarding the results of your test.  If you are on MyChart you will be notified as soon as the provider has reviewed the results and signed off on them.

## 2018-02-22 NOTE — NURSING NOTE
Chief Complaint   Patient presents with     Hypertension     pt here to discuss high blood pressure     Recheck Medication     pt would like to discuss medications     Niesha Vale CMA at 12:55 PM on 2/22/2018.

## 2018-02-22 NOTE — MR AVS SNAPSHOT
After Visit Summary   2/22/2018    Eleni Logan    MRN: [id number]           Patient Information     Date Of Birth          1955        Visit Information        Provider Department      2/22/2018 1:00 PM Preethi Gibson MD [facility]        Today's Diagnoses     Visit for screening mammogram    -  1    Cramping of hands          Care Instructions    Primary Care Center: [phone]     Primary Care Center Medication Refill Request Information:  * Please contact your pharmacy regarding ANY request for medication refills.  ** [facility] Prescription Fax = [phone]  * Please allow 3 business days for routine medication refills.  * Please allow 5 business days for controlled substance medication refills.     Primary Care Center Test Result notification information:  *You will be notified with in 7-10 days of your appointment day regarding the results of your test.  If you are on MyChart you will be notified as soon as the provider has reviewed the results and signed off on them.            Follow-ups after your visit        Follow-up notes from your care team     Return in about 2 weeks (around 3/8/2018).      Your next 10 appointments already scheduled     May 16, 2018 10:45 AM CDT   (Arrive by 10:30 AM)   Cystoscopy with Lynnette Garcia MD   [facility] for Prostate and Urologic Cancers ([facility])    [address] [place] [address] [phone]            May 21, 2018  2:30 PM CDT   (Arrive by 2:15 PM)   RETURN ENDOCRINE with Ana Pollack MD   [facility] ([facility])    [address] [place] [address] [phone]              Who to contact     Please call your clinic at [phone] to:    Ask questions about your health    Make or cancel appointments    Discuss your medicines    Learn about your test results    Speak to  your doctor            Additional Information About Your Visit        Fishlabshart Information     Preview Networkst is an electronic gateway that provides easy, online access to your medical records. With [email or web address], you can request a clinic appointment, read your test results, renew a prescription or communicate with your care team.     To sign up for [email or web address] visit the website at [email or web address]   You will be asked to enter the access code listed below, as well as some personal information. Please follow the directions to create your username and password.     Your access code is: [email or web address]  Expires: 3/28/2018  6:31 AM     Your access code will  in 90 days. If you need help or a new code, please contact your [facility] or call [phone] for assistance.        Care EveryWhere ID     This is your Care EveryWhere ID. This could be used by other organizations to access your [facility] medical records  [id number]        Your Vitals Were     Pulse BMI (Body Mass Index)                74 39.93 kg/m2           Blood Pressure from Last 3 Encounters:   18 141/80   18 186/87   18 134/75    Weight from Last 3 Encounters:   18 96.8 kg (213 lb 8 oz)   18 99 kg (218 lb 4.8 oz)   18 98.2 kg (216 lb 9.6 oz)              We Performed the Following     DNR/DNI     Full Code        Primary Care Provider Office Phone # Fax #    Preethi Gibson [phone]       [facility] [address]  [facility] [address]        Equal Access to Services     GERBER DAVENPORT : Hadii aad ku hadasho Soomaali, waaxda luqadaha, qaybta kaalmada adeegyada, clare dodd hayricha garrison. So [facility] [phone].    ATENCIÓN: Si habla español, tiene a stoner disposición servicios gratuitos de asistencia lingüística. Llame al [phone].    We comply with applicable federal civil rights laws and [place] laws. We do not discriminate on the basis  of race, color, national origin, age, disability, sex, sexual orientation, or gender identity.            Thank you!     Thank you for choosing [facility]  for your care. Our goal is always to provide you with excellent care. Hearing back from our patients is one way we can continue to improve our services. Please take a few minutes to complete the written survey that you may receive in the mail after your visit with us. Thank you!             Your Updated Medication List - Protect others around you: Learn how to safely use, store and throw away your medicines at www.disposemymeds.org.          This list is accurate as of 2/22/18 11:59 PM.  Always use your most recent med list.                   Brand Name Dispense Instructions for use Diagnosis    artificial saliva Aers spray     1 Bottle    Take 1 spray by mouth every 6 hours as needed for dry mouth    Dry mouth       atorvastatin 20 MG tablet    LIPITOR    90 tablet    Take 1 tablet (20 mg) by mouth daily    Hypercholesteremia       calcium citrate-vitamin D 315-250 MG-UNIT Tabs per tablet    CITRACAL    120 tablet    Take 1 tablet by mouth daily    Osteoporosis, unspecified osteoporosis type, unspecified pathological fracture presence       carvedilol 6.25 MG tablet    COREG    360 tablet    Take 2 tablets (12.5 mg) by mouth 2 times daily (with meals)    Hypertension secondary to other renal disorders       cephalexin 250 MG capsule    KEFLEX    90 capsule    Take 1 capsule (250 mg) by mouth At Bedtime    Urinary frequency, Urinary tract infection       cholecalciferol 1000 UNITS capsule    vitamin  -D    180 capsule    Take 2 capsules (2,000 Units) by mouth daily    Vitamin D deficiency       cloNIDine 0.1 MG tablet    CATAPRES    60 tablet    Take 1 tablet (0.1 mg) by mouth 2 times daily as needed For systolic BP >180    HTN (hypertension)       COMPOUNDED NON-CONTROLLED SUBSTANCE - PHARMACY TO MIX COMPOUNDED MEDICATION    CMPD RX    30 g     Estriol 1 mg/g Apply small amount to finger and apply to inside vagina daily for 2 weeks then twice weekly Route: vaginally    Atrophic vaginitis       darbepoetin bucky 300 MCG/0.6ML injection    ARANESP (ALBUMIN FREE)    0.6 mL    Inject 0.6 mLs (300 mcg) Subcutaneous every 28 days As needed for hgb<10g/dL    Chronic kidney disease, stage III (moderate), Anemia in stage 3 chronic kidney disease       folic acid 1 MG tablet    FOLVITE    30 tablet    Take 1 tablet (1 mg) by mouth daily    Preventive measure       hydrALAZINE 25 MG tablet    APRESOLINE    90 tablet    Take 1 tablet (25 mg) by mouth 3 times daily as needed For systolic BP >170    HTN (hypertension)       hydrocortisone 1 % cream    CORTAID    60 g    Apply topically 2 times daily    Rash, skin       mycophenolate 250 MG capsule     120 capsule    TAKE TWO CAPSULES (500MG) BY MOUTH TWICE A DAY (DAW1)    Kidney transplanted       NIFEdipine ER osmotic 60 MG Tb24    PROCARDIA XL    180 tablet    Take 1 tablet (60 mg) by mouth 2 times daily    Hypertension secondary to other renal disorders       pantoprazole 40 MG EC tablet    PROTONIX    30 tablet    Take 1 tablet (40 mg) by mouth daily    Gastroesophageal reflux disease with esophagitis       predniSONE 5 MG tablet    DELTASONE    30 tablet    Take 1 tablet (5 mg) by mouth daily    Kidney transplanted       sodium bicarbonate 650 MG tablet     180 tablet    Take 1 tablet (650 mg) by mouth 2 times daily    Acidosis       vitamin D 22967 UNIT capsule    ERGOCALCIFEROL    12 capsule    Take 1 capsule (50,000 Units) by mouth once a week    Vitamin D deficiency

## 2018-02-25 PROBLEM — Z48.298 AFTERCARE FOLLOWING ORGAN TRANSPLANT: Status: ACTIVE | Noted: 2018-02-25

## 2018-03-01 ENCOUNTER — OFFICE VISIT (OUTPATIENT)
Dept: VASCULAR SURGERY | Facility: CLINIC | Age: 63
End: 2018-03-01
Payer: MEDICARE

## 2018-03-01 ENCOUNTER — TELEPHONE (OUTPATIENT)
Dept: PHARMACY | Facility: CLINIC | Age: 63
End: 2018-03-01

## 2018-03-01 VITALS
RESPIRATION RATE: 22 BRPM | OXYGEN SATURATION: 97 % | DIASTOLIC BLOOD PRESSURE: 87 MMHG | SYSTOLIC BLOOD PRESSURE: 186 MMHG | HEART RATE: 81 BPM

## 2018-03-01 DIAGNOSIS — N18.6 ESRD (END STAGE RENAL DISEASE) ON DIALYSIS (H): Primary | ICD-10-CM

## 2018-03-01 DIAGNOSIS — Z99.2 ESRD (END STAGE RENAL DISEASE) ON DIALYSIS (H): Primary | ICD-10-CM

## 2018-03-01 DIAGNOSIS — D64.9 ANEMIA: ICD-10-CM

## 2018-03-01 DIAGNOSIS — Z48.298 AFTERCARE FOLLOWING ORGAN TRANSPLANT: ICD-10-CM

## 2018-03-01 DIAGNOSIS — Z94.0 KIDNEY REPLACED BY TRANSPLANT: ICD-10-CM

## 2018-03-01 DIAGNOSIS — Z79.899 ENCOUNTER FOR LONG-TERM CURRENT USE OF MEDICATION: ICD-10-CM

## 2018-03-01 DIAGNOSIS — N18.4 CKD (CHRONIC KIDNEY DISEASE) STAGE 4, GFR 15-29 ML/MIN (H): ICD-10-CM

## 2018-03-01 LAB
ALBUMIN SERPL-MCNC: 3.3 G/DL (ref 3.4–5)
ALBUMIN UR-MCNC: >499 MG/DL
ANION GAP SERPL CALCULATED.3IONS-SCNC: 8 MMOL/L (ref 3–14)
APPEARANCE UR: CLEAR
BILIRUB UR QL STRIP: NEGATIVE
BUN SERPL-MCNC: 51 MG/DL (ref 7–30)
CALCIUM SERPL-MCNC: 8.2 MG/DL (ref 8.5–10.1)
CHLORIDE SERPL-SCNC: 109 MMOL/L (ref 94–109)
CO2 SERPL-SCNC: 24 MMOL/L (ref 20–32)
COLOR UR AUTO: YELLOW
CREAT SERPL-MCNC: 2.13 MG/DL (ref 0.52–1.04)
ERYTHROCYTE [DISTWIDTH] IN BLOOD BY AUTOMATED COUNT: 17.2 % (ref 10–15)
FERRITIN SERPL-MCNC: 951 NG/ML (ref 8–252)
GFR SERPL CREATININE-BSD FRML MDRD: 23 ML/MIN/1.7M2
GLUCOSE SERPL-MCNC: 142 MG/DL (ref 70–99)
GLUCOSE UR STRIP-MCNC: NEGATIVE MG/DL
HCT VFR BLD AUTO: 30.8 % (ref 35–47)
HGB BLD-MCNC: 9.1 G/DL (ref 11.7–15.7)
HGB UR QL STRIP: NEGATIVE
IRON SATN MFR SERPL: 19 % (ref 15–46)
IRON SERPL-MCNC: 37 UG/DL (ref 35–180)
KETONES UR STRIP-MCNC: NEGATIVE MG/DL
LEUKOCYTE ESTERASE UR QL STRIP: ABNORMAL
MCH RBC QN AUTO: 23.6 PG (ref 26.5–33)
MCHC RBC AUTO-ENTMCNC: 29.5 G/DL (ref 31.5–36.5)
MCV RBC AUTO: 80 FL (ref 78–100)
MUCOUS THREADS #/AREA URNS LPF: PRESENT /LPF
NITRATE UR QL: NEGATIVE
PH UR STRIP: 6 PH (ref 5–7)
PHOSPHATE SERPL-MCNC: 3.7 MG/DL (ref 2.5–4.5)
PLATELET # BLD AUTO: 243 10E9/L (ref 150–450)
POTASSIUM SERPL-SCNC: 4.3 MMOL/L (ref 3.4–5.3)
RBC # BLD AUTO: 3.86 10E12/L (ref 3.8–5.2)
RBC #/AREA URNS AUTO: 1 /HPF (ref 0–2)
SODIUM SERPL-SCNC: 141 MMOL/L (ref 133–144)
SOURCE: ABNORMAL
SP GR UR STRIP: 1.01 (ref 1–1.03)
SQUAMOUS #/AREA URNS AUTO: <1 /HPF (ref 0–1)
TIBC SERPL-MCNC: 192 UG/DL (ref 240–430)
UROBILINOGEN UR STRIP-MCNC: 0 MG/DL (ref 0–2)
WBC # BLD AUTO: 6.1 10E9/L (ref 4–11)
WBC #/AREA URNS AUTO: 5 /HPF (ref 0–5)

## 2018-03-01 ASSESSMENT — PAIN SCALES - GENERAL: PAINLEVEL: NO PAIN (0)

## 2018-03-01 NOTE — MR AVS SNAPSHOT
After Visit Summary   3/1/2018    Eleni Logan    MRN: [id number]           Patient Information     Date Of Birth          1955        Visit Information        Provider Department      3/1/2018 4:45 PM Brandy Bond MD [facility]        Today's Diagnoses     ESRD (end stage renal disease) on dialysis (H)    -  1       Follow-ups after your visit        Your next 10 appointments already scheduled     May 03, 2018  1:00 PM CDT   (Arrive by 12:45 PM)   Return Visit with Preethi Gibson MD   [facility] ([facility])    [address] [place] [address]  [address] [facility] [id number] [phone]            May 16, 2018 10:45 AM CDT   (Arrive by 10:30 AM)   Cystoscopy with Lynnette Garcia MD   [facility] for Prostate and Urologic Cancers ([facility])    [address] [place] [id number] [phone]            May 21, 2018  2:30 PM CDT   (Arrive by 2:15 PM)   RETURN ENDOCRINE with Ana Pollack MD   [facility] ([facility])    [address] [place] [address]  [address]rd [facility] [id number] [phone]              Who to contact     Please call your clinic at [phone] to:    Ask questions about your health    Make or cancel appointments    Discuss your medicines    Learn about your test results    Speak to your doctor            Additional Information About Your Visit        MyChart Information     Tab Solutions is an electronic gateway that provides easy, online access to your medical records. With Tab Solutions, you can request a clinic appointment, read your test results, renew a prescription or communicate with your care team.     To sign up for Tab Solutions visit the website at [email or web address]   You will be asked to enter the access code listed below, as well as some personal information. Please follow the directions to  create your username and password.     Your access code is: [email or web address]  Expires: 3/28/2018  6:31 AM     Your access code will  in 90 days. If you need help or a new code, please contact your [facility] or call [phone] for assistance.        Care EveryWhere ID     This is your Care EveryWhere ID. This could be used by other organizations to access your [facility] medical records  [id number]        Your Vitals Were     Pulse Respirations Pulse Oximetry Breastfeeding?          81 22 97% No         Blood Pressure from Last 3 Encounters:   18 141/80   18 186/87   18 134/75    Weight from Last 3 Encounters:   18 213 lb 8 oz   18 218 lb 4.8 oz   18 216 lb 9.6 oz              Today, you had the following     No orders found for display       Primary Care Provider Office Phone # Fax #    Preethi Gibson [phone]       [facility] [address]  [facility] [address]        Equal Access to Services     GERBER DAVENPORT : Hadii aad ku hadasho Sodioni, waaxda luqadaha, qaybta kaalmada adedahlia, clare garrison. So [facility] [phone].    ATENCIÓN: Si habla español, tiene a stoner disposición servicios gratuitos de asistencia lingüística. [facility] [phone].    We comply with applicable federal civil rights laws and [place] laws. We do not discriminate on the basis of race, color, national origin, age, disability, sex, sexual orientation, or gender identity.            Thank you!     Thank you for choosing [facility]  for your care. Our goal is always to provide you with excellent care. Hearing back from our patients is one way we can continue to improve our services. Please take a few minutes to complete the written survey that you may receive in the mail after your visit with us. Thank you!             Your Updated Medication List - Protect others around you: Learn how to  safely use, store and throw away your medicines at www.disposemymeds.org.          This list is accurate as of 3/1/18 11:59 PM.  Always use your most recent med list.                   Brand Name Dispense Instructions for use Diagnosis    artificial saliva Aers spray     1 Bottle    Take 1 spray by mouth every 6 hours as needed for dry mouth    Dry mouth       atorvastatin 20 MG tablet    LIPITOR    90 tablet    Take 1 tablet (20 mg) by mouth daily    Hypercholesteremia       calcium citrate-vitamin D 315-250 MG-UNIT Tabs per tablet    CITRACAL    120 tablet    Take 1 tablet by mouth daily    Osteoporosis, unspecified osteoporosis type, unspecified pathological fracture presence       carvedilol 6.25 MG tablet    COREG    360 tablet    Take 2 tablets (12.5 mg) by mouth 2 times daily (with meals)    Hypertension secondary to other renal disorders       cephalexin 250 MG capsule    KEFLEX    90 capsule    Take 1 capsule (250 mg) by mouth At Bedtime    Urinary frequency, Urinary tract infection       cholecalciferol 1000 UNITS capsule    vitamin  -D    180 capsule    Take 2 capsules (2,000 Units) by mouth daily    Vitamin D deficiency       cloNIDine 0.1 MG tablet    CATAPRES    60 tablet    Take 1 tablet (0.1 mg) by mouth 2 times daily as needed For systolic BP >180    HTN (hypertension)       COMPOUNDED NON-CONTROLLED SUBSTANCE - PHARMACY TO MIX COMPOUNDED MEDICATION    CMPD RX    30 g    Estriol 1 mg/g Apply small amount to finger and apply to inside vagina daily for 2 weeks then twice weekly Route: vaginally    Atrophic vaginitis       darbepoetin bucky 300 MCG/0.6ML injection    ARANESP (ALBUMIN FREE)    0.6 mL    Inject 0.6 mLs (300 mcg) Subcutaneous every 28 days As needed for hgb<10g/dL    Chronic kidney disease, stage III (moderate), Anemia in stage 3 chronic kidney disease       folic acid 1 MG tablet    FOLVITE    30 tablet    Take 1 tablet (1 mg) by mouth daily    Preventive measure       hydrALAZINE 25 MG  tablet    APRESOLINE    90 tablet    Take 1 tablet (25 mg) by mouth 3 times daily as needed For systolic BP >170    HTN (hypertension)       hydrocortisone 1 % cream    CORTAID    60 g    Apply topically 2 times daily    Rash, skin       mycophenolate 250 MG capsule     120 capsule    TAKE TWO CAPSULES (500MG) BY MOUTH TWICE A DAY (DAW1)    Kidney transplanted       NIFEdipine ER osmotic 60 MG Tb24    PROCARDIA XL    180 tablet    Take 1 tablet (60 mg) by mouth 2 times daily    Hypertension secondary to other renal disorders       pantoprazole 40 MG EC tablet    PROTONIX    30 tablet    Take 1 tablet (40 mg) by mouth daily    Gastroesophageal reflux disease with esophagitis       predniSONE 5 MG tablet    DELTASONE    30 tablet    Take 1 tablet (5 mg) by mouth daily    Kidney transplanted       sodium bicarbonate 650 MG tablet     180 tablet    Take 1 tablet (650 mg) by mouth 2 times daily    Acidosis       vitamin D 50685 UNIT capsule    ERGOCALCIFEROL    12 capsule    Take 1 capsule (50,000 Units) by mouth once a week    Vitamin D deficiency

## 2018-03-01 NOTE — LETTER
3/1/2018       RE: Eleni Logan  [address]  [address]  [facility] [address]     Dear Colleague,    Thank you for referring your patient, Eleni Logan, to the [facility] at [facility]. Please see a copy of my visit note below.    VASCULAR SURGERY CLINIC NOTE    HPI:    Pt is a 62 year old year old female who is status post right arm second stage right upper arm basilic vein transposition with Dr. Bond on 11/21/2017    Subjective:   Pt reports that she's doing well overall. Pt has not started using the fistula yet- doesn't need dialysis. Pt still having some minor cutaneous discomfort in the distal forearm that has slightly improved from the last visit. She has full range of motion with the right hand and arm. Pt notes greater discomfort in her posterior right shoulder than her left shoulder when she does overhead exercise. Pt doing well overall.     Review Of Systems:   General: Denies F/C  Respiratory: Denies SOB  Cardio: Denies CP    Patient Active Problem List   Diagnosis     Chronic rhinitis     Essential hypertension, benign     S/P kidney transplant     Rash     Asthma exacerbation     UTI (urinary tract infection)     Anemia     Chest pain     Urinary tract infection     Localized pustular psoriasis     CKD (chronic kidney disease) stage 4, GFR 15-29 ml/min (H)     Immunosuppression (H)     Fistula     End-stage renal disease (ESRD) (H)     Aftercare following organ transplant     Objective:  Vital Signs:  /87 (BP Location: Left arm)  Pulse 81  Resp 22  SpO2 97%  Breastfeeding? No    Prior to Admission medications    Medication Sig Start Date End Date Taking? Authorizing Provider   NIFEdipine ER osmotic (PROCARDIA XL) 60 MG TB24 Take 1 tablet (60 mg) by mouth 2 times daily 2/19/18   Tyshawn Sexton MD   calcium citrate-vitamin D (CITRACAL) 315-250 MG-UNIT TABS per tablet Take 1 tablet by mouth daily 2/15/18   Ana Pollack Dev  MD Maria Esther   COMPOUNDED NON-CONTROLLED SUBSTANCE (CMPD RX) - PHARMACY TO MIX COMPOUNDED MEDICATION Estriol 1 mg/g  Apply small amount to finger and apply to inside vagina daily for 2 weeks then twice weekly  Route: vaginally 2/7/18   Lynnette Garcia MD   cloNIDine (CATAPRES) 0.1 MG tablet Take 1 tablet (0.1 mg) by mouth 2 times daily as needed For systolic BP >180 2/1/18   Tyshawn Sexton MD   atorvastatin (LIPITOR) 20 MG tablet Take 1 tablet (20 mg) by mouth daily 1/30/18   Preethi Gibson MD   CELLCEPT (BRAND) 250 MG CAPSULE TAKE TWO CAPSULES (500MG) BY MOUTH TWICE A DAY (DAW1) 1/29/18   Tyshawn Sexton MD   darbepoetin bucky (ARANESP, ALBUMIN FREE,) 300 MCG/0.6ML injection Inject 0.6 mLs (300 mcg) Subcutaneous every 28 days As needed for hgb<10g/dL 1/26/18   Tyshawn Sexton MD   cephalexin (KEFLEX) 250 MG capsule Take 1 capsule (250 mg) by mouth At Bedtime 12/22/17   Tyshawn Sexton MD   artificial saliva (BIOTENE MT) AERS spray Take 1 spray by mouth every 6 hours as needed for dry mouth 11/16/17   Rocio Saul MD   predniSONE (DELTASONE) 5 MG tablet Take 1 tablet (5 mg) by mouth daily 10/2/17   Tyshawn Sexton MD   sodium bicarbonate 650 MG tablet Take 1 tablet (650 mg) by mouth 2 times daily 10/2/17   Tyshawn Sexton MD   hydrALAZINE (APRESOLINE) 25 MG tablet Take 1 tablet (25 mg) by mouth 3 times daily as needed For systolic BP >170 9/18/17   Tyshawn Sexton MD   vitamin D (ERGOCALCIFEROL) 71133 UNIT capsule Take 1 capsule (50,000 Units) by mouth once a week 8/25/17   Tyshawn Sexton MD   carvedilol (COREG) 6.25 MG tablet Take 2 tablets (12.5 mg) by mouth 2 times daily (with meals) 8/8/17   Tyshawn Sexton MD   cholecalciferol (VITAMIN  -D) 1000 UNITS capsule Take 2 capsules (2,000 Units) by mouth daily 8/8/17   Tyshawn Sexton MD   pantoprazole (PROTONIX) 40 MG EC tablet Take 1 tablet (40 mg) by mouth daily 5/7/17   Rocio Saul MD   folic acid (FOLVITE) 1 MG tablet  Take 1 tablet (1 mg) by mouth daily 4/6/17   Rocio Saul MD   hydrocortisone (CORTAID) 1 % cream Apply topically 2 times daily 7/5/16   Taryn Green, APRN CNP     PHYSICAL EXAM:  General: The patient is alert and oriented.  Moves all extremities.   HEENT: Color appropriate for race, warm, dry  Lungs: Breathing unlabored    EXTREMITIES: UEs without discoloration or edema. Thrill palpable on RUE fistula    Assessment:  Pt is a 62 year old year old female who is status post right arm second stage right upper arm basilic vein transposition with Dr. Bond on 11/21/2017    Plan:  - Right arm fistula maturing nicely.  - Right arm AVF ready to use once dialysis is initiated  - Continue Atorvastatin  - F/U with vascular surgery PRN    Chantale Dee PA-C   Vascular Surgery Service  [facility]     I have seen and assessed this patient with the above provider and agree with her above documentation, impression and plan. Basilic vein transposition looks good.  May initiate dialysis through the fistula when necessary.    I spent>50% of this 15 min visit in counseling.    Again, thank you for allowing me to participate in the care of your patient.      Sincerely,    Brandy Bond MD

## 2018-03-01 NOTE — NURSING NOTE
Chief Complaint   Patient presents with     RECHECK     Follow up right arm fistula        Vitals:    03/01/18 1605   BP: 186/87   BP Location: Left arm   Pulse: 81   Resp: 22   SpO2: 97%       There is no height or weight on file to calculate BMI.        Kia Sotomayor LPN

## 2018-03-01 NOTE — PROGRESS NOTES
VASCULAR SURGERY CLINIC NOTE    HPI:    Pt is a 62 year old year old female who is status post right arm second stage right upper arm basilic vein transposition with Dr. Bond on 11/21/2017    Subjective:   Pt reports that she's doing well overall. Pt has not started using the fistula yet- doesn't need dialysis. Pt still having some minor cutaneous discomfort in the distal forearm that has slightly improved from the last visit. She has full range of motion with the right hand and arm. Pt notes greater discomfort in her posterior right shoulder than her left shoulder when she does overhead exercise. Pt doing well overall.     Review Of Systems:   General: Denies F/C  Respiratory: Denies SOB  Cardio: Denies CP    Patient Active Problem List   Diagnosis     Chronic rhinitis     Essential hypertension, benign     S/P kidney transplant     Rash     Asthma exacerbation     UTI (urinary tract infection)     Anemia     Chest pain     Urinary tract infection     Localized pustular psoriasis     CKD (chronic kidney disease) stage 4, GFR 15-29 ml/min (H)     Immunosuppression (H)     Fistula     End-stage renal disease (ESRD) (H)     Aftercare following organ transplant     Objective:  Vital Signs:  /87 (BP Location: Left arm)  Pulse 81  Resp 22  SpO2 97%  Breastfeeding? No    Prior to Admission medications    Medication Sig Start Date End Date Taking? Authorizing Provider   NIFEdipine ER osmotic (PROCARDIA XL) 60 MG TB24 Take 1 tablet (60 mg) by mouth 2 times daily 2/19/18   Tyshawn Sexton MD   calcium citrate-vitamin D (CITRACAL) 315-250 MG-UNIT TABS per tablet Take 1 tablet by mouth daily 2/15/18   Ana Pollack MD   COMPOUNDED NON-CONTROLLED SUBSTANCE (CMPD RX) - PHARMACY TO MIX COMPOUNDED MEDICATION Estriol 1 mg/g  Apply small amount to finger and apply to inside vagina daily for 2 weeks then twice weekly  Route: vaginally 2/7/18   Lynnette Garcia MD   cloNIDine (CATAPRES) 0.1 MG tablet  Take 1 tablet (0.1 mg) by mouth 2 times daily as needed For systolic BP >180 2/1/18   Tyshawn Sexton MD   atorvastatin (LIPITOR) 20 MG tablet Take 1 tablet (20 mg) by mouth daily 1/30/18   Preethi Gibson MD   CELLCEPT (BRAND) 250 MG CAPSULE TAKE TWO CAPSULES (500MG) BY MOUTH TWICE A DAY (DAW1) 1/29/18   Tyshawn Sexton MD   darbepoetin bucky (ARANESP, ALBUMIN FREE,) 300 MCG/0.6ML injection Inject 0.6 mLs (300 mcg) Subcutaneous every 28 days As needed for hgb<10g/dL 1/26/18   Tyshawn Sexton MD   cephalexin (KEFLEX) 250 MG capsule Take 1 capsule (250 mg) by mouth At Bedtime 12/22/17   Tyshawn Sexton MD   artificial saliva (BIOTENE MT) AERS spray Take 1 spray by mouth every 6 hours as needed for dry mouth 11/16/17   Rocio Saul MD   predniSONE (DELTASONE) 5 MG tablet Take 1 tablet (5 mg) by mouth daily 10/2/17   Tyshawn Sexton MD   sodium bicarbonate 650 MG tablet Take 1 tablet (650 mg) by mouth 2 times daily 10/2/17   Tyshawn Sexton MD   hydrALAZINE (APRESOLINE) 25 MG tablet Take 1 tablet (25 mg) by mouth 3 times daily as needed For systolic BP >170 9/18/17   Tyshawn Sexton MD   vitamin D (ERGOCALCIFEROL) 28828 UNIT capsule Take 1 capsule (50,000 Units) by mouth once a week 8/25/17   Tyshawn Sexton MD   carvedilol (COREG) 6.25 MG tablet Take 2 tablets (12.5 mg) by mouth 2 times daily (with meals) 8/8/17   Tyshawn Sexton MD   cholecalciferol (VITAMIN  -D) 1000 UNITS capsule Take 2 capsules (2,000 Units) by mouth daily 8/8/17   Tyshawn Sexton MD   pantoprazole (PROTONIX) 40 MG EC tablet Take 1 tablet (40 mg) by mouth daily 5/7/17   Rocio Saul MD   folic acid (FOLVITE) 1 MG tablet Take 1 tablet (1 mg) by mouth daily 4/6/17   Rocio Saul MD   hydrocortisone (CORTAID) 1 % cream Apply topically 2 times daily 7/5/16   Taryn Green, APRN CNP     PHYSICAL EXAM:  General: The patient is alert and oriented.  Moves all extremities.   HEENT: Color appropriate  for race, warm, dry  Lungs: Breathing unlabored    EXTREMITIES: UEs without discoloration or edema. Thrill palpable on RUE fistula    Assessment:  Pt is a 62 year old year old female who is status post right arm second stage right upper arm basilic vein transposition with Dr. Bond on 11/21/2017    Plan:  - Right arm fistula maturing nicely.  - Right arm AVF ready to use once dialysis is initiated  - Continue Atorvastatin  - F/U with vascular surgery PRN    Chantale Dee PA-C   Vascular Surgery Service  [facility]

## 2018-03-02 NOTE — TELEPHONE ENCOUNTER
Anemia Management Note  SUBJECTIVE/OBJECTIVE:  Referred by Dr Tyshawn Sexton February 10, 2017  Primary Diagnosis: Anemia in Chronic Kidney Disease (N18.3 D63.1)   Secondary Diagnosis: Chronic Kidney Disease, Stage III (N18.3)   Hgb goal range: 9-10  Epo/Darbo: Aranesp 300 mcg every 14 days - At home   RX will  on 18  Iron regimen:  None  Lab orders  18 in EPIC   Contact:                      Ok to leave message on home phone regarding (no selection made) per consent to communicate dated 8/15/13    Anemia Latest Ref Rng & Units 2017 2017 2017 2018 2018 2018 3/1/2018   HGB Goal - - - - - - - -   VALARIE Dose - - 300 mcg 300 mcg 300 mcg - 300 mcg -   Hemoglobin 11.7 - 15.7 g/dL 9.6(L) - 9.9(L) 9.3(L) 9.7(L) - 9.1(L)   TSAT 15 - 46 % - - - 25 36 - 19   Ferritin 8 - 252 ng/mL - - - 1268(H) 964(H) - 951(H)   PRBCs - - - - - - - -     BP Readings from Last 3 Encounters:   18 186/87   18 134/75   18 139/80     Wt Readings from Last 2 Encounters:   18 218 lb 4.8 oz (99 kg)   18 216 lb 9.6 oz (98.2 kg)       Verify Aranesp RX frequency.  The rx says Aranesp (300 mcg)  every 28 days As needed for hgb<10g/dL but the information above says every 14 days.  Eleni would like a new rx to dose every 14 days if needed like she had in the past. Occasionally she has needed to dose more often than 28 days    ASSESSMENT:  Hgb: At goal - recommend dose  TSat: not at goal (>30%) but ferritin >500ng/mL.  IV iron not indicated at this time per anemia protocol. Ferritin: At goal (>100ng/mL)    PLAN:  Dose with aranesp and RTC for hgb then aranesp if needed in 2 to 4 week(s)    Orders needed to be renewed (for next follow-up date) in EPIC: None    Iron labs due:  3/29/18    Plan discussed with:  Eleni  Plan provided by:  Winston  Reviewed 2018 GLORIA  NEXT FOLLOW-UP DATE:  3/15/18    Anemia Management Service  Shea Sotomayor,PharmD and Preethi Akers CPhT  Phone:  [phone]  Fax: [phone]

## 2018-03-08 ENCOUNTER — ALLIED HEALTH/NURSE VISIT (OUTPATIENT)
Dept: INTERNAL MEDICINE | Facility: CLINIC | Age: 63
End: 2018-03-08

## 2018-03-08 ENCOUNTER — OFFICE VISIT (OUTPATIENT)
Dept: INTERNAL MEDICINE | Facility: CLINIC | Age: 63
End: 2018-03-08
Payer: MEDICARE

## 2018-03-08 VITALS
OXYGEN SATURATION: 100 % | BODY MASS INDEX: 39.05 KG/M2 | WEIGHT: 213.5 LBS | HEART RATE: 52 BPM | DIASTOLIC BLOOD PRESSURE: 80 MMHG | SYSTOLIC BLOOD PRESSURE: 141 MMHG

## 2018-03-08 DIAGNOSIS — Z71.9 VISIT FOR COUNSELING: Primary | ICD-10-CM

## 2018-03-08 DIAGNOSIS — Z78.9 POLST (PHYSICIAN ORDERS FOR LIFE-SUSTAINING TREATMENT): ICD-10-CM

## 2018-03-08 DIAGNOSIS — Z71.89 COUNSELING REGARDING GOALS OF CARE: Primary | ICD-10-CM

## 2018-03-08 ASSESSMENT — PAIN SCALES - GENERAL: PAINLEVEL: NO PAIN (0)

## 2018-03-08 NOTE — NURSING NOTE
Chief Complaint   Patient presents with     Results     Patient is here to follow up on lab work.      Radhika Arrington LPN at 1:21 PM on 3/8/2018.

## 2018-03-08 NOTE — PROGRESS NOTES
PRIMARY CARE CENTER       SUBJECTIVE:  Eleni Logan is a 62 year old [place] woman (moved here in 1975), gout, ESRD due to IgA nephropathy s/p LDKT in 1999, hypertension, asthma, hyperlipidemia, RIZWAN (non-compliant with CPAP) who is here for followup of goals of care.  Please see my note from 2/22/18 for additional details.     Pt is here to discuss POLST form in further detail.     Enjoys meeting with friends and going out to see people. If she weren't strong enough to get out of the house, she would be sad. Right now, she states that she has no more items on her bucketlist - she feels she has accomplished everything she needed to accomplish before she dies.     Pt is unable to read English. We filled out a PHQ 9 together in clinic today. Total score of 13. Pt sometimes thinks she would be better off dead, but denies active SI/HI. Only gets depressed if she hears about her illnesses.     Hand cramping is improved with the calcium supplement (citracal and vitamin D - initiated for osteoporosis). (Low calcium on 3/1).     No fevers, chills, chest pain, or shortness of breath, abdominal pain. Still urinating ok. Sometimes has constipation, but buys OTC stool softener (Miralax).       Medications and allergies reviewed by me today.     ROS:   Constitutional, neuro, ENT, endocrine, pulmonary, cardiac, gastrointestinal, genitourinary, musculoskeletal, integument and psychiatric systems are negative, except as otherwise noted.    OBJECTIVE:    /80  Pulse 52  Wt 96.8 kg (213 lb 8 oz)  SpO2 100%  BMI 39.05 kg/m2   Wt Readings from Last 1 Encounters:   03/08/18 96.8 kg (213 lb 8 oz)     No acute distress, non-labored breathing. Speaking in full sentences. Mood and affect appropriate.     ASSESSMENT/PLAN:    Eleni was seen today for results.    Diagnoses and all orders for this visit:    Counseling regarding goals of care  POLST (Physician Orders for Life-Sustaining Treatment)  Patient was seen  today to discuss goals of care in light of complex medical history and multiple consultants involved with her care.  Patient finds that she gets sad and depressed if she has to go to too many appointments.  During this visit we completed her POLST form and confirmed that she would like to be DNR/DNI.  Order has been changed in the epic system.  She understands this might mean that she can't get a kidney transplant. She is okay with dialysis. IF she lives to be 68 she will be happy.    We also completed a PHQ 9 which was 13, but I do feel that a lot of the symptoms she is endorsing is due to some of her underlying medical comorbidities including untreated sleep apnea and chronic kidney disease.  She denied suicidal or homicidal ideation.  At this point because she does not feel like her symptoms are interfering with the pleasure in her life, we will not start any antidepressant or make any changes in her medical care.  At end of visit her pulse form was given to Salma () to make copies.  Other orders  -     DNR/DNI    Pt should return to clinic for f/u with me in 2 months.    Preethi Gibson MD  Mar 8, 2018    Pt was discussed with Dr. Allen.       While the patient was in clinic, I reviewed the pertinent medical history and results.  I discussed the current findings on physical examination, as well as the patient s diagnosis and treatment plan with the resident and agree with the information as documented with the following exceptions: none.   Mirza Allen MD

## 2018-03-08 NOTE — MR AVS SNAPSHOT
After Visit Summary   3/8/2018    Eleni Logan    MRN: [id number]           Patient Information     Date Of Birth          1955        Visit Information        Provider Department      3/8/2018 4:10 PM Salma Prince LICSW [facility]        Today's Diagnoses     Visit for counseling    -  1       Follow-ups after your visit        Your next 10 appointments already scheduled     May 03, 2018  1:00 PM CDT   (Arrive by 12:45 PM)   Return Visit with Preethi Gibson MD   [facility] ([facility])    [address] [place] [address]  [address] [facility] [id number] [phone]            May 16, 2018 10:45 AM CDT   (Arrive by 10:30 AM)   Cystoscopy with Lynnette Garcia MD   [facility] ([facility])    [address] [place] [id number] [phone]            May 21, 2018  2:30 PM CDT   (Arrive by 2:15 PM)   RETURN ENDOCRINE with Ana Pollack MD   [facility] Endocrinology ([facility])    [address] [place] [address]  3rd [facility] [id number] [phone]              Who to contact     Please call your clinic at [phone] to:    Ask questions about your health    Make or cancel appointments    Discuss your medicines    Learn about your test results    Speak to your doctor            Additional Information About Your Visit        MyChart Information     EVOFEMt is an electronic gateway that provides easy, online access to your medical records. With ZingCheckout, you can request a clinic appointment, read your test results, renew a prescription or communicate with your care team.     To sign up for EVOFEMt visit the website at [email or web address]   You will be asked to enter the access code listed below, as well as some personal information. Please follow the directions to create your username  and password.     Your access code is: [email or web address]  Expires: 3/28/2018  6:31 AM     Your access code will  in 90 days. If you need help or a new code, please contact your [facility] or call [phone] for assistance.        Care EveryWhere ID     This is your Care EveryWhere ID. This could be used by other organizations to access your [place] medical records  [id number]         Blood Pressure from Last 3 Encounters:   18 141/80   18 186/87   18 134/75    Weight from Last 3 Encounters:   18 96.8 kg (213 lb 8 oz)   18 99 kg (218 lb 4.8 oz)   18 98.2 kg (216 lb 9.6 oz)              Today, you had the following     No orders found for display       Primary Care Provider Office Phone # Fax #    Preethi Gibson [phone]       [facility] [address]  [facility] [address]        Equal Access to Services     CARLOS ENRIQUE [facility]: Hadii aad ku hadasho Soomaali, waaxda luqadaha, qaybta kaalmada adeegyada, waxgila chambers . So [facility] [phone].    ATENCIÓN: Si habla español, tiene a stoner disposición servicios gratuitos de asistencia lingüística. Celine al [phone].    We comply with applicable federal civil rights laws and [place] laws. We do not discriminate on the basis of race, color, national origin, age, disability, sex, sexual orientation, or gender identity.            Thank you!     Thank you for choosing [facility]  for your care. Our goal is always to provide you with excellent care. Hearing back from our patients is one way we can continue to improve our services. Please take a few minutes to complete the written survey that you may receive in the mail after your visit with us. Thank you!             Your Updated Medication List - Protect others around you: Learn how to safely use, store and throw away your medicines at www.disposemymeds.org.          This  list is accurate as of 3/8/18 11:59 PM.  Always use your most recent med list.                   Brand Name Dispense Instructions for use Diagnosis    artificial saliva Aers spray     1 Bottle    Take 1 spray by mouth every 6 hours as needed for dry mouth    Dry mouth       atorvastatin 20 MG tablet    LIPITOR    90 tablet    Take 1 tablet (20 mg) by mouth daily    Hypercholesteremia       calcium citrate-vitamin D 315-250 MG-UNIT Tabs per tablet    CITRACAL    120 tablet    Take 1 tablet by mouth daily    Osteoporosis, unspecified osteoporosis type, unspecified pathological fracture presence       carvedilol 6.25 MG tablet    COREG    360 tablet    Take 2 tablets (12.5 mg) by mouth 2 times daily (with meals)    Hypertension secondary to other renal disorders       cephalexin 250 MG capsule    KEFLEX    90 capsule    Take 1 capsule (250 mg) by mouth At Bedtime    Urinary frequency, Urinary tract infection       cholecalciferol 1000 UNITS capsule    vitamin  -D    180 capsule    Take 2 capsules (2,000 Units) by mouth daily    Vitamin D deficiency       cloNIDine 0.1 MG tablet    CATAPRES    60 tablet    Take 1 tablet (0.1 mg) by mouth 2 times daily as needed For systolic BP >180    HTN (hypertension)       COMPOUNDED NON-CONTROLLED SUBSTANCE - PHARMACY TO MIX COMPOUNDED MEDICATION    CMPD RX    30 g    Estriol 1 mg/g Apply small amount to finger and apply to inside vagina daily for 2 weeks then twice weekly Route: vaginally    Atrophic vaginitis       darbepoetin bucky 300 MCG/0.6ML injection    ARANESP (ALBUMIN FREE)    0.6 mL    Inject 0.6 mLs (300 mcg) Subcutaneous every 28 days As needed for hgb<10g/dL    Chronic kidney disease, stage III (moderate), Anemia in stage 3 chronic kidney disease       folic acid 1 MG tablet    FOLVITE    30 tablet    Take 1 tablet (1 mg) by mouth daily    Preventive measure       hydrALAZINE 25 MG tablet    APRESOLINE    90 tablet    Take 1 tablet (25 mg) by mouth 3 times daily as  needed For systolic BP >170    HTN (hypertension)       hydrocortisone 1 % cream    CORTAID    60 g    Apply topically 2 times daily    Rash, skin       mycophenolate 250 MG capsule     120 capsule    TAKE TWO CAPSULES (500MG) BY MOUTH TWICE A DAY (DAW1)    Kidney transplanted       NIFEdipine ER osmotic 60 MG Tb24    PROCARDIA XL    180 tablet    Take 1 tablet (60 mg) by mouth 2 times daily    Hypertension secondary to other renal disorders       pantoprazole 40 MG EC tablet    PROTONIX    30 tablet    Take 1 tablet (40 mg) by mouth daily    Gastroesophageal reflux disease with esophagitis       predniSONE 5 MG tablet    DELTASONE    30 tablet    Take 1 tablet (5 mg) by mouth daily    Kidney transplanted       sodium bicarbonate 650 MG tablet     180 tablet    Take 1 tablet (650 mg) by mouth 2 times daily    Acidosis       vitamin D 76795 UNIT capsule    ERGOCALCIFEROL    12 capsule    Take 1 capsule (50,000 Units) by mouth once a week    Vitamin D deficiency

## 2018-03-08 NOTE — MR AVS SNAPSHOT
After Visit Summary   3/8/2018    Eleni Logan    MRN: [id number]           Patient Information     Date Of Birth          1955        Visit Information        Provider Department      3/8/2018 1:40 PM Preethi Gibson MD [facility]        Today's Diagnoses     Counseling regarding goals of care    -  1    POLST (Physician Orders for Life-Sustaining Treatment)          Care Instructions    [facility] Center: [phone]     Primary Care Center Medication Refill Request Information:  * Please contact your pharmacy regarding ANY request for medication refills.  ** [facility] Prescription Fax = [phone]  * Please allow 3 business days for routine medication refills.  * Please allow 5 business days for controlled substance medication refills.     Primary Care Center Test Result notification information:  *You will be notified with in 7-10 days of your appointment day regarding the results of your test.  If you are on MyChart you will be notified as soon as the provider has reviewed the results and signed off on them.            Follow-ups after your visit        Your next 10 appointments already scheduled     May 03, 2018  1:00 PM CDT   (Arrive by 12:45 PM)   Return Visit with Preethi Gibson MD   [facility] ([facility])    [address] [place] [address]  4th [facility] [address] [phone]            May 16, 2018 10:45 AM CDT   (Arrive by 10:30 AM)   Cystoscopy with Lynnette Garcia MD   [facility] ([facility])    [address] [place] [id number] [phone]            May 21, 2018  2:30 PM CDT   (Arrive by 2:15 PM)   RETURN ENDOCRINE with Ana Pollack MD   [facility] ([facility])    [address] [place] [address] [phone]               Who to contact     Please call your clinic at [phone] to:    Ask questions about your health    Make or cancel appointments    Discuss your medicines    Learn about your test results    Speak to your doctor            Additional Information About Your Visit        MyChart Information     PlayGiga is an electronic gateway that provides easy, online access to your medical records. With PlayGiga, you can request a clinic appointment, read your test results, renew a prescription or communicate with your care team.     To sign up for PlayGiga visit the website at [email or web address]   You will be asked to enter the access code listed below, as well as some personal information. Please follow the directions to create your username and password.     Your access code is: [email or web address]  Expires: 3/28/2018  6:31 AM     Your access code will  in 90 days. If you need help or a new code, please contact your [facility] or call [phone] for assistance.        Care EveryWhere ID     This is your Care EveryWhere ID. This could be used by other organizations to access your [facility] medical records  [id number]        Your Vitals Were     Pulse Pulse Oximetry BMI (Body Mass Index)             52 100% 39.05 kg/m2          Blood Pressure from Last 3 Encounters:   18 141/80   18 186/87   18 134/75    Weight from Last 3 Encounters:   18 96.8 kg (213 lb 8 oz)   18 99 kg (218 lb 4.8 oz)   18 98.2 kg (216 lb 9.6 oz)              We Performed the Following     DNR/DNI        Primary Care Provider Office Phone # Fax #    Preethi Gibson [phone]       [address] [address]        Equal Access to Services     GERBER DAVENPORT : Luís Anderson, nathaniel muniz, qachelita wylie, clare garrison. So [facility] [phone].    ATENCIÓN: Si jeff sales,  tiene a stoner disposición servicios gratuitos de asistencia lingüística. Celine muñiz [phone].    We comply with applicable federal civil rights laws and [place] laws. We do not discriminate on the basis of race, color, national origin, age, disability, sex, sexual orientation, or gender identity.            Thank you!     Thank you for choosing [facility]  for your care. Our goal is always to provide you with excellent care. Hearing back from our patients is one way we can continue to improve our services. Please take a few minutes to complete the written survey that you may receive in the mail after your visit with us. Thank you!             Your Updated Medication List - Protect others around you: Learn how to safely use, store and throw away your medicines at www.disposemymeds.org.          This list is accurate as of 3/8/18  2:16 PM.  Always use your most recent med list.                   Brand Name Dispense Instructions for use Diagnosis    artificial saliva Aers spray     1 Bottle    Take 1 spray by mouth every 6 hours as needed for dry mouth    Dry mouth       atorvastatin 20 MG tablet    LIPITOR    90 tablet    Take 1 tablet (20 mg) by mouth daily    Hypercholesteremia       calcium citrate-vitamin D 315-250 MG-UNIT Tabs per tablet    CITRACAL    120 tablet    Take 1 tablet by mouth daily    Osteoporosis, unspecified osteoporosis type, unspecified pathological fracture presence       carvedilol 6.25 MG tablet    COREG    360 tablet    Take 2 tablets (12.5 mg) by mouth 2 times daily (with meals)    Hypertension secondary to other renal disorders       cephalexin 250 MG capsule    KEFLEX    90 capsule    Take 1 capsule (250 mg) by mouth At Bedtime    Urinary frequency, Urinary tract infection       cholecalciferol 1000 UNITS capsule    vitamin  -D    180 capsule    Take 2 capsules (2,000 Units) by mouth daily    Vitamin D deficiency       cloNIDine 0.1 MG tablet    CATAPRES    60 tablet     Take 1 tablet (0.1 mg) by mouth 2 times daily as needed For systolic BP >180    HTN (hypertension)       COMPOUNDED NON-CONTROLLED SUBSTANCE - PHARMACY TO MIX COMPOUNDED MEDICATION    CMPD RX    30 g    Estriol 1 mg/g Apply small amount to finger and apply to inside vagina daily for 2 weeks then twice weekly Route: vaginally    Atrophic vaginitis       darbepoetin bucky 300 MCG/0.6ML injection    ARANESP (ALBUMIN FREE)    0.6 mL    Inject 0.6 mLs (300 mcg) Subcutaneous every 28 days As needed for hgb<10g/dL    Chronic kidney disease, stage III (moderate), Anemia in stage 3 chronic kidney disease       folic acid 1 MG tablet    FOLVITE    30 tablet    Take 1 tablet (1 mg) by mouth daily    Preventive measure       hydrALAZINE 25 MG tablet    APRESOLINE    90 tablet    Take 1 tablet (25 mg) by mouth 3 times daily as needed For systolic BP >170    HTN (hypertension)       hydrocortisone 1 % cream    CORTAID    60 g    Apply topically 2 times daily    Rash, skin       mycophenolate 250 MG capsule     120 capsule    TAKE TWO CAPSULES (500MG) BY MOUTH TWICE A DAY (DAW1)    Kidney transplanted       NIFEdipine ER osmotic 60 MG Tb24    PROCARDIA XL    180 tablet    Take 1 tablet (60 mg) by mouth 2 times daily    Hypertension secondary to other renal disorders       pantoprazole 40 MG EC tablet    PROTONIX    30 tablet    Take 1 tablet (40 mg) by mouth daily    Gastroesophageal reflux disease with esophagitis       predniSONE 5 MG tablet    DELTASONE    30 tablet    Take 1 tablet (5 mg) by mouth daily    Kidney transplanted       sodium bicarbonate 650 MG tablet     180 tablet    Take 1 tablet (650 mg) by mouth 2 times daily    Acidosis       vitamin D 05337 UNIT capsule    ERGOCALCIFEROL    12 capsule    Take 1 capsule (50,000 Units) by mouth once a week    Vitamin D deficiency

## 2018-03-08 NOTE — PATIENT INSTRUCTIONS
[facility]: [phone]     [facility] Medication Refill Request Information:  * Please contact your pharmacy regarding ANY request for medication refills.  ** [facility] Prescription Fax = [phone]  * Please allow 3 business days for routine medication refills.  * Please allow 5 business days for controlled substance medication refills.     [facility] Test Result notification information:  *You will be notified with in 7-10 days of your appointment day regarding the results of your test.  If you are on MyChart you will be notified as soon as the provider has reviewed the results and signed off on them.

## 2018-03-09 ASSESSMENT — PATIENT HEALTH QUESTIONNAIRE - PHQ9: SUM OF ALL RESPONSES TO PHQ QUESTIONS 1-9: 13

## 2018-03-09 NOTE — PROGRESS NOTES
Social Work Brief Intervention  [facility]    Data/Intervention:    Patient Name:  Eleni Logan  /Age:  1955 (62 year old)    Visit Type: in person  Referral Source: [facility]  Reason for Referral:  Insurance, POLST    Collaborated With:    -Patient    Patient Concerns/Issues:   Patient completed POLST with provider.  gave the original to Patient and instructed to hang on the refrigerator.  sent copy of POLST to medical records for scanning. Patient also had a letter from the county regarding selecting a health plan. The date to choose as now passed and Patient is assigned to [employer].  informed that [employer] is accepted here so she should be ok.    Intervention/Education/Resources Provided:  Provided education on where to put POLST and basic information on [employer].     Assessment/Plan:  Patient will contact  if any other concerns arise.    Provided patient/family with contact information and availability to follow up as needed.    FALLON Tanner  [facility]  Direct Phone: [phone]  Pager:  [phone]

## 2018-03-09 NOTE — PROGRESS NOTES
I have seen and assessed this patient with the above provider and agree with her above documentation, impression and plan. Basilic vein transposition looks good.  May initiate dialysis through the fistula when necessary.    I spent>50% of this 15 min visit in counseling.

## 2018-03-15 ENCOUNTER — TELEPHONE (OUTPATIENT)
Dept: PHARMACY | Facility: CLINIC | Age: 63
End: 2018-03-15

## 2018-03-15 NOTE — TELEPHONE ENCOUNTER
Follow-up with anemia management service:    Left VM stating could get Hgb drawn and dose with Aranesp if feeling low energy.    Anemia Latest Ref Rng & Units 2017 2017 2017 2018 2018 2018 3/1/2018   HGB Goal - - - - - - - -   VALARIE Dose - - 300 mcg 300 mcg 300 mcg - 300 mcg 300 mcg   Hemoglobin 11.7 - 15.7 g/dL 9.6(L) - 9.9(L) 9.3(L) 9.7(L) - 9.1(L)   TSAT 15 - 46 % - - - 25 36 - 19   Ferritin 8 - 252 ng/mL - - - 1268(H) 964(H) - 951(H)   PRBCs - - - - - - - -       Orders needed to be renewed (for next follow-up date) in EPIC: None   Med order expires:2019   Lab orders : 2019    Follow-up call date: 2019    [facility]    Anemia Management Service  Shea Sotomayor,PharmD and Preethi Akers CPhT  Phone: [phone]  Fax: [phone]

## 2018-03-17 ENCOUNTER — HEALTH MAINTENANCE LETTER (OUTPATIENT)
Age: 63
End: 2018-03-17

## 2018-03-19 ENCOUNTER — CARE COORDINATION (OUTPATIENT)
Dept: NEPHROLOGY | Facility: CLINIC | Age: 63
End: 2018-03-19

## 2018-03-22 ENCOUNTER — TELEPHONE (OUTPATIENT)
Dept: PHARMACY | Facility: CLINIC | Age: 63
End: 2018-03-22

## 2018-03-22 NOTE — TELEPHONE ENCOUNTER
Follow-up with anemia management service:  I spoke to Eleni reminding her that she is due for Hgb, ferritin and iron labs and possibly an aranesp dose.  She said she will be going in for her labs next week    Anemia Latest Ref Rng & Units 2017 2017 2017 2018 2018 2018 3/1/2018   HGB Goal - - - - - - - -   VALARIE Dose - - 300 mcg 300 mcg 300 mcg - 300 mcg 300 mcg   Hemoglobin 11.7 - 15.7 g/dL 9.6(L) - 9.9(L) 9.3(L) 9.7(L) - 9.1(L)   TSAT 15 - 46 % - - - 25 36 - 19   Ferritin 8 - 252 ng/mL - - - 1268(H) 964(H) - 951(H)   PRBCs - - - - - - - -       Orders needed to be renewed (for next follow-up date) in EPIC: None   Med order expires: 19   Lab orders : 19    Follow-up call date: 3/29/18  Reviewed 2018 MAJ Montero    Anemia Management Service  Shea Sotomayor,Doug and Preethi Akers CPhT  Phone: [phone]  Fax: [phone]

## 2018-03-29 DIAGNOSIS — D64.9 ANEMIA: ICD-10-CM

## 2018-03-29 DIAGNOSIS — R25.2 CRAMPING OF HANDS: ICD-10-CM

## 2018-03-29 DIAGNOSIS — N18.4 CKD (CHRONIC KIDNEY DISEASE) STAGE 4, GFR 15-29 ML/MIN (H): ICD-10-CM

## 2018-03-29 LAB
HCT VFR BLD AUTO: 32.1 % (ref 35–47)
HGB BLD-MCNC: 9.7 G/DL (ref 11.7–15.7)
MAGNESIUM SERPL-MCNC: 2 MG/DL (ref 1.6–2.3)

## 2018-03-30 ENCOUNTER — TELEPHONE (OUTPATIENT)
Dept: PHARMACY | Facility: CLINIC | Age: 63
End: 2018-03-30

## 2018-03-30 NOTE — TELEPHONE ENCOUNTER
Anemia Management Note  SUBJECTIVE/OBJECTIVE:  Referred by Dr Tyshawn Sexton February 10, 2017  Primary Diagnosis: Anemia in Chronic Kidney Disease (N18.3 D63.1)   Secondary Diagnosis: Chronic Kidney Disease, Stage III (N18.3)   Hgb goal range: 9-10  Epo/Darbo: Aranesp 300 mcg every 14 days - At home   RX will  on 2019  Iron regimen:  None  Lab orders  2019 in EPIC   Contact:                      Ok to leave message on home phone regarding (no selection made) per consent to communicate dated 8/15/13  Anemia Latest Ref Rng & Units 2017 2017 2018 2018 2018 3/1/2018 3/29/2018   HGB Goal - - - - - - - -   VALARIE Dose - 300 mcg 300 mcg 300 mcg - 300 mcg 300 mcg -   Hemoglobin 11.7 - 15.7 g/dL - 9.9(L) 9.3(L) 9.7(L) - 9.1(L) 9.7(L)   TSAT 15 - 46 % - - 25 36 - 19 -   Ferritin 8 - 252 ng/mL - - 1268(H) 964(H) - 951(H) -   PRBCs - - - - - - - -     BP Readings from Last 3 Encounters:   18 141/80   18 186/87   18 134/75     Wt Readings from Last 2 Encounters:   18 213 lb 8 oz (96.8 kg)   18 218 lb 4.8 oz (99 kg)       States feeling well.    ASSESSMENT:  Hgb:Not at goal but improving - recommend dose and continue current regimen  TSat: not at goal >30% Ferritin: At goal (>100ng/mL), ferritin above 500ng/ml, IV iron not indicated.    PLAN:  Dose with aranesp and RTC for hgb then aranesp if needed in 2 week(s).      Orders needed to be renewed (for next follow-up date) in [facility]: None    Iron labs due:  now    Plan discussed with:  Eleni  Plan provided by:      NEXT FOLLOW-UP DATE:  2018    Anemia Management Service  Shea Sotomayor,PharmD and Preethi Akers CPhT  Phone: [phone]  Fax: [phone]

## 2018-04-02 DIAGNOSIS — Z29.9 PREVENTIVE MEASURE: ICD-10-CM

## 2018-04-02 RX ORDER — FOLIC ACID 1 MG/1
1 TABLET ORAL DAILY
Qty: 90 TABLET | Refills: 3 | Status: SHIPPED | OUTPATIENT
Start: 2018-04-02 | End: 2019-04-09

## 2018-04-04 ENCOUNTER — CARE COORDINATION (OUTPATIENT)
Dept: NEPHROLOGY | Facility: CLINIC | Age: 63
End: 2018-04-04

## 2018-04-04 NOTE — PROGRESS NOTES
Reason for Call    Checked in with patient. States she feels well, denied any questions or concerns for nephrology.    Plan    1. Follow up in 1 month  2. Call if any change with symptoms or concerns    Patient was given an opportunity to ask questions and have those questions answered to her satisfaction.  Patient verbalized understanding of instructions provided and agreed to plan of care.    Brittney Quinones RN

## 2018-04-11 ENCOUNTER — TELEPHONE (OUTPATIENT)
Dept: PHARMACY | Facility: CLINIC | Age: 63
End: 2018-04-11

## 2018-04-11 DIAGNOSIS — N18.4 ANEMIA IN STAGE 4 CHRONIC KIDNEY DISEASE (H): ICD-10-CM

## 2018-04-11 DIAGNOSIS — N18.4 CKD (CHRONIC KIDNEY DISEASE) STAGE 4, GFR 15-29 ML/MIN (H): Primary | ICD-10-CM

## 2018-04-11 DIAGNOSIS — D63.1 ANEMIA IN STAGE 4 CHRONIC KIDNEY DISEASE (H): ICD-10-CM

## 2018-04-11 NOTE — TELEPHONE ENCOUNTER
Anemia Management Note  SUBJECTIVE/OBJECTIVE:  Referred by Dr Tyshawn Sexton February 10, 2017  Primary Diagnosis: Anemia in Chronic Kidney Disease (N18.3 D63.1)   Secondary Diagnosis: Chronic Kidney Disease, Stage III (N18.3)   Hgb goal range: 9-10  Epo/Darbo: Aranesp 300 mcg every 14 days - At home   RX will  on 2019  Iron regimen:  None  Lab orders  2019 in EPIC   Contact:                      Ok to leave message on home phone regarding (no selection made) per consent to communicate dated 8/15/13    Anemia Latest Ref Rng & Units 2017 2018 2018 2018 3/1/2018 3/29/2018 2018   HGB Goal - - - - - - - -   VALARIE Dose - 300 mcg 300 mcg - 300 mcg 300 mcg - 300 mcg   Hemoglobin 11.7 - 15.7 g/dL 9.9(L) 9.3(L) 9.7(L) - 9.1(L) 9.7(L) -   TSAT 15 - 46 % - 25 36 - 19 - -   Ferritin 8 - 252 ng/mL - 1268(H) 964(H) - 951(H) - -   PRBCs - - - - - - - -     BP Readings from Last 3 Encounters:   18 141/80   18 186/87   18 134/75     Wt Readings from Last 2 Encounters:   18 213 lb 8 oz (96.8 kg)   18 218 lb 4.8 oz (99 kg)       Verified and documented aranesp dose given on .  Eleni only receives 1 dose of aranesp a month from the pharmacy so she has to order a refill on her RX everytime she needs to dose which results in a delay to dose after labs is drawn.  Eleni would like her aranesp RX updated to every 2 weeks instead of every 28 days  Prescription updated and sent Electronically to  Mail order with new sig/2 syringes for 1 month supply.  GLORIA  ASSESSMENT:  Hgb:  At goal - dosed with aranesp  TSat: not at goal (>30%) but ferritin >500ng/mL.  IV iron not indicated at this time per anemia protocol. Ferritin: At goal (>100ng/mL)    PLAN:  Dosed with aranesp on  and RTC for hgb, ferritin and iron labs then aranesp if needed in 2 week(s)    Orders needed to be renewed (for next follow-up date) in [facility]: Aranesp med order  Order sent 2018  GLORIA  Iron labs due:  4/19/18    Plan discussed with:  Eleni  Plan provided by:  Winston  Reviewed 04/12/2018   NEXT FOLLOW-UP DATE:  4/19/18    Anemia Management Service  Shea Sotomayor PharmD and Preethi Akers CPhT  Phone: [phone]  Fax: [phone]

## 2018-04-18 DIAGNOSIS — N18.4 CKD (CHRONIC KIDNEY DISEASE) STAGE 4, GFR 15-29 ML/MIN (H): ICD-10-CM

## 2018-04-18 DIAGNOSIS — D64.9 ANEMIA: ICD-10-CM

## 2018-04-18 LAB
FERRITIN SERPL-MCNC: 879 NG/ML (ref 8–252)
HCT VFR BLD AUTO: 34.8 % (ref 35–47)
HGB BLD-MCNC: 10.3 G/DL (ref 11.7–15.7)
IRON SATN MFR SERPL: 16 % (ref 15–46)
IRON SERPL-MCNC: 34 UG/DL (ref 35–180)
TIBC SERPL-MCNC: 210 UG/DL (ref 240–430)

## 2018-04-19 ENCOUNTER — TELEPHONE (OUTPATIENT)
Dept: PHARMACY | Facility: CLINIC | Age: 63
End: 2018-04-19

## 2018-04-19 NOTE — TELEPHONE ENCOUNTER
Anemia Management Note  SUBJECTIVE/OBJECTIVE:  Referred by Dr Tyshawn Sexton February 10, 2017  Primary Diagnosis: Anemia in Chronic Kidney Disease (N18.3 D63.1)   Secondary Diagnosis: Chronic Kidney Disease, Stage III (N18.3)   Hgb goal range: 9-10  Epo/Darbo: Aranesp 300 mcg every 14 days - At home   RX will  on 2019  Iron regimen:  None  Lab orders  2019 in EPIC   Contact:                      Ok to leave message on home phone regarding (no selection made) per consent to communicate dated 8/15/13    Anemia Latest Ref Rng & Units 2018 2018 2018 3/1/2018 3/29/2018 2018 2018   HGB Goal - - - - - - - -   VALARIE Dose - 300 mcg - 300 mcg 300 mcg - 300 mcg -   Hemoglobin 11.7 - 15.7 g/dL 9.3(L) 9.7(L) - 9.1(L) 9.7(L) - 10.3(L)   TSAT 15 - 46 % 25 36 - 19 - - 16   Ferritin 8 - 252 ng/mL 1268(H) 964(H) - 951(H) - - 879(H)   PRBCs - - - - - - - -     BP Readings from Last 3 Encounters:   18 141/80   18 186/87   18 134/75     Wt Readings from Last 2 Encounters:   18 213 lb 8 oz (96.8 kg)   18 218 lb 4.8 oz (99 kg)           ASSESSMENT:  Hgb:Above goal - recommend hold dose  TSat: not at goal (>30%) but ferritin >500ng/mL.  IV iron not indicated at this time per anemia protocol. Ferritin: At goal (>100ng/mL)    PLAN:  Hold Aranesp and RTC for hgb then aranesp if needed in 2 week(s)    Orders needed to be renewed (for next follow-up date) in [facility]: None    Iron labs due:  18    Plan discussed with:  Eleni  Plan provided by:  Bharti Akers [facility]  Anemia Clinic  [phone]  Reviewed 2018 GLORIA    NEXT FOLLOW-UP DATE:  18    Anemia Management Service  Shea Sotomayor,PharmD and Preethi Akers CPhT  Phone: [phone]  Fax: [phone]

## 2018-04-20 NOTE — PROVIDER NOTIFICATION
Vascular surg resident paged asking if pt needs heparin monitoring orders etc, PTT order released.     No change in orders, did not receive response from team.    Display Individual Monthly Dosage In The Note (If Yes Will Display All Dosages Which Are Not N/A): yes Detail Level: Zone Patient Weight (Optional But Required For Cumulative Dose-Numbers And Decimals Only): 170 Weight Units: pounds Dosing Month 2 (Required For Cumulative Dosing): 40mg BID Months Of Therapy Completed: 2 Dosing Month 1 (Required For Cumulative Dosing): 40mg Daily Kilograms Preamble Statement (Weight Entered In Details Tab): Reported Weight in pounds: Kilograms Preamble Statement (Weight Entered In Details Tab): Reported Weight in kilograms: Female Completion Statement: After discussing her treatment course we decided to discontinue isotretinoin therapy at this time. I explained that she would need to continue her birth control methods for at least one month after the last dosage. She should also get a pregnancy test one month after the last dose. She shouldn't donate blood for one month after the last dose. She should call with any new symptoms of depression. Male Completion Statement: After discussing his treatment course we decided to discontinue isotretinoin therapy at this time. He shouldn't donate blood for one month after the last dose. He should call with any new symptoms of depression.

## 2018-05-02 ENCOUNTER — CARE COORDINATION (OUTPATIENT)
Dept: NEPHROLOGY | Facility: CLINIC | Age: 63
End: 2018-05-02

## 2018-05-02 ENCOUNTER — TELEPHONE (OUTPATIENT)
Dept: PHARMACY | Facility: CLINIC | Age: 63
End: 2018-05-02

## 2018-05-02 NOTE — TELEPHONE ENCOUNTER
Follow-up with anemia management service:    I spoke to Eleni, reminding her that she is due for a Hgb lab and possibly an aranesp dose.  She is going in for her lab on 5/3    Anemia Latest Ref Rng & Units 2018 2018 2018 3/1/2018 3/29/2018 2018 2018   HGB Goal - - - - - - - -   VALARIE Dose - 300 mcg - 300 mcg 300 mcg - 300 mcg -   Hemoglobin 11.7 - 15.7 g/dL 9.3(L) 9.7(L) - 9.1(L) 9.7(L) - 10.3(L)   TSAT 15 - 46 % 25 36 - 19 - - 16   Ferritin 8 - 252 ng/mL 1268(H) 964(H) - 951(H) - - 879(H)   PRBCs - - - - - - - -       Orders needed to be renewed (for next follow-up date) in EPIC: None   Med order expires: 19   Lab orders : 19    Follow-up call date: 5/3/18  Reviewed 2018 MAJ Bharti Akers [facility]  Anemia Clinic  [phone]    Anemia Management Service  Shea Sotomayor,PharmD and [facility]  Phone: [phone]  Fax: [phone]

## 2018-05-03 ENCOUNTER — TELEPHONE (OUTPATIENT)
Dept: PHARMACY | Facility: CLINIC | Age: 63
End: 2018-05-03

## 2018-05-03 ENCOUNTER — OFFICE VISIT (OUTPATIENT)
Dept: INTERNAL MEDICINE | Facility: CLINIC | Age: 63
End: 2018-05-03
Payer: MEDICARE

## 2018-05-03 VITALS
BODY MASS INDEX: 38.12 KG/M2 | DIASTOLIC BLOOD PRESSURE: 84 MMHG | HEART RATE: 80 BPM | SYSTOLIC BLOOD PRESSURE: 129 MMHG | WEIGHT: 208.4 LBS

## 2018-05-03 DIAGNOSIS — N18.4 CKD (CHRONIC KIDNEY DISEASE) STAGE 4, GFR 15-29 ML/MIN (H): ICD-10-CM

## 2018-05-03 DIAGNOSIS — K21.00 GASTROESOPHAGEAL REFLUX DISEASE WITH ESOPHAGITIS: ICD-10-CM

## 2018-05-03 DIAGNOSIS — N39.0 RECURRENT UTI: ICD-10-CM

## 2018-05-03 DIAGNOSIS — Z48.298 AFTERCARE FOLLOWING ORGAN TRANSPLANT: ICD-10-CM

## 2018-05-03 DIAGNOSIS — M81.0 OSTEOPOROSIS, UNSPECIFIED OSTEOPOROSIS TYPE, UNSPECIFIED PATHOLOGICAL FRACTURE PRESENCE: ICD-10-CM

## 2018-05-03 DIAGNOSIS — D64.9 ANEMIA: ICD-10-CM

## 2018-05-03 DIAGNOSIS — Z94.0 LIVING-DONOR KIDNEY TRANSPLANT RECIPIENT: ICD-10-CM

## 2018-05-03 DIAGNOSIS — I10 BENIGN ESSENTIAL HYPERTENSION: ICD-10-CM

## 2018-05-03 DIAGNOSIS — E04.1 THYROID NODULE: ICD-10-CM

## 2018-05-03 DIAGNOSIS — M1A.9XX0 CHRONIC GOUT WITHOUT TOPHUS, UNSPECIFIED CAUSE, UNSPECIFIED SITE: Primary | ICD-10-CM

## 2018-05-03 LAB
ALBUMIN SERPL-MCNC: 3.3 G/DL (ref 3.4–5)
ALBUMIN UR-MCNC: >499 MG/DL
ALP SERPL-CCNC: 77 U/L (ref 40–150)
ANION GAP SERPL CALCULATED.3IONS-SCNC: 9 MMOL/L (ref 3–14)
APPEARANCE UR: CLEAR
BILIRUB UR QL STRIP: NEGATIVE
BUN SERPL-MCNC: 48 MG/DL (ref 7–30)
CALCIUM SERPL-MCNC: 8.1 MG/DL (ref 8.5–10.1)
CHLORIDE SERPL-SCNC: 107 MMOL/L (ref 94–109)
CO2 SERPL-SCNC: 21 MMOL/L (ref 20–32)
COLOR UR AUTO: YELLOW
CREAT SERPL-MCNC: 2.41 MG/DL (ref 0.52–1.04)
GFR SERPL CREATININE-BSD FRML MDRD: 20 ML/MIN/1.7M2
GLUCOSE SERPL-MCNC: 109 MG/DL (ref 70–99)
GLUCOSE UR STRIP-MCNC: NEGATIVE MG/DL
HCT VFR BLD AUTO: 33.8 % (ref 35–47)
HGB BLD-MCNC: 10.1 G/DL (ref 11.7–15.7)
HGB UR QL STRIP: NEGATIVE
KETONES UR STRIP-MCNC: NEGATIVE MG/DL
LEUKOCYTE ESTERASE UR QL STRIP: ABNORMAL
MUCOUS THREADS #/AREA URNS LPF: PRESENT /LPF
NITRATE UR QL: NEGATIVE
PH UR STRIP: 5 PH (ref 5–7)
PHOSPHATE SERPL-MCNC: 3.2 MG/DL (ref 2.5–4.5)
POTASSIUM SERPL-SCNC: 4.8 MMOL/L (ref 3.4–5.3)
PTH-INTACT SERPL-MCNC: 262 PG/ML (ref 18–80)
RBC #/AREA URNS AUTO: 3 /HPF (ref 0–2)
SODIUM SERPL-SCNC: 138 MMOL/L (ref 133–144)
SOURCE: ABNORMAL
SP GR UR STRIP: 1.01 (ref 1–1.03)
SQUAMOUS #/AREA URNS AUTO: 1 /HPF (ref 0–1)
T4 FREE SERPL-MCNC: 1.29 NG/DL (ref 0.76–1.46)
TSH SERPL DL<=0.005 MIU/L-ACNC: 2.63 MU/L (ref 0.4–4)
UROBILINOGEN UR STRIP-MCNC: 0 MG/DL (ref 0–2)
WBC #/AREA URNS AUTO: 55 /HPF (ref 0–5)
WBC CLUMPS #/AREA URNS HPF: PRESENT /HPF

## 2018-05-03 PROCEDURE — 82306 VITAMIN D 25 HYDROXY: CPT | Performed by: INTERNAL MEDICINE

## 2018-05-03 PROCEDURE — 84166 PROTEIN E-PHORESIS/URINE/CSF: CPT | Performed by: INTERNAL MEDICINE

## 2018-05-03 PROCEDURE — 87086 URINE CULTURE/COLONY COUNT: CPT | Performed by: INTERNAL MEDICINE

## 2018-05-03 PROCEDURE — 83970 ASSAY OF PARATHORMONE: CPT | Performed by: INTERNAL MEDICINE

## 2018-05-03 PROCEDURE — 82652 VIT D 1 25-DIHYDROXY: CPT | Performed by: INTERNAL MEDICINE

## 2018-05-03 RX ORDER — ALLOPURINOL 100 MG/1
100 TABLET ORAL DAILY
Qty: 90 TABLET | Refills: 3 | Status: SHIPPED | OUTPATIENT
Start: 2018-05-03 | End: 2019-05-10

## 2018-05-03 ASSESSMENT — PAIN SCALES - GENERAL: PAINLEVEL: NO PAIN (0)

## 2018-05-03 NOTE — TELEPHONE ENCOUNTER
Anemia Management Note  SUBJECTIVE/OBJECTIVE:  Referred by Dr Tyshawn Sexton February 10, 2017  Primary Diagnosis: Anemia in Chronic Kidney Disease (N18.3 D63.1)   Secondary Diagnosis: Chronic Kidney Disease, Stage III (N18.3)   Hgb goal range: 9-10  Epo/Darbo: Aranesp 300 mcg every 14 days - At home   RX will  on 2019  Iron regimen:  None  Lab orders  2019 in EPIC   Contact:                      Ok to leave message on home phone regarding (no selection made) per consent to communicate dated 8/15/13    Anemia Latest Ref Rng & Units 2018 2018 3/1/2018 3/29/2018 2018 2018 5/3/2018   HGB Goal - - - - - - - -   VALARIE Dose - - 300 mcg 300 mcg - 300 mcg - -   Hemoglobin 11.7 - 15.7 g/dL 9.7(L) - 9.1(L) 9.7(L) - 10.3(L) 10.1(L)   TSAT 15 - 46 % 36 - 19 - - 16 -   Ferritin 8 - 252 ng/mL 964(H) - 951(H) - - 879(H) -   PRBCs - - - - - - - -     BP Readings from Last 3 Encounters:   18 129/84   18 141/80   18 186/87     Wt Readings from Last 2 Encounters:   18 208 lb 6.4 oz (94.5 kg)   18 213 lb 8 oz (96.8 kg)       ASSESSMENT:  Hgb:Above goal - recommend hold dose  TSat: not at goal (>30%) but ferritin >500ng/mL.  IV iron not indicated at this time per anemia protocol. Ferritin: At goal (>100ng/mL)    PLAN:  Eleni will hold her Aranesp and RTC for hgb, ferritin and iron labs then aranesp if needed in 2 week(s)    Orders needed to be renewed (for next follow-up date) in [facility]: None    Iron labs due:  18    Plan discussed with:  Eleni  Plan provided by:  Bharti Akers, [facility]  Anemia Clinic  [phone]    NEXT FOLLOW-UP DATE:  18  Reviewed 2018 [facility]  Anemia Management Service  Gini AgostoD and Preethi Akers CPhT  Phone: [phone]  Fax: [phone]

## 2018-05-03 NOTE — PROGRESS NOTES
Reason for Call    Spoke with patient. States she's doing well without symptoms or concerns at this time. Scheduled to see her PCP today.    Patient Education    1. Uremic symptoms and when to call clinic    Plan    1. Continue to monitor symptoms  2. Follow up in 3-4 weeks    Patient was given an opportunity to ask questions and have those questions answered to her satisfaction.  Patient verbalized understanding of instructions provided and agreed to plan of care.    Brittney Quinones RN

## 2018-05-03 NOTE — PATIENT INSTRUCTIONS
[facility]: [phone]     [facility] Medication Refill Request Information:  * Please contact your pharmacy regarding ANY request for medication refills.  ** [facility] Prescription Fax = [phone]  * Please allow 3 business days for routine medication refills.  * Please allow 5 business days for controlled substance medication refills.     [facility] Test Result notification information:  *You will be notified with in 7-10 days of your appointment day regarding the results of your test.  If you are on MyChart you will be notified as soon as the provider has reviewed the results and signed off on them.             [facility]         Internal Medicine Resident                   Continuity Clinic    Who We Are    Resident Continuity Clinic is a part of the [facility] Primary Care Clinic.  Resident physicians see patients independently and establish a relationship with them over the course of their three-year residency program.  As with the Primary Care Clinic, our Resident Continuity Clinic models a group practice.  If your doctor is not available, you will be able to see another resident physician.  At the end of a resident s training, patients will be transitioned to a new resident physician for ongoing care.     We treat patients with a wide array of medical needs from routine physicals, to acute illnesses, to diabetes and blood pressure management, to complex medical illness.  What is a Resident Physician?    Resident physicians hold medical degrees and are doctors. They are training to become specialists in Internal Medicine. They work under the supervision of board-certified faculty physicians.  Expectations for Your Care    We strive to provide accessible, quality care at all times.    In order to provide this care, it is best to see your primary care resident doctor consistently rather switching between providers.  In the event you do see another physician, you should  schedule a follow-up visit with your usual primary care doctor.    If you are transitioning your care from another clinic, it is helpful to have your records available for your doctor to review.    We do not prescribe controlled substances, such as ADD medications or narcotic pain medications, on your first visit.  We will review your health records and concerns prior to devising a treatment plan with you in order to provide the best care.      Clinic Services     Extended clinic hours; patient  to help navigate your visit;  parking; laboratory and imaging services with evening and weekend hours    Multiple medical and surgical specialties in one building    Complementary services, including Nutrition, Integrative Medicine, Pharmacy consultations, Mental and Behavioral Health, Sports Medicine and Physical Therapy    Thank You    We would like to thank you for choosing the [facility] Internal Medicine Resident Continuity Clinic for your primary care. You are making a priceless contribution to the training of the next generation of health care practitioners.     Contact us at [phone] for appointments or questions.    Resident Clinic Hours are Tuesdays and Thursdays, 7:30am-5:00pm    Residents  Leisa Ferrera MD   (Female )   Haleigh Rodriguez MD   (Female)   Elmo Rivera MD  (Male)   Mack Moore MD  (Male)   Adia Sanderson MD   (Female)   Sebastien Connell MD  (Male)    Wily Chamberlain MD  (Male)   Charles Perkins MD  (Male)   Mack Daniel MD (Male)   Dragan Hidalgo MD  (Male)   Libia Greene MD (Female)    Alecia Santana MD (Female)   Indu Pinon MD  (Male)   Karishma Barnes MD(Female)   Winifred Marte MD  (Female)    Supervising Physicians   MD Perri Del Valle MD Briar Duffy, MD James Langland, MD Mary Logeais, MD Tanya Melnik, MD Charles Moldow, MD Heather Thompson Buum, MD Kathleen Watson, MD

## 2018-05-03 NOTE — PROGRESS NOTES
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  Please see my note from 2/22/18 for additional details.     Started exercising with walking and riding a bike at the gym ([employer] [facility] gym). She says she feels more energy. Does not push to hard or else she gets dyspnea on exertion.  Mood is ok, she says feels happier now. She is able to go out more often now, to the [employer] and seeing more friends now.  She has started eating healthier now, with more vegetables in her diet. Has been eating less rice.  Has been successful with intentional weight loss, her goal is to get under 200lbs in a couple of months.     Hand cramping improved.     Kidney transplant: Follows with nephrology--> On Prednisone 5mg daily with CellCept 500mg BID. 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 Her kidney function has been anywhere from 1.8-2.2 mg/dL but now slightly worse. She has modest proteinuria.\"  Allograft function Cr 2.41 now on5/3, with normal potassium and slightly elevated BUn of 48. GFR 20.     Recurrent UTI: Continues on daily Keflex prophylaxis. Follows with urology. Has followup scheduled with Urology for in-offiec cystoscopy.      Hypertension: Coreg 6.25mg BID, Clonidine 0.1mg daily, Nifedipine ER 60mg daily. Has now weaned off the Hydralazine 25mg     History of LA Grade A reflux esophagitis and hiatal hernia (last scope 2/16/16): Pt denies having this. She has not heard of Protonix or Pantoprazole.     History of thyroid nodules and osteoporosis: Follows with endocrinology.     No fevers, chills. No chest " "pain or sob. No abdominal pain or diarrhea. Denies constipation, has 1BM once daily.     Medications and allergies reviewed by me today.     ROS:   Constitutional, neuro, ENT, endocrine, pulmonary, cardiac, gastrointestinal, genitourinary, musculoskeletal, integument and psychiatric systems are negative, except as otherwise noted.    OBJECTIVE:    /84  Pulse 80  Wt 94.5 kg (208 lb 6.4 oz)  Breastfeeding? No  BMI 38.12 kg/m2   Wt Readings from Last 1 Encounters:   05/03/18 94.5 kg (208 lb 6.4 oz)       GENERAL APPEARANCE: healthy, alert and no distress     EYES: EOMI, PERRL     HENT: ear canals and TM's normal and nose and mouth without ulcers or lesions     NECK: no adenopathy, no asymmetry, masses, or scars and thyroid normal to palpation     RESP: lungs clear to auscultation - no rales, rhonchi or wheezes     CV: regular rates and rhythm, no m/g/r     ABDOMEN:  Scar on RLQ, slight hernia on side of kidney transplant (RLQ), palpable kidney transplant, soft, nontender, bowel sounds normal     SKIN: no suspicious lesions or rashes     NEURO: Normal strength and tone, sensory exam grossly normal, mentation intact and speech normal     PSYCH: mentation appears normal. and affect normal/bright     LYMPHATICS: No cervical adenopathy  ACCESS: RUE Fistula      ASSESSMENT/PLAN:    Eleni was seen today for hypertension.    Diagnoses and all orders for this visit:    Chronic gout without tophus, unspecified cause, unspecified site   Pt requesting refill.   -     allopurinol (ZYLOPRIM) 100 MG tablet; Take 1 tablet (100 mg) by mouth daily    CKD (chronic kidney disease) stage 4, GFR 15-29 ml/min (H): Pt interested in learning about foods that can prevent progression of her kidney disease. Pt was instructed to seek followup with kidney team.   -     NUTRITION REFERRAL    Kidney transplant: Follows with nephrology--> On Prednisone 5mg daily with CellCept 500mg BID. 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 Her kidney function has been anywhere from 1.8-2.2 mg/dL but now slightly worse. She has modest proteinuria.\"  Allograft function Cr 2.41 now on5/3, with normal potassium and slightly elevated BUn of 48. GFR 20.     Recurrent UTI: Continues on daily Keflex prophylaxis. Follows with urology. Has followup scheduled with Urology for in-offiec cystoscopy.      Hypertension: Coreg 6.25mg BID, Clonidine 0.1mg daily, Nifedipine ER 60mg daily. Has now weaned off the Hydralazine 25mg     History of LA Grade A reflux esophagitis and hiatal hernia (last scope 2/16/16): Pt denies having this. She has not heard of Protonix or Pantoprazole.     History of thyroid nodules and osteoporosis: Follows with endocrinology.     Goals of Care: Pt now DNR/DNI. Please see my last two notes for additional details on discussions.     Pt should return to clinic for f/u in 6 months. Pt will be seeing Dr. Saul or new resident     Preethi Gibson MD  May 3, 2018    Pt was seen and plan of care discussed with Dr. Saul.   I was present during the visit and the patient was seen and examined by me in conjunction with the resident.  I discussed the pertinent history, exam, results and plan with the resident and agree with the documentation above with the following exceptions: none.      Rocio Saul MD  "

## 2018-05-03 NOTE — NURSING NOTE
Chief Complaint   Patient presents with     Hypertension     Patient here for hypertension follow up.       Hoda Grigsby CMA at 12:55 PM on 5/3/2018.

## 2018-05-03 NOTE — MR AVS SNAPSHOT
After Visit Summary   5/3/2018    Eleni Logan    MRN: [id number]           Patient Information     Date Of Birth          1955        Visit Information        Provider Department      5/3/2018 1:00 PM Preethi Gibson MD [facility]        Today's Diagnoses     Chronic gout without tophus, unspecified cause, unspecified site    -  1    CKD (chronic kidney disease) stage 4, GFR 15-29 ml/min (H)        Benign essential hypertension        Gastroesophageal reflux disease with esophagitis        Recurrent UTI        Living-donor kidney transplant recipient          Care Instructions    Primary Care Center: [phone]     Primary [facility] Medication Refill Request Information:  * Please contact your pharmacy regarding ANY request for medication refills.  ** [facility] Prescription Fax = [phone]  * Please allow 3 business days for routine medication refills.  * Please allow 5 business days for controlled substance medication refills.     [facility] Test Result notification information:  *You will be notified with in 7-10 days of your appointment day regarding the results of your test.  If you are on MyChart you will be notified as soon as the provider has reviewed the results and signed off on them.             [facility]         Internal Medicine Resident                   Continuity Clinic    Who We Are    Resident Continuity Clinic is a part of the [facility] Primary Care Clinic.  Resident physicians see patients independently and establish a relationship with them over the course of their three-year residency program.  As with the Primary Care Clinic, our Resident Continuity Clinic models a group practice.  If your doctor is not available, you will be able to see another resident physician.  At the end of a resident s training, patients will be transitioned to a new resident physician for ongoing care.     We treat patients with a wide array of medical  needs from routine physicals, to acute illnesses, to diabetes and blood pressure management, to complex medical illness.  What is a Resident Physician?    Resident physicians hold medical degrees and are doctors. They are training to become specialists in Internal Medicine. They work under the supervision of board-certified faculty physicians.  Expectations for Your Care    We strive to provide accessible, quality care at all times.    In order to provide this care, it is best to see your primary care resident doctor consistently rather switching between providers.  In the event you do see another physician, you should schedule a follow-up visit with your usual primary care doctor.    If you are transitioning your care from another clinic, it is helpful to have your records available for your doctor to review.    We do not prescribe controlled substances, such as ADD medications or narcotic pain medications, on your first visit.  We will review your health records and concerns prior to devising a treatment plan with you in order to provide the best care.      Clinic Services     Extended clinic hours; patient  to help navigate your visit;  parking; laboratory and imaging services with evening and weekend hours    Multiple medical and surgical specialties in one building    Complementary services, including Nutrition, Integrative Medicine, Pharmacy consultations, Mental and Behavioral Health, Sports Medicine and Physical Therapy    Thank You    We would like to thank you for choosing the [facility] Internal Medicine Resident Continuity Clinic for your primary care. You are making a priceless contribution to the training of the next generation of health care practitioners.     Contact us at [phone] for appointments or questions.    Resident Clinic Hours are Tuesdays and Thursdays, 7:30am-5:00pm    Residents  Leisa Ferrera MD   (Female )   Haleigh Rodriguez MD   (Female)   Elmo Rivera  MD  (Male)   Mack Moore MD  (Male)   Adia Sanderson MD   (Female)   Sebastien Connell MD  (Male)    Wily Chamberlain MD  (Male)   Charles Perkins MD  (Male)   Mack Daniel MD (Male)   Dragan Hidalgo MD  (Male)   Libia Greene MD (Female)    Alecia Santana MD (Female)   Indu Pinon MD  (Male)   Karishma Barnes MD(Female)   Winifred Marte MD  (Female)    Supervising Physicians   MD Perri Del Valle, MD Mirza Allen, MD Salvador Ovalle, MD Alma Sahu, MD Pancho Steele MD Heather Thompson Buum, MD Meghana Gonzalez MD                    Follow-ups after your visit        Additional Services     NUTRITION REFERRAL       Your provider has referred you to: [facility]: [facility] (on call location)  [facility] [phone]   [email or web address]    Please be aware that coverage of these services is subject to the terms and limitations of your health insurance plan.  Call member services at your health plan with any benefit or coverage questions.      Please bring the following with you to your appointment:    (1) This referral request  (2) Any documents given to you regarding this referral  (3) Any specific questions you have about diet and/or food choices                  Your next 10 appointments already scheduled     May 16, 2018 10:45 AM CDT   (Arrive by 10:30 AM)   Cystoscopy with Lynnette Garcia MD   [facility] ([facility])    [address] [place] [address]  4th [facility] [id number] [phone]            May 21, 2018  2:30 PM CDT   (Arrive by 2:15 PM)   RETURN ENDOCRINE with Ana Pollack MD   [facility] ([facility])    [address] [place] [id number] [phone]            Nov 08, 2018  9:05 AM CST   (Arrive by 8:50 AM)   Return Visit  with Haleigh Rodriguez MD   [facility] ([facility])    [address]  [address] [facility] [address] [phone]              Who to contact     Please call your clinic at [phone] to:    Ask questions about your health    Make or cancel appointments    Discuss your medicines    Learn about your test results    Speak to your doctor            Additional Information About Your Visit        MyChart Information     [employer] is an electronic gateway that provides easy, online access to your medical records. With [employer], you can request a clinic appointment, read your test results, renew a prescription or communicate with your care team.     To sign up for [employer] visit the website at [email or web address]   You will be asked to enter the access code listed below, as well as some personal information. Please follow the directions to create your username and password.     Your access code is: [email or web address]  Expires: 2018  6:30 AM     Your access code will  in 90 days. If you need help or a new code, please contact your [facility] or call [phone] for assistance.        Care EveryWhere ID     This is your Care EveryWhere ID. This could be used by other organizations to access your [facility] medical records  [id number]        Your Vitals Were     Pulse Breastfeeding? BMI (Body Mass Index)             80 No 38.12 kg/m2          Blood Pressure from Last 3 Encounters:   18 129/84   18 141/80   18 186/87    Weight from Last 3 Encounters:   18 94.5 kg (208 lb 6.4 oz)   18 96.8 kg (213 lb 8 oz)   18 99 kg (218 lb 4.8 oz)              We Performed the Following     NUTRITION REFERRAL          Today's Medication Changes          These changes are accurate as of 5/3/18  1:37 PM.  If you have any questions, ask your nurse or doctor.               Start taking these medicines.         Dose/Directions    allopurinol 100 MG tablet   Commonly known as:  ZYLOPRIM   Used for:  Chronic gout without tophus, unspecified cause, unspecified site   Started by:  Preethi Gibson MD        Dose:  100 mg   Take 1 tablet (100 mg) by mouth daily   Quantity:  90 tablet   Refills:  3         Stop taking these medicines if you haven't already. Please contact your care team if you have questions.     hydrALAZINE 25 MG tablet   Commonly known as:  APRESOLINE   Stopped by:  Preethi Gibson MD                Where to get your medicines      These medications were sent to [place], [place] - [address]  [address], [facility] [address]    Hours:  TRANSPLANT PHONE NUMBER [phone] Phone:  [phone]     allopurinol 100 MG tablet                Primary Care Provider Office Phone # Fax #    Preethi Gibson [phone]       [facility] [address]  [facility] [address]        Equal Access to Services     CARLOS ENRIQUE DAVENPORT AH: Hadii aad ku hadasho Soomaali, waaxda luqadaha, qaybta kaalmada adeegyada, waxay idiin hayricha chambers . So [facility] [phone].    ATENCIÓN: Si habla español, tiene a stoner disposición servicios gratuitos de asistencia lingüística. Llame al [phone].    We comply with applicable federal civil rights laws and [place] laws. We do not discriminate on the basis of race, color, national origin, age, disability, sex, sexual orientation, or gender identity.            Thank you!     Thank you for choosing [facility] PRIMARY CARE CLINIC  for your care. Our goal is always to provide you with excellent care. Hearing back from our patients is one way we can continue to improve our services. Please take a few minutes to complete the written survey that you may receive in the mail after your visit with us. Thank you!             Your Updated Medication List - Protect others around you:  Learn how to safely use, store and throw away your medicines at www.disposemymeds.org.          This list is accurate as of 5/3/18  1:37 PM.  Always use your most recent med list.                   Brand Name Dispense Instructions for use Diagnosis    allopurinol 100 MG tablet    ZYLOPRIM    90 tablet    Take 1 tablet (100 mg) by mouth daily    Chronic gout without tophus, unspecified cause, unspecified site       artificial saliva Aers spray     1 Bottle    Take 1 spray by mouth every 6 hours as needed for dry mouth    Dry mouth       atorvastatin 20 MG tablet    LIPITOR    90 tablet    Take 1 tablet (20 mg) by mouth daily    Hypercholesteremia       calcium citrate-vitamin D 315-250 MG-UNIT Tabs per tablet    CITRACAL    120 tablet    Take 1 tablet by mouth daily    Osteoporosis, unspecified osteoporosis type, unspecified pathological fracture presence       carvedilol 6.25 MG tablet    COREG    360 tablet    Take 2 tablets (12.5 mg) by mouth 2 times daily (with meals)    Hypertension secondary to other renal disorders       cephalexin 250 MG capsule    KEFLEX    90 capsule    Take 1 capsule (250 mg) by mouth At Bedtime    Urinary frequency, Urinary tract infection       cholecalciferol 1000 units capsule    vitamin  -D    180 capsule    Take 2 capsules (2,000 Units) by mouth daily    Vitamin D deficiency       cloNIDine 0.1 MG tablet    CATAPRES    60 tablet    Take 1 tablet (0.1 mg) by mouth 2 times daily as needed For systolic BP >180    HTN (hypertension)       COMPOUNDED NON-CONTROLLED SUBSTANCE - PHARMACY TO MIX COMPOUNDED MEDICATION    CMPD RX    30 g    Estriol 1 mg/g Apply small amount to finger and apply to inside vagina daily for 2 weeks then twice weekly Route: vaginally    Atrophic vaginitis       darbepoetin bucky 300 MCG/0.6ML injection    ARANESP (ALBUMIN FREE)    1.2 mL    Inject 0.6 mLs (300 mcg) Subcutaneous every 14 days As needed for hgb<10g/dL    CKD (chronic kidney disease) stage 4, GFR 15-29  ml/min (H), Anemia in stage 4 chronic kidney disease (H)       folic acid 1 MG tablet    FOLVITE    90 tablet    Take 1 tablet (1 mg) by mouth daily    Preventive measure       hydrocortisone 1 % cream    CORTAID    60 g    Apply topically 2 times daily    Rash, skin       mycophenolate 250 MG capsule     120 capsule    TAKE TWO CAPSULES (500MG) BY MOUTH TWICE A DAY (DAW1)    Kidney transplanted       NIFEdipine ER osmotic 60 MG Tb24    PROCARDIA XL    180 tablet    Take 1 tablet (60 mg) by mouth 2 times daily    Hypertension secondary to other renal disorders       pantoprazole 40 MG EC tablet    PROTONIX    30 tablet    Take 1 tablet (40 mg) by mouth daily    Gastroesophageal reflux disease with esophagitis       predniSONE 5 MG tablet    DELTASONE    30 tablet    Take 1 tablet (5 mg) by mouth daily    Kidney transplanted       sodium bicarbonate 650 MG tablet     180 tablet    Take 1 tablet (650 mg) by mouth 2 times daily    Acidosis       vitamin D 39691 UNIT capsule    ERGOCALCIFEROL    12 capsule    Take 1 capsule (50,000 Units) by mouth once a week    Vitamin D deficiency

## 2018-05-04 ENCOUNTER — TELEPHONE (OUTPATIENT)
Dept: TRANSPLANT | Facility: CLINIC | Age: 63
End: 2018-05-04

## 2018-05-04 ENCOUNTER — TELEPHONE (OUTPATIENT)
Dept: NEPHROLOGY | Facility: CLINIC | Age: 63
End: 2018-05-04

## 2018-05-04 DIAGNOSIS — N39.0 URINARY TRACT INFECTION: Primary | ICD-10-CM

## 2018-05-04 LAB
ALBUMIN MFR UR ELPH: 82.7 %
ALPHA1 GLOB MFR UR ELPH: 2.6 %
ALPHA2 GLOB MFR UR ELPH: 3 %
B-GLOBULIN MFR UR ELPH: 3.3 %
BACTERIA SPEC CULT: NORMAL
GAMMA GLOB MFR UR ELPH: 8.4 %
Lab: NORMAL
M PROTEIN MFR UR ELPH: 3 %
PROT PATTERN UR ELPH-IMP: ABNORMAL
SPECIMEN SOURCE: NORMAL

## 2018-05-04 NOTE — TELEPHONE ENCOUNTER
Patient called to discuss UA results. Reports frequency, but denied any other urinary symptoms. Update sent to Dr. Sexton, sensitivities pending.    Brittney Quinones RN

## 2018-05-07 ENCOUNTER — PRE VISIT (OUTPATIENT)
Dept: UROLOGY | Facility: CLINIC | Age: 63
End: 2018-05-07

## 2018-05-07 LAB
DEPRECATED CALCIDIOL+CALCIFEROL SERPL-MC: 33 UG/L (ref 20–75)
VITAMIN D2 SERPL-MCNC: 14 UG/L
VITAMIN D3 SERPL-MCNC: 19 UG/L

## 2018-05-07 NOTE — TELEPHONE ENCOUNTER
Patient with history of rUTIs coming in for cystoscopy with Dr. Garcia. Patient chart reviewed, no need for call, all records available and ready for appointment.

## 2018-05-08 ENCOUNTER — CARE COORDINATION (OUTPATIENT)
Dept: TRANSPLANT | Facility: CLINIC | Age: 63
End: 2018-05-08

## 2018-05-08 NOTE — TELEPHONE ENCOUNTER
Per Dr. Sexton:    If no symptoms no treatment just repeat in a week   Ask her to drink a lot and empty frequently     Left detailed voicemail updating patient. Advised to call if any questions.    Brittney Quinones RN

## 2018-05-09 LAB — 1,25(OH)2D SERPL-MCNC: 42.5 PG/ML (ref 19.9–79.3)

## 2018-05-14 ENCOUNTER — TELEPHONE (OUTPATIENT)
Dept: NEPHROLOGY | Facility: CLINIC | Age: 63
End: 2018-05-14

## 2018-05-14 DIAGNOSIS — R35.0 URINARY FREQUENCY: Primary | ICD-10-CM

## 2018-05-14 NOTE — TELEPHONE ENCOUNTER
Received call from patient, expressed concern she may have a UTI. Reports urinary burning and frequency started over the weekend. Requested order for UA/UC. Order placed.    Brittney Quinones RN

## 2018-05-16 ENCOUNTER — TELEPHONE (OUTPATIENT)
Dept: PHARMACY | Facility: CLINIC | Age: 63
End: 2018-05-16

## 2018-05-16 DIAGNOSIS — D64.9 ANEMIA: ICD-10-CM

## 2018-05-16 DIAGNOSIS — N39.0 URINARY TRACT INFECTION: Primary | ICD-10-CM

## 2018-05-16 DIAGNOSIS — N39.0 URINARY TRACT INFECTION: ICD-10-CM

## 2018-05-16 DIAGNOSIS — R35.0 URINARY FREQUENCY: ICD-10-CM

## 2018-05-16 DIAGNOSIS — N18.4 CKD (CHRONIC KIDNEY DISEASE) STAGE 4, GFR 15-29 ML/MIN (H): ICD-10-CM

## 2018-05-16 LAB
ALBUMIN UR-MCNC: >499 MG/DL
APPEARANCE UR: CLEAR
BACTERIA #/AREA URNS HPF: ABNORMAL /HPF
BILIRUB UR QL STRIP: NEGATIVE
COLOR UR AUTO: YELLOW
FERRITIN SERPL-MCNC: 1198 NG/ML (ref 8–252)
GLUCOSE UR STRIP-MCNC: NEGATIVE MG/DL
HCT VFR BLD AUTO: 33.1 % (ref 35–47)
HGB BLD-MCNC: 10 G/DL (ref 11.7–15.7)
HGB UR QL STRIP: ABNORMAL
IRON SATN MFR SERPL: 40 % (ref 15–46)
IRON SERPL-MCNC: 84 UG/DL (ref 35–180)
KETONES UR STRIP-MCNC: NEGATIVE MG/DL
LEUKOCYTE ESTERASE UR QL STRIP: ABNORMAL
MUCOUS THREADS #/AREA URNS LPF: PRESENT /LPF
NITRATE UR QL: NEGATIVE
PH UR STRIP: 5 PH (ref 5–7)
RBC #/AREA URNS AUTO: 4 /HPF (ref 0–2)
SOURCE: ABNORMAL
SP GR UR STRIP: 1.01 (ref 1–1.03)
SQUAMOUS #/AREA URNS AUTO: <1 /HPF (ref 0–1)
TIBC SERPL-MCNC: 209 UG/DL (ref 240–430)
UROBILINOGEN UR STRIP-MCNC: 0 MG/DL (ref 0–2)
WBC #/AREA URNS AUTO: 29 /HPF (ref 0–5)
WBC CLUMPS #/AREA URNS HPF: PRESENT /HPF

## 2018-05-16 PROCEDURE — 87086 URINE CULTURE/COLONY COUNT: CPT | Performed by: INTERNAL MEDICINE

## 2018-05-16 RX ORDER — LEVOFLOXACIN 500 MG/1
500 TABLET, FILM COATED ORAL DAILY
Qty: 7 TABLET | Refills: 0 | Status: SHIPPED | OUTPATIENT
Start: 2018-05-16 | End: 2018-08-07

## 2018-05-16 NOTE — TELEPHONE ENCOUNTER
Anemia Management Note  SUBJECTIVE/OBJECTIVE:  Referred by Dr Tyshawn Sexton February 10, 2017  Primary Diagnosis: Anemia in Chronic Kidney Disease (N18.3 D63.1)   Secondary Diagnosis: Chronic Kidney Disease, Stage III (N18.3)   Hgb goal range: 9-10  Epo/Darbo: Aranesp 300 mcg every 14 days - At home   RX will  on 2019  Iron regimen:  None  Lab orders  2019 in EPIC   Contact:                      Ok to leave message on home phone regarding (no selection made) per consent to communicate dated 8/15/13    Anemia Latest Ref Rng & Units 2018 3/1/2018 3/29/2018 2018 2018 5/3/2018 2018   HGB Goal - - - - - - - -   VALARIE Dose - 300 mcg 300 mcg - 300 mcg - - -   Hemoglobin 11.7 - 15.7 g/dL - 9.1(L) 9.7(L) - 10.3(L) 10.1(L) 10.0(L)   TSAT 15 - 46 % - 19 - - 16 - 40   Ferritin 8 - 252 ng/mL - 951(H) - - 879(H) - 1198(H)   PRBCs - - - - - - - -     BP Readings from Last 3 Encounters:   18 146/76   18 129/84   18 141/80     Wt Readings from Last 2 Encounters:   18 208 lb (94.3 kg)   18 208 lb 6.4 oz (94.5 kg)       ASSESSMENT:  Hgb: At goal - recommend hold dose due to Hgb 10.0  TSat: at goal >30% Ferritin: Elevated (>1000ng/mL)    PLAN:  Hold Aranesp and RTC for hgb then aranesp if needed in 2 week(s)    Orders needed to be renewed (for next follow-up date) in [facility]: None    Iron labs due:  18    Plan discussed with:  Eleni    Plan provided by:  Bharti Akers CPhT  [facility]  [phone]    NEXT FOLLOW-UP DATE:  18  Reviewed 2018 [facility]  Anemia Management Service  Shea Sotomayor,PharmD and Preethi Akers CPhT  Phone: [phone]  Fax: [phone]

## 2018-05-17 LAB
BACTERIA SPEC CULT: NORMAL
SPECIMEN SOURCE: NORMAL

## 2018-05-18 ENCOUNTER — TELEPHONE (OUTPATIENT)
Dept: NEPHROLOGY | Facility: CLINIC | Age: 63
End: 2018-05-18

## 2018-05-18 ENCOUNTER — TELEPHONE (OUTPATIENT)
Dept: UROLOGY | Facility: CLINIC | Age: 63
End: 2018-05-18

## 2018-05-18 DIAGNOSIS — N39.0 URINARY TRACT INFECTION: Primary | ICD-10-CM

## 2018-05-18 NOTE — TELEPHONE ENCOUNTER
----- Message from Arelis Brannon RN sent at 5/18/2018  9:13 AM CDT -----  Her UC was negative.  Help please  ----- Message -----     From: Brittney Quinones, MARIBELL     Sent: 5/18/2018   8:50 AM       To: [facility]    Eleni Rosa called me to say she cannot take levaquin. She's had issues with it in the past, made it feel very weak. She took two doses and stopped. She's requesting a new prescription be sent today, and a call when done.    Thanks,  Brittney, Nephrology RN Care Coordinator

## 2018-05-18 NOTE — TELEPHONE ENCOUNTER
Patient called to report an issue with the antibiotic given by urology. She remembered that she's taken it in the past, and that she had issues with it. She stopped after 2 days due to weakness. Requesting a new prescription be sent to [facility] pharmacy. Message sent to urology team.    Brittney Quinones RN

## 2018-05-18 NOTE — TELEPHONE ENCOUNTER
Patient reports she had shortness of breathe when she took levaquin (2)   She stopped taking it. Patient's urine culture returned negative- no treatment at this time needed  Patient will come in on 6/6/18 for labs and have another culture done at that time for her cysto the next week   Patient agreed with plan    Akanksha Ponce RN   Care Coordinator Urology

## 2018-05-23 ENCOUNTER — OFFICE VISIT (OUTPATIENT)
Dept: ENDOCRINOLOGY | Facility: CLINIC | Age: 63
End: 2018-05-23
Payer: MEDICARE

## 2018-05-23 VITALS
HEIGHT: 62 IN | DIASTOLIC BLOOD PRESSURE: 72 MMHG | BODY MASS INDEX: 37.89 KG/M2 | SYSTOLIC BLOOD PRESSURE: 139 MMHG | HEART RATE: 64 BPM | WEIGHT: 205.9 LBS

## 2018-05-23 DIAGNOSIS — M81.0 AGE-RELATED OSTEOPOROSIS WITHOUT CURRENT PATHOLOGICAL FRACTURE: Primary | ICD-10-CM

## 2018-05-23 DIAGNOSIS — Z94.0 S/P KIDNEY TRANSPLANT: ICD-10-CM

## 2018-05-23 DIAGNOSIS — E04.1 THYROID NODULE: ICD-10-CM

## 2018-05-23 ASSESSMENT — PAIN SCALES - GENERAL: PAINLEVEL: NO PAIN (0)

## 2018-05-23 NOTE — LETTER
"5/23/2018       RE: Eleni Logan  [address]  [address]  [place] [address]     Dear Colleague,    Thank you for referring your patient, Eleni Logan, to the [facility] ENDOCRINOLOGY at [facility]. Please see a copy of my visit note below.    Endocrine Clinic Consult:     Reason for consult: Thyroid nodule  Referring Physician: Preetih Gibson    HPI:   Eleni Logan is a 62 year old female who is seen for initial consultation.  She  has a past medical history of Anatomical narrow angle; Anemia; Gout; HTN (hypertension); Hyperlipidaemia; IgA nephropathy; Immunosuppressed status (H); Nonsenile cataract; and Obstructive sleep apnea..  1999: LDKT    2/2018:  incidentally discovered thyroid nodules on US of AV fistula. She did not prior knowledge of this.   2/7/18: US thyroid: 2 nodules measuring 0.5 x 0.3 x 0.4 cm and has no internal vascularity. There is a 1.1 x 1.1 x 1.3 cm hypoechoic nodule with internal soft tissue component and no internal vascularity;  benign \"colloid with clot\" morphology.    No compressive symptoms - neck pain, problems with swallowing, voice change.   Prior TSH normal. Denies temp intolerance, palpitation, new or worse constipation, anorexia, sleep disturbance. Feels fatigued at times, no change in energy levels recently. No SOB / fevers.      She was diagnosed with osteoporosis 1/2017 in the setting of CKD, postmenopausal state and low calcium intake. She has morbid obesity followed up at weight management clinic.       Osteoporosis:   She was diagnosed with osteoporosis 1/2017 in the setting of CKD, postmenopausal state and low calcium intake.   1/2017: DXA showed osteoporosis. Significant decline compared to prior DXA in 2010 on all 3 sites  high fracture risk due to steroid use.        Morbid obesity: Encouraged today for a FU with Dr Salgado.      Other ROS:   No falls or fractures.   No eye symptoms  No headache, change in vision, loss " of peripheral vision  Complete ROS that were obtained were negative.     Past Medical History:   Diagnosis Date     Anatomical narrow angle      Anemia      Gout      HTN (hypertension)      Hyperlipidaemia      IgA nephropathy      Immunosuppressed status (H)     Prednisone and cellcept     Nonsenile cataract      Obstructive sleep apnea      Past Surgical History:   Procedure Laterality Date     ARTHROPLASTY KNEE       COLONOSCOPY N/A 2/18/2016    Procedure: COLONOSCOPY;  Surgeon: Markie Squires MD;  Location: [facility]     CREATE FISTULA ARTERIOVENOUS UPPER EXTREMITY Right 9/19/2017    Procedure: CREATE FISTULA ARTERIOVENOUS UPPER EXTREMITY;  Right Rm Basilic Vein Fistula, Cephalic Vein Thrombectomy.;  Surgeon: Brandy Bond MD;  Location: [facility]     ESOPHAGOSCOPY, GASTROSCOPY, DUODENOSCOPY (EGD), COMBINED N/A 2/17/2016    Procedure: COMBINED ESOPHAGOSCOPY, GASTROSCOPY, DUODENOSCOPY (EGD);  Surgeon: Markie Squires MD;  Location: [facility]     HC CAPSULE ENDOSCOPY N/A 2/18/2016    Procedure: CAPSULE/PILL CAM ENDOSCOPY;  Surgeon: Markie Squires MD;  Location: [facility]     LASER YAG IRIDOTOMY BILATERAL  2008     RENAL TRANSPLANT PROGRAM ADULT IP CONSULT       REVISION FISTULA ARTERIOVENOUS UPPER EXTREMITY Right 11/21/2017    Procedure: REVISION FISTULA ARTERIOVENOUS UPPER EXTREMITY;  Right Arm Basilic Vein Transposition,  Anesthesia Block;  Surgeon: Brandy Bond MD;  Location: [facility]     TRANSPLANT  07/1999    kidney     Family History   Problem Relation Age of Onset     KIDNEY DISEASE Mother      Macular Degeneration No family hx of      Eye Surgery No family hx of      Glaucoma No family hx of      DIABETES No family hx of      Hypertension No family hx of      Amblyopia No family hx of      Skin Cancer No family hx of      Melanoma No family hx of      Social History     Social History     Marital status:      Spouse name: N/A     Number of children: N/A     Years of education: N/A  "    Occupational History     Not on file.     Social History Main Topics     Smoking status: Never Smoker     Smokeless tobacco: Never Used      Comment: Has been around second hand smoke     Alcohol use Yes      Comment: Once in a great while     Drug use: No     Sexual activity: Not Currently     Other Topics Concern     Not on file     Social History Narrative        Allergies   Allergen Reactions     Ciprofloxacin Palpitations     Levaquin [Levofloxacin] Shortness Of Breath     Current Outpatient Prescriptions   Medication     allopurinol (ZYLOPRIM) 100 MG tablet     artificial saliva (BIOTENE MT) AERS spray     atorvastatin (LIPITOR) 20 MG tablet     calcium citrate-vitamin D (CITRACAL) 315-250 MG-UNIT TABS per tablet     carvedilol (COREG) 6.25 MG tablet     CELLCEPT (BRAND) 250 MG CAPSULE     cephalexin (KEFLEX) 250 MG capsule     cholecalciferol (VITAMIN  -D) 1000 UNITS capsule     cloNIDine (CATAPRES) 0.1 MG tablet     COMPOUNDED NON-CONTROLLED SUBSTANCE (CMPD RX) - PHARMACY TO MIX COMPOUNDED MEDICATION     darbepoetin bucky (ARANESP, ALBUMIN FREE,) 300 MCG/0.6ML injection     folic acid (FOLVITE) 1 MG tablet     hydrocortisone (CORTAID) 1 % cream     levofloxacin (LEVAQUIN) 500 MG tablet     NIFEdipine ER osmotic (PROCARDIA XL) 60 MG TB24     pantoprazole (PROTONIX) 40 MG EC tablet     predniSONE (DELTASONE) 5 MG tablet     sodium bicarbonate 650 MG tablet     vitamin D (ERGOCALCIFEROL) 17318 UNIT capsule     No current facility-administered medications for this visit.            Exam:  /72  Pulse 64  Ht 1.575 m (5' 2.01\")  Wt 93.4 kg (205 lb 14.4 oz)  BMI 37.65 kg/m2   Constitutional: obese female.   Head: Normocephalic. No masses, lesions, tenderness or abnormalities  Neck: Neck supple. No adenopathy. Thyroid slightly irregular with no discrete palpable nodule. Carotids without bruits.  ENT: No throat congestion, no neck nodes or sinus tenderness  Cardiovascular: regular rhythm, no " tachycardia  Respiratory: Lungs clear  Gastrointestinal: Abdomen soft, non-tender. BS normal. No striae  Musculoskeletal: gait normal and normal muscle tone  Neurologic: Normal speech, reflexes normal.   Psychiatric: mentation appears normal       TSH   Date Value Ref Range Status   05/03/2018 2.63 0.40 - 4.00 mU/L Final   04/18/2016 1.26 0.40 - 4.00 mU/L Final   10/03/2013 4.26 0.4 - 5.0 mU/L Final   11/23/2010 3.17 0.4 - 5.0 mU/L Final   03/09/2009 1.95 0.4 - 5.0 mU/L Final       T4 Free   Date Value Ref Range Status   05/03/2018 1.29 0.76 - 1.46 ng/dL Final   ]    CBC RESULTS:   Recent Labs   Lab Test  02/01/18   1356  01/11/18   1423   WBC   --   7.8   RBC   --   3.89   HGB  9.7*  9.3*   HCT  32.5*  31.4*   MCV   --   81   MCH   --   23.9*   MCHC   --   29.6*   RDW   --   16.7*   PLT   --   297     Recent Labs   Lab Test  01/11/18   1423  12/19/17   1155   NA  138  141   POTASSIUM  4.0  4.2   CHLORIDE  109  111*   CO2  22  20   ANIONGAP  8  11   GLC  168*  97   BUN  46*  55*   CR  2.08*  2.01*   GLORIA  8.1*  8.6         Assessment   Eleni Logan is a 62 year old female with history of renal transplant who is here for evaluation of incidentally discovered thyroid nodules on US neck. She is clinically euthyroid, and has no compressive symptoms. Her labs in the past showed normal TSH. US done prior to visit showed 2 cystic nodule with very small solid component.    She was diagnosed with osteoporosis 1/2017 in the setting of CKD, postmenopausal state and low calcium intake. She has morbid obesity followed up at weight management clinic.       Thyroid Nodules  Normal TFTs   V low risk US appearance.   FU US in 18-24 mo     Osteoporosis: Treatment plan is complex. We discussed options in detail. Renal insufficiency and high PTH is noted, high risks with reclast or PTH analogs. Denosumab is probably the best option going forward. Skin infection risks are higher, but benefits may outweigh the risks.   01/2017:   DXA  showed osteoporosis.   Significant decline compared to prior DXA on all 3 sites.  Has not received treatment.   Age related but high fracture risk due to steroid use.   Plan to normalize calcium intake, continue Vit D levels. Labs repeated in 3 months showed normal Vitamin D  High PTH and renal failure noted.   Plan for Prolia infusion.   DXA 1/2019.     Morbid obesity: sees Dr Salgado.      Ana Pollack MD  [address]  Endocrinology Service      Patient Instructions   We will contact you with the prolia infusion plan.     Continue Vit D and Ca supplements.         Orders Placed This Encounter   Procedures     Dexa Wrist/Heel     Dexa hip/pelvis/spine       Again, thank you for allowing me to participate in the care of your patient.      Sincerely,    Ana Pollack MD

## 2018-05-23 NOTE — MR AVS SNAPSHOT
After Visit Summary   5/23/2018    Eleni Logan    MRN: [id number]           Patient Information     Date Of Birth          1955        Visit Information        Provider Department      5/23/2018 12:30 PM Ana Pollack MD [facility] End[facility]        Today's Diagnoses     Age-related osteoporosis without current pathological fracture    -  1      Care Instructions    We will contact you with the prolia infusion plan.     Continue Vit D and Ca supplements.           Follow-ups after your visit        Follow-up notes from your care team     Return in about 6 months (around 11/23/2018).      Your next 10 appointments already scheduled     Jun 13, 2018  1:00 PM CDT   (Arrive by 12:45 PM)   Cystoscopy with Lynnette Garcia MD   [facility] ([facility])    [address] [place] [address]  4th [facility] [address] [phone]            Aug 06, 2018  3:35 PM CDT   (Arrive by 3:05 PM)   Return Kidney Transplant with  Kidney/Pancreas Recipient   [facility] Nephrology ([facility])    [address] [place] [address]  [address] [place] [address]   [phone]            Nov 08, 2018  9:05 AM CST   (Arrive by 8:50 AM)   Return Visit with Haleigh Rodriguez MD   [facility] ([facility])    [address] [place] [address]   [phone]            Nov 28, 2018  2:30 PM CST   (Arrive by 2:15 PM)   RETURN ENDOCRINE with Ana Pollack MD   [facility] Endocrinology ([facility])    [address] [place] [id number] [phone]              Future tests that were ordered for you today     Open Future Orders        Priority Expected Expires Ordered    Dexa Wrist/Heel Routine  12/19/2018 5/23/2018    Dexa hip/pelvis/spine Routine  12/19/2018 5/23/2018            Who  "to contact     Please call your clinic at [phone] to:    Ask questions about your health    Make or cancel appointments    Discuss your medicines    Learn about your test results    Speak to your doctor            Additional Information About Your Visit        Care EveryWhere ID     This is your Care EveryWhere ID. This could be used by other organizations to access your [place] medical records  [id number]        Your Vitals Were     Pulse Height BMI (Body Mass Index)             64 1.575 m (5' 2.01\") 37.65 kg/m2          Blood Pressure from Last 3 Encounters:   05/23/18 139/72   05/16/18 146/76   05/03/18 129/84    Weight from Last 3 Encounters:   05/23/18 93.4 kg (205 lb 14.4 oz)   05/16/18 94.3 kg (208 lb)   05/03/18 94.5 kg (208 lb 6.4 oz)               Primary Care Provider Office Phone # Fax #    Preethi Gibson [phone]       [address] [address]        Equal Access to Services     [facility] : Hadii aad ku hadasho Soomaali, waaxda luqadaha, qaybta kaalmada adeegyada, [facility] . So [facility] [phone].    ATENCIÓN: Si habla español, tiene a stoner disposición servicios gratuitos de asistencia lingüística. Llame al [phone].    We comply with applicable federal civil rights laws and [place] laws. We do not discriminate on the basis of race, color, national origin, age, disability, sex, sexual orientation, or gender identity.            Thank you!     Thank you for choosing [facility] ENDOCRINOLOGY  for your care. Our goal is always to provide you with excellent care. Hearing back from our patients is one way we can continue to improve our services. Please take a few minutes to complete the written survey that you may receive in the mail after your visit with us. Thank you!             Your Updated Medication List - Protect others around you: Learn how to safely use, store and throw away your medicines at " www.disposemymeds.org.          This list is accurate as of 5/23/18  1:00 PM.  Always use your most recent med list.                   Brand Name Dispense Instructions for use Diagnosis    allopurinol 100 MG tablet    ZYLOPRIM    90 tablet    Take 1 tablet (100 mg) by mouth daily    Chronic gout without tophus, unspecified cause, unspecified site       artificial saliva Aers spray     1 Bottle    Take 1 spray by mouth every 6 hours as needed for dry mouth    Dry mouth       atorvastatin 20 MG tablet    LIPITOR    90 tablet    Take 1 tablet (20 mg) by mouth daily    Hypercholesteremia       calcium citrate-vitamin D 315-250 MG-UNIT Tabs per tablet    CITRACAL    120 tablet    Take 1 tablet by mouth daily    Osteoporosis, unspecified osteoporosis type, unspecified pathological fracture presence       carvedilol 6.25 MG tablet    COREG    360 tablet    Take 2 tablets (12.5 mg) by mouth 2 times daily (with meals)    Hypertension secondary to other renal disorders       cephalexin 250 MG capsule    KEFLEX    90 capsule    Take 1 capsule (250 mg) by mouth At Bedtime    Urinary frequency, Urinary tract infection       cholecalciferol 1000 units capsule    vitamin  -D    180 capsule    Take 2 capsules (2,000 Units) by mouth daily    Vitamin D deficiency       cloNIDine 0.1 MG tablet    CATAPRES    60 tablet    Take 1 tablet (0.1 mg) by mouth 2 times daily as needed For systolic BP >180    HTN (hypertension)       COMPOUNDED NON-CONTROLLED SUBSTANCE - PHARMACY TO MIX COMPOUNDED MEDICATION    CMPD RX    30 g    Estriol 1 mg/g Apply small amount to finger and apply to inside vagina daily for 2 weeks then twice weekly Route: vaginally    Atrophic vaginitis       darbepoetin bucky 300 MCG/0.6ML injection    ARANESP (ALBUMIN FREE)    1.2 mL    Inject 0.6 mLs (300 mcg) Subcutaneous every 14 days As needed for hgb<10g/dL    CKD (chronic kidney disease) stage 4, GFR 15-29 ml/min (H), Anemia in stage 4 chronic kidney disease (H)        folic acid 1 MG tablet    FOLVITE    90 tablet    Take 1 tablet (1 mg) by mouth daily    Preventive measure       hydrocortisone 1 % cream    CORTAID    60 g    Apply topically 2 times daily    Rash, skin       levofloxacin 500 MG tablet    LEVAQUIN    7 tablet    Take 1 tablet (500 mg) by mouth daily    Urinary tract infection       mycophenolate 250 MG capsule     120 capsule    TAKE TWO CAPSULES (500MG) BY MOUTH TWICE A DAY (DAW1)    Kidney transplanted       NIFEdipine ER osmotic 60 MG Tb24    PROCARDIA XL    180 tablet    Take 1 tablet (60 mg) by mouth 2 times daily    Hypertension secondary to other renal disorders       pantoprazole 40 MG EC tablet    PROTONIX    30 tablet    Take 1 tablet (40 mg) by mouth daily    Gastroesophageal reflux disease with esophagitis       predniSONE 5 MG tablet    DELTASONE    30 tablet    Take 1 tablet (5 mg) by mouth daily    Kidney transplanted       sodium bicarbonate 650 MG tablet     180 tablet    Take 1 tablet (650 mg) by mouth 2 times daily    Acidosis       vitamin D 32915 UNIT capsule    ERGOCALCIFEROL    12 capsule    Take 1 capsule (50,000 Units) by mouth once a week    Vitamin D deficiency

## 2018-05-23 NOTE — PROGRESS NOTES
"Endocrine Clinic Consult:     Reason for consult: Thyroid nodule  Referring Physician: Preethi Gibson    HPI:   Eleni Logan is a 62 year old female who is seen for initial consultation.  She  has a past medical history of Anatomical narrow angle; Anemia; Gout; HTN (hypertension); Hyperlipidaemia; IgA nephropathy; Immunosuppressed status (H); Nonsenile cataract; and Obstructive sleep apnea..  1999: LDKT    2/2018:  incidentally discovered thyroid nodules on US of AV fistula. She did not prior knowledge of this.   2/7/18: US thyroid: 2 nodules measuring 0.5 x 0.3 x 0.4 cm and has no internal vascularity. There is a 1.1 x 1.1 x 1.3 cm hypoechoic nodule with internal soft tissue component and no internal vascularity;  benign \"colloid with clot\" morphology.    No compressive symptoms - neck pain, problems with swallowing, voice change.   Prior TSH normal. Denies temp intolerance, palpitation, new or worse constipation, anorexia, sleep disturbance. Feels fatigued at times, no change in energy levels recently. No SOB / fevers.      She was diagnosed with osteoporosis 1/2017 in the setting of CKD, postmenopausal state and low calcium intake. She has morbid obesity followed up at weight management clinic.       Osteoporosis:   She was diagnosed with osteoporosis 1/2017 in the setting of CKD, postmenopausal state and low calcium intake.   1/2017: DXA showed osteoporosis. Significant decline compared to prior DXA in 2010 on all 3 sites  high fracture risk due to steroid use.        Morbid obesity: Encouraged today for a FU with Dr Salgado.      Other ROS:   No falls or fractures.   No eye symptoms  No headache, change in vision, loss of peripheral vision  Complete ROS that were obtained were negative.     Past Medical History:   Diagnosis Date     Anatomical narrow angle      Anemia      Gout      HTN (hypertension)      Hyperlipidaemia      IgA nephropathy      Immunosuppressed status (H)     Prednisone and cellcept "     Nonsenile cataract      Obstructive sleep apnea      Past Surgical History:   Procedure Laterality Date     ARTHROPLASTY KNEE       COLONOSCOPY N/A 2/18/2016    Procedure: COLONOSCOPY;  Surgeon: Markie Squires MD;  Location: [facility]     CREATE FISTULA ARTERIOVENOUS UPPER EXTREMITY Right 9/19/2017    Procedure: CREATE FISTULA ARTERIOVENOUS UPPER EXTREMITY;  Right Rm Basilic Vein Fistula, Cephalic Vein Thrombectomy.;  Surgeon: Brandy Bond MD;  Location: [facility] OR     ESOPHAGOSCOPY, GASTROSCOPY, DUODENOSCOPY (EGD), COMBINED N/A 2/17/2016    Procedure: COMBINED ESOPHAGOSCOPY, GASTROSCOPY, DUODENOSCOPY (EGD);  Surgeon: Markie Squires MD;  Location:  GI     HC CAPSULE ENDOSCOPY N/A 2/18/2016    Procedure: CAPSULE/PILL CAM ENDOSCOPY;  Surgeon: Markie Squires MD;  Location: [facility]     LASER YAG IRIDOTOMY BILATERAL  2008     RENAL TRANSPLANT PROGRAM ADULT IP CONSULT       REVISION FISTULA ARTERIOVENOUS UPPER EXTREMITY Right 11/21/2017    Procedure: REVISION FISTULA ARTERIOVENOUS UPPER EXTREMITY;  Right Arm Basilic Vein Transposition,  Anesthesia Block;  Surgeon: Brandy Bond MD;  Location: [facility] OR     TRANSPLANT  07/1999    kidney     Family History   Problem Relation Age of Onset     KIDNEY DISEASE Mother      Macular Degeneration No family hx of      Eye Surgery No family hx of      Glaucoma No family hx of      DIABETES No family hx of      Hypertension No family hx of      Amblyopia No family hx of      Skin Cancer No family hx of      Melanoma No family hx of      Social History     Social History     Marital status:      Spouse name: N/A     Number of children: N/A     Years of education: N/A     Occupational History     Not on file.     Social History Main Topics     Smoking status: Never Smoker     Smokeless tobacco: Never Used      Comment: Has been around second hand smoke     Alcohol use Yes      Comment: Once in a great while     Drug use: No     Sexual activity: Not  "Currently     Other Topics Concern     Not on file     Social History Narrative        Allergies   Allergen Reactions     Ciprofloxacin Palpitations     Levaquin [Levofloxacin] Shortness Of Breath     Current Outpatient Prescriptions   Medication     allopurinol (ZYLOPRIM) 100 MG tablet     artificial saliva (BIOTENE MT) AERS spray     atorvastatin (LIPITOR) 20 MG tablet     calcium citrate-vitamin D (CITRACAL) 315-250 MG-UNIT TABS per tablet     carvedilol (COREG) 6.25 MG tablet     CELLCEPT (BRAND) 250 MG CAPSULE     cephalexin (KEFLEX) 250 MG capsule     cholecalciferol (VITAMIN  -D) 1000 UNITS capsule     cloNIDine (CATAPRES) 0.1 MG tablet     COMPOUNDED NON-CONTROLLED SUBSTANCE (CMPD RX) - PHARMACY TO MIX COMPOUNDED MEDICATION     darbepoetin bucky (ARANESP, ALBUMIN FREE,) 300 MCG/0.6ML injection     folic acid (FOLVITE) 1 MG tablet     hydrocortisone (CORTAID) 1 % cream     levofloxacin (LEVAQUIN) 500 MG tablet     NIFEdipine ER osmotic (PROCARDIA XL) 60 MG TB24     pantoprazole (PROTONIX) 40 MG EC tablet     predniSONE (DELTASONE) 5 MG tablet     sodium bicarbonate 650 MG tablet     vitamin D (ERGOCALCIFEROL) 25528 UNIT capsule     No current facility-administered medications for this visit.            Exam:  /72  Pulse 64  Ht 1.575 m (5' 2.01\")  Wt 93.4 kg (205 lb 14.4 oz)  BMI 37.65 kg/m2   Constitutional: obese female.   Head: Normocephalic. No masses, lesions, tenderness or abnormalities  Neck: Neck supple. No adenopathy. Thyroid slightly irregular with no discrete palpable nodule. Carotids without bruits.  ENT: No throat congestion, no neck nodes or sinus tenderness  Cardiovascular: regular rhythm, no tachycardia  Respiratory: Lungs clear  Gastrointestinal: Abdomen soft, non-tender. BS normal. No striae  Musculoskeletal: gait normal and normal muscle tone  Neurologic: Normal speech, reflexes normal.   Psychiatric: mentation appears normal       TSH   Date Value Ref Range Status   05/03/2018 2.63 " 0.40 - 4.00 mU/L Final   04/18/2016 1.26 0.40 - 4.00 mU/L Final   10/03/2013 4.26 0.4 - 5.0 mU/L Final   11/23/2010 3.17 0.4 - 5.0 mU/L Final   03/09/2009 1.95 0.4 - 5.0 mU/L Final       T4 Free   Date Value Ref Range Status   05/03/2018 1.29 0.76 - 1.46 ng/dL Final   ]    CBC RESULTS:   Recent Labs   Lab Test  02/01/18   1356  01/11/18   1423   WBC   --   7.8   RBC   --   3.89   HGB  9.7*  9.3*   HCT  32.5*  31.4*   MCV   --   81   MCH   --   23.9*   MCHC   --   29.6*   RDW   --   16.7*   PLT   --   297     Recent Labs   Lab Test  01/11/18   1423  12/19/17   1155   NA  138  141   POTASSIUM  4.0  4.2   CHLORIDE  109  111*   CO2  22  20   ANIONGAP  8  11   GLC  168*  97   BUN  46*  55*   CR  2.08*  2.01*   GLORIA  8.1*  8.6         Assessment   Eleni Logan is a 62 year old female with history of renal transplant who is here for evaluation of incidentally discovered thyroid nodules on US neck. She is clinically euthyroid, and has no compressive symptoms. Her labs in the past showed normal TSH. US done prior to visit showed 2 cystic nodule with very small solid component.    She was diagnosed with osteoporosis 1/2017 in the setting of CKD, postmenopausal state and low calcium intake. She has morbid obesity followed up at weight management clinic.       Thyroid Nodules  Normal TFTs   V low risk US appearance.   FU US in 18-24 mo     Osteoporosis: Treatment plan is complex. We discussed options in detail. Renal insufficiency and high PTH is noted, high risks with reclast or PTH analogs. Denosumab is probably the best option going forward. Skin infection risks are higher, but benefits may outweigh the risks.   01/2017:   DXA showed osteoporosis.   Significant decline compared to prior DXA on all 3 sites.  Has not received treatment.   Age related but high fracture risk due to steroid use.   Plan to normalize calcium intake, continue Vit D levels. Labs repeated in 3 months showed normal Vitamin D  High PTH and renal failure  noted.   Plan for Prolia infusion.   DXA 1/2019.     Morbid obesity: sees Dr Salgado.      Ana Pollack MD  [id number]  Endocrinology Service      Patient Instructions   We will contact you with the prolia infusion plan.     Continue Vit D and Ca supplements.         Orders Placed This Encounter   Procedures     Dexa Wrist/Heel     Dexa hip/pelvis/spine

## 2018-05-28 PROBLEM — M81.0 AGE-RELATED OSTEOPOROSIS WITHOUT CURRENT PATHOLOGICAL FRACTURE: Status: ACTIVE | Noted: 2018-05-28

## 2018-05-31 ENCOUNTER — TELEPHONE (OUTPATIENT)
Dept: PHARMACY | Facility: CLINIC | Age: 63
End: 2018-05-31

## 2018-05-31 NOTE — TELEPHONE ENCOUNTER
Follow-up with anemia management service:    LM for Eleni reminding her that she is due for Hgb lab and possibly an aranesp dose    Anemia Latest Ref Rng & Units 2018 3/1/2018 3/29/2018 2018 2018 5/3/2018 2018   HGB Goal - - - - - - - -   VALARIE Dose - 300 mcg 300 mcg - 300 mcg - - -   Hemoglobin 11.7 - 15.7 g/dL - 9.1(L) 9.7(L) - 10.3(L) 10.1(L) 10.0(L)   TSAT 15 - 46 % - 19 - - 16 - 40   Ferritin 8 - 252 ng/mL - 951(H) - - 879(H) - 1198(H)   PRBCs - - - - - - - -       Orders needed to be renewed (for next follow-up date) in EPIC: None   Med order expires: 19   Lab orders : 19    Follow-up call date: 18    Bharti Akers CPhT  Anemia Clinic  [phone]  Reviewed 2018 [facility]  Anemia Management Service  Shea Sotomayor,PharmD and Preethi Akers CPhT  Phone: [phone]  Fax: [phone]

## 2018-06-05 ENCOUNTER — TELEPHONE (OUTPATIENT)
Dept: PHARMACY | Facility: CLINIC | Age: 63
End: 2018-06-05

## 2018-06-05 DIAGNOSIS — D64.9 ANEMIA: ICD-10-CM

## 2018-06-05 DIAGNOSIS — N18.4 CKD (CHRONIC KIDNEY DISEASE) STAGE 4, GFR 15-29 ML/MIN (H): ICD-10-CM

## 2018-06-05 DIAGNOSIS — N39.0 URINARY TRACT INFECTION: ICD-10-CM

## 2018-06-05 LAB
HCT VFR BLD AUTO: 31.6 % (ref 35–47)
HGB BLD-MCNC: 9.6 G/DL (ref 11.7–15.7)

## 2018-06-05 PROCEDURE — 87086 URINE CULTURE/COLONY COUNT: CPT | Performed by: UROLOGY

## 2018-06-05 NOTE — TELEPHONE ENCOUNTER
Anemia Management Note  SUBJECTIVE/OBJECTIVE:  Referred by Dr Tyshawn Sexton February 10, 2017  Primary Diagnosis: Anemia in Chronic Kidney Disease (N18.3 D63.1)   Secondary Diagnosis: Chronic Kidney Disease, Stage III (N18.3)   Hgb goal range: 9-10  Epo/Darbo: Aranesp 300 mcg every 14 days - At home   RX will  on 2019  Iron regimen:  None  Lab orders  2019 in EPIC   Contact:                      Ok to leave message on home phone regarding (no selection made) per consent to communicate dated 8/15/13       Anemia Latest Ref Rng & Units 3/1/2018 3/29/2018 2018 2018 5/3/2018 2018 2018   HGB Goal - - - - - - - -   VALARIE Dose - 300 mcg - 300 mcg - - - -   Hemoglobin 11.7 - 15.7 g/dL 9.1(L) 9.7(L) - 10.3(L) 10.1(L) 10.0(L) 9.6(L)   TSAT 15 - 46 %  - -  -   Ferritin 8 - 252 ng/mL 951(H) - - 879(H) - 1198(H) -   PRBCs - - - - - - - -     BP Readings from Last 3 Encounters:   18 139/72   18 146/76   18 129/84     Wt Readings from Last 2 Encounters:   18 205 lb 14.4 oz (93.4 kg)   18 208 lb (94.3 kg)         ASSESSMENT:  Hgb:At goal - recommend dose  TSat: at goal >30% Ferritin: Elevated (>1000ng/mL)    PLAN:  LM for Usanee to dose with aranesp and RTC for hgb, ferritin and iron labs then aranesp if needed in 2 week(s)    Orders needed to be renewed (for next follow-up date) in [facility]: None    Iron labs due:  18    Plan discussed with:  LM for Usanee  Plan provided by:  Bhatri Akers CPhT  Anemia Clinic  [phone]    NEXT FOLLOW-UP DATE:  18  reviewed 2018 [facility]  Anemia Management Service  Shea Sotomayor,PharmD and Preethi Akers CPhT  Phone: [phone]  Fax: [phone]

## 2018-06-06 ENCOUNTER — TELEPHONE (OUTPATIENT)
Dept: NEPHROLOGY | Facility: CLINIC | Age: 63
End: 2018-06-06

## 2018-06-06 ENCOUNTER — PRE VISIT (OUTPATIENT)
Dept: UROLOGY | Facility: CLINIC | Age: 63
End: 2018-06-06

## 2018-06-06 DIAGNOSIS — I10 HTN (HYPERTENSION): ICD-10-CM

## 2018-06-06 LAB
BACTERIA SPEC CULT: NORMAL
BACTERIA SPEC CULT: NORMAL
Lab: NORMAL
SPECIMEN SOURCE: NORMAL

## 2018-06-06 RX ORDER — CLONIDINE HYDROCHLORIDE 0.1 MG/1
0.1 TABLET ORAL 2 TIMES DAILY PRN
Qty: 60 TABLET | Refills: 3 | Status: SHIPPED | OUTPATIENT
Start: 2018-06-06 | End: 2018-08-06

## 2018-06-06 NOTE — TELEPHONE ENCOUNTER
6/6/18:  Eleni returned my call, she will dose with her aranesp and RTC for hgb, ferritin and iron labs then aranesp if needed in 2 week(s)     Anemia Latest Ref Rng & Units 3/29/2018 4/5/2018 4/18/2018 5/3/2018 5/16/2018 6/5/2018 6/6/2018   HGB Goal - - - - - - - -   VALARIE Dose - - 300 mcg - - - - 300 mcg   Hemoglobin 11.7 - 15.7 g/dL 9.7(L) - 10.3(L) 10.1(L) 10.0(L) 9.6(L) -   TSAT 15 - 46 % - - 16 - 40 - -   Ferritin 8 - 252 ng/mL - - 879(H) - 1198(H) - -   PRBCs - - - - - - - -       Follow up date:  6/20/18  Reviewed 06/06/2018 MAJ Bharti Akers, [facility]  Anemia Clinic  [phone]

## 2018-06-13 ENCOUNTER — OFFICE VISIT (OUTPATIENT)
Dept: UROLOGY | Facility: CLINIC | Age: 63
End: 2018-06-13
Payer: MEDICARE

## 2018-06-13 VITALS
HEART RATE: 70 BPM | DIASTOLIC BLOOD PRESSURE: 72 MMHG | HEIGHT: 62 IN | BODY MASS INDEX: 37.73 KG/M2 | SYSTOLIC BLOOD PRESSURE: 138 MMHG | WEIGHT: 205 LBS

## 2018-06-13 DIAGNOSIS — N39.0 RECURRENT UTI: Primary | ICD-10-CM

## 2018-06-13 ASSESSMENT — PAIN SCALES - GENERAL: PAINLEVEL: NO PAIN (0)

## 2018-06-13 NOTE — PROGRESS NOTES
Reason for Visit:  Cystoscopy    Clinical Data: Ms. Eleni Logan is a 62 year old female with a hx of recurrent uti's.  She is on prophylaxis with keflex and returns for cystoscopy.  She has hx of ESRD secondary to IgA nephropathy and is s/p kidney transplant in 1999.    Cystoscopy procedure:  Pt. Was consented and placed in the lithotomy position.  She was cleaned and preparred in the usual fashion.  Lidocain gel was inserted into the urethra and given time to take effect.  A 16 fr flexible cystoscope was then inserted through the urethra and into the bladder.  The urethra was wnl.  The bladder was with 1+ trabeculation.  No tumors, diverticulae, or stones.  Bilateral native u/o's present.  Implanted transplant ureter in the right dome area effluxing clear urine.  The cystoscope was then withdrawn.  The pt. Tolerated the procedure well.    A/P:  62 year old female with recurrent uti's in the settin of kidney transplant from 1999.  Normal cystoscopy and doing well with no infections on the prophylaxis.  -continue prophylaxis  -vaginal estrogen cram  -f/u in 6 months.    Thank you for allowing me to participate in the care of  Ms. Eleni Logan and I will keep you updated on her progress.    Lynnette Garcia MD

## 2018-06-13 NOTE — MR AVS SNAPSHOT
"              After Visit Summary   6/13/2018    Eleni Logan    MRN: [id number]           Patient Information     Date Of Birth          1955        Visit Information        Provider Department      6/13/2018 1:00 PM Lynnette Garcia MD [facility] Urology and [facility]        Today's Diagnoses     Recurrent UTI    -  1      Care Instructions      AFTER YOUR CYSTOSCOPY        You have just completed a cystoscopy, or \"cysto\", which allowed your physician to learn more about your bladder (or to remove a stent placed after surgery). We suggest that you continue to avoid caffeine, fruit juice, and alcohol for the next 24 hours, however, you are encouraged to return to your normal activities.         A few things that are considered normal after your cystoscopy:     * Small amount of bleeding (or spotting) that clears within the next 24 hours     * Slight burning sensation with urination     * Sensation to of needing to avoid more frequently     * The feeling of \"air\" in your urine     * Mild discomfort that is relieved with Tylenol        Please contact our office promptly if you:     * Develop a fever above 101 degrees     * Are unable to urinate     * Develop bright red blood that does not stop     * Severe pain or swelling         Please contact our office with any concerns or questions @Critical access hospital.          Follow-ups after your visit        Follow-up notes from your care team     Return in 6 months (on 12/13/2018).      Your next 10 appointments already scheduled     Aug 06, 2018  3:35 PM CDT   (Arrive by 3:05 PM)   Return Kidney Transplant with  Kidney/Pancreas Recipient   [facility]ph[facility] ([facility])    [address] [place] [address]  [address]  [facility] [id number] [phone]            Nov 08, 2018  9:05 AM CST   (Arrive by 8:50 AM)   Return Visit with Haleigh Rodriguez MD   [facility] ([facility])    " "[address]  [address] [facility] [address]   [phone]            Nov 28, 2018  2:30 PM CST   (Arrive by 2:15 PM)   RETURN ENDOCRINE with Ana Pollack MD   [facility] ([facility])    [address]  [address] [facility] [id number] [phone]            Dec 12, 2018  9:45 AM CST   (Arrive by 9:30 AM)   Return Visit with Lynnette Garcia MD   [facility] and [facility]rolog[facility] ([facility])    [address]  [address] [facility] [id number] [phone]              Who to contact     Please call your clinic at [phone] to:    Ask questions about your health    Make or cancel appointments    Discuss your medicines    Learn about your test results    Speak to your doctor            Additional Information About Your Visit        Care EveryWhere ID     This is your Care EveryWhere ID. This could be used by other organizations to access your [facility] medical records  [id number]        Your Vitals Were     Pulse Height BMI (Body Mass Index)             70 1.575 m (5' 2\") 37.49 kg/m2          Blood Pressure from Last 3 Encounters:   06/13/18 138/72   05/23/18 139/72   05/16/18 146/76    Weight from Last 3 Encounters:   06/13/18 93 kg (205 lb)   05/23/18 93.4 kg (205 lb 14.4 oz)   05/16/18 94.3 kg (208 lb)              We Performed the Following     CYSTOURETHROSCOPY        Primary Care Provider Office Phone # Fax #    Preethi Gibson [phone]       [facility] [address]  [facility] [address]        Equal Access to Services     GERBER DAVENPORT AH: Luís Anderson, nathaniel muniz, austin wylie, clare garrison. So [facility] [phone].    ATENCIÓN: Si habla español, tiene a stoner disposición servicios gratuitos de asistencia lingüística. Llame al [phone].    We comply with applicable " federal civil rights laws and [place] laws. We do not discriminate on the basis of race, color, national origin, age, disability, sex, sexual orientation, or gender identity.            Thank you!     Thank you for choosing [facility] UROLOGY AND [facility] FOR PROSTATE AND UROLOGIC CANCERS  for your care. Our goal is always to provide you with excellent care. Hearing back from our patients is one way we can continue to improve our services. Please take a few minutes to complete the written survey that you may receive in the mail after your visit with us. Thank you!             Your Updated Medication List - Protect others around you: Learn how to safely use, store and throw away your medicines at www.disposemymeds.org.          This list is accurate as of 6/13/18  1:28 PM.  Always use your most recent med list.                   Brand Name Dispense Instructions for use Diagnosis    allopurinol 100 MG tablet    ZYLOPRIM    90 tablet    Take 1 tablet (100 mg) by mouth daily    Chronic gout without tophus, unspecified cause, unspecified site       artificial saliva Aers spray     1 Bottle    Take 1 spray by mouth every 6 hours as needed for dry mouth    Dry mouth       atorvastatin 20 MG tablet    LIPITOR    90 tablet    Take 1 tablet (20 mg) by mouth daily    Hypercholesteremia       calcium citrate-vitamin D 315-250 MG-UNIT Tabs per tablet    CITRACAL    120 tablet    Take 1 tablet by mouth daily    Osteoporosis, unspecified osteoporosis type, unspecified pathological fracture presence       carvedilol 6.25 MG tablet    COREG    360 tablet    Take 2 tablets (12.5 mg) by mouth 2 times daily (with meals)    Hypertension secondary to other renal disorders       cephalexin 250 MG capsule    KEFLEX    90 capsule    Take 1 capsule (250 mg) by mouth At Bedtime    Urinary frequency, Urinary tract infection       cholecalciferol 1000 units capsule    vitamin  -D    180 capsule    Take 2 capsules (2,000 Units) by mouth daily     Vitamin D deficiency       cloNIDine 0.1 MG tablet    CATAPRES    60 tablet    Take 1 tablet (0.1 mg) by mouth 2 times daily as needed For systolic BP >180    HTN (hypertension)       COMPOUNDED NON-CONTROLLED SUBSTANCE - PHARMACY TO MIX COMPOUNDED MEDICATION    CMPD RX    30 g    Estriol 1 mg/g Apply small amount to finger and apply to inside vagina daily for 2 weeks then twice weekly Route: vaginally    Atrophic vaginitis       darbepoetin bucky 300 MCG/0.6ML injection    ARANESP (ALBUMIN FREE)    1.2 mL    Inject 0.6 mLs (300 mcg) Subcutaneous every 14 days As needed for hgb<10g/dL    CKD (chronic kidney disease) stage 4, GFR 15-29 ml/min (H), Anemia in stage 4 chronic kidney disease (H)       folic acid 1 MG tablet    FOLVITE    90 tablet    Take 1 tablet (1 mg) by mouth daily    Preventive measure       hydrocortisone 1 % cream    CORTAID    60 g    Apply topically 2 times daily    Rash, skin       levofloxacin 500 MG tablet    LEVAQUIN    7 tablet    Take 1 tablet (500 mg) by mouth daily    Urinary tract infection       mycophenolate 250 MG capsule     120 capsule    TAKE TWO CAPSULES (500MG) BY MOUTH TWICE A DAY (DAW1)    Kidney transplanted       NIFEdipine ER osmotic 60 MG Tb24    PROCARDIA XL    180 tablet    Take 1 tablet (60 mg) by mouth 2 times daily    Hypertension secondary to other renal disorders       pantoprazole 40 MG EC tablet    PROTONIX    30 tablet    Take 1 tablet (40 mg) by mouth daily    Gastroesophageal reflux disease with esophagitis       predniSONE 5 MG tablet    DELTASONE    30 tablet    Take 1 tablet (5 mg) by mouth daily    Kidney transplanted       sodium bicarbonate 650 MG tablet     180 tablet    Take 1 tablet (650 mg) by mouth 2 times daily    Acidosis       vitamin D 98016 UNIT capsule    ERGOCALCIFEROL    12 capsule    Take 1 capsule (50,000 Units) by mouth once a week    Vitamin D deficiency

## 2018-06-13 NOTE — NURSING NOTE
"Chief Complaint   Patient presents with     Cystoscopy     recurrent UTIs       Blood pressure 138/72, pulse 70, height 1.575 m (5' 2\"), weight 93 kg (205 lb), not currently breastfeeding. Body mass index is 37.49 kg/(m^2).    Patient Active Problem List   Diagnosis     Chronic rhinitis     Essential hypertension, benign     S/P kidney transplant     Rash     Asthma exacerbation     UTI (urinary tract infection)     Anemia     Chest pain     Urinary tract infection     Localized pustular psoriasis     CKD (chronic kidney disease) stage 4, GFR 15-29 ml/min (H)     Immunosuppression (H)     Fistula     End-stage renal disease (ESRD) (H)     Aftercare following organ transplant     Age-related osteoporosis without current pathological fracture       Allergies   Allergen Reactions     Ciprofloxacin Palpitations     Levaquin [Levofloxacin] Shortness Of Breath       Current Outpatient Prescriptions   Medication Sig Dispense Refill     allopurinol (ZYLOPRIM) 100 MG tablet Take 1 tablet (100 mg) by mouth daily 90 tablet 3     artificial saliva (BIOTENE MT) AERS spray Take 1 spray by mouth every 6 hours as needed for dry mouth 1 Bottle 3     atorvastatin (LIPITOR) 20 MG tablet Take 1 tablet (20 mg) by mouth daily 90 tablet 2     calcium citrate-vitamin D (CITRACAL) 315-250 MG-UNIT TABS per tablet Take 1 tablet by mouth daily 120 tablet 3     carvedilol (COREG) 6.25 MG tablet Take 2 tablets (12.5 mg) by mouth 2 times daily (with meals) 360 tablet 1     CELLCEPT (BRAND) 250 MG CAPSULE TAKE TWO CAPSULES (500MG) BY MOUTH TWICE A DAY (DAW1) 120 capsule 6     cephalexin (KEFLEX) 250 MG capsule Take 1 capsule (250 mg) by mouth At Bedtime 90 capsule 1     cholecalciferol (VITAMIN  -D) 1000 UNITS capsule Take 2 capsules (2,000 Units) by mouth daily 180 capsule 3     cloNIDine (CATAPRES) 0.1 MG tablet Take 1 tablet (0.1 mg) by mouth 2 times daily as needed For systolic BP >180 60 tablet 3     COMPOUNDED NON-CONTROLLED SUBSTANCE (CMPD " RX) - PHARMACY TO MIX COMPOUNDED MEDICATION Estriol 1 mg/g  Apply small amount to finger and apply to inside vagina daily for 2 weeks then twice weekly  Route: vaginally 30 g 11     darbepoetin bucky (ARANESP, ALBUMIN FREE,) 300 MCG/0.6ML injection Inject 0.6 mLs (300 mcg) Subcutaneous every 14 days As needed for hgb<10g/dL 1.2 mL 11     folic acid (FOLVITE) 1 MG tablet Take 1 tablet (1 mg) by mouth daily 90 tablet 3     hydrocortisone (CORTAID) 1 % cream Apply topically 2 times daily 60 g 1     levofloxacin (LEVAQUIN) 500 MG tablet Take 1 tablet (500 mg) by mouth daily 7 tablet 0     NIFEdipine ER osmotic (PROCARDIA XL) 60 MG TB24 Take 1 tablet (60 mg) by mouth 2 times daily 180 tablet 3     pantoprazole (PROTONIX) 40 MG EC tablet Take 1 tablet (40 mg) by mouth daily 30 tablet 11     predniSONE (DELTASONE) 5 MG tablet Take 1 tablet (5 mg) by mouth daily 30 tablet 11     sodium bicarbonate 650 MG tablet Take 1 tablet (650 mg) by mouth 2 times daily 180 tablet 3     vitamin D (ERGOCALCIFEROL) 50814 UNIT capsule Take 1 capsule (50,000 Units) by mouth once a week 12 capsule 0       Social History   Substance Use Topics     Smoking status: Never Smoker     Smokeless tobacco: Never Used      Comment: Has been around second hand smoke     Alcohol use Yes      Comment: Once in a great while     Invasive Procedure Safety Checklist:    Procedure: cystoscopy    Action: Complete sections and checkboxes as appropriate.    Pre-procedure:  1. Patient ID Verified with 2 identifiers (Ruby and  or MRN) : YES    2. Procedure and site verified with patient/designee (when able) : YES    3. Accurate consent documentation in medical record : YES    4. H&P (or appropriate assessment) documented in medical record : NO  H&P must be up to 30 days prior to procedure an updated within 24 hours of                 Procedure as applicable.     5. Relevant diagnostic and radiology test results appropriately labeled and displayed as applicable :  NO    6. Blood products, implants, devices, and/or special equipment available for the procedure as applicable : NO    7. Procedure site(s) marked with provider initials [Exclusions: none] : NO    8. Marking not required. Reason : Yes  Procedure does not require site marking    Time Out:     Time-Out performed immediately prior to starting procedure, including verbal and active participation of all team members addressing: YES    1. Correct patient identity.  2. Confirmed that the correct side and site are marked.  3. An accurate procedure to be done.  4. Agreement on the procedure to be done.  5. Correct patient position.  6. Relevant images and results are properly labeled and appropriately displayed.  7. The need to administer antibiotics or fluids for irrigation purposes during the procedure as applicable.  8. Safety precautions based on patient history or medication use.    During Procedure: Verification of correct person, site, and procedure occurs any time the responsibility for care of the patient is transferred to another member of the care team.    Leisa Greer LPN  6/13/2018  12:47 PM    The following medication was given:     MEDICATION:  Lidocaine without epinephrine  ROUTE: urethral  SITE: urethral  DOSE: 10 mL  LOT #: [id number]  : [employer]  EXPIRATION DATE: 2/2020  NDC#: [address]-4   Was there drug waste? No      Leisa Greer LPN  June 13, 2018

## 2018-06-13 NOTE — LETTER
6/13/2018     RE: Eleni Logan  [address]  [address]  [place] [address]     Dear Colleague,    Thank you for referring your patient, Eleni Logan, to the [facility] UROLOGY AND INST FOR PROSTATE AND UROLOGIC CANCERS at [facility]. Please see a copy of my visit note below.    Reason for Visit:  Cystoscopy    Clinical Data: Ms. Eleni Logan is a 62 year old female with a hx of recurrent uti's.  She is on prophylaxis with keflex and returns for cystoscopy.  She has hx of ESRD secondary to IgA nephropathy and is s/p kidney transplant in 1999.    Cystoscopy procedure:  Pt. Was consented and placed in the lithotomy position.  She was cleaned and preparred in the usual fashion.  Lidocain gel was inserted into the urethra and given time to take effect.  A 16 fr flexible cystoscope was then inserted through the urethra and into the bladder.  The urethra was wnl.  The bladder was with 1+ trabeculation.  No tumors, diverticulae, or stones.  Bilateral native u/o's present.  Implanted transplant ureter in the right dome area effluxing clear urine.  The cystoscope was then withdrawn.  The pt. Tolerated the procedure well.    A/P:  62 year old female with recurrent uti's in the settin of kidney transplant from 1999.  Normal cystoscopy and doing well with no infections on the prophylaxis.  -continue prophylaxis  -vaginal estrogen cram  -f/u in 6 months.    Thank you for allowing me to participate in the care of  Ms. Eleni Logan and I will keep you updated on her progress.    Lynnette Garcia MD

## 2018-06-20 ENCOUNTER — TELEPHONE (OUTPATIENT)
Dept: PHARMACY | Facility: CLINIC | Age: 63
End: 2018-06-20

## 2018-06-20 NOTE — TELEPHONE ENCOUNTER
Follow-up with anemia management service:    LM for Eleni reminding her that she is due for Hgb, ferritin and iron labs and possibly an aranesp dose    Anemia Latest Ref Rng & Units 3/29/2018 2018 2018 5/3/2018 2018 2018 2018   HGB Goal - - - - - - - -   VALARIE Dose - - 300 mcg - - - - 300 mcg   Hemoglobin 11.7 - 15.7 g/dL 9.7(L) - 10.3(L) 10.1(L) 10.0(L) 9.6(L) -   TSAT 15 - 46 % - - 16 - 40 - -   Ferritin 8 - 252 ng/mL - - 879(H) - 1198(H) - -   PRBCs - - - - - - - -       Orders needed to be renewed (for next follow-up date) in EPIC: None   Med order expires: 19   Lab orders : 19    Follow-up call date: 18    Bharti Akers [facility]  Anemia Clinic  [phone]  Reviewed 2018 [facility]  Anemia Management Service  Shea Sotomayor,PharmD and Preethi Akers CPhT  Phone: [phone]  Fax: [phone]

## 2018-06-25 ENCOUNTER — TELEPHONE (OUTPATIENT)
Dept: PHARMACY | Facility: CLINIC | Age: 63
End: 2018-06-25

## 2018-06-25 NOTE — TELEPHONE ENCOUNTER
Follow-up with anemia management service:    I spoke to Eleni reminding her that she is due for Hgb, ferritin and iron labs and possibly an aranesp dose.  She will go in for labs on     Anemia Latest Ref Rng & Units 3/29/2018 2018 2018 5/3/2018 2018 2018 2018   HGB Goal - - - - - - - -   VALARIE Dose - - 300 mcg - - - - 300 mcg   Hemoglobin 11.7 - 15.7 g/dL 9.7(L) - 10.3(L) 10.1(L) 10.0(L) 9.6(L) -   TSAT 15 - 46 % - - 16 - 40 - -   Ferritin 8 - 252 ng/mL - - 879(H) - 1198(H) - -   PRBCs - - - - - - - -       Orders needed to be renewed (for next follow-up date) in EPIC: None                          Med order expires: 19                          Lab orders : 19  Follow-up call date: 18    Bharti Akers [facility]  Anemia Clinic  [phone]    Anemia Management Service  Shea Sotomayor,PharmD and Preethi Akers CPhT  Phone: [phone]  Fax: [phone]

## 2018-06-26 ENCOUNTER — TELEPHONE (OUTPATIENT)
Dept: PHARMACY | Facility: CLINIC | Age: 63
End: 2018-06-26

## 2018-06-26 DIAGNOSIS — N18.4 CKD (CHRONIC KIDNEY DISEASE) STAGE 4, GFR 15-29 ML/MIN (H): ICD-10-CM

## 2018-06-26 DIAGNOSIS — D64.9 ANEMIA: ICD-10-CM

## 2018-06-26 LAB
FERRITIN SERPL-MCNC: 1232 NG/ML (ref 8–252)
HCT VFR BLD AUTO: 27.1 % (ref 35–47)
HGB BLD-MCNC: 8 G/DL (ref 11.7–15.7)
IRON SATN MFR SERPL: 29 % (ref 15–46)
IRON SERPL-MCNC: 57 UG/DL (ref 35–180)
TIBC SERPL-MCNC: 197 UG/DL (ref 240–430)

## 2018-06-26 NOTE — TELEPHONE ENCOUNTER
Anemia Management Note  SUBJECTIVE/OBJECTIVE:  Referred by Dr Tyshawn Sexton February 10, 2017  Primary Diagnosis: Anemia in Chronic Kidney Disease (N18.3 D63.1)   Secondary Diagnosis: Chronic Kidney Disease, Stage III (N18.3)   Hgb goal range: 9-10  Epo/Darbo: Aranesp 300 mcg every 14 days - At home   RX will  on 2019  Iron regimen:  None  Lab orders  2019 in EPIC   Contact:                      Ok to leave message on home phone regarding (no selection made) per consent to communicate dated 8/15/13    Anemia Latest Ref Rng & Units 2018 2018 5/3/2018 2018 2018 2018 2018   HGB Goal - - - - - - - -   VALARIE Dose - 300 mcg - - - - 300 mcg -   Hemoglobin 11.7 - 15.7 g/dL - 10.3(L) 10.1(L) 10.0(L) 9.6(L) - 8.0(L)   TSAT 15 - 46 % - 16 - 40 - - 29   Ferritin 8 - 252 ng/mL - 879(H) - 1198(H) - - 1232(H)   PRBCs - - - - - - - -     BP Readings from Last 3 Encounters:   18 138/72   18 139/72   18 146/76     Wt Readings from Last 2 Encounters:   18 205 lb (93 kg)   18 205 lb 14.4 oz (93.4 kg)         ASSESSMENT:  Hgb: Not at goal  - recommend dose and continue current regimen - 3 weeks between doses  TSat: not at goal (>30%) but ferritin >1000ng/mL.  PO iron not indicated at this time per anemia protocol.   Ferritin: Elevated (>1000ng/mL)    PLAN:  Eleni will dose with aranesp  and RTC for hgb then aranesp if needed in 2 week(s)    Orders needed to be renewed (for next follow-up date) in [facility]: None    Iron labs due:  18    Plan discussed with:  Eleni    Plan provided by:  Bharti Akers [facility]  Anemia Clinic  [phone]    NEXT FOLLOW-UP DATE:  7/10/18    Anemia Management Service  Shea Sotomayor PharmD and Preethi Akers CPhT  Phone: [phone]  Fax: [phone]

## 2018-07-10 ENCOUNTER — TELEPHONE (OUTPATIENT)
Dept: NEPHROLOGY | Facility: CLINIC | Age: 63
End: 2018-07-10

## 2018-07-10 DIAGNOSIS — Z48.298 AFTERCARE FOLLOWING ORGAN TRANSPLANT: ICD-10-CM

## 2018-07-10 DIAGNOSIS — N18.4 CKD (CHRONIC KIDNEY DISEASE) STAGE 4, GFR 15-29 ML/MIN (H): ICD-10-CM

## 2018-07-10 DIAGNOSIS — D64.9 ANEMIA: ICD-10-CM

## 2018-07-10 LAB
ALBUMIN SERPL-MCNC: 3.1 G/DL (ref 3.4–5)
ALBUMIN UR-MCNC: 100 MG/DL
ANION GAP SERPL CALCULATED.3IONS-SCNC: 12 MMOL/L (ref 3–14)
APPEARANCE UR: ABNORMAL
BACTERIA #/AREA URNS HPF: ABNORMAL /HPF
BILIRUB UR QL STRIP: NEGATIVE
BUN SERPL-MCNC: 57 MG/DL (ref 7–30)
CALCIUM SERPL-MCNC: 7.9 MG/DL (ref 8.5–10.1)
CHLORIDE SERPL-SCNC: 108 MMOL/L (ref 94–109)
CO2 SERPL-SCNC: 20 MMOL/L (ref 20–32)
COLOR UR AUTO: YELLOW
CREAT SERPL-MCNC: 2.7 MG/DL (ref 0.52–1.04)
GFR SERPL CREATININE-BSD FRML MDRD: 18 ML/MIN/1.7M2
GLUCOSE SERPL-MCNC: 105 MG/DL (ref 70–99)
GLUCOSE UR STRIP-MCNC: NEGATIVE MG/DL
HCT VFR BLD AUTO: 26.1 % (ref 35–47)
HGB BLD-MCNC: 7.6 G/DL (ref 11.7–15.7)
HGB UR QL STRIP: ABNORMAL
KETONES UR STRIP-MCNC: NEGATIVE MG/DL
LEUKOCYTE ESTERASE UR QL STRIP: ABNORMAL
NITRATE UR QL: NEGATIVE
PH UR STRIP: 5 PH (ref 5–7)
PHOSPHATE SERPL-MCNC: 4.3 MG/DL (ref 2.5–4.5)
POTASSIUM SERPL-SCNC: 4.4 MMOL/L (ref 3.4–5.3)
RBC #/AREA URNS AUTO: 1 /HPF (ref 0–2)
SODIUM SERPL-SCNC: 140 MMOL/L (ref 133–144)
SOURCE: ABNORMAL
SP GR UR STRIP: 1.01 (ref 1–1.03)
SQUAMOUS #/AREA URNS AUTO: <1 /HPF (ref 0–1)
TRANS CELLS #/AREA URNS HPF: <1 /HPF
UROBILINOGEN UR STRIP-MCNC: 0 MG/DL (ref 0–2)
WBC #/AREA URNS AUTO: >182 /HPF (ref 0–5)
WBC CLUMPS #/AREA URNS HPF: PRESENT /HPF

## 2018-07-10 PROCEDURE — 87186 SC STD MICRODIL/AGAR DIL: CPT | Performed by: INTERNAL MEDICINE

## 2018-07-10 PROCEDURE — 87086 URINE CULTURE/COLONY COUNT: CPT | Performed by: INTERNAL MEDICINE

## 2018-07-10 PROCEDURE — 87088 URINE BACTERIA CULTURE: CPT | Performed by: INTERNAL MEDICINE

## 2018-07-10 NOTE — TELEPHONE ENCOUNTER
Patient called to request UA/UC orders. States she's been experiencing frequency and a burning sensation for the last few days. Informed her she has standing orders in place. She will go to the lab today.    Brittney Quinones RN

## 2018-07-11 ENCOUNTER — TELEPHONE (OUTPATIENT)
Dept: PHARMACY | Facility: CLINIC | Age: 63
End: 2018-07-11

## 2018-07-11 NOTE — TELEPHONE ENCOUNTER
Anemia Management Note  SUBJECTIVE/OBJECTIVE:  Referred by Dr Tyshawn Sexton February 10, 2017  Primary Diagnosis: Anemia in Chronic Kidney Disease (N18.3 D63.1)   Secondary Diagnosis: Chronic Kidney Disease, Stage III (N18.3)   Hgb goal range: 9-10  Epo/Darbo: Aranesp 300 mcg every 14 days - At home   RX will  on 2019  Iron regimen:  None    Anemia Latest Ref Rng & Units 5/3/2018 2018 2018 2018 2018 7/10/2018 2018   HGB Goal - - - - - - - -   VALARIE Dose - - - - 300 mcg 300 mcg - 300 mcg   Hemoglobin 11.7 - 15.7 g/dL 10.1(L) 10.0(L) 9.6(L) - 8.0(L) 7.6(L) -   TSAT 15 - 46 % - 40 - - 29 - -   Ferritin 8 - 252 ng/mL - 1198(H) - - 1232(H) - -   PRBCs - - - - - - - -     BP Readings from Last 3 Encounters:   18 138/72   18 139/72   18 146/76     Wt Readings from Last 2 Encounters:   18 205 lb (93 kg)   18 205 lb 14.4 oz (93.4 kg)       Eleni admits to feeling really tired and weak    ASSESSMENT:  Hgb: Not at goal  - recommend dose and continue current regimen  TSat: not at goal (>30%) but ferritin >1000ng/mL.  PO iron not indicated at this time per anemia protocol.   Ferritin: Elevated (>1000ng/mL)    PLAN:  Eleni will dose with aranesp today and RTC for hgb, ferritin and iron labs then aranesp if needed in 2 week(s)  Referred to Shea to address declining Hgb - Acute UTI DX 2018, watch if Hgb rebounds once infection cleared. GLORIA    Orders needed to be renewed (for next follow-up date) in EPIC: None    Iron labs due:  18    Plan discussed with:  Eleni    Plan provided by:  Bharti Akers, [facility]  Anemia Clinic  [phone]    NEXT FOLLOW-UP DATE:  18  Reviewed 2018 [facility]  Anemia Management Service  Shea Sotomayor PharmD and Preethi Akers CPhT  Phone: [phone]  Fax: [phone]

## 2018-07-12 ENCOUNTER — TELEPHONE (OUTPATIENT)
Dept: TRANSPLANT | Facility: CLINIC | Age: 63
End: 2018-07-12

## 2018-07-12 DIAGNOSIS — Z94.0 KIDNEY TRANSPLANTED: Primary | ICD-10-CM

## 2018-07-12 LAB
BACTERIA SPEC CULT: ABNORMAL
Lab: ABNORMAL
SPECIMEN SOURCE: ABNORMAL

## 2018-07-12 RX ORDER — AMOXICILLIN AND CLAVULANATE POTASSIUM 500; 125 MG/1; MG/1
1 TABLET, FILM COATED ORAL 2 TIMES DAILY
Qty: 28 TABLET | Refills: 0 | Status: SHIPPED | OUTPATIENT
Start: 2018-07-12 | End: 2018-08-07

## 2018-07-12 RX ORDER — AMOXICILLIN AND CLAVULANATE POTASSIUM 500; 125 MG/1; MG/1
1 TABLET, FILM COATED ORAL 2 TIMES DAILY
Qty: 20 TABLET | Refills: 0 | Status: SHIPPED | OUTPATIENT
Start: 2018-07-12 | End: 2018-07-12

## 2018-07-12 NOTE — TELEPHONE ENCOUNTER
ISSUE:  Positive UTI.  Reviewed result with Dr Newman.  Per Dr Newman, pt to start Augmentin 500 mg BID x 10 days and then 2 days following, repeat BMP and UA/UC.     PLAN:                         Previous Messages       ----- Message -----      From: Tyshawn Sexton MD      Sent: 7/12/2018   3:41 PM        To: Brittney Quinones RN   Subject: UTI                                               Lets start her on Augmentin 500-125 mg po bid for 14 days (treatment dose for infection)   Is she taking her prophylactic keflex 250 mg nightly. My clinical inclination she is not because the bug is sensitive to keflex.   Please verify if she is or not. If she is taking it, she can hold it while on treatment. If she is not on it, after she had completed the treatment regimen above, then refill it and ask her to take it nightly give her Keflex 250 mg nightly 90 days with 3 refills. No plans to stop it.          OUTCOME:   Above plan reviewed with pt.  RX placed.  Pt verbalized she is still on Keflex 250 mg nightly.  Pt educated to stop Keflex while on Augmentin, but to restart after treatment complete.  Pt v/u.   RNCC let pt know per Dr Newman, she is to have repeat BMP and UA/UC done a few days following augmentin treatment.  Pt creatinine elevated, likely d/t UTI.  Pt confirms she is getting plenty of water and will cont hydrating well.     Pt questions answered, pt v/u.

## 2018-07-25 ENCOUNTER — TELEPHONE (OUTPATIENT)
Dept: PHARMACY | Facility: CLINIC | Age: 63
End: 2018-07-25

## 2018-07-25 NOTE — TELEPHONE ENCOUNTER
Follow-up with anemia management service:    LM for Eleni reminding her that she is due for hgb, ferritin and iron labs then aranesp if needed    Has appt 2018 with Carlie.     Anemia Latest Ref Rng & Units 5/3/2018 2018 2018 2018 2018 7/10/2018 2018   HGB Goal - - - - - - - -   VALARIE Dose - - - - 300 mcg 300 mcg - 300 mcg   Hemoglobin 11.7 - 15.7 g/dL 10.1(L) 10.0(L) 9.6(L) - 8.0(L) 7.6(L) -   TSAT 15 - 46 % - 40 - - 29 - -   Ferritin 8 - 252 ng/mL - 1198(H) - - 1232(H) - -   PRBCs - - - - - - - -       Orders needed to be renewed (for next follow-up date) in EPIC: None                          Med order expires: 19                          Lab orders : 19    Follow-up call date: 18    Bharti Akers CPhT  Anemia Clinic  [phone]  Reviewed 2018 [facility]  Anemia Management Service  Gini AgostoD and Preethi Akers CPhT  Phone: [phone]  Fax: [phone]

## 2018-07-30 ENCOUNTER — TELEPHONE (OUTPATIENT)
Dept: PHARMACY | Facility: CLINIC | Age: 63
End: 2018-07-30

## 2018-07-30 DIAGNOSIS — D64.9 ANEMIA: ICD-10-CM

## 2018-07-30 DIAGNOSIS — N18.4 CKD (CHRONIC KIDNEY DISEASE) STAGE 4, GFR 15-29 ML/MIN (H): ICD-10-CM

## 2018-07-30 DIAGNOSIS — Z94.0 KIDNEY TRANSPLANTED: ICD-10-CM

## 2018-07-30 LAB
ALBUMIN UR-MCNC: >499 MG/DL
APPEARANCE UR: ABNORMAL
BILIRUB UR QL STRIP: NEGATIVE
COLOR UR AUTO: YELLOW
CREAT SERPL-MCNC: 1.96 MG/DL (ref 0.52–1.04)
FERRITIN SERPL-MCNC: 1104 NG/ML (ref 8–252)
GFR SERPL CREATININE-BSD FRML MDRD: 26 ML/MIN/1.7M2
GLUCOSE UR STRIP-MCNC: NEGATIVE MG/DL
HCT VFR BLD AUTO: 29.7 % (ref 35–47)
HGB BLD-MCNC: 8.5 G/DL (ref 11.7–15.7)
HGB UR QL STRIP: ABNORMAL
IRON SATN MFR SERPL: 37 % (ref 15–46)
IRON SERPL-MCNC: 74 UG/DL (ref 35–180)
KETONES UR STRIP-MCNC: NEGATIVE MG/DL
LEUKOCYTE ESTERASE UR QL STRIP: ABNORMAL
NITRATE UR QL: NEGATIVE
PH UR STRIP: 5 PH (ref 5–7)
RBC #/AREA URNS AUTO: 2 /HPF (ref 0–2)
SOURCE: ABNORMAL
SP GR UR STRIP: 1.01 (ref 1–1.03)
SQUAMOUS #/AREA URNS AUTO: 1 /HPF (ref 0–1)
TIBC SERPL-MCNC: 200 UG/DL (ref 240–430)
UROBILINOGEN UR STRIP-MCNC: 0 MG/DL (ref 0–2)
WBC #/AREA URNS AUTO: 50 /HPF (ref 0–5)
WBC CLUMPS #/AREA URNS HPF: PRESENT /HPF

## 2018-07-30 PROCEDURE — 87088 URINE BACTERIA CULTURE: CPT | Performed by: INTERNAL MEDICINE

## 2018-07-30 PROCEDURE — 87186 SC STD MICRODIL/AGAR DIL: CPT | Performed by: INTERNAL MEDICINE

## 2018-07-30 PROCEDURE — 87086 URINE CULTURE/COLONY COUNT: CPT | Performed by: INTERNAL MEDICINE

## 2018-07-30 NOTE — TELEPHONE ENCOUNTER
Anemia Management Note  SUBJECTIVE/OBJECTIVE:  Referred by Dr Tyshawn Sexton February 10, 2017  Primary Diagnosis: Anemia in Chronic Kidney Disease (N18.3 D63.1)   Secondary Diagnosis: Chronic Kidney Disease, Stage III (N18.3)   Hgb goal range: 9-10  Epo/Darbo: Aranesp 300 mcg every 14 days - At home   RX will  on 2019  Iron regimen:  None    Anemia Latest Ref Rng & Units 2018 2018 2018 2018 7/10/2018 2018 2018   HGB Goal - - - - - - - -   VALARIE Dose - - - 300 mcg 300 mcg - 300 mcg 300 mcg   Hemoglobin 11.7 - 15.7 g/dL 10.0(L) 9.6(L) - 8.0(L) 7.6(L) - 8.5(L)   TSAT 15 - 46 % 40 - - 29 - - 37   Ferritin 8 - 252 ng/mL 1198(H) - - 1232(H) - - 1104(H)   PRBCs - - - - - - - -     BP Readings from Last 3 Encounters:   18 138/72   18 139/72   18 146/76     Wt Readings from Last 2 Encounters:   18 205 lb (93 kg)   18 205 lb 14.4 oz (93.4 kg)           ASSESSMENT:  Hgb: Not at goal but improving - recommend dose and continue current regimen  TSat: at goal >30% Ferritin: Elevated (>1000ng/mL)    PLAN:  Eleni will dose with aranesp today and RTC for hgb then aranesp if needed in 2 week(s)    Orders needed to be renewed (for next follow-up date) in EPIC: None    Iron labs due:      Plan discussed with:  Eleni  Plan provided by:  Bharti Aekrs CPhT  Anemia Clinic  [phone]    NEXT FOLLOW-UP DATE:  18  Reviewed 2018 [facility]  Anemia Management Service  Shea Sotomayor,PharmD and Preethi Akers CPhT  Phone: [phone]  Fax: [phone]

## 2018-07-31 ENCOUNTER — TELEPHONE (OUTPATIENT)
Dept: TRANSPLANT | Facility: CLINIC | Age: 63
End: 2018-07-31

## 2018-07-31 DIAGNOSIS — Z94.0 KIDNEY TRANSPLANTED: Primary | ICD-10-CM

## 2018-07-31 DIAGNOSIS — N39.0 URINARY TRACT INFECTION: ICD-10-CM

## 2018-07-31 NOTE — TELEPHONE ENCOUNTER
ISSUE:  UA/UC remains positive after treatment.    Creatinine back to baseline    OUTCOME:   Spoke with pt regarding how she is feeling after treatment of her UTI 7/10/18.  Pt verbalizes she no longer has s/s of UTI.  Per Dr Boss, if pt asymptomatic, no treatment needed.  Pt encouraged to let us know if she starts experiencing s/s of a UTI.   Pt verbalized she is back on Keflex 250 mg Q night for prophylactic.   ID referral placed.      Pt questions answered, pt v/u.     Dmitri Boss MD Etcheverry, Katherine, RN                   agree            Previous Messages       ----- Message -----      From: Aspen Leong, RN      Sent: 7/31/2018   3:24 PM        To: Dmitri Boss MD     Plan per rounds:   If pt asymptomatic with UTI, no treatment on UA/UC drawn 7/30/18.     If pt symptomatic will treat once sensitivities back.     Pt to be referred to ID in regards to recurrent UTI.     Thanks!   Kia

## 2018-08-02 LAB
BACTERIA SPEC CULT: ABNORMAL
BACTERIA SPEC CULT: ABNORMAL
SPECIMEN SOURCE: ABNORMAL

## 2018-08-06 DIAGNOSIS — E87.20 ACIDOSIS: ICD-10-CM

## 2018-08-06 DIAGNOSIS — E78.00 HYPERCHOLESTEREMIA: ICD-10-CM

## 2018-08-06 DIAGNOSIS — I10 HTN (HYPERTENSION): ICD-10-CM

## 2018-08-06 NOTE — TELEPHONE ENCOUNTER
Clonidine 0.1MG  Qty 60  Last Fill 07/05/2018    Sodium Bicarbonate 650MG  Qty 180  Last FIll 07/05/2018    Thank you  Hedy Babb [facility]

## 2018-08-06 NOTE — TELEPHONE ENCOUNTER
Atorvastatin 20MG  Qty 30  Last Fill 07/05/2018    Pt would like 90ds    Thank you  Hedy Babb [facility]

## 2018-08-07 ENCOUNTER — OFFICE VISIT (OUTPATIENT)
Dept: NEPHROLOGY | Facility: CLINIC | Age: 63
End: 2018-08-07
Attending: INTERNAL MEDICINE
Payer: MEDICARE

## 2018-08-07 VITALS
BODY MASS INDEX: 36.36 KG/M2 | TEMPERATURE: 97.6 F | SYSTOLIC BLOOD PRESSURE: 148 MMHG | HEART RATE: 78 BPM | WEIGHT: 198.8 LBS | OXYGEN SATURATION: 97 % | DIASTOLIC BLOOD PRESSURE: 95 MMHG

## 2018-08-07 DIAGNOSIS — M81.0 OSTEOPOROSIS, UNSPECIFIED OSTEOPOROSIS TYPE, UNSPECIFIED PATHOLOGICAL FRACTURE PRESENCE: ICD-10-CM

## 2018-08-07 DIAGNOSIS — R30.0 DYSURIA: Primary | ICD-10-CM

## 2018-08-07 DIAGNOSIS — I15.0 RENOVASCULAR HYPERTENSION: ICD-10-CM

## 2018-08-07 LAB
ALBUMIN UR-MCNC: 100 MG/DL
APPEARANCE UR: ABNORMAL
BACTERIA #/AREA URNS HPF: ABNORMAL /HPF
BILIRUB UR QL STRIP: NEGATIVE
COLOR UR AUTO: YELLOW
GLUCOSE UR STRIP-MCNC: NEGATIVE MG/DL
HGB UR QL STRIP: NEGATIVE
KETONES UR STRIP-MCNC: NEGATIVE MG/DL
LEUKOCYTE ESTERASE UR QL STRIP: ABNORMAL
MUCOUS THREADS #/AREA URNS LPF: PRESENT /LPF
NITRATE UR QL: NEGATIVE
PH UR STRIP: 5 PH (ref 5–7)
RBC #/AREA URNS AUTO: 2 /HPF (ref 0–2)
SOURCE: ABNORMAL
SP GR UR STRIP: 1.01 (ref 1–1.03)
UROBILINOGEN UR STRIP-MCNC: 0 MG/DL (ref 0–2)
WBC #/AREA URNS AUTO: 106 /HPF (ref 0–5)
WBC CLUMPS #/AREA URNS HPF: PRESENT /HPF

## 2018-08-07 PROCEDURE — 81001 URINALYSIS AUTO W/SCOPE: CPT | Performed by: INTERNAL MEDICINE

## 2018-08-07 PROCEDURE — G0463 HOSPITAL OUTPT CLINIC VISIT: HCPCS | Mod: ZF

## 2018-08-07 PROCEDURE — 87086 URINE CULTURE/COLONY COUNT: CPT | Performed by: INTERNAL MEDICINE

## 2018-08-07 RX ORDER — HYDRALAZINE HYDROCHLORIDE 25 MG/1
25 TABLET, FILM COATED ORAL 2 TIMES DAILY
Qty: 60 TABLET | Refills: 3 | Status: SHIPPED | OUTPATIENT
Start: 2018-08-07 | End: 2018-11-28

## 2018-08-07 RX ORDER — ATORVASTATIN CALCIUM 20 MG/1
20 TABLET, FILM COATED ORAL DAILY
Qty: 90 TABLET | Refills: 2 | Status: SHIPPED | OUTPATIENT
Start: 2018-08-07 | End: 2019-05-03

## 2018-08-07 ASSESSMENT — PAIN SCALES - GENERAL: PAINLEVEL: NO PAIN (0)

## 2018-08-07 NOTE — LETTER
8/7/2018      RE: Eleni Logan  [address]  [address]  [place] [address]       Assessment and Plan:  1.  Allograft function.  Her serum creatinine is stable to slightly better ~ 2.0 mg/dL. Now with mature access.   2. Immunosuppression management: She is currently on an immunosuppressive regimen that consists of prednisone 5 mg daily in addition to CellCept 500 mg b.i.d.  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 Her kidney function has been anywhere from 1.8-2.2 mg/dL.  3.  Recurrent urinary tract infections. Recently completed treatment for another UTI (7/20/2018). Will repeat UC and UA today. On prophylaxis.   4.  Anemia of chronic kidney disease.  She continues to require Aranesp therapy.  5. Hematodialysis Access: s/p placement  Right arm fistula with good thrill   6. Obesity: she continues to try weight loss (10 Lb in the last 6 months)   7. Renal osteodystrophy will need to recheck her PTH on large dose vitamin D replacement  8. Hypertension near goal, much better controlled she is confused on her regimen. I asked her to bring her medicines for MTM consult. She requested hydralazine refill.     Assessment and plan was discussed with patient and she voiced her understanding and agreement.    Reason for Visit:  Ms. Logan is here for routine follow up and immunosuppression management.    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          Transplant Hx:       Tx: LDKT  Date: 1999       Present Maintenance IS: Mycophenolate mofetil and Prednisone       Baseline Creatinine: 2-3 mg/dL        Biopsy: yes with TMA    Since she was seen last, developed urinary symptoms her UA revealed another bout of a UTI which was treated and currently asymptomatic.although an addition organism  was identified that was resistant to the prescribed augmentin.  No NVD. No abdominal pain. Feels less tired. She is actively losing weight.     Home BP: controlled.      ROS:   A comprehensive review of systems was obtained and negative, except as noted in the HPI or PMH.    Active Medical Problems:  Patient Active Problem List   Diagnosis     Chronic rhinitis     Essential hypertension, benign     S/P kidney transplant     Rash     Asthma exacerbation     UTI (urinary tract infection)     Anemia     Chest pain     Urinary tract infection     Localized pustular psoriasis     CKD (chronic kidney disease) stage 4, GFR 15-29 ml/min (H)     Immunosuppression (H)     Fistula     End-stage renal disease (ESRD) (H)     Aftercare following organ transplant     Age-related osteoporosis without current pathological fracture       Personal Hx:  Social History     Social History     Marital status:      Spouse name: N/A     Number of children: N/A     Years of education: N/A     Occupational History     Not on file.     Social History Main Topics     Smoking status: Never Smoker     Smokeless tobacco: Never Used      Comment: Has been around second hand smoke     Alcohol use Yes      Comment: Once in a great while     Drug use: No     Sexual activity: Not Currently     Other Topics Concern     Not on file     Social History Narrative       Allergies:  Allergies   Allergen Reactions     Ciprofloxacin Palpitations     Levaquin [Levofloxacin] Shortness Of Breath       Medications:    Current Outpatient Prescriptions on File Prior to Visit:  allopurinol (ZYLOPRIM) 100 MG tablet Take 1 tablet (100 mg) by mouth daily   artificial saliva (BIOTENE MT) AERS spray Take 1 spray by mouth every 6 hours as needed for dry mouth   atorvastatin (LIPITOR) 20 MG tablet Take 1 tablet (20 mg) by mouth daily   carvedilol (COREG) 6.25 MG tablet Take 2 tablets (12.5 mg) by mouth 2 times daily (with meals)   CELLCEPT (BRAND) 250 MG CAPSULE TAKE  TWO CAPSULES (500MG) BY MOUTH TWICE A DAY (DAW1)   cephalexin (KEFLEX) 250 MG capsule Take 1 capsule (250 mg) by mouth At Bedtime   cloNIDine (CATAPRES) 0.1 MG tablet Take 1 tablet (0.1 mg) by mouth 2 times daily as needed For systolic BP >180   COMPOUNDED NON-CONTROLLED SUBSTANCE (CMPD RX) - PHARMACY TO MIX COMPOUNDED MEDICATION Estriol 1 mg/gApply small amount to finger and apply to inside vagina daily for 2 weeks then twice weeklyRoute: vaginally   darbepoetin bucky (ARANESP, ALBUMIN FREE,) 300 MCG/0.6ML injection Inject 0.6 mLs (300 mcg) Subcutaneous every 14 days As needed for hgb<10g/dL   folic acid (FOLVITE) 1 MG tablet Take 1 tablet (1 mg) by mouth daily   hydrocortisone (CORTAID) 1 % cream Apply topically 2 times daily   NIFEdipine ER osmotic (PROCARDIA XL) 60 MG TB24 Take 1 tablet (60 mg) by mouth 2 times daily   pantoprazole (PROTONIX) 40 MG EC tablet Take 1 tablet (40 mg) by mouth daily   predniSONE (DELTASONE) 5 MG tablet Take 1 tablet (5 mg) by mouth daily   sodium bicarbonate 650 MG tablet Take 1 tablet (650 mg) by mouth 2 times daily     No current facility-administered medications on file prior to visit.       Vitals:  BP (!) 148/95 (BP Location: Left arm)  Pulse 78  Temp 97.6  F (36.4  C) (Oral)  Wt 90.2 kg (198 lb 12.8 oz)  SpO2 97%  BMI 36.36 kg/m2  Heart rate normalized during the visit  Exam:   GENERAL APPEARANCE: alert and no distress  HENT: mouth without ulcers or lesions  LYMPHATICS: no cervical or supraclavicular nodes  RESP: lungs clear to auscultation - no rales, rhonchi or wheezes  CV: regular rhythm, normal rate, no rub, no murmur  EDEMA: no LE edema bilaterally  ABDOMEN: soft, nondistended, nontender, bowel sounds normal  MS: extremities normal - no gross deformities noted, no evidence of inflammation in joints, no muscle tenderness  SKIN: no rash  ACCESS: right arm fistula with good bruit and good thrill     Results:   Reviewed     Kidney/Pancreas Recipient

## 2018-08-07 NOTE — MR AVS SNAPSHOT
After Visit Summary   8/7/2018    Eleni Logan    MRN: [id number]           Patient Information     Date Of Birth          1955        Visit Information        Provider Department      8/7/2018 1:55 PM Recipient, [facility]phrology        Today's Diagnoses     Dysuria    -  1    Osteoporosis, unspecified osteoporosis type, unspecified pathological fracture presence        Renovascular hypertension           Follow-ups after your visit        Additional Services     MED THERAPY MANAGE REFERRAL       Your provider has referred you to: **[facility] Scheduling (numerous locations) [phone]   [email or web address]    Reason for Referral: transplant follow up with Milton Choi PharmD    The [place] [facility] department will contact you to schedule an appointment.  You may also schedule the appointment by calling [phone].  For [place] patients, please call [phone] to confirm/schedule your appointment on the next business day.    This service is designed to help you get the most from your medications.  A specially trained Pharmacist will work closely with you and your providers to solve any questions, concerns, issues or problems related to your medications.    Please bring all of your prescription and non-prescription medications (such as vitamins, over-the-counter medications, and herbals) or a detailed medication list to your appointment.    If you have a glucose meter or other home monitoring information, please also bring this to your appointment (i.e. blood glucose log, blood pressure log, pain log, etc.).                  Follow-up notes from your care team     Return in about 3 months (around 11/7/2018).      Your next 10 appointments already scheduled     Aug 31, 2018  3:00 PM CDT   (Arrive by 2:45 PM)   Return Visit [facility] MD SHANNAN [facility]ayo  [facility]    [address]  [address]  [facility] [address]   [phone]            Nov 08, 2018  9:05 AM CST   (Arrive by 8:50 AM)   Return Visit with Haleigh Rodriguez MD   [facility] ([facility])    [address]  [address] [facility] [address]   [phone]            Nov 15, 2018  1:00 PM CST   (Arrive by 12:45 PM)   Office Visit with Milton Choi [facility] Therapy Management ([facility])    [address]  [address] [facility] [address] [phone]           Bring a current list of meds and any records pertaining to this visit. For Physicals, please bring immunization records and any forms needing to be filled out. Please arrive 10 minutes early to complete paperwork.            Nov 15, 2018  2:20 PM CST   (Arrive by 1:50 PM)   Return Kidney Transplant with [facility] Recipient   [facility]ph[facility] ([facility])    [address]  [address]  [facility] [address]   [phone]            Nov 28, 2018  2:30 PM CST   (Arrive by 2:15 PM)   RETURN ENDOCRINE with Ana Pollack MD   [facility] Endocrinology ([facility])    [address] [place] [address]  [address] [facility] [address]   [phone]            Dec 12, 2018  9:45 AM CST   (Arrive by 9:30 AM)   Return Visit with Lynnette Garcia MD   [facility] Prostate [facility] Urologic Cancers ([facility])    [address] [place] [address]  [address] [facility] [address] [phone]              Future tests that were ordered for you today     Open Future Orders        Priority Expected Expires Ordered    Routine UA with micro reflex to culture Routine  9/6/2018 8/7/2018    Urine Culture Aerobic Bacterial Routine  9/6/2018 8/7/2018             Who to contact     If you have questions or need follow up information about today's clinic visit or your schedule please contact [facility] NEPHROLOGY directly at [phone].  Normal or non-critical lab and imaging results will be communicated to you by MyChart, letter or phone within 4 business days after the clinic has received the results. If you do not hear from us within 7 days, please contact the clinic through MyChart or phone. If you have a critical or abnormal lab result, we will notify you by phone as soon as possible.  Submit refill requests through [employer] or call your pharmacy and they will forward the refill request to us. Please allow 3 business days for your refill to be completed.          Additional Information About Your Visit        Care EveryWhere ID     This is your Care EveryWhere ID. This could be used by other organizations to access your [place] medical records  [id number]        Your Vitals Were     Pulse Temperature Pulse Oximetry BMI (Body Mass Index)          78 97.6  F (36.4  C) (Oral) 97% 36.36 kg/m2         Blood Pressure from Last 3 Encounters:   08/07/18 (!) 148/95   06/13/18 138/72   05/23/18 139/72    Weight from Last 3 Encounters:   08/07/18 90.2 kg (198 lb 12.8 oz)   06/13/18 93 kg (205 lb)   05/23/18 93.4 kg (205 lb 14.4 oz)              We Performed the Following     MED THERAPY MANAGE REFERRAL          Today's Medication Changes          These changes are accurate as of 8/7/18  3:03 PM.  If you have any questions, ask your nurse or doctor.               Start taking these medicines.        Dose/Directions    Blood Pressure Monitor Kit   Used for:  Renovascular hypertension   Started by:  [facility]        Automatic Blood Pressure Monitor   Quantity:  1 kit   Refills:  0       hydrALAZINE 25 MG tablet   Commonly known as:  APRESOLINE   Used for:  Renovascular hypertension   Started by:  [facility]ilippo [facility]        Dose:  25 mg   Take 1 tablet  (25 mg) by mouth 2 times daily   Quantity:  60 tablet   Refills:  3         These medicines have changed or have updated prescriptions.        Dose/Directions    calcium citrate-vitamin D 315-250 MG-UNIT Tabs per tablet   Commonly known as:  CITRACAL   This may have changed:  when to take this   Used for:  Osteoporosis, unspecified osteoporosis type, unspecified pathological fracture presence   Changed by:  Recipient, [facility]        Dose:  1 tablet   Take 1 tablet by mouth 2 times daily   Quantity:  180 tablet   Refills:  3         Stop taking these medicines if you haven't already. Please contact your care team if you have questions.     amoxicillin-clavulanate 500-125 MG per tablet   Commonly known as:  AUGMENTIN   Stopped by:  Recipient, [facility]           cholecalciferol 1000 units capsule   Commonly known as:  vitamin  -D   Stopped by:  Recipient, [facility]           levofloxacin 500 MG tablet   Commonly known as:  LEVAQUIN   Stopped by:  Recipient, [facility]           vitamin D 39676 UNIT capsule   Commonly known as:  ERGOCALCIFEROL   Stopped by:  Recipient, [facility]                Where to get your medicines      These medications were sent to [address] [place] [address] [address] [place] [address]    Hours:  TRANSPLANT PHONE NUMBER [phone] Phone:  [phone]     Blood Pressure Monitor Kit    calcium citrate-vitamin D 315-250 MG-UNIT Tabs per tablet    hydrALAZINE 25 MG tablet                Primary Care Provider Office Phone # Fax #    Preethi Gibson [phone]       [address]  [facility] [address]        Equal Access to Services     GERBER DAVENPORT : Luís vasquezo Sodioni, waaxda luqadaha, qaybta kaalmada adedahlia, clare garrison. So [facility] [phone].    ATENCIÓN: rao Warner  stoner disposición servicios gratuitos de asistencia lingüística. Celine muñiz [phone].    We comply with applicable federal civil rights laws and [place] laws. We do not discriminate on the basis of race, color, national origin, age, disability, sex, sexual orientation, or gender identity.            Thank you!     Thank you for choosing [facility] NEPHROLOGY  for your care. Our goal is always to provide you with excellent care. Hearing back from our patients is one way we can continue to improve our services. Please take a few minutes to complete the written survey that you may receive in the mail after your visit with us. Thank you!             Your Updated Medication List - Protect others around you: Learn how to safely use, store and throw away your medicines at www.disposemymeds.org.          This list is accurate as of 8/7/18  3:03 PM.  Always use your most recent med list.                   Brand Name Dispense Instructions for use Diagnosis    allopurinol 100 MG tablet    ZYLOPRIM    90 tablet    Take 1 tablet (100 mg) by mouth daily    Chronic gout without tophus, unspecified cause, unspecified site       artificial saliva Aers spray     1 Bottle    Take 1 spray by mouth every 6 hours as needed for dry mouth    Dry mouth       atorvastatin 20 MG tablet    LIPITOR    90 tablet    Take 1 tablet (20 mg) by mouth daily    Hypercholesteremia       Blood Pressure Monitor Kit     1 kit    Automatic Blood Pressure Monitor    Renovascular hypertension       calcium citrate-vitamin D 315-250 MG-UNIT Tabs per tablet    CITRACAL    180 tablet    Take 1 tablet by mouth 2 times daily    Osteoporosis, unspecified osteoporosis type, unspecified pathological fracture presence       carvedilol 6.25 MG tablet    COREG    360 tablet    Take 2 tablets (12.5 mg) by mouth 2 times daily (with meals)    Hypertension secondary to other renal disorders       cephalexin 250 MG capsule    KEFLEX    90 capsule    Take 1 capsule (250 mg) by  mouth At Bedtime    Urinary frequency, Urinary tract infection       cloNIDine 0.1 MG tablet    CATAPRES    60 tablet    Take 1 tablet (0.1 mg) by mouth 2 times daily as needed For systolic BP >180    HTN (hypertension)       COMPOUNDED NON-CONTROLLED SUBSTANCE - PHARMACY TO MIX COMPOUNDED MEDICATION    CMPD RX    30 g    Estriol 1 mg/g Apply small amount to finger and apply to inside vagina daily for 2 weeks then twice weekly Route: vaginally    Atrophic vaginitis       darbepoetin bucky 300 MCG/0.6ML injection    ARANESP (ALBUMIN FREE)    1.2 mL    Inject 0.6 mLs (300 mcg) Subcutaneous every 14 days As needed for hgb<10g/dL    CKD (chronic kidney disease) stage 4, GFR 15-29 ml/min (H), Anemia in stage 4 chronic kidney disease (H)       folic acid 1 MG tablet    FOLVITE    90 tablet    Take 1 tablet (1 mg) by mouth daily    Preventive measure       hydrALAZINE 25 MG tablet    APRESOLINE    60 tablet    Take 1 tablet (25 mg) by mouth 2 times daily    Renovascular hypertension       hydrocortisone 1 % cream    CORTAID    60 g    Apply topically 2 times daily    Rash, skin       mycophenolate 250 MG capsule     120 capsule    TAKE TWO CAPSULES (500MG) BY MOUTH TWICE A DAY (DAW1)    Kidney transplanted       NIFEdipine ER osmotic 60 MG Tb24    PROCARDIA XL    180 tablet    Take 1 tablet (60 mg) by mouth 2 times daily    Hypertension secondary to other renal disorders       pantoprazole 40 MG EC tablet    PROTONIX    30 tablet    Take 1 tablet (40 mg) by mouth daily    Gastroesophageal reflux disease with esophagitis       predniSONE 5 MG tablet    DELTASONE    30 tablet    Take 1 tablet (5 mg) by mouth daily    Kidney transplanted       sodium bicarbonate 650 MG tablet     180 tablet    Take 1 tablet (650 mg) by mouth 2 times daily    Acidosis

## 2018-08-07 NOTE — NURSING NOTE
Chief Complaint   Patient presents with     RECHECK     follow up annual kidney tx     BP (!) 148/95 (BP Location: Left arm)  Pulse 78  Temp 97.6  F (36.4  C) (Oral)  Wt 90.2 kg (198 lb 12.8 oz)  SpO2 97%  BMI 36.36 kg/m2   Alondra Bermeo

## 2018-08-07 NOTE — TELEPHONE ENCOUNTER
Left voicemail for patient to call back (to confirm if she is taking sodium bicarb as prescribed).    Brittney Quinones RN

## 2018-08-07 NOTE — PROGRESS NOTES
Assessment and Plan:  1.  Allograft function.  Her serum creatinine is stable to slightly better ~ 2.0 mg/dL. Now with mature access.   2. Immunosuppression management: She is currently on an immunosuppressive regimen that consists of prednisone 5 mg daily in addition to CellCept 500 mg b.i.d.  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 Her kidney function has been anywhere from 1.8-2.2 mg/dL.  3.  Recurrent urinary tract infections. Recently completed treatment for another UTI (7/20/2018). Will repeat UC and UA today. On prophylaxis.   4.  Anemia of chronic kidney disease.  She continues to require Aranesp therapy.  5. Hematodialysis Access: s/p placement  Right arm fistula with good thrill   6. Obesity: she continues to try weight loss (10 Lb in the last 6 months)   7. Renal osteodystrophy will need to recheck her PTH on large dose vitamin D replacement  8. Hypertension near goal, much better controlled she is confused on her regimen. I asked her to bring her medicines for MTM consult. She requested hydralazine refill.     Assessment and plan was discussed with patient and she voiced her understanding and agreement.    Reason for Visit:  Ms. Logan is here for routine follow up and immunosuppression management.    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          Transplant Hx:       Tx: LDKT  Date: 1999       Present Maintenance IS: Mycophenolate mofetil and Prednisone       Baseline Creatinine: 2-3 mg/dL        Biopsy: yes with TMA    Since she was seen last, developed urinary symptoms her UA revealed another bout of a UTI which was treated and currently asymptomatic.although an addition organism was identified that was resistant to the prescribed augmentin.  No NVD. No abdominal pain.  Feels less tired. She is actively losing weight.     Home BP: controlled.      ROS:   A comprehensive review of systems was obtained and negative, except as noted in the HPI or PMH.    Active Medical Problems:  Patient Active Problem List   Diagnosis     Chronic rhinitis     Essential hypertension, benign     S/P kidney transplant     Rash     Asthma exacerbation     UTI (urinary tract infection)     Anemia     Chest pain     Urinary tract infection     Localized pustular psoriasis     CKD (chronic kidney disease) stage 4, GFR 15-29 ml/min (H)     Immunosuppression (H)     Fistula     End-stage renal disease (ESRD) (H)     Aftercare following organ transplant     Age-related osteoporosis without current pathological fracture       Personal Hx:  Social History     Social History     Marital status:      Spouse name: N/A     Number of children: N/A     Years of education: N/A     Occupational History     Not on file.     Social History Main Topics     Smoking status: Never Smoker     Smokeless tobacco: Never Used      Comment: Has been around second hand smoke     Alcohol use Yes      Comment: Once in a great while     Drug use: No     Sexual activity: Not Currently     Other Topics Concern     Not on file     Social History Narrative       Allergies:  Allergies   Allergen Reactions     Ciprofloxacin Palpitations     Levaquin [Levofloxacin] Shortness Of Breath       Medications:    Current Outpatient Prescriptions on File Prior to Visit:  allopurinol (ZYLOPRIM) 100 MG tablet Take 1 tablet (100 mg) by mouth daily   artificial saliva (BIOTENE MT) AERS spray Take 1 spray by mouth every 6 hours as needed for dry mouth   atorvastatin (LIPITOR) 20 MG tablet Take 1 tablet (20 mg) by mouth daily   carvedilol (COREG) 6.25 MG tablet Take 2 tablets (12.5 mg) by mouth 2 times daily (with meals)   CELLCEPT (BRAND) 250 MG CAPSULE TAKE TWO CAPSULES (500MG) BY MOUTH TWICE A DAY (DAW1)   cephalexin (KEFLEX) 250 MG capsule Take  1 capsule (250 mg) by mouth At Bedtime   cloNIDine (CATAPRES) 0.1 MG tablet Take 1 tablet (0.1 mg) by mouth 2 times daily as needed For systolic BP >180   COMPOUNDED NON-CONTROLLED SUBSTANCE (CMPD RX) - PHARMACY TO MIX COMPOUNDED MEDICATION Estriol 1 mg/gApply small amount to finger and apply to inside vagina daily for 2 weeks then twice weeklyRoute: vaginally   darbepoetin bucky (ARANESP, ALBUMIN FREE,) 300 MCG/0.6ML injection Inject 0.6 mLs (300 mcg) Subcutaneous every 14 days As needed for hgb<10g/dL   folic acid (FOLVITE) 1 MG tablet Take 1 tablet (1 mg) by mouth daily   hydrocortisone (CORTAID) 1 % cream Apply topically 2 times daily   NIFEdipine ER osmotic (PROCARDIA XL) 60 MG TB24 Take 1 tablet (60 mg) by mouth 2 times daily   pantoprazole (PROTONIX) 40 MG EC tablet Take 1 tablet (40 mg) by mouth daily   predniSONE (DELTASONE) 5 MG tablet Take 1 tablet (5 mg) by mouth daily   sodium bicarbonate 650 MG tablet Take 1 tablet (650 mg) by mouth 2 times daily     No current facility-administered medications on file prior to visit.       Vitals:  BP (!) 148/95 (BP Location: Left arm)  Pulse 78  Temp 97.6  F (36.4  C) (Oral)  Wt 90.2 kg (198 lb 12.8 oz)  SpO2 97%  BMI 36.36 kg/m2  Heart rate normalized during the visit  Exam:   GENERAL APPEARANCE: alert and no distress  HENT: mouth without ulcers or lesions  LYMPHATICS: no cervical or supraclavicular nodes  RESP: lungs clear to auscultation - no rales, rhonchi or wheezes  CV: regular rhythm, normal rate, no rub, no murmur  EDEMA: no LE edema bilaterally  ABDOMEN: soft, nondistended, nontender, bowel sounds normal  MS: extremities normal - no gross deformities noted, no evidence of inflammation in joints, no muscle tenderness  SKIN: no rash  ACCESS: right arm fistula with good bruit and good thrill     Results:   Reviewed

## 2018-08-08 LAB
BACTERIA SPEC CULT: NORMAL
BACTERIA SPEC CULT: NORMAL
Lab: NORMAL
SPECIMEN SOURCE: NORMAL

## 2018-08-09 RX ORDER — CLONIDINE HYDROCHLORIDE 0.1 MG/1
0.1 TABLET ORAL 2 TIMES DAILY PRN
Qty: 60 TABLET | Refills: 3 | Status: SHIPPED | OUTPATIENT
Start: 2018-08-09

## 2018-08-13 ENCOUNTER — TELEPHONE (OUTPATIENT)
Dept: PHARMACY | Facility: CLINIC | Age: 63
End: 2018-08-13

## 2018-08-13 RX ORDER — SODIUM BICARBONATE 650 MG/1
650 TABLET ORAL 2 TIMES DAILY
Qty: 180 TABLET | Refills: 3 | Status: SHIPPED | OUTPATIENT
Start: 2018-08-13 | End: 2018-11-15

## 2018-08-13 NOTE — TELEPHONE ENCOUNTER
Patient called back, confirmed she is taking it as prescribed. Has not missed any doses. Message sent to Dr. Sexton to discuss dose.    Brittney Quinones RN

## 2018-08-13 NOTE — TELEPHONE ENCOUNTER
Follow-up with anemia management service:    LM for Eleni reminding her that she is due for her Hgb lab and possibly an aranesp dose    Anemia Latest Ref Rng & Units 2018 2018 2018 2018 7/10/2018 2018 2018   HGB Goal - - - - - - - -   VALARIE Dose - - - 300 mcg 300 mcg - 300 mcg 300 mcg   Hemoglobin 11.7 - 15.7 g/dL 10.0(L) 9.6(L) - 8.0(L) 7.6(L) - 8.5(L)   TSAT 15 - 46 % 40 - - 29 - - 37   Ferritin 8 - 252 ng/mL 1198(H) - - 1232(H) - - 1104(H)   PRBCs - - - - - - - -     Orders needed to be renewed (for next follow-up date) in EPIC: None                          Med order expires: 19                          Lab orders : 19    Follow-up call date: 8/15/18    Bharti Akers CPhT  An[facility] Clinic  [phone]  Reviewed 2018 [facility]  Anemia Management Service  Shea Sotomayor,PharmD and Preethi Akers CPhT  Phone: [phone]  Fax: [phone]

## 2018-08-15 ENCOUNTER — TELEPHONE (OUTPATIENT)
Dept: PHARMACY | Facility: CLINIC | Age: 63
End: 2018-08-15

## 2018-08-15 DIAGNOSIS — D64.9 ANEMIA: ICD-10-CM

## 2018-08-15 DIAGNOSIS — N18.4 CKD (CHRONIC KIDNEY DISEASE) STAGE 4, GFR 15-29 ML/MIN (H): ICD-10-CM

## 2018-08-15 LAB
FERRITIN SERPL-MCNC: 1017 NG/ML (ref 8–252)
HCT VFR BLD AUTO: 29.1 % (ref 35–47)
HGB BLD-MCNC: 8.7 G/DL (ref 11.7–15.7)
IRON SATN MFR SERPL: 38 % (ref 15–46)
IRON SERPL-MCNC: 77 UG/DL (ref 35–180)
TIBC SERPL-MCNC: 204 UG/DL (ref 240–430)

## 2018-08-15 NOTE — TELEPHONE ENCOUNTER
Anemia Management Note  SUBJECTIVE/OBJECTIVE:  Referred by Dr Tyshawn Sexton February 10, 2017  Primary Diagnosis: Anemia in Chronic Kidney Disease (N18.3 D63.1)   Secondary Diagnosis: Chronic Kidney Disease, Stage III (N18.3)   Hgb goal range: 9-10  Epo/Darbo: Aranesp 300 mcg every 14 days - At home   RX will  on 2019  Iron regimen:  None  Anemia Latest Ref Rng & Units 2018 2018 2018 7/10/2018 2018 2018 8/15/2018   HGB Goal - - - - - - - -   VALARIE Dose - - 300 mcg 300 mcg - 300 mcg 300 mcg 300 mcg   Hemoglobin 11.7 - 15.7 g/dL 9.6(L) - 8.0(L) 7.6(L) - 8.5(L) 8.7(L)   TSAT 15 - 46 % - - 29 - - 37 38   Ferritin 8 - 252 ng/mL - - 1232(H) - - 1104(H) 1017(H)   PRBCs - - - - - - - -       BP Readings from Last 3 Encounters:   18 (!) 148/95   18 138/72   18 139/72     Wt Readings from Last 2 Encounters:   18 198 lb 12.8 oz (90.2 kg)   18 205 lb (93 kg)       ASSESSMENT:  Hgb: Not at goal but improving - recommend dose and continue current regimen  TSat: at goal >30% Ferritin: Elevated (>1000ng/mL)     PLAN:  Eleni will dose with aranesp today and RTC for hgb then aranesp if needed in 2 week(s)     Orders needed to be renewed (for next follow-up date) in EPIC: None     Iron labs due:    Plan discussed with:  Eleni  Plan provided by:  Bharti Akers CPhT  An[facility] Clinic  [phone]    NEXT FOLLOW-UP DATE:  18  Reviewed 2018 [facility]  Anemia Management Service  Shea Sotomayor,PharmD and Preethi Akers CPhT  Phone: [phone]  Fax: [phone]

## 2018-08-29 ENCOUNTER — TELEPHONE (OUTPATIENT)
Dept: PHARMACY | Facility: CLINIC | Age: 63
End: 2018-08-29

## 2018-08-29 NOTE — TELEPHONE ENCOUNTER
Follow-up with anemia management service:    LM for Eleni reminding her that she is due for hgb then aranesp     She has an upcoming appt on  and could get labs drawn then -3PM    Anemia Latest Ref Rng & Units 2018 2018 2018 7/10/2018 2018 2018 8/15/2018   HGB Goal - - - - - - - -   VALARIE Dose - - 300 mcg 300 mcg - 300 mcg 300 mcg 300 mcg   Hemoglobin 11.7 - 15.7 g/dL 9.6(L) - 8.0(L) 7.6(L) - 8.5(L) 8.7(L)   TSAT 15 - 46 % - - 29 - - 37 38   Ferritin 8 - 252 ng/mL - - 1232(H) - - 1104(H) 1017(H)   PRBCs - - - - - - - -       Orders needed to be renewed (for next follow-up date) in EPIC: None                          Med order expires: 19                          Lab orders : 19    Follow-up call date: 18    Bharti Akers CPhT  Anemia Clinic  [phone]  Reviewed 2018 [facility]  Anemia Management Service  Shea Sotomayor,PharmD and Preethi Akers CPhT  Phone: [phone]  Fax: [phone]

## 2018-08-31 ENCOUNTER — OFFICE VISIT (OUTPATIENT)
Dept: INFECTIOUS DISEASES | Facility: CLINIC | Age: 63
End: 2018-08-31
Payer: MEDICARE

## 2018-08-31 ENCOUNTER — TELEPHONE (OUTPATIENT)
Dept: PHARMACY | Facility: CLINIC | Age: 63
End: 2018-08-31

## 2018-08-31 VITALS
HEIGHT: 62 IN | BODY MASS INDEX: 37.34 KG/M2 | WEIGHT: 202.9 LBS | HEART RATE: 92 BPM | TEMPERATURE: 98.5 F | OXYGEN SATURATION: 98 % | SYSTOLIC BLOOD PRESSURE: 117 MMHG | DIASTOLIC BLOOD PRESSURE: 69 MMHG

## 2018-08-31 DIAGNOSIS — D64.9 ANEMIA: ICD-10-CM

## 2018-08-31 DIAGNOSIS — N39.0 RECURRENT UTI: Primary | ICD-10-CM

## 2018-08-31 DIAGNOSIS — N18.4 CKD (CHRONIC KIDNEY DISEASE) STAGE 4, GFR 15-29 ML/MIN (H): ICD-10-CM

## 2018-08-31 DIAGNOSIS — Z23 NEED FOR VACCINATION: ICD-10-CM

## 2018-08-31 DIAGNOSIS — Z94.0 KIDNEY TRANSPLANTED: ICD-10-CM

## 2018-08-31 DIAGNOSIS — B00.9 HERPES SIMPLEX INFECTION: ICD-10-CM

## 2018-08-31 LAB
HCT VFR BLD AUTO: 29.7 % (ref 35–47)
HGB BLD-MCNC: 8.7 G/DL (ref 11.7–15.7)

## 2018-08-31 PROCEDURE — 90750 HZV VACC RECOMBINANT IM: CPT | Mod: ZF | Performed by: STUDENT IN AN ORGANIZED HEALTH CARE EDUCATION/TRAINING PROGRAM

## 2018-08-31 PROCEDURE — 25000125 ZZHC RX 250: Mod: ZF | Performed by: STUDENT IN AN ORGANIZED HEALTH CARE EDUCATION/TRAINING PROGRAM

## 2018-08-31 PROCEDURE — 85014 HEMATOCRIT: CPT

## 2018-08-31 PROCEDURE — 90471 IMMUNIZATION ADMIN: CPT | Mod: ZF

## 2018-08-31 PROCEDURE — 85018 HEMOGLOBIN: CPT

## 2018-08-31 PROCEDURE — G0463 HOSPITAL OUTPT CLINIC VISIT: HCPCS | Mod: 25,ZF

## 2018-08-31 RX ADMIN — ZOSTER VACCINE RECOMBINANT, ADJUVANTED 0.5 ML: KIT at 16:10

## 2018-08-31 ASSESSMENT — PAIN SCALES - GENERAL: PAINLEVEL: NO PAIN (0)

## 2018-08-31 NOTE — PROGRESS NOTES
Infectious Disease clinic Note  8/31/18    ASSESSMENT;      The patient is a 61-year-old female with PMH of HTN, HLD, obesity, living related kidney transplant secondary to IgA nephropathy in 1999, currently on CellCept 500 bid and prednisone 5 daily; low grade EBV viremia; CKD.  She is originally from [facility] in [place].     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     EBV viremia: low grade, last check in 6/2017 6500. No evidence of PTLD on CT     PLAN:  - stop keflex as has not helped   - call me when symptoms start, I will fit her into my clinic to examine and send for HSV cx and start appropriate treatment   - shingrix vaccine today   - Second dose after 2 months     Face to face time 40 mins. >50% spent coordinating care and counseling on my impressions and plan going forward.     Iwona Shah  , [facility]  Division of Infectious Disease, Department of Internal Medicine  Pager [phone]      ----------------------------------------------------  Last seen by my colleague 6/2017 for low grade EBV viremia without PTLD. She is here to see me for recurrent UTIs, referred by Dr. Sexton    HISTORY OF PRESENT ILLNESS:  The patient is a 61-year-old female with PMH of HTN, HLD, obesity, living related kidney transplant secondary to IgA nephropathy in 1999, currently on CellCept 500 bid and prednisone 5 daily; low grade EBV viremia; CKD.  She is originally from [facility] in [place].     She reports recurrent UTIs for the past 3-5 years, every 2 months. It first starts with extreme itchiness in the back in the buttocks with blisters and then develops  frequency of urination, burning, feels the sensation to go but not much comes out. She submits a urine sample and then starts taking an abx. A week into it the symptoms resolve and recur 2 months later.     She has seen  Urology for this and had a cystoscopy in June which was normal. She has been on keflex prophylaxis since Dec 2017 but she does not think that has helped. She has been prescribed vaginal estrogen cream but has not filled the prescription.     Her abx history includes usually 2 week treatment of     augmentin in July 2018  levaquin in May 2018  Cefpodoxime in Jan 2018  augmentin Nov 2017   cefpodoxime March,Aug and sep 2017        PAST MEDICAL HISTORY:    Past Medical History:   Diagnosis Date     Anatomical narrow angle      Anemia      Gout      HTN (hypertension)      Hyperlipidaemia      IgA nephropathy      Immunosuppressed status (H)     Prednisone and cellcept     Nonsenile cataract      Obstructive sleep apnea         Immunization History   Administered Date(s) Administered     Hepatitis A Immunity: Titer/MD Dx 11/26/2015     Influenza (IIV3) PF 11/01/2005, 10/29/2007, 11/03/2008, 11/02/2009, 11/17/2010, 12/11/2012, 09/30/2014, 10/27/2015     Influenza Vaccine IM 3yrs+ 4 Valent IIV4 10/03/2013, 10/02/2016, 09/20/2017     Pneumo Conj 13-V (2010&after) 12/02/2014     Pneumococcal 23 valent 09/03/2014     TDAP Vaccine (Boostrix) 12/11/2012     Zoster vaccine recombinant adjuvanted (SHINGRIX) 08/31/2018     Has had chicken pox as a kid     MEDICATIONS:    Current Outpatient Prescriptions   Medication     allopurinol (ZYLOPRIM) 100 MG tablet     artificial saliva (BIOTENE MT) AERS spray     atorvastatin (LIPITOR) 20 MG tablet     Blood Pressure Monitor KIT     calcium citrate-vitamin D (CITRACAL) 315-250 MG-UNIT TABS per tablet     carvedilol (COREG) 6.25 MG tablet     CELLCEPT (BRAND) 250 MG CAPSULE     cephalexin (KEFLEX) 250 MG capsule     cloNIDine (CATAPRES) 0.1 MG tablet     COMPOUNDED NON-CONTROLLED SUBSTANCE (CMPD RX) - PHARMACY TO MIX COMPOUNDED MEDICATION     darbepoetin bucky (ARANESP, ALBUMIN FREE,) 300 MCG/0.6ML injection     folic acid (FOLVITE) 1 MG tablet     hydrALAZINE (APRESOLINE) 25 MG tablet      "hydrocortisone (CORTAID) 1 % cream     NIFEdipine ER osmotic (PROCARDIA XL) 60 MG TB24     pantoprazole (PROTONIX) 40 MG EC tablet     predniSONE (DELTASONE) 5 MG tablet     sodium bicarbonate 650 MG tablet     No current facility-administered medications for this visit.          SOCIAL HISTORY:  Reviewed.      FAMILY HISTORY:  Reviewed.      PHYSICAL EXAMINATION:   /69  Pulse 92  Temp 98.5  F (36.9  C) (Oral)  Ht 1.575 m (5' 2\")  Wt 92 kg (202 lb 14.4 oz)  SpO2 98%  BMI 37.11 kg/m2    Constitutional: Patient in no distress  Eyes: not pale, not jaundiced  Neck: no lymphadenopathy  CVS: no added sounds  RS: clear  Abdomen: soft, BS+  Skin: exam of buttock with lots of scars   Extremities: no pedal edema  Psych: Alert and oriented x 3      LABORATORY WORK AND IMAGING:     CBC RESULTS:   Recent Labs   Lab Test  08/31/18   1347   03/01/18   1548   WBC   --    --   6.1   RBC   --    --   3.86   HGB  8.7*   < >  9.1*   HCT  29.7*   < >  30.8*   MCV   --    --   80   MCH   --    --   23.6*   MCHC   --    --   29.5*   RDW   --    --   17.2*   PLT   --    --   243    < > = values in this interval not displayed.         Last Comprehensive Metabolic Panel:  Sodium   Date Value Ref Range Status   07/10/2018 140 133 - 144 mmol/L Final     Potassium   Date Value Ref Range Status   07/10/2018 4.4 3.4 - 5.3 mmol/L Final     Chloride   Date Value Ref Range Status   07/10/2018 108 94 - 109 mmol/L Final     Carbon Dioxide   Date Value Ref Range Status   07/10/2018 20 20 - 32 mmol/L Final     Anion Gap   Date Value Ref Range Status   07/10/2018 12 3 - 14 mmol/L Final     Glucose   Date Value Ref Range Status   07/10/2018 105 (H) 70 - 99 mg/dL Final     Urea Nitrogen   Date Value Ref Range Status   07/10/2018 57 (H) 7 - 30 mg/dL Final     Creatinine   Date Value Ref Range Status   07/30/2018 1.96 (H) 0.52 - 1.04 mg/dL Final     GFR Estimate   Date Value Ref Range Status   07/30/2018 26 (L) >60 mL/min/1.7m2 Final     Comment: "     Non  GFR Calc     Calcium   Date Value Ref Range Status   07/10/2018 7.9 (L) 8.5 - 10.1 mg/dL Final     Liver Function Studies -   Recent Labs   Lab Test  07/10/18   1224  05/03/18   1233   10/01/16   0815   PROTTOTAL   --    --    --   7.5   ALBUMIN  3.1*  3.3*   < >  3.2*   BILITOTAL   --    --    --   0.4   ALKPHOS   --   77   --   137   AST   --    --    --   28   ALT   --    --    --   13    < > = values in this interval not displayed.         Results for ISABELLE VAZQUEZ (MRN [id number]) as of 8/31/2018 15:24   Ref. Range 5/16/2018 09:57 7/10/2018 12:27 7/30/2018 10:14 8/7/2018 14:41   Color Urine Unknown Yellow Yellow Yellow Yellow   Appearance Urine Unknown Clear Cloudy Slightly Cloudy Slightly Cloudy   Glucose Urine Latest Ref Range: NEG^Negative mg/dL Negative Negative Negative Negative   Bilirubin Urine Latest Ref Range: NEG^Negative  Negative Negative Negative Negative   Ketones Urine Latest Ref Range: NEG^Negative mg/dL Negative Negative Negative Negative   Specific Gravity Urine Latest Ref Range: 1.003 - 1.035  1.011 1.011 1.012 1.012   pH Urine Latest Ref Range: 5.0 - 7.0 pH 5.0 5.0 5.0 5.0   Protein Albumin Urine Latest Ref Range: NEG^Negative mg/dL >499 (A) 100 (A) >499 (A) 100 (A)   Urobilinogen mg/dL Latest Ref Range: 0.0 - 2.0 mg/dL 0.0 0.0 0.0 0.0   Nitrite Urine Latest Ref Range: NEG^Negative  Negative Negative Negative Negative   Blood Urine Latest Ref Range: NEG^Negative  Small (A) Small (A) Small (A) Negative   Leukocyte Esterase Urine Latest Ref Range: NEG^Negative  Small (A) Large (A) Large (A) Moderate (A)   Source Unknown Midstream Urine Midstream Urine Midstream Urine Unspecified Urine   WBC Urine Latest Ref Range: 0 - 5 /HPF 29 (H) >182 (H) 50 (H) 106 (H)   RBC Urine Latest Ref Range: 0 - 2 /HPF 4 (H) 1 2 2   Bacteria Urine Latest Ref Range: NEG^Negative /HPF Few (A) Few (A)  Few (A)   WBC Clumps Latest Ref Range: NEG^Negative /HPF Present (A) Present (A) Present (A)  Present (A)   Squamous Epithelial /HPF Urine Latest Ref Range: 0 - 1 /HPF <1 <1 1    Transitional Epi Latest Ref Range: FEW^Few /HPF  <1 (A)     Mucous Urine Latest Ref Range: NEG^Negative /LPF Present (A)   Present (A)       Urine cultures   8/7/18 <10k urogenital samir  7/30/18 50-100k klebsiella pneumoniae, 10-50k morganella  7/10 >100k Klebsiella pneumoniae  6/5 <10k urogenital samir  5/16 ,,  5/3 ,,  1/11 >100k E.coli , 50-100k Enterobacter aerogenes  12/19/17 >100k E.coli   8/2017 .100k E.coli  3/2017 - 50-100k E.coli     Imaging   6/22/17 CT chest/abdomen/pelvis        Impression: In this patient with Abiel-Barr virus viremia, there are  no CT findings to suggest PTLD.

## 2018-08-31 NOTE — TELEPHONE ENCOUNTER
Anemia Management Note  SUBJECTIVE/OBJECTIVE:  Referred by Dr Tyshawn Sexton February 10, 2017  Primary Diagnosis: Anemia in Chronic Kidney Disease (N18.3 D63.1)   Secondary Diagnosis: Chronic Kidney Disease, Stage III (N18.3)   Hgb goal range: 9-10  Epo/Darbo: Aranesp 300 mcg every 14 days - At home   RX will  on 2019  Anemia Latest Ref Rng & Units 2018 2018 7/10/2018 2018 2018 8/15/2018 2018   HGB Goal - - - - - - - -   VALARIE Dose - 300 mcg 300 mcg - 300 mcg 300 mcg 300 mcg 300 mcg   Hemoglobin 11.7 - 15.7 g/dL - 8.0(L) 7.6(L) - 8.5(L) 8.7(L) 8.7(L)   TSAT 15 - 46 % - 29 - - 37 38 -   Ferritin 8 - 252 ng/mL - 1232(H) - - 1104(H) 1017(H) -   PRBCs - - - - - - - -       BP Readings from Last 3 Encounters:   18 117/69   18 (!) 148/95   18 138/72     Wt Readings from Last 2 Encounters:   18 202 lb 14.4 oz (92 kg)   18 198 lb 12.8 oz (90.2 kg)     Currently treating infection    ASSESSMENT:  Hgb:Not at goal but improving - recommend dose and continue current regimen  TSat: at goal >30% Ferritin: Elevated (>1000ng/mL)    PLAN:  Dose with aranesp and RTC for hgb then aranesp if needed in 2 week(s)    Orders needed to be renewed (for next follow-up date) in EPIC: None    Iron labs due:  2018    Plan discussed with:  Eleni  Plan provided by:      NEXT FOLLOW-UP DATE:  2018    Anemia Management Service  Shea Sotomayor,PharmD and Preethi Akers CPhT  Phone: [phone]  Fax: [phone]

## 2018-08-31 NOTE — MR AVS SNAPSHOT
After Visit Summary   8/31/2018    Eleni Logan    MRN: [id number]           Patient Information     Date Of Birth          1955        Visit Information        Provider Department      8/31/2018 3:00 PM Iwona Shah MD [facility] and Infectious Diseases        Today's Diagnoses     Recurrent UTI    -  1    Kidney transplanted        Need for vaccination        Herpes simplex infection           Follow-ups after your visit        Your next 10 appointments already scheduled     Nov 08, 2018  9:05 AM CST   (Arrive by 8:50 AM)   Return Visit with Haleigh Rodriguez MD   [facility] ([facility])    [address]  4th Floor  [place] [address]   [phone]            Nov 15, 2018  1:00 PM CST   (Arrive by 12:45 PM)   Office Visit with Milton Choi [facility] ([facility])    [address] [place] [address]  [address] [facility] [address] [phone]           Bring a current list of meds and any records pertaining to this visit. For Physicals, please bring immunization records and any forms needing to be filled out. Please arrive 10 minutes early to complete paperwork.            Nov 15, 2018  2:35 PM CST   (Arrive by 2:05 PM)   Return Kidney Transplant with Uc Kidney/Pancreas Recipient   [facility]ph[facility] ([facility])    [address]  [address]  [place] [address]   [phone]            Nov 28, 2018  2:30 PM CST   (Arrive by 2:15 PM)   RETURN ENDOCRINE with Ana Pollack MD   [facility]in[facility] ([facility])    [address] [place] [address]  [address] [facility] [address]   [phone]            Dec 12, 2018  9:45 AM CST   (Arrive by 9:30 AM)   Return Visit with Lynnette Garcia MD   [facility] ([facility]  "[facility])    [address]  4th Floor  [place] [address] [phone]              Who to contact     If you have questions or need follow up information about today's clinic visit or your schedule please contact [facility] INFECTIOUS DISEAS[facility] directly at [phone].  Normal or non-critical lab and imaging results will be communicated to you by MyChart, letter or phone within 4 business days after the clinic has received the results. If you do not hear from us within 7 days, please contact the clinic through MyChart or phone. If you have a critical or abnormal lab result, we will notify you by phone as soon as possible.  Submit refill requests through [employer] or call your pharmacy and they will forward the refill request to us. Please allow 3 business days for your refill to be completed.          Additional Information About Your Visit        Care EveryWhere ID     This is your Care EveryWhere ID. This could be used by other organizations to access your [facility] medical records  [id number]        Your Vitals Were     Pulse Temperature Height Pulse Oximetry BMI (Body Mass Index)       92 98.5  F (36.9  C) (Oral) 1.575 m (5' 2\") 98% 37.11 kg/m2        Blood Pressure from Last 3 Encounters:   08/31/18 117/69   08/07/18 (!) 148/95   06/13/18 138/72    Weight from Last 3 Encounters:   08/31/18 92 kg (202 lb 14.4 oz)   08/07/18 90.2 kg (198 lb 12.8 oz)   06/13/18 93 kg (205 lb)              Today, you had the following     No orders found for display       Primary Care Provider Office Phone # Fax #    Preethi Gibson [phone]       [facility] [address]  [place] [place] [address]        Equal Access to Services     GERBER DAVENPORT : Luís Anderson, nathaniel muniz, clare buchanan. So [facility] [phone].    ATENCIÓN: Si habla español, tiene a stoner disposición servicios gratuitos " de asistencia lingüística. Celine muñiz [phone].    We comply with applicable federal civil rights laws and [place] laws. We do not discriminate on the basis of race, color, national origin, age, disability, sex, sexual orientation, or gender identity.            Thank you!     Thank you for choosing [facility] INFECTI[facility] DISEASES  for your care. Our goal is always to provide you with excellent care. Hearing back from our patients is one way we can continue to improve our services. Please take a few minutes to complete the written survey that you may receive in the mail after your visit with us. Thank you!             Your Updated Medication List - Protect others around you: Learn how to safely use, store and throw away your medicines at www.disposemymeds.org.          This list is accurate as of 8/31/18  6:17 PM.  Always use your most recent med list.                   Brand Name Dispense Instructions for use Diagnosis    allopurinol 100 MG tablet    ZYLOPRIM    90 tablet    Take 1 tablet (100 mg) by mouth daily    Chronic gout without tophus, unspecified cause, unspecified site       artificial saliva Aers spray     1 Bottle    Take 1 spray by mouth every 6 hours as needed for dry mouth    Dry mouth       atorvastatin 20 MG tablet    LIPITOR    90 tablet    Take 1 tablet (20 mg) by mouth daily    Hypercholesteremia       Blood Pressure Monitor Kit     1 kit    Automatic Blood Pressure Monitor    Renovascular hypertension       calcium citrate-vitamin D 315-250 MG-UNIT Tabs per tablet    CITRACAL    180 tablet    Take 1 tablet by mouth 2 times daily    Osteoporosis, unspecified osteoporosis type, unspecified pathological fracture presence       carvedilol 6.25 MG tablet    COREG    360 tablet    Take 2 tablets (12.5 mg) by mouth 2 times daily (with meals)    Hypertension secondary to other renal disorders       cephalexin 250 MG capsule    KEFLEX    90 capsule    Take 1 capsule (250 mg) by  mouth At Bedtime    Urinary frequency, Urinary tract infection       cloNIDine 0.1 MG tablet    CATAPRES    60 tablet    Take 1 tablet (0.1 mg) by mouth 2 times daily as needed For systolic BP >180    HTN (hypertension)       COMPOUNDED NON-CONTROLLED SUBSTANCE - PHARMACY TO MIX COMPOUNDED MEDICATION    CMPD RX    30 g    Estriol 1 mg/g Apply small amount to finger and apply to inside vagina daily for 2 weeks then twice weekly Route: vaginally    Atrophic vaginitis       darbepoetin bucky 300 MCG/0.6ML injection    ARANESP (ALBUMIN FREE)    1.2 mL    Inject 0.6 mLs (300 mcg) Subcutaneous every 14 days As needed for hgb<10g/dL    CKD (chronic kidney disease) stage 4, GFR 15-29 ml/min (H), Anemia in stage 4 chronic kidney disease (H)       folic acid 1 MG tablet    FOLVITE    90 tablet    Take 1 tablet (1 mg) by mouth daily    Preventive measure       hydrALAZINE 25 MG tablet    APRESOLINE    60 tablet    Take 1 tablet (25 mg) by mouth 2 times daily    Renovascular hypertension       hydrocortisone 1 % cream    CORTAID    60 g    Apply topically 2 times daily    Rash, skin       mycophenolate 250 MG capsule     120 capsule    TAKE TWO CAPSULES (500MG) BY MOUTH TWICE A DAY (DAW1)    Kidney transplanted       NIFEdipine ER osmotic 60 MG Tb24    PROCARDIA XL    180 tablet    Take 1 tablet (60 mg) by mouth 2 times daily    Hypertension secondary to other renal disorders       pantoprazole 40 MG EC tablet    PROTONIX    30 tablet    Take 1 tablet (40 mg) by mouth daily    Gastroesophageal reflux disease with esophagitis       predniSONE 5 MG tablet    DELTASONE    30 tablet    Take 1 tablet (5 mg) by mouth daily    Kidney transplanted       sodium bicarbonate 650 MG tablet     180 tablet    Take 1 tablet (650 mg) by mouth 2 times daily    Acidosis

## 2018-09-10 DIAGNOSIS — Z94.0 KIDNEY TRANSPLANTED: Primary | ICD-10-CM

## 2018-09-10 DIAGNOSIS — I15.1 HYPERTENSION SECONDARY TO OTHER RENAL DISORDERS: ICD-10-CM

## 2018-09-10 RX ORDER — NIFEDIPINE 60 MG/1
60 TABLET, EXTENDED RELEASE ORAL
Qty: 180 TABLET | Refills: 3 | Status: SHIPPED | OUTPATIENT
Start: 2018-09-10

## 2018-09-10 RX ORDER — MYCOPHENOLATE MOFETIL 250 MG/1
500 CAPSULE ORAL 2 TIMES DAILY
Qty: 120 CAPSULE | Refills: 11 | Status: SHIPPED | OUTPATIENT
Start: 2018-09-10 | End: 2019-05-31

## 2018-09-10 NOTE — TELEPHONE ENCOUNTER
cellcept 250mg  Last FIlled Date 8/7  Qty dispensed 120    Thank you very kindly!  Bernadine Song [facility]

## 2018-09-10 NOTE — TELEPHONE ENCOUNTER
Pt states new dose of nifedipine, pt taking 120mg BID    Thank you very kindly!  Bernadine Song CPhT  [facility]

## 2018-09-11 ENCOUNTER — TELEPHONE (OUTPATIENT)
Dept: INFECTIOUS DISEASES | Facility: CLINIC | Age: 63
End: 2018-09-11

## 2018-09-11 ENCOUNTER — OFFICE VISIT (OUTPATIENT)
Dept: INFECTIOUS DISEASES | Facility: CLINIC | Age: 63
End: 2018-09-11
Attending: STUDENT IN AN ORGANIZED HEALTH CARE EDUCATION/TRAINING PROGRAM
Payer: MEDICARE

## 2018-09-11 VITALS
TEMPERATURE: 98.2 F | OXYGEN SATURATION: 100 % | DIASTOLIC BLOOD PRESSURE: 77 MMHG | HEART RATE: 100 BPM | SYSTOLIC BLOOD PRESSURE: 163 MMHG | HEIGHT: 62 IN

## 2018-09-11 DIAGNOSIS — A60.1 HERPES SIMPLEX INFECTION OF PERIANAL SKIN: Primary | ICD-10-CM

## 2018-09-11 DIAGNOSIS — Z94.0 S/P KIDNEY TRANSPLANT: ICD-10-CM

## 2018-09-11 DIAGNOSIS — L40.8 LOCALIZED PUSTULAR PSORIASIS: ICD-10-CM

## 2018-09-11 PROCEDURE — G0463 HOSPITAL OUTPT CLINIC VISIT: HCPCS | Mod: ZF

## 2018-09-11 PROCEDURE — 87798 DETECT AGENT NOS DNA AMP: CPT | Performed by: STUDENT IN AN ORGANIZED HEALTH CARE EDUCATION/TRAINING PROGRAM

## 2018-09-11 PROCEDURE — 87529 HSV DNA AMP PROBE: CPT | Performed by: STUDENT IN AN ORGANIZED HEALTH CARE EDUCATION/TRAINING PROGRAM

## 2018-09-11 RX ORDER — ACYCLOVIR 400 MG/1
400 TABLET ORAL EVERY 12 HOURS
Qty: 60 TABLET | Refills: 2 | Status: SHIPPED | OUTPATIENT
Start: 2018-09-11 | End: 2018-11-16

## 2018-09-11 ASSESSMENT — PAIN SCALES - GENERAL: PAINLEVEL: NO PAIN (0)

## 2018-09-11 NOTE — TELEPHONE ENCOUNTER
[facility]    Phone Message    May a detailed message be left on voicemail: yes    Reason for Call: Other: Pt was told to come in when blisters starts showing up. She would like to come in today if possible. Next available appt 10/02, pt said no bc that's too far out.       Action Taken: Message routed to:  [facility] ([facility] ID

## 2018-09-11 NOTE — PROGRESS NOTES
Transplant Infectious Disease clinic follow up Note  09/11/2018    ASSESSMENT;      The patient is a 61-year-old female with PMH of HTN, HLD, obesity, living related kidney transplant secondary to IgA nephropathy in 1999, currently on CellCept 500 bid and prednisone 5 daily; low grade EBV viremia; CKD.  She is originally from [facility].     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 Confirmed today on exam. Sent pustule fluid for HSV and VZV PCR and started the patient on acyclovir 800 mg bid x 5 days for HSV reactivation followed by 400 mg bid x 3 months for chronic suppression (due to frequent recurrence every 1-2 months).   On review or records, dermatology had diagnosed her with pustular psoriasis in 2016. They had prescribed steroid cream. I am not sure if patient tried it. Will await viral PCR tests and response to acyclovir. If not viral etiology then will need to send her to dermatology again.     EBV viremia: low grade, last check in 6/2017 6500. No evidence of PTLD on CT     PLAN:  - Sent pustule fluid for HSV and VZV PCR  - Start acyclovir 800 mg bid x 5 days for genital herpes reactivation followed 400 mg bid x 3 months for chronic suppression (due to frequent recurrence every 1-2 months)  - Second dose of shingrix and flu vaccination after 1 month    RTC in 3 months. Will have to refill acyclovir for chronic suppression.      Serostatus: unknown   Immunizations: up to date except for second dose of shingrix vaccine and flu vaccine in one month  Prophylaxis - was on keflex UTI prophylaxis Dec 2017 to Aug 2018     Face to face time 25 mins. >50% spent coordinating care and counseling on my impressions and plan going forward.     Iwona Shah  , [facility]  Division of Infectious Disease, Department of Internal Medicine  Pager  [phone]    ----------------------------------------------------    Interval events: Last seen 12 days ago. She has new blisters in the buttock area for the past 3 days. Hence is here to have that evaluated as an add on. The blisters are very itchy as usual. They have not progressed to develop urinary symptoms yet. Patient reports having stopped keflex antibiotic.   On review of medical records, I see that she saw dermatology here in 2013 and 2016 for the rash in buttock and was diagnosed with pustular psoriasis due to family history and concurrent umbilical pustules.       8/31/18  Last seen by my colleague 6/2017 for low grade EBV viremia without PTLD. She is here to see me for recurrent UTIs, referred by Dr. Sexton    HISTORY OF PRESENT ILLNESS:  The patient is a 61-year-old female with PMH of HTN, HLD, obesity, living related kidney transplant secondary to IgA nephropathy in 1999, currently on CellCept 500 bid and prednisone 5 daily; low grade EBV viremia; CKD.  She is originally from [facility].     She reports recurrent UTIs for the past 3-5 years, every 2 months. It first starts with extreme itchiness in the back in the buttocks with blisters and then develops  frequency of urination, burning, feels the sensation to go but not much comes out. She submits a urine sample and then starts taking an abx. A week into it the symptoms resolve and recur 2 months later.     She has seen Urology for this and had a cystoscopy in June which was normal. She has been on keflex prophylaxis since Dec 2017 but she does not think that has helped. She has been prescribed vaginal estrogen cream but has not filled the prescription.     Her abx history includes usually 2 week treatment of     augmentin in July 2018  levaquin in May 2018  Cefpodoxime in Jan 2018  augmentin Nov 2017   cefpodoxime March,Aug and sep 2017        PAST MEDICAL HISTORY:    Past Medical History:   Diagnosis Date     Anatomical narrow angle      Anemia       "Gout      HTN (hypertension)      Hyperlipidaemia      IgA nephropathy      Immunosuppressed status (H)     Prednisone and cellcept     Nonsenile cataract      Obstructive sleep apnea         Immunization History   Administered Date(s) Administered     Hepatitis A Immunity: Titer/MD Jasso 11/26/2015     Influenza (IIV3) PF 11/01/2005, 10/29/2007, 11/03/2008, 11/02/2009, 11/17/2010, 12/11/2012, 09/30/2014, 10/27/2015     Influenza Vaccine IM 3yrs+ 4 Valent IIV4 10/03/2013, 10/02/2016, 09/20/2017     Pneumo Conj 13-V (2010&after) 12/02/2014     Pneumococcal 23 valent 09/03/2014     TDAP Vaccine (Boostrix) 12/11/2012     Zoster vaccine recombinant adjuvanted (SHINGRIX) 08/31/2018     Has had chicken pox as a kid     MEDICATIONS:    Current Outpatient Prescriptions   Medication     acyclovir (ZOVIRAX) 400 MG tablet     allopurinol (ZYLOPRIM) 100 MG tablet     artificial saliva (BIOTENE MT) AERS spray     atorvastatin (LIPITOR) 20 MG tablet     Blood Pressure Monitor KIT     calcium citrate-vitamin D (CITRACAL) 315-250 MG-UNIT TABS per tablet     carvedilol (COREG) 6.25 MG tablet     CELLCEPT (BRAND) 250 MG CAPSULE     cephalexin (KEFLEX) 250 MG capsule     cloNIDine (CATAPRES) 0.1 MG tablet     COMPOUNDED NON-CONTROLLED SUBSTANCE (CMPD RX) - PHARMACY TO MIX COMPOUNDED MEDICATION     darbepoetin bucky (ARANESP, ALBUMIN FREE,) 300 MCG/0.6ML injection     folic acid (FOLVITE) 1 MG tablet     hydrALAZINE (APRESOLINE) 25 MG tablet     hydrocortisone (CORTAID) 1 % cream     NIFEdipine ER osmotic (PROCARDIA XL) 60 MG TB24     pantoprazole (PROTONIX) 40 MG EC tablet     predniSONE (DELTASONE) 5 MG tablet     sodium bicarbonate 650 MG tablet     No current facility-administered medications for this visit.          SOCIAL HISTORY:  Reviewed.      FAMILY HISTORY:  Reviewed.      PHYSICAL EXAMINATION:   /77  Pulse 100  Temp 98.2  F (36.8  C) (Oral)  Ht 1.575 m (5' 2\")  SpO2 100%    Constitutional: Patient in no " distress  Eyes: not pale, not jaundiced  Neck: no lymphadenopathy  CVS: no added sounds  RS: clear  Abdomen: soft, BS+  Skin: exam of buttock with lots of scars   3 pustules just above the buttock little left to the midline. Excoriation on the right buttock present - likely deroofed pustule from scratching  Extremities: no pedal edema  Psych: Alert and oriented x 3      LABORATORY WORK AND IMAGING:     CBC RESULTS:   Recent Labs   Lab Test  08/31/18   1347   03/01/18   1548   WBC   --    --   6.1   RBC   --    --   3.86   HGB  8.7*   < >  9.1*   HCT  29.7*   < >  30.8*   MCV   --    --   80   MCH   --    --   23.6*   MCHC   --    --   29.5*   RDW   --    --   17.2*   PLT   --    --   243    < > = values in this interval not displayed.         Last Comprehensive Metabolic Panel:  Sodium   Date Value Ref Range Status   07/10/2018 140 133 - 144 mmol/L Final     Potassium   Date Value Ref Range Status   07/10/2018 4.4 3.4 - 5.3 mmol/L Final     Chloride   Date Value Ref Range Status   07/10/2018 108 94 - 109 mmol/L Final     Carbon Dioxide   Date Value Ref Range Status   07/10/2018 20 20 - 32 mmol/L Final     Anion Gap   Date Value Ref Range Status   07/10/2018 12 3 - 14 mmol/L Final     Glucose   Date Value Ref Range Status   07/10/2018 105 (H) 70 - 99 mg/dL Final     Urea Nitrogen   Date Value Ref Range Status   07/10/2018 57 (H) 7 - 30 mg/dL Final     Creatinine   Date Value Ref Range Status   07/30/2018 1.96 (H) 0.52 - 1.04 mg/dL Final     GFR Estimate   Date Value Ref Range Status   07/30/2018 26 (L) >60 mL/min/1.7m2 Final     Comment:     Non  GFR Calc     Calcium   Date Value Ref Range Status   07/10/2018 7.9 (L) 8.5 - 10.1 mg/dL Final     Liver Function Studies -   Recent Labs   Lab Test  07/10/18   1224  05/03/18   1233   10/01/16   0815   PROTTOTAL   --    --    --   7.5   ALBUMIN  3.1*  3.3*   < >  3.2*   BILITOTAL   --    --    --   0.4   ALKPHOS   --   77   --   137   AST   --    --    --    28   ALT   --    --    --   13    < > = values in this interval not displayed.         Results for ISABELLE VAZQUEZ (MRN [id number]) as of 8/31/2018 15:24   Ref. Range 5/16/2018 09:57 7/10/2018 12:27 7/30/2018 10:14 8/7/2018 14:41   Color Urine Unknown Yellow Yellow Yellow Yellow   Appearance Urine Unknown Clear Cloudy Slightly Cloudy Slightly Cloudy   Glucose Urine Latest Ref Range: NEG^Negative mg/dL Negative Negative Negative Negative   Bilirubin Urine Latest Ref Range: NEG^Negative  Negative Negative Negative Negative   Ketones Urine Latest Ref Range: NEG^Negative mg/dL Negative Negative Negative Negative   Specific Gravity Urine Latest Ref Range: 1.003 - 1.035  1.011 1.011 1.012 1.012   pH Urine Latest Ref Range: 5.0 - 7.0 pH 5.0 5.0 5.0 5.0   Protein Albumin Urine Latest Ref Range: NEG^Negative mg/dL >499 (A) 100 (A) >499 (A) 100 (A)   Urobilinogen mg/dL Latest Ref Range: 0.0 - 2.0 mg/dL 0.0 0.0 0.0 0.0   Nitrite Urine Latest Ref Range: NEG^Negative  Negative Negative Negative Negative   Blood Urine Latest Ref Range: NEG^Negative  Small (A) Small (A) Small (A) Negative   Leukocyte Esterase Urine Latest Ref Range: NEG^Negative  Small (A) Large (A) Large (A) Moderate (A)   Source Unknown Midstream Urine Midstream Urine Midstream Urine Unspecified Urine   WBC Urine Latest Ref Range: 0 - 5 /HPF 29 (H) >182 (H) 50 (H) 106 (H)   RBC Urine Latest Ref Range: 0 - 2 /HPF 4 (H) 1 2 2   Bacteria Urine Latest Ref Range: NEG^Negative /HPF Few (A) Few (A)  Few (A)   WBC Clumps Latest Ref Range: NEG^Negative /HPF Present (A) Present (A) Present (A) Present (A)   Squamous Epithelial /HPF Urine Latest Ref Range: 0 - 1 /HPF <1 <1 1    Transitional Epi Latest Ref Range: FEW^Few /HPF  <1 (A)     Mucous Urine Latest Ref Range: NEG^Negative /LPF Present (A)   Present (A)       Urine cultures   8/7/18 <10k urogenital samir  7/30/18 50-100k klebsiella pneumoniae, 10-50k morganella  7/10 >100k Klebsiella pneumoniae  6/5 <10k urogenital  samir  5/16 ,,  5/3 ,,  1/11 >100k E.coli , 50-100k Enterobacter aerogenes  12/19/17 >100k E.coli   8/2017 .100k E.coli  3/2017 - 50-100k E.coli     Imaging   6/22/17 CT chest/abdomen/pelvis        Impression: In this patient with Abiel-Barr virus viremia, there are  no CT findings to suggest PTLD.

## 2018-09-11 NOTE — MR AVS SNAPSHOT
After Visit Summary   9/11/2018    Eleni Logan    MRN: [id number]           Patient Information     Date Of Birth          1955        Visit Information        Provider Department      9/11/2018 3:30 PM Iwona Shah MD [facility] and Infectious Diseases        Today's Diagnoses     Herpes simplex infection of perianal skin    -  1    S/P kidney transplant        Localized pustular psoriasis           Follow-ups after your visit        Your next 10 appointments already scheduled     Nov 08, 2018  9:05 AM CST   (Arrive by 8:50 AM)   Return Visit with Haleigh Rodriguez MD   [facility] ([facility])    [address]  4th Floor  [place] [id number] [phone]            Nov 15, 2018  1:00 PM CST   (Arrive by 12:45 PM)   Office Visit with Milton Choi [facility] ([facility])    [address] [place] [address]  [address] [facility] [id number] [phone]           Bring a current list of meds and any records pertaining to this visit. For Physicals, please bring immunization records and any forms needing to be filled out. Please arrive 10 minutes early to complete paperwork.            Nov 15, 2018  2:35 PM CST   (Arrive by 2:05 PM)   Return Kidney Transplant with Uc Kidney/Pancreas Recipient 1   [facility] Nephrology ([facility])    [address]  [address]  [place] [address]   [phone]            Nov 28, 2018  2:30 PM CST   (Arrive by 2:15 PM)   RETURN ENDOCRINE with Ana Pollack MD   [facility] Endocrinology ([facility])    [address] [place] [address]  [address] [facility] [id number] [phone]            Dec 11, 2018  2:00 PM CST   (Arrive by 1:45 PM)   Return Visit with Iwona Shah MD   [facility] and Infectious Diseases ([facility]  "Surgery Center)    [address]  [address]  [place] [id number] [phone]            Dec 12, 2018  9:45 AM CST   (Arrive by 9:30 AM)   Return Visit with Lynnette Garcia MD   [facility] Urologic Cancers ([facility])    [address]  4th Floor  [place] [id number] [phone]              Who to contact     If you have questions or need follow up information about today's clinic visit or your schedule please contact [facility] AND INFECTIOUS DISEASES directly at [phone].  Normal or non-critical lab and imaging results will be communicated to you by MyChart, letter or phone within 4 business days after the clinic has received the results. If you do not hear from us within 7 days, please contact the clinic through MyChart or phone. If you have a critical or abnormal lab result, we will notify you by phone as soon as possible.  Submit refill requests through [employer] or call your pharmacy and they will forward the refill request to us. Please allow 3 business days for your refill to be completed.          Additional Information About Your Visit        Care EveryWhere ID     This is your Care EveryWhere ID. This could be used by other organizations to access your [facility] medical records  [id number]        Your Vitals Were     Pulse Temperature Height Pulse Oximetry          100 98.2  F (36.8  C) (Oral) 1.575 m (5' 2\") 100%         Blood Pressure from Last 3 Encounters:   09/11/18 163/77   08/31/18 117/69   08/07/18 (!) 148/95    Weight from Last 3 Encounters:   08/31/18 92 kg (202 lb 14.4 oz)   08/07/18 90.2 kg (198 lb 12.8 oz)   06/13/18 93 kg (205 lb)              We Performed the Following     Herpes simplex virus by PCR     Varicella Zoster DNA PCR CSF or Skin Swab          Today's Medication Changes          These changes are accurate as of 9/11/18  4:30 PM.  If you have any questions, ask your nurse or " doctor.               Start taking these medicines.        Dose/Directions    acyclovir 400 MG tablet   Commonly known as:  ZOVIRAX   Used for:  Herpes simplex infection of perianal skin   Started by:  Iwona Shah MD        Dose:  400 mg   Take 1 tablet (400 mg) by mouth every 12 hours   Quantity:  60 tablet   Refills:  2            Where to get your medicines      These medications were sent to [facility] - [place], [place] - [address]  [address], [place] [address]    Hours:  TRANSPLANT PHONE NUMBER [phone] Phone:  [phone]     acyclovir 400 MG tablet                Primary Care Provider Office Phone # Fax #    Preethi Gibson [phone]       [facility] [address]  [place] [address]        Equal Access to Services     GERBER DAVENPORT : Hadii aad ku hadasho Sodioni, waaxda luqadaha, qaybta kaalmada adehayderyada, clare garrison. So [facility] [phone].    ATENCIÓN: Si habla español, tiene a stoner disposición servicios gratuitos de asistencia lingüística. Celine al [phone].    We comply with applicable federal civil rights laws and [place] laws. We do not discriminate on the basis of race, color, national origin, age, disability, sex, sexual orientation, or gender identity.            Thank you!     Thank you for choosing [facility] AND INFECTIOUS DISEASES  for your care. Our goal is always to provide you with excellent care. Hearing back from our patients is one way we can continue to improve our services. Please take a few minutes to complete the written survey that you may receive in the mail after your visit with us. Thank you!             Your Updated Medication List - Protect others around you: Learn how to safely use, store and throw away your medicines at www.disposemymeds.org.          This list is accurate as of 9/11/18  4:30 PM.  Always use your most  recent med list.                   Brand Name Dispense Instructions for use Diagnosis    acyclovir 400 MG tablet    ZOVIRAX    60 tablet    Take 1 tablet (400 mg) by mouth every 12 hours    Herpes simplex infection of perianal skin       allopurinol 100 MG tablet    ZYLOPRIM    90 tablet    Take 1 tablet (100 mg) by mouth daily    Chronic gout without tophus, unspecified cause, unspecified site       artificial saliva Aers spray     1 Bottle    Take 1 spray by mouth every 6 hours as needed for dry mouth    Dry mouth       atorvastatin 20 MG tablet    LIPITOR    90 tablet    Take 1 tablet (20 mg) by mouth daily    Hypercholesteremia       Blood Pressure Monitor Kit     1 kit    Automatic Blood Pressure Monitor    Renovascular hypertension       calcium citrate-vitamin D 315-250 MG-UNIT Tabs per tablet    CITRACAL    180 tablet    Take 1 tablet by mouth 2 times daily    Osteoporosis, unspecified osteoporosis type, unspecified pathological fracture presence       carvedilol 6.25 MG tablet    COREG    360 tablet    Take 2 tablets (12.5 mg) by mouth 2 times daily (with meals)    Hypertension secondary to other renal disorders       cephalexin 250 MG capsule    KEFLEX    90 capsule    Take 1 capsule (250 mg) by mouth At Bedtime    Urinary frequency, Urinary tract infection       cloNIDine 0.1 MG tablet    CATAPRES    60 tablet    Take 1 tablet (0.1 mg) by mouth 2 times daily as needed For systolic BP >180    HTN (hypertension)       COMPOUNDED NON-CONTROLLED SUBSTANCE - PHARMACY TO MIX COMPOUNDED MEDICATION    CMPD RX    30 g    Estriol 1 mg/g Apply small amount to finger and apply to inside vagina daily for 2 weeks then twice weekly Route: vaginally    Atrophic vaginitis       darbepoetin bucky 300 MCG/0.6ML injection    ARANESP (ALBUMIN FREE)    1.2 mL    Inject 0.6 mLs (300 mcg) Subcutaneous every 14 days As needed for hgb<10g/dL    CKD (chronic kidney disease) stage 4, GFR 15-29 ml/min (H), Anemia in stage 4 chronic  kidney disease (H)       folic acid 1 MG tablet    FOLVITE    90 tablet    Take 1 tablet (1 mg) by mouth daily    Preventive measure       hydrALAZINE 25 MG tablet    APRESOLINE    60 tablet    Take 1 tablet (25 mg) by mouth 2 times daily    Renovascular hypertension       hydrocortisone 1 % cream    CORTAID    60 g    Apply topically 2 times daily    Rash, skin       mycophenolate 250 MG capsule     120 capsule    Take 2 capsules (500 mg) by mouth 2 times daily    Kidney transplanted       NIFEdipine ER osmotic 60 MG Tb24    PROCARDIA XL    180 tablet    Take 1 tablet (60 mg) by mouth 2 times daily    Hypertension secondary to other renal disorders       pantoprazole 40 MG EC tablet    PROTONIX    30 tablet    Take 1 tablet (40 mg) by mouth daily    Gastroesophageal reflux disease with esophagitis       predniSONE 5 MG tablet    DELTASONE    30 tablet    Take 1 tablet (5 mg) by mouth daily    Kidney transplanted       sodium bicarbonate 650 MG tablet     180 tablet    Take 1 tablet (650 mg) by mouth 2 times daily    Acidosis

## 2018-09-11 NOTE — LETTER
9/11/2018    RE: Eleni Logan  [address]  [address]  [facility] [address]       Transplant Infectious Disease clinic follow up Note  09/11/2018    ASSESSMENT;      The patient is a 61-year-old female with PMH of HTN, HLD, obesity, living related kidney transplant secondary to IgA nephropathy in 1999, currently on CellCept 500 bid and prednisone 5 daily; low grade EBV viremia; CKD.  She is originally from [facility].     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 Confirmed today on exam. Sent pustule fluid for HSV and VZV PCR and started the patient on acyclovir 800 mg bid x 5 days for HSV reactivation followed by 400 mg bid x 3 months for chronic suppression (due to frequent recurrence every 1-2 months).   On review or records, dermatology had diagnosed her with pustular psoriasis in 2016. They had prescribed steroid cream. I am not sure if patient tried it. Will await viral PCR tests and response to acyclovir. If not viral etiology then will need to send her to dermatology again.     EBV viremia: low grade, last check in 6/2017 6500. No evidence of PTLD on CT     PLAN:  - Sent pustule fluid for HSV and VZV PCR  - Start acyclovir 800 mg bid x 5 days for genital herpes reactivation followed 400 mg bid x 3 months for chronic suppression (due to frequent recurrence every 1-2 months)  - Second dose of shingrix and flu vaccination after 1 month    RTC in 3 months. Will have to refill acyclovir for chronic suppression.      Serostatus: unknown   Immunizations: up to date except for second dose of shingrix vaccine and flu vaccine in one month  Prophylaxis - was on keflex UTI prophylaxis Dec 2017 to Aug 2018     Face to face time 25 mins. >50% spent coordinating care and counseling on my impressions and plan going forward.     Iwona Shah  , [employer]   [place]  Division of Infectious Disease, Department of Internal Medicine  Pager [phone]    ----------------------------------------------------    Interval events: Last seen 12 days ago. She has new blisters in the buttock area for the past 3 days. Hence is here to have that evaluated as an add on. The blisters are very itchy as usual. They have not progressed to develop urinary symptoms yet. Patient reports having stopped keflex antibiotic.   On review of medical records, I see that she saw dermatology here in 2013 and 2016 for the rash in buttock and was diagnosed with pustular psoriasis due to family history and concurrent umbilical pustules.       8/31/18  Last seen by my colleague 6/2017 for low grade EBV viremia without PTLD. She is here to see me for recurrent UTIs, referred by Dr. Sexton    HISTORY OF PRESENT ILLNESS:  The patient is a 61-year-old female with PMH of HTN, HLD, obesity, living related kidney transplant secondary to IgA nephropathy in 1999, currently on CellCept 500 bid and prednisone 5 daily; low grade EBV viremia; CKD.  She is originally from [facility].     She reports recurrent UTIs for the past 3-5 years, every 2 months. It first starts with extreme itchiness in the back in the buttocks with blisters and then develops  frequency of urination, burning, feels the sensation to go but not much comes out. She submits a urine sample and then starts taking an abx. A week into it the symptoms resolve and recur 2 months later.     She has seen Urology for this and had a cystoscopy in June which was normal. She has been on keflex prophylaxis since Dec 2017 but she does not think that has helped. She has been prescribed vaginal estrogen cream but has not filled the prescription.     Her abx history includes usually 2 week treatment of     augmentin in July 2018  levaquin in May 2018  Cefpodoxime in Jan 2018  augmentin Nov 2017   cefpodoxime March,Aug and sep 2017        PAST MEDICAL HISTORY:     Past Medical History:   Diagnosis Date     Anatomical narrow angle      Anemia      Gout      HTN (hypertension)      Hyperlipidaemia      IgA nephropathy      Immunosuppressed status (H)     Prednisone and cellcept     Nonsenile cataract      Obstructive sleep apnea         Immunization History   Administered Date(s) Administered     Hepatitis A Immunity: Isha/MD Dx 11/26/2015     Influenza (IIV3) PF 11/01/2005, 10/29/2007, 11/03/2008, 11/02/2009, 11/17/2010, 12/11/2012, 09/30/2014, 10/27/2015     Influenza Vaccine IM 3yrs+ 4 Valent IIV4 10/03/2013, 10/02/2016, 09/20/2017     Pneumo Conj 13-V (2010&after) 12/02/2014     Pneumococcal 23 valent 09/03/2014     TDAP Vaccine (Boostrix) 12/11/2012     Zoster vaccine recombinant adjuvanted (SHINGRIX) 08/31/2018     Has had chicken pox as a kid     MEDICATIONS:    Current Outpatient Prescriptions   Medication     acyclovir (ZOVIRAX) 400 MG tablet     allopurinol (ZYLOPRIM) 100 MG tablet     artificial saliva (BIOTENE MT) AERS spray     atorvastatin (LIPITOR) 20 MG tablet     Blood Pressure Monitor KIT     calcium citrate-vitamin D (CITRACAL) 315-250 MG-UNIT TABS per tablet     carvedilol (COREG) 6.25 MG tablet     CELLCEPT (BRAND) 250 MG CAPSULE     cephalexin (KEFLEX) 250 MG capsule     cloNIDine (CATAPRES) 0.1 MG tablet     COMPOUNDED NON-CONTROLLED SUBSTANCE (CMPD RX) - PHARMACY TO MIX COMPOUNDED MEDICATION     darbepoetin bucky (ARANESP, ALBUMIN FREE,) 300 MCG/0.6ML injection     folic acid (FOLVITE) 1 MG tablet     hydrALAZINE (APRESOLINE) 25 MG tablet     hydrocortisone (CORTAID) 1 % cream     NIFEdipine ER osmotic (PROCARDIA XL) 60 MG TB24     pantoprazole (PROTONIX) 40 MG EC tablet     predniSONE (DELTASONE) 5 MG tablet     sodium bicarbonate 650 MG tablet     No current facility-administered medications for this visit.          SOCIAL HISTORY:  Reviewed.      FAMILY HISTORY:  Reviewed.      PHYSICAL EXAMINATION:   /77  Pulse 100  Temp 98.2  F (36.8  C)  "(Oral)  Ht 1.575 m (5' 2\")  SpO2 100%    Constitutional: Patient in no distress  Eyes: not pale, not jaundiced  Neck: no lymphadenopathy  CVS: no added sounds  RS: clear  Abdomen: soft, BS+  Skin: exam of buttock with lots of scars   3 pustules just above the buttock little left to the midline. Excoriation on the right buttock present - likely deroofed pustule from scratching  Extremities: no pedal edema  Psych: Alert and oriented x 3      LABORATORY WORK AND IMAGING:     CBC RESULTS:   Recent Labs   Lab Test  08/31/18   1347   03/01/18   1548   WBC   --    --   6.1   RBC   --    --   3.86   HGB  8.7*   < >  9.1*   HCT  29.7*   < >  30.8*   MCV   --    --   80   MCH   --    --   23.6*   MCHC   --    --   29.5*   RDW   --    --   17.2*   PLT   --    --   243    < > = values in this interval not displayed.         Last Comprehensive Metabolic Panel:  Sodium   Date Value Ref Range Status   07/10/2018 140 133 - 144 mmol/L Final     Potassium   Date Value Ref Range Status   07/10/2018 4.4 3.4 - 5.3 mmol/L Final     Chloride   Date Value Ref Range Status   07/10/2018 108 94 - 109 mmol/L Final     Carbon Dioxide   Date Value Ref Range Status   07/10/2018 20 20 - 32 mmol/L Final     Anion Gap   Date Value Ref Range Status   07/10/2018 12 3 - 14 mmol/L Final     Glucose   Date Value Ref Range Status   07/10/2018 105 (H) 70 - 99 mg/dL Final     Urea Nitrogen   Date Value Ref Range Status   07/10/2018 57 (H) 7 - 30 mg/dL Final     Creatinine   Date Value Ref Range Status   07/30/2018 1.96 (H) 0.52 - 1.04 mg/dL Final     GFR Estimate   Date Value Ref Range Status   07/30/2018 26 (L) >60 mL/min/1.7m2 Final     Comment:     Non  GFR Calc     Calcium   Date Value Ref Range Status   07/10/2018 7.9 (L) 8.5 - 10.1 mg/dL Final     Liver Function Studies -   Recent Labs   Lab Test  07/10/18   1224  05/03/18   1233   10/01/16   0815   PROTTOTAL   --    --    --   7.5   ALBUMIN  3.1*  3.3*   < >  3.2*   BILITOTAL   --   "  --    --   0.4   ALKPHOS   --   77   --   137   AST   --    --    --   28   ALT   --    --    --   13    < > = values in this interval not displayed.         Results for ISABELLE VAZQUEZ (MRN [id number]) as of 8/31/2018 15:24   Ref. Range 5/16/2018 09:57 7/10/2018 12:27 7/30/2018 10:14 8/7/2018 14:41   Color Urine Unknown Yellow Yellow Yellow Yellow   Appearance Urine Unknown Clear Cloudy Slightly Cloudy Slightly Cloudy   Glucose Urine Latest Ref Range: NEG^Negative mg/dL Negative Negative Negative Negative   Bilirubin Urine Latest Ref Range: NEG^Negative  Negative Negative Negative Negative   Ketones Urine Latest Ref Range: NEG^Negative mg/dL Negative Negative Negative Negative   Specific Gravity Urine Latest Ref Range: 1.003 - 1.035  1.011 1.011 1.012 1.012   pH Urine Latest Ref Range: 5.0 - 7.0 pH 5.0 5.0 5.0 5.0   Protein Albumin Urine Latest Ref Range: NEG^Negative mg/dL >499 (A) 100 (A) >499 (A) 100 (A)   Urobilinogen mg/dL Latest Ref Range: 0.0 - 2.0 mg/dL 0.0 0.0 0.0 0.0   Nitrite Urine Latest Ref Range: NEG^Negative  Negative Negative Negative Negative   Blood Urine Latest Ref Range: NEG^Negative  Small (A) Small (A) Small (A) Negative   Leukocyte Esterase Urine Latest Ref Range: NEG^Negative  Small (A) Large (A) Large (A) Moderate (A)   Source Unknown Midstream Urine Midstream Urine Midstream Urine Unspecified Urine   WBC Urine Latest Ref Range: 0 - 5 /HPF 29 (H) >182 (H) 50 (H) 106 (H)   RBC Urine Latest Ref Range: 0 - 2 /HPF 4 (H) 1 2 2   Bacteria Urine Latest Ref Range: NEG^Negative /HPF Few (A) Few (A)  Few (A)   WBC Clumps Latest Ref Range: NEG^Negative /HPF Present (A) Present (A) Present (A) Present (A)   Squamous Epithelial /HPF Urine Latest Ref Range: 0 - 1 /HPF <1 <1 1    Transitional Epi Latest Ref Range: FEW^Few /HPF  <1 (A)     Mucous Urine Latest Ref Range: NEG^Negative /LPF Present (A)   Present (A)       Urine cultures   8/7/18 <10k urogenital samir  7/30/18 50-100k klebsiella pneumoniae,  10-50k morganella  7/10 >100k Klebsiella pneumoniae  6/5 <10k urogenital samir  5/16 ,,  5/3 ,,  1/11 >100k E.coli , 50-100k Enterobacter aerogenes  12/19/17 >100k E.coli   8/2017 .100k E.coli  3/2017 - 50-100k E.coli     Imaging   6/22/17 CT chest/abdomen/pelvis        Impression: In this patient with Abiel-Barr virus viremia, there are  no CT findings to suggest PTLD.       Iwona Shah MD

## 2018-09-12 LAB
HSV1 DNA SPEC QL NAA+PROBE: NEGATIVE
HSV2 DNA SPEC QL NAA+PROBE: POSITIVE
SPECIMEN SOURCE: ABNORMAL
SPECIMEN TYPE: NORMAL
VARICELLA ZOSTER DNA PCR COMMENT: NORMAL
VZV DNA SPEC QL NAA+PROBE: NORMAL

## 2018-09-14 ENCOUNTER — TELEPHONE (OUTPATIENT)
Dept: PHARMACY | Facility: CLINIC | Age: 63
End: 2018-09-14

## 2018-09-14 NOTE — TELEPHONE ENCOUNTER
Follow-up with anemia management service:    Due for Hgb, possible Aranesp, states will probably go in next Monday or Tuesday.    Anemia Latest Ref Rng & Units 2018 2018 7/10/2018 2018 2018 8/15/2018 2018   HGB Goal - - - - - - - -   VALARIE Dose - 300 mcg 300 mcg - 300 mcg 300 mcg 300 mcg 300 mcg   Hemoglobin 11.7 - 15.7 g/dL - 8.0(L) 7.6(L) - 8.5(L) 8.7(L) 8.7(L)   TSAT 15 - 46 % - 29 - - 37 38 -   Ferritin 8 - 252 ng/mL - 1232(H) - - 1104(H) 1017(H) -   PRBCs - - - - - - - -       Orders needed to be renewed (for next follow-up date) in EPIC: None   Med order expires: 2019   Lab orders : 2019    Follow-up call date: 2018    [facility]    Anemia Management Service  Shea Sotomayor,PharmD and Preethi Akers CPhT  Phone: [phone]  Fax: [phone]

## 2018-09-18 NOTE — TELEPHONE ENCOUNTER
I spoke to Eleni, she was unable to go in for labs on 9/18 as previously discussed.  She will go in on 9/19/18    Next follow up date:  9/19/18    Bharti Aekrs, [facility]  Anemia Clinic  [phone]

## 2018-09-19 ENCOUNTER — TELEPHONE (OUTPATIENT)
Dept: PHARMACY | Facility: CLINIC | Age: 63
End: 2018-09-19

## 2018-09-19 DIAGNOSIS — Z48.298 AFTERCARE FOLLOWING ORGAN TRANSPLANT: ICD-10-CM

## 2018-09-19 DIAGNOSIS — D64.9 ANEMIA: ICD-10-CM

## 2018-09-19 DIAGNOSIS — N18.4 CKD (CHRONIC KIDNEY DISEASE) STAGE 4, GFR 15-29 ML/MIN (H): ICD-10-CM

## 2018-09-19 LAB
ALBUMIN SERPL-MCNC: 3.3 G/DL (ref 3.4–5)
ALBUMIN UR-MCNC: 100 MG/DL
ANION GAP SERPL CALCULATED.3IONS-SCNC: 9 MMOL/L (ref 3–14)
APPEARANCE UR: ABNORMAL
BACTERIA #/AREA URNS HPF: ABNORMAL /HPF
BILIRUB UR QL STRIP: NEGATIVE
BUN SERPL-MCNC: 56 MG/DL (ref 7–30)
CALCIUM SERPL-MCNC: 8.1 MG/DL (ref 8.5–10.1)
CHLORIDE SERPL-SCNC: 106 MMOL/L (ref 94–109)
CO2 SERPL-SCNC: 23 MMOL/L (ref 20–32)
COLOR UR AUTO: YELLOW
CREAT SERPL-MCNC: 2.42 MG/DL (ref 0.52–1.04)
FERRITIN SERPL-MCNC: 1038 NG/ML (ref 8–252)
GFR SERPL CREATININE-BSD FRML MDRD: 20 ML/MIN/1.7M2
GLUCOSE SERPL-MCNC: 109 MG/DL (ref 70–99)
GLUCOSE UR STRIP-MCNC: NEGATIVE MG/DL
HCT VFR BLD AUTO: 30.3 % (ref 35–47)
HGB BLD-MCNC: 9.2 G/DL (ref 11.7–15.7)
HGB UR QL STRIP: NEGATIVE
HYALINE CASTS #/AREA URNS LPF: 3 /LPF (ref 0–2)
IRON SATN MFR SERPL: 27 % (ref 15–46)
IRON SERPL-MCNC: 55 UG/DL (ref 35–180)
KETONES UR STRIP-MCNC: NEGATIVE MG/DL
LEUKOCYTE ESTERASE UR QL STRIP: ABNORMAL
NITRATE UR QL: NEGATIVE
PH UR STRIP: 5 PH (ref 5–7)
PHOSPHATE SERPL-MCNC: 3.9 MG/DL (ref 2.5–4.5)
POTASSIUM SERPL-SCNC: 4 MMOL/L (ref 3.4–5.3)
RBC #/AREA URNS AUTO: 6 /HPF (ref 0–2)
SODIUM SERPL-SCNC: 138 MMOL/L (ref 133–144)
SOURCE: ABNORMAL
SP GR UR STRIP: 1.01 (ref 1–1.03)
SQUAMOUS #/AREA URNS AUTO: 1 /HPF (ref 0–1)
TIBC SERPL-MCNC: 205 UG/DL (ref 240–430)
TRANS CELLS #/AREA URNS HPF: <1 /HPF
UROBILINOGEN UR STRIP-MCNC: 0 MG/DL (ref 0–2)
WBC #/AREA URNS AUTO: >182 /HPF (ref 0–5)
WBC CLUMPS #/AREA URNS HPF: PRESENT /HPF

## 2018-09-19 PROCEDURE — 87088 URINE BACTERIA CULTURE: CPT | Performed by: INTERNAL MEDICINE

## 2018-09-19 PROCEDURE — 87086 URINE CULTURE/COLONY COUNT: CPT | Performed by: INTERNAL MEDICINE

## 2018-09-19 PROCEDURE — 87186 SC STD MICRODIL/AGAR DIL: CPT | Performed by: INTERNAL MEDICINE

## 2018-09-19 NOTE — TELEPHONE ENCOUNTER
Anemia Management Note  SUBJECTIVE/OBJECTIVE:  Referred by Dr Tyshawn Sexton February 10, 2017  Primary Diagnosis: Anemia in Chronic Kidney Disease (N18.3 D63.1)   Secondary Diagnosis: Chronic Kidney Disease, Stage III (N18.3)   Hgb goal range: 9-10  Epo/Darbo: Aranesp 300 mcg every 14 days - At home   RX will  on 2019  Iron regimen:  None  Lab orders : 19    Anemia Latest Ref Rng & Units 2018 7/10/2018 2018 2018 8/15/2018 2018 2018   HGB Goal - - - - - - - -   VALARIE Dose - 300 mcg - 300 mcg 300 mcg 300 mcg 300 mcg 300 mcg   Hemoglobin 11.7 - 15.7 g/dL 8.0(L) 7.6(L) - 8.5(L) 8.7(L) 8.7(L) 9.2(L)   TSAT 15 - 46 % 29 - - 37 38 - 27   Ferritin 8 - 252 ng/mL 1232(H) - - 1104(H) 1017(H) - 1038(H)   PRBCs - - - - - - - -     BP Readings from Last 3 Encounters:   18 163/77   18 117/69   18 (!) 148/95     Wt Readings from Last 2 Encounters:   18 202 lb 14.4 oz (92 kg)   18 198 lb 12.8 oz (90.2 kg)           ASSESSMENT:  Hgb: At goal - recommend dose  TSat: not at goal (>30%) but ferritin >1000ng/mL.  PO iron not indicated at this time per anemia protocol.   Ferritin: Elevated (>1000ng/mL)    PLAN:  Dose with aranesp and RTC for hgb then aranesp if needed in 2 week(s)    Orders needed to be renewed (for next follow-up date) in EPIC: None    Iron labs due:  10/17/18    Plan discussed with:  Eleni  Plan provided by:  Bharti Akers CPhT  Anemia Clinic  [phone]    NEXT FOLLOW-UP DATE:  10/3/18  Reviewed 2018 [facility]  Anemia Management Service  Shea Sotomayor,PharmD and Preethi Akers CPhT  Phone: [phone]  Fax: [phone]

## 2018-09-20 ENCOUNTER — CARE COORDINATION (OUTPATIENT)
Dept: NEPHROLOGY | Facility: CLINIC | Age: 63
End: 2018-09-20

## 2018-09-20 DIAGNOSIS — N39.0 URINARY TRACT INFECTION: Primary | ICD-10-CM

## 2018-09-21 LAB
BACTERIA SPEC CULT: ABNORMAL
SPECIMEN SOURCE: ABNORMAL

## 2018-09-21 RX ORDER — AMOXICILLIN AND CLAVULANATE POTASSIUM 500; 125 MG/1; MG/1
1 TABLET, FILM COATED ORAL 2 TIMES DAILY
Qty: 28 TABLET | Refills: 0 | Status: SHIPPED | OUTPATIENT
Start: 2018-09-21 | End: 2018-10-05

## 2018-09-21 NOTE — PROGRESS NOTES
Per Dr. Sexton:    augmentin 500-125 mg po bid for 14 days     Left detailed voicemail updating patient. Advised to call to discuss where to send rx.    Brittney Quinones RN

## 2018-09-21 NOTE — PROGRESS NOTES
Spoke with patient to assess for urinary symptoms. Denied any symptoms or concerns at this time. Update sent to team.    Brittney Quinones RN

## 2018-09-21 NOTE — PROGRESS NOTES
Rx sent to [facility] pharmacy, left detailed voicemail updating patient.    Brittney Quinones RN

## 2018-09-24 NOTE — PROGRESS NOTES
Patient called, confirmed message was received. She will start antibiotic today.    Brittney Quinones RN

## 2018-10-03 ENCOUNTER — TELEPHONE (OUTPATIENT)
Dept: PHARMACY | Facility: CLINIC | Age: 63
End: 2018-10-03

## 2018-10-03 NOTE — TELEPHONE ENCOUNTER
Follow-up with anemia management service:    I spoke to Eleni reminding her that she is due for Hgb lab and possibly an aranesp dose  She will go in for labs on 10/4    Anemia Latest Ref Rng & Units 2018 7/10/2018 2018 2018 8/15/2018 2018 2018   HGB Goal - - - - - - - -   VALARIE Dose - 300 mcg - 300 mcg 300 mcg 300 mcg 300 mcg 300 mcg   Hemoglobin 11.7 - 15.7 g/dL 8.0(L) 7.6(L) - 8.5(L) 8.7(L) 8.7(L) 9.2(L)   TSAT 15 - 46 % 29 - - 37 38 - 27   Ferritin 8 - 252 ng/mL 1232(H) - - 1104(H) 1017(H) - 1038(H)   PRBCs - - - - - - - -     Orders needed to be renewed (for next follow-up date) in EPIC: None   Med order expires: 2019   Lab orders : 2019  Follow-up call date: 10/04/18    Bharti Akers [facility]  Anemia Clinic  [phone]  Reviewed 10/03/2018 [facility]  Anemia Management Service  Shea Sotomayor,PharmD and Preethi Akers CPhT  Phone: [phone]  Fax: [phone]

## 2018-10-04 ENCOUNTER — TELEPHONE (OUTPATIENT)
Dept: PHARMACY | Facility: CLINIC | Age: 63
End: 2018-10-04

## 2018-10-04 DIAGNOSIS — N18.4 CKD (CHRONIC KIDNEY DISEASE) STAGE 4, GFR 15-29 ML/MIN (H): ICD-10-CM

## 2018-10-04 DIAGNOSIS — D64.9 ANEMIA: ICD-10-CM

## 2018-10-04 LAB
HCT VFR BLD AUTO: 33 % (ref 35–47)
HGB BLD-MCNC: 9.7 G/DL (ref 11.7–15.7)

## 2018-10-04 NOTE — TELEPHONE ENCOUNTER
Anemia Management Note  SUBJECTIVE/OBJECTIVE:  Referred by Dr Tyshawn Sexton February 10, 2017  Primary Diagnosis: Anemia in Chronic Kidney Disease (N18.3 D63.1)   Secondary Diagnosis: Chronic Kidney Disease, Stage III (N18.3)   Hgb goal range: 9-10  Epo/Darbo: Aranesp 300 mcg every 14 days - At home   RX will  on 2019  Iron regimen:  None  Lab orders : 19    Anemia Latest Ref Rng & Units 7/10/2018 2018 2018 8/15/2018 2018 2018 10/4/2018   HGB Goal - - - - - - - -   VALARIE Dose - - 300 mcg 300 mcg 300 mcg 300 mcg 300 mcg 300 mcg   Hemoglobin 11.7 - 15.7 g/dL 7.6(L) - 8.5(L) 8.7(L) 8.7(L) 9.2(L) 9.7(L)   TSAT 15 - 46 % - - 37 38 - 27 -   Ferritin 8 - 252 ng/mL - - 1104(H) 1017(H) - 1038(H) -   PRBCs - - - - - - - -     BP Readings from Last 3 Encounters:   18 163/77   18 117/69   18 (!) 148/95     Wt Readings from Last 2 Encounters:   18 202 lb 14.4 oz (92 kg)   18 198 lb 12.8 oz (90.2 kg)         ASSESSMENT:  Hgb: At goal - recommend dose  TSat: not at goal (>30%) but ferritin >1000ng/mL.  PO iron not indicated at this time per anemia protocol.   Ferritin: Elevated (>1000ng/mL)    PLAN:  Eleni will dose with aranesp and RTC for hgb, ferritin and iron labs then aranesp if needed in 2 week(s)    Orders needed to be renewed (for next follow-up date) in EPIC: None    Iron labs due:  10/18/18    Plan discussed with:  Eleni  Plan provided by:  Bharti Akers CP  Anemia Clinic  [phone]    NEXT FOLLOW-UP DATE:  10/18/18  Reviewed 10/08/2018 [facility]  Anemia Management Service  Shea Sotomayor,PharmD and Preethi Akers CPhT  Phone: [phone]  Fax: [phone]

## 2018-10-05 DIAGNOSIS — Z94.0 KIDNEY TRANSPLANTED: ICD-10-CM

## 2018-10-05 RX ORDER — PREDNISONE 5 MG/1
5 TABLET ORAL DAILY
Qty: 30 TABLET | Refills: 11 | Status: SHIPPED | OUTPATIENT
Start: 2018-10-05 | End: 2019-05-31

## 2018-10-08 NOTE — TELEPHONE ENCOUNTER
Patient called to request refill of prednisone. Informed her rx sent to pharmacy on 10/5. She will pick this up today.    Brittney Quinones RN

## 2018-10-15 ENCOUNTER — DOCUMENTATION ONLY (OUTPATIENT)
Dept: TRANSPLANT | Facility: CLINIC | Age: 63
End: 2018-10-15

## 2018-10-15 DIAGNOSIS — Z48.298 AFTERCARE FOLLOWING ORGAN TRANSPLANT: ICD-10-CM

## 2018-10-15 DIAGNOSIS — Z94.0 KIDNEY TRANSPLANTED: Primary | ICD-10-CM

## 2018-10-15 NOTE — LETTER
PHYSICIAN ORDERS    DATE & TIME ISSUED: October 15, 2018 9:47 PM  PATIENT NAME: Eleni Logan   : 1955     [facility] MR# [if applicable]: [id number]     DIAGNOSIS / ICD - 10 CODES    Kidney Transplanted (Z94.0)    After Care Following Organ Transplant (Z48.298)    Long Term Use of Medication (Z79.899)    Complications Kidney Transplant (T86.10)      Please complete the following labs:    Every 3 Months    Basic Metabolic panel    Complete Blood Count    Every 6 months    Protein Random Urine with creatinine ratio      Patient should release information to the [facility].   Please fax to the Transplant Center at [phone].  Any questions please call [phone].        Tyshawn Sexton MD

## 2018-10-16 NOTE — PROGRESS NOTES
Chart Prep    Clinic Visit on: 11/15/18    Last lab completed:  9/19/18    Lab letter updated: 10/15/18    Lab orders up to date in Epic.

## 2018-10-18 ENCOUNTER — TELEPHONE (OUTPATIENT)
Dept: PHARMACY | Facility: CLINIC | Age: 63
End: 2018-10-18

## 2018-10-18 NOTE — TELEPHONE ENCOUNTER
Follow-up with anemia management service:    I spoke to Eleni reminding her that she is due for hgb, ferritin and iron labs then aranesp. She will go in for labs on 10/19 or 10/22/18    Anemia Latest Ref Rng & Units 7/10/2018 2018 2018 8/15/2018 2018 2018 10/4/2018   HGB Goal - - - - - - - -   VALARIE Dose - - 300 mcg 300 mcg 300 mcg 300 mcg 300 mcg 300 mcg   Hemoglobin 11.7 - 15.7 g/dL 7.6(L) - 8.5(L) 8.7(L) 8.7(L) 9.2(L) 9.7(L)   TSAT 15 - 46 % - - 37 38 - 27 -   Ferritin 8 - 252 ng/mL - - 1104(H) 1017(H) - 1038(H) -   PRBCs - - - - - - - -       Orders needed to be renewed (for next follow-up date) in EPIC: None   Med order expires: 2019   Lab orders : 2019    Follow-up call date: 10/22/18    Bharti Akers CP  Anemia Clinic  [phone]  Reviewed 10/22/2018 [facility]  Anemia Management Service  Shea Sotomayor,PharmD and Preethi Akers CPhT  Phone: [phone]  Fax: [phone]

## 2018-10-19 ENCOUNTER — TELEPHONE (OUTPATIENT)
Dept: TRANSPLANT | Facility: CLINIC | Age: 63
End: 2018-10-19

## 2018-10-19 DIAGNOSIS — Z48.298 AFTERCARE FOLLOWING ORGAN TRANSPLANT: ICD-10-CM

## 2018-10-19 DIAGNOSIS — D64.9 ANEMIA: ICD-10-CM

## 2018-10-19 DIAGNOSIS — Z94.0 KIDNEY TRANSPLANTED: Primary | ICD-10-CM

## 2018-10-19 DIAGNOSIS — Z94.0 KIDNEY TRANSPLANTED: ICD-10-CM

## 2018-10-19 DIAGNOSIS — N18.4 CKD (CHRONIC KIDNEY DISEASE) STAGE 4, GFR 15-29 ML/MIN (H): ICD-10-CM

## 2018-10-19 LAB
ANION GAP SERPL CALCULATED.3IONS-SCNC: 8 MMOL/L (ref 3–14)
BUN SERPL-MCNC: 53 MG/DL (ref 7–30)
CALCIUM SERPL-MCNC: 7.8 MG/DL (ref 8.5–10.1)
CHLORIDE SERPL-SCNC: 106 MMOL/L (ref 94–109)
CO2 SERPL-SCNC: 24 MMOL/L (ref 20–32)
CREAT SERPL-MCNC: 2.61 MG/DL (ref 0.52–1.04)
CREAT UR-MCNC: 84 MG/DL
ERYTHROCYTE [DISTWIDTH] IN BLOOD BY AUTOMATED COUNT: 16.8 % (ref 10–15)
FERRITIN SERPL-MCNC: 886 NG/ML (ref 8–252)
GFR SERPL CREATININE-BSD FRML MDRD: 18 ML/MIN/1.7M2
GLUCOSE SERPL-MCNC: 118 MG/DL (ref 70–99)
HCT VFR BLD AUTO: 33.2 % (ref 35–47)
HGB BLD-MCNC: 9.7 G/DL (ref 11.7–15.7)
IRON SATN MFR SERPL: 20 % (ref 15–46)
IRON SERPL-MCNC: 38 UG/DL (ref 35–180)
MCH RBC QN AUTO: 24.7 PG (ref 26.5–33)
MCHC RBC AUTO-ENTMCNC: 29.2 G/DL (ref 31.5–36.5)
MCV RBC AUTO: 85 FL (ref 78–100)
PLATELET # BLD AUTO: 238 10E9/L (ref 150–450)
POTASSIUM SERPL-SCNC: 4 MMOL/L (ref 3.4–5.3)
PROT UR-MCNC: 2.17 G/L
PROT/CREAT 24H UR: 2.59 G/G CR (ref 0–0.2)
RBC # BLD AUTO: 3.92 10E12/L (ref 3.8–5.2)
SODIUM SERPL-SCNC: 138 MMOL/L (ref 133–144)
TIBC SERPL-MCNC: 193 UG/DL (ref 240–430)
WBC # BLD AUTO: 5.5 10E9/L (ref 4–11)

## 2018-10-19 RX ORDER — LISINOPRIL 5 MG/1
5 TABLET ORAL DAILY
Qty: 30 TABLET | Refills: 11 | Status: SHIPPED | OUTPATIENT
Start: 2018-10-19 | End: 2018-11-15

## 2018-10-19 NOTE — TELEPHONE ENCOUNTER
ISSUE:  UPC elevated from 1.66 to 2.59    PLAN:   Pt reviewed with Dr Newman.  Per Dr Newman, pt to start lisinopril 5 mg daily d/t elevated UPC and have UPC checked every 3 months.  Pt to have a low K diet and get BMP in 1-2 weeks.  Pt to monitor BP at home and report readings to nephrology nurses in 1-2 weeks.   Per Dr Newman, pt to be re referred to intake group.     OUTCOME:  Labs reviewed with pt.  Pt aware her UPC is elevated.  Pt reports her BP have been running 140s/80s.    Above plan reviewed with pt, pt v/u. She will  RX tomorrow and plan on BMP in 2 weeks.  Pt v/u to eat low K diet and will monitor BP at home and report values to neph nurses.    Pt also aware intake group will reach out to her d/t GFR <20.     Pt questions answered, pt v/u.

## 2018-10-22 ENCOUNTER — TELEPHONE (OUTPATIENT)
Dept: PHARMACY | Facility: CLINIC | Age: 63
End: 2018-10-22

## 2018-10-22 NOTE — TELEPHONE ENCOUNTER
Anemia Management Note  SUBJECTIVE/OBJECTIVE:  Referred by Dr Tyshawn Sexton February 10, 2017  Primary Diagnosis: Anemia in Chronic Kidney Disease (N18.3 D63.1)   Secondary Diagnosis: Chronic Kidney Disease, Stage III (N18.3)   Hgb goal range: 9-10  Epo/Darbo: Aranesp 300 mcg every 14 days - At home   RX will  on 2019  Iron regimen:  None  Lab orders : 19    Anemia Latest Ref Rng & Units 2018 2018 8/15/2018 2018 2018 10/4/2018 10/19/2018   HGB Goal - - - - - - - -   VALARIE Dose - 300 mcg 300 mcg 300 mcg 300 mcg 300 mcg 300 mcg -   Hemoglobin 11.7 - 15.7 g/dL - 8.5(L) 8.7(L) 8.7(L) 9.2(L) 9.7(L) 9.7(L)   TSAT 15 - 46 % - 37 38 - 27 - 20   Ferritin 8 - 252 ng/mL - 1104(H) 1017(H) - 1038(H) - 886(H)   PRBCs - - - - - - - -       BP Readings from Last 3 Encounters:   18 163/77   18 117/69   18 (!) 148/95     Wt Readings from Last 2 Encounters:   18 202 lb 14.4 oz (92 kg)   18 198 lb 12.8 oz (90.2 kg)     Left VM asking to verify if dosed with Aranesp 10/23/2018 GLORIA - Angelnekevin called back and verified she will dose today. GLORIA    ASSESSMENT:  Hgb: At goal - recommend dose  Tsat: not at goal (>30%) but ferritin >500ng/mL.  IV iron not indicated at this time per anemia protocol.  Ferritin: At goal (>100ng/mL)    PLAN:  LM for Eleni to dose with aranesp and RTC for hgb then aranesp if needed in 2 week(s)    Orders needed to be renewed (for next follow-up date) in EPIC: None    Iron labs due:  18    Plan discussed with: NAY for Eleni  Plan provided by:  Bharti Akers [facility]  Anemia Clinic  [phone]    NEXT FOLLOW-UP DATE:  18  Reviewed 10/23/2018 [facility]  Anemia Management Service  Shea Sotomayor PharmD and Preethi kAers CPhT  Phone: [phone]  Fax: [phone]

## 2018-11-02 NOTE — TELEPHONE ENCOUNTER
Call placed to pt to remind her she needs updated BMP since starting lisinopril 2 weeks ago. Pt reports she will go in for her BMP level next week.      Pt questions answered, pt v/u.

## 2018-11-05 ENCOUNTER — TELEPHONE (OUTPATIENT)
Dept: PHARMACY | Facility: CLINIC | Age: 63
End: 2018-11-05

## 2018-11-05 ENCOUNTER — CARE COORDINATION (OUTPATIENT)
Dept: NEPHROLOGY | Facility: CLINIC | Age: 63
End: 2018-11-05

## 2018-11-05 NOTE — PROGRESS NOTES
Nephrology Note: Nursing Outreach Encounter    REASON FOR CALL:                                                      REASON FOR CALL: Blood Pressure Follow Up                                          SITUATION/BACKROUND:                                                    Patient is being treated for HTN.    Recently started on lisinopril.      ASSESSMENT:                                                      Called patient to follow up on BP. States it's been averaging 130/80's at home since starting lisinopril. She feels great, no symptoms or concerns. Will repeat labs when she's here to see PCP on 11/8. She's scheduled with the transplant team on 11/15.    Currently prescribed lisinopril 5mg daily, nifedipine 60mg BID, clondine 0.1mg BID prn, hydralazine 25mg BID, and coreg 12.5mg BID.    PLAN:                                                      Follow Up:   Patient to follow up as scheduled at next apt. Reviewed with Dr. Newman who advised no changes are needed at this time.    Patient verbalized understanding and will contact the clinic with any further questions or concerns.     Brittney Quinones RN

## 2018-11-05 NOTE — TELEPHONE ENCOUNTER
Follow-up with anemia management service:    LM for Eleni reminding her that she is due for a Hgb lab and possibly an aranesp dose    Anemia Latest Ref Rng & Units 2018 8/15/2018 2018 2018 10/4/2018 10/19/2018 10/23/2018   HGB Goal - - - - - - - -   VALARIE Dose - 300 mcg 300 mcg 300 mcg 300 mcg 300 mcg - 300 mcg   Hemoglobin 11.7 - 15.7 g/dL 8.5(L) 8.7(L) 8.7(L) 9.2(L) 9.7(L) 9.7(L) -   TSAT 15 - 46 % 37 38 - 27 - 20 -   Ferritin 8 - 252 ng/mL 1104(H) 1017(H) - 1038(H) - 886(H) -   PRBCs - - - - - - - -       Orders needed to be renewed (for next follow-up date) in EPIC: None   Med order expires: 2019   Lab orders : 2019    Follow-up call date: 18    Bharti Akers CPhT  Anemia Clinic  [phone]  Reviewed 2018 [facility]  Anemia Management Service  Shea Sotomayor,PharmD and Preethi Akers CPhT  Phone: [phone]  Fax: [phone]

## 2018-11-08 ENCOUNTER — OFFICE VISIT (OUTPATIENT)
Dept: INTERNAL MEDICINE | Facility: CLINIC | Age: 63
End: 2018-11-08
Payer: MEDICARE

## 2018-11-08 ENCOUNTER — TELEPHONE (OUTPATIENT)
Dept: PHARMACY | Facility: CLINIC | Age: 63
End: 2018-11-08

## 2018-11-08 VITALS
DIASTOLIC BLOOD PRESSURE: 74 MMHG | WEIGHT: 194 LBS | BODY MASS INDEX: 35.48 KG/M2 | HEART RATE: 102 BPM | OXYGEN SATURATION: 96 % | SYSTOLIC BLOOD PRESSURE: 127 MMHG

## 2018-11-08 DIAGNOSIS — N18.4 CKD (CHRONIC KIDNEY DISEASE) STAGE 4, GFR 15-29 ML/MIN (H): ICD-10-CM

## 2018-11-08 DIAGNOSIS — Z48.298 AFTERCARE FOLLOWING ORGAN TRANSPLANT: ICD-10-CM

## 2018-11-08 DIAGNOSIS — D64.9 ANEMIA: ICD-10-CM

## 2018-11-08 DIAGNOSIS — Z94.0 KIDNEY TRANSPLANTED: ICD-10-CM

## 2018-11-08 DIAGNOSIS — Z23 NEED FOR PROPHYLACTIC VACCINATION AND INOCULATION AGAINST INFLUENZA: Primary | ICD-10-CM

## 2018-11-08 LAB
ALBUMIN UR-MCNC: 100 MG/DL
ANION GAP SERPL CALCULATED.3IONS-SCNC: 11 MMOL/L (ref 3–14)
APPEARANCE UR: ABNORMAL
BACTERIA #/AREA URNS HPF: ABNORMAL /HPF
BILIRUB UR QL STRIP: NEGATIVE
BUN SERPL-MCNC: 51 MG/DL (ref 7–30)
CALCIUM SERPL-MCNC: 8 MG/DL (ref 8.5–10.1)
CHLORIDE SERPL-SCNC: 108 MMOL/L (ref 94–109)
CO2 SERPL-SCNC: 19 MMOL/L (ref 20–32)
COLOR UR AUTO: YELLOW
CREAT SERPL-MCNC: 2.84 MG/DL (ref 0.52–1.04)
GFR SERPL CREATININE-BSD FRML MDRD: 17 ML/MIN/1.7M2
GLUCOSE SERPL-MCNC: 107 MG/DL (ref 70–99)
GLUCOSE UR STRIP-MCNC: NEGATIVE MG/DL
HCT VFR BLD AUTO: 35.9 % (ref 35–47)
HGB BLD-MCNC: 10.9 G/DL (ref 11.7–15.7)
HGB UR QL STRIP: NEGATIVE
KETONES UR STRIP-MCNC: NEGATIVE MG/DL
LEUKOCYTE ESTERASE UR QL STRIP: ABNORMAL
MUCOUS THREADS #/AREA URNS LPF: PRESENT /LPF
NITRATE UR QL: NEGATIVE
PH UR STRIP: 5 PH (ref 5–7)
POTASSIUM SERPL-SCNC: 4.3 MMOL/L (ref 3.4–5.3)
RBC #/AREA URNS AUTO: 2 /HPF (ref 0–2)
SODIUM SERPL-SCNC: 138 MMOL/L (ref 133–144)
SOURCE: ABNORMAL
SP GR UR STRIP: 1.01 (ref 1–1.03)
UROBILINOGEN UR STRIP-MCNC: 0 MG/DL (ref 0–2)
WBC #/AREA URNS AUTO: >182 /HPF (ref 0–5)
WBC CLUMPS #/AREA URNS HPF: PRESENT /HPF

## 2018-11-08 PROCEDURE — 87088 URINE BACTERIA CULTURE: CPT | Performed by: INTERNAL MEDICINE

## 2018-11-08 PROCEDURE — 87186 SC STD MICRODIL/AGAR DIL: CPT | Performed by: INTERNAL MEDICINE

## 2018-11-08 PROCEDURE — 87086 URINE CULTURE/COLONY COUNT: CPT | Performed by: INTERNAL MEDICINE

## 2018-11-08 ASSESSMENT — PAIN SCALES - GENERAL: PAINLEVEL: NO PAIN (0)

## 2018-11-08 NOTE — PROGRESS NOTES
PRIMARY CARE CENTER       SUBJECTIVE:  Eleni Logan is a 62 year old [place] woman (moved here in 1975), gout, ESRD due to IgA nephropathy s/p LDKT in 1999, hypertension, asthma, hyperlipidemia, RIZWAN (non-compliant with CPAP) who is here for followup.      She has a complicated history and follows with endocrine, urology, and renal transplant.  She overall feels well.  BP are good at home, 120s-130s SBP at home.  She has a history of recurrent UTIs.  She is no longer taking antibiotic for UTI. Urine and blood check for today. No pain with urination. A little less urine for the last week so checking urine today.    She notes that she had an outbreak of HSV and was started on acyclovir.  She is concerned that she is having some symptoms from this medication aching her feel a little bit crawling. Denies dizziness or lightheadedness. Occasionally has poor appetite and feels weak. Usually about every 2-3 weeks this happens, often associated with change of medication.     She also notes that she takes 1 medication that she started recently and has developed cough at night with dry throat.  She does not have any of her medications with her so is unable to identify which medications are causing symptoms.  Per chart review it appears that she was started on lisinopril recently by nephrology.    No F/C, SOB, CP, lightheaded, confusion. Diarrhea a couple weeks ago but resolved.       Past medical and social histories as well as medications and allergies reviewed by me today.     ROS:   Constitutional, neuro, ENT, endocrine, pulmonary, cardiac, gastrointestinal, genitourinary, musculoskeletal, integument and psychiatric systems are negative, except as otherwise noted.    OBJECTIVE:    /74 (BP Location: Right arm, Patient Position: Sitting, Cuff Size: Adult Large)  Pulse 102  Wt 88 kg (194 lb)  SpO2 96%  BMI 35.48 kg/m2   Wt Readings from Last 1 Encounters:   10/23/18 90.7 kg (200 lb)       GENERAL  APPEARANCE: healthy, alert and no distress     EYES: EOMI, PERRL     HENT:mouth without ulcers or lesions     NECK: no adenopathy, no asymmetry, masses, or scars and thyroid normal to palpation     RESP: lungs clear to auscultation - no rales, rhonchi or wheezes     CV: regular rates and rhythm,     ABDOMEN:  soft, nontender, no HSM or masses and bowel sounds normal, henernia present on right, palpable kidney from tx on right     MS: extremities normal- no gross deformities noted, no evidence of inflammation in joints, FROM in all extremities.     SKIN: no suspicious lesions or rashes     NEURO: Normal strength and tone, sensory exam grossly normal, mentation intact and speech normal     PSYCH: mentation appears normal. 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      Hypertension  Patient has a history of hypertension on several medications.  She has been checking her blood pressures at home which have had SBP is 120s-130s.  Pressure is good in clinic today.  She does note an increased cough, which may be related to what appears to be recently added lisinopril.  She is following up with nephrology who prescribed this medication.  Lisinopril could be the cause of the cough, and losartan may be a good option for her to replace this.  Given her soon follow-up and renal transplant history will defer to them to change medication.    Recurrent UTIs   off antibiotics, follows with urology.  She will have a another  UA today.  Continue to monitor    Need for prophylactic vaccination and inoculation against influenza  -     FLU VACCINE, SPLIT VIRUS, IM (QUADRIVALENT) [68569]- >3 YRS       Pt should return to clinic for f/u with me in 6 months    Haleigh Rodriguez MD  Nov 8, 2018    Pt was seen and plan of care discussed  with Dr Kelly    Attestation:  I, Perri Silverman, saw this patient with the resident and agree with the resident s findings and plan of care as documented in the resident s note.      Perri Silverman MD

## 2018-11-08 NOTE — TELEPHONE ENCOUNTER
Anemia Management Note  SUBJECTIVE/OBJECTIVE:  Referred by Dr Tyshawn Sexton February 10, 2017  Primary Diagnosis: Anemia in Chronic Kidney Disease (N18.3 D63.1)   Secondary Diagnosis: Chronic Kidney Disease, Stage III (N18.3)   Hgb goal range: 9-10  Epo/Darbo: Aranesp 300 mcg every 14 days - At home   RX will  on 2019  Iron regimen:  None  Lab orders : 19    Anemia Latest Ref Rng & Units 8/15/2018 2018 2018 10/4/2018 10/19/2018 10/23/2018 2018   HGB Goal - - - - - - - -   VALARIE Dose - 300 mcg 300 mcg 300 mcg 300 mcg - 300 mcg -   Hemoglobin 11.7 - 15.7 g/dL 8.7(L) 8.7(L) 9.2(L) 9.7(L) 9.7(L) - 10.9(L)   TSAT 15 - 46 % 38 - 27 - 20 - -   Ferritin 8 - 252 ng/mL 1017(H) - 1038(H) - 886(H) - -   PRBCs - - - - - - - -     BP Readings from Last 3 Encounters:   18 127/74   18 163/77   18 117/69     Wt Readings from Last 2 Encounters:   18 194 lb (88 kg)   10/23/18 200 lb (90.7 kg)       Eleni reports being sick last week.  She was unable to eat without vomiting.  She is better now and has starting eating again. May have been dehydrated  Eleni has an appt on 11/15 so she will get her Hgb re-checked then     ASSESSMENT:  Hgb: Above goal and rapid increase - recommend hold dose for now and recheck Hgb in 1 week  TSat: not at goal (>30%) but ferritin >500ng/mL.  IV iron not indicated at this time per anemia protocol. Ferritin: At goal (>100ng/mL)    PLAN:  Hold Aranesp and RTC for hgb then aranesp if needed in 1 week(s)    Orders needed to be renewed (for next follow-up date) in EPIC: None    Iron labs due:  18    Plan discussed with:  Eleni    Plan provided by:  Bharti Akers CPhT  Anemia Clinic  [phone]    NEXT FOLLOW-UP DATE:  11/15/18  Reviewed 2018 [facility]  Anemia Management Service  Shea Sotomayor PharmD and Preethi Akers CPhT  Phone: [phone]  Fax: [phone]

## 2018-11-08 NOTE — NURSING NOTE
Chief Complaint   Patient presents with     Recheck Medication     6 month follow up       Jag Joseph EMT 9:14 AM on 11/8/2018.

## 2018-11-08 NOTE — MR AVS SNAPSHOT
After Visit Summary   11/8/2018    Eleni Logan    MRN: [id number]           Patient Information     Date Of Birth          1955        Visit Information        Provider Department      11/8/2018 9:05 AM Haleigh Rodriguez MD [facility]        Today's Diagnoses     Need for prophylactic vaccination and inoculation against influenza    -  1      Care Instructions    Primary Care Center Medication Refill Request Information:  * Please contact your pharmacy regarding ANY request for medication refills.  ** [facility] Prescription Fax = [phone]  * Please allow 3 business days for routine medication refills.  * Please allow 5 business days for controlled substance medication refills.     Primary Care Center Test Result notification information:  *You will be notified with in 7-10 days of your appointment day regarding the results of your test.  If you are on MyChart you will be notified as soon as the provider has reviewed the results and signed off on them.    Primary Care Center: [phone]              [facility]         Internal Medicine Resident                   Continuity Clinic    Who We Are    Resident Continuity Clinic is a part of the [facility].  Resident physicians see patients independently and establish a relationship with them over the course of their three-year residency program.  As with the Primary Care Clinic, our Resident Continuity Clinic models a group practice.  If your doctor is not available, you will be able to see another resident physician.  At the end of a resident s training, patients will be transitioned to a new resident physician for ongoing care.     We treat patients with a wide array of medical needs from routine physicals, to acute illnesses, to diabetes and blood pressure management, to complex medical illness.  What is a Resident Physician?    Resident physicians hold medical degrees and are doctors. They are  training to become specialists in Internal Medicine. They work under the supervision of board-certified faculty physicians.  Expectations for Your Care    We strive to provide accessible, quality care at all times.    In order to provide this care, it is best to see your primary care resident doctor consistently rather switching between providers.  In the event you do see another physician, you should schedule a follow-up visit with your usual primary care doctor.    If you are transitioning your care from another clinic, it is helpful to have your records available for your doctor to review.    We do not prescribe controlled substances, such as ADD medications or narcotic pain medications, on your first visit.  We will review your health records and concerns prior to devising a treatment plan with you in order to provide the best care.      Clinic Services     Extended clinic hours; patient  to help navigate your visit;  parking; laboratory and imaging services with evening and weekend hours    Multiple medical and surgical specialties in one building    Complementary services, including Nutrition, Integrative Medicine, Pharmacy consultations, Mental and Behavioral Health, Sports Medicine and Physical Therapy    Thank You    We would like to thank you for choosing the [facility] Internal Medicine Resident Continuity Clinic for your primary care. You are making a priceless contribution to the training of the next generation of health care practitioners.     Contact us at [phone] for appointments or questions.    Resident Clinic Hours are Tuesdays and Thursdays, 7:30am-5:00pm    Residents  Leisa Ferrera MD   (Female )   Haleigh Rodriguez MD   (Female)   Elmo Rivera MD  (Male)   Mack Moore MD  (Male)   Adia Sanderson MD   (Female)   Sebastien Connell MD  (Male)    Wily Chamberlain MD  (Male)   Charles Perkins MD  (Male)   Mack Daniel MD (Male)   Dragan Hidalgo  MD  (Male)   Libia Greene MD (Female)    Alecia Santana MD (Female)   Indu Pinon MD  (Male)   Karishma Barnes MD(Female)   Winifred Marte MD  (Female)    Supervising Physicians   MD Perri Del Valle MD Briar Duffy, MD Alma Douglass MD Tanya Melnik, MD Charles Moldow, MD Heather Thompson Buum, MD Kathleen Watson, MD                    Follow-ups after your visit        Your next 10 appointments already scheduled     Nov 15, 2018  1:00 PM CST   (Arrive by 12:45 PM)   Office Visit with Milton Choi [facility] Medication Therapy Management ([facility])    [address] [place] [address]  3rd [facility] [id number] [phone]           Bring a current list of meds and any records pertaining to this visit. For Physicals, please bring immunization records and any forms needing to be filled out. Please arrive 10 minutes early to complete paperwork.            Nov 15, 2018  2:35 PM CST   (Arrive by 2:05 PM)   Return Kidney Transplant with  Kidney/Pancreas Recipient 2   [facility] Nephrology ([facility])    [address]  [address]  [facility] [id number] [phone]            Nov 28, 2018  2:30 PM CST   (Arrive by 2:15 PM)   RETURN ENDOCRINE with Ana Pollack MD   [facility] Endocrinology ([facility])    [address] [place] [address]  3rd [facility] [id number] [phone]            Dec 11, 2018  2:00 PM CST   (Arrive by 1:45 PM)   Return Visit with Iwona Shah MD   [facility] and Infectious Diseases ([facility])    [address]  [address]  [facility] [id number] [phone]            Dec 12, 2018  9:45 AM CST   (Arrive by 9:30 AM)   Return Visit with Lynnette Garcia MD   [facility]rology and Inst for Prostate and Urologic Cancers ([facility]  [place])    [address]  [address]  [facility] [address] [phone]              Who to contact     Please call your clinic at [phone] to:    Ask questions about your health    Make or cancel appointments    Discuss your medicines    Learn about your test results    Speak to your doctor            Additional Information About Your Visit        Care EveryWhere ID     This is your Care EveryWhere ID. This could be used by other organizations to access your [place] medical records  [id number]        Your Vitals Were     Pulse Pulse Oximetry BMI (Body Mass Index)             102 96% 35.48 kg/m2          Blood Pressure from Last 3 Encounters:   11/08/18 127/74   09/11/18 163/77   08/31/18 117/69    Weight from Last 3 Encounters:   11/08/18 88 kg (194 lb)   10/23/18 90.7 kg (200 lb)   08/31/18 92 kg (202 lb 14.4 oz)              We Performed the Following     FLU VACCINE, SPLIT VIRUS, IM (QUADRIVALENT) [54123]- >3 YRS        Primary Care Provider Office Phone # Fax #    Preethi Gibson [phone]       [facility] [address]  [facility] [address]        Equal Access to Services     GERBER DAVENPORT AH: Hadii lani vasquezo Sodioni, waaxda luqadaha, qaybta kaalmada adeegyada, clare garrison. So [facility] [phone].    ATENCIÓN: Si habla español, tiene a stoner disposición servicios gratuitos de asistencia lingüística. Celine al [phone].    We comply with applicable federal civil rights laws and [place] laws. We do not discriminate on the basis of race, color, national origin, age, disability, sex, sexual orientation, or gender identity.            Thank you!     Thank you for choosing [facility] PRIMARY CARE CLINIC  for your care. Our goal is always to provide you with excellent care. Hearing back from our patients is one way we can continue to improve our services. Please take a few minutes to complete the written survey that you may  receive in the mail after your visit with us. Thank you!             Your Updated Medication List - Protect others around you: Learn how to safely use, store and throw away your medicines at www.disposemymeds.org.          This list is accurate as of 11/8/18  9:52 AM.  Always use your most recent med list.                   Brand Name Dispense Instructions for use Diagnosis    acyclovir 400 MG tablet    ZOVIRAX    60 tablet    Take 1 tablet (400 mg) by mouth every 12 hours    Herpes simplex infection of perianal skin       allopurinol 100 MG tablet    ZYLOPRIM    90 tablet    Take 1 tablet (100 mg) by mouth daily    Chronic gout without tophus, unspecified cause, unspecified site       artificial saliva Aers spray     1 Bottle    Take 1 spray by mouth every 6 hours as needed for dry mouth    Dry mouth       atorvastatin 20 MG tablet    LIPITOR    90 tablet    Take 1 tablet (20 mg) by mouth daily    Hypercholesteremia       Blood Pressure Monitor Kit     1 kit    Automatic Blood Pressure Monitor    Renovascular hypertension       calcium citrate-vitamin D 315-250 MG-UNIT Tabs per tablet    CITRACAL    180 tablet    Take 1 tablet by mouth 2 times daily    Osteoporosis, unspecified osteoporosis type, unspecified pathological fracture presence       carvedilol 6.25 MG tablet    COREG    360 tablet    Take 2 tablets (12.5 mg) by mouth 2 times daily (with meals)    Hypertension secondary to other renal disorders       cephalexin 250 MG capsule    KEFLEX    90 capsule    Take 1 capsule (250 mg) by mouth At Bedtime    Urinary frequency, Urinary tract infection       cloNIDine 0.1 MG tablet    CATAPRES    60 tablet    Take 1 tablet (0.1 mg) by mouth 2 times daily as needed For systolic BP >180    HTN (hypertension)       COMPOUNDED NON-CONTROLLED SUBSTANCE - PHARMACY TO MIX COMPOUNDED MEDICATION    CMPD RX    30 g    Estriol 1 mg/g Apply small amount to finger and apply to inside vagina daily for 2 weeks then twice weekly  Route: vaginally    Atrophic vaginitis       darbepoetin bucky 300 MCG/0.6ML injection    ARANESP (ALBUMIN FREE)    1.2 mL    Inject 0.6 mLs (300 mcg) Subcutaneous every 14 days As needed for hgb<10g/dL    CKD (chronic kidney disease) stage 4, GFR 15-29 ml/min (H), Anemia in stage 4 chronic kidney disease (H)       folic acid 1 MG tablet    FOLVITE    90 tablet    Take 1 tablet (1 mg) by mouth daily    Preventive measure       hydrALAZINE 25 MG tablet    APRESOLINE    60 tablet    Take 1 tablet (25 mg) by mouth 2 times daily    Renovascular hypertension       hydrocortisone 1 % cream    CORTAID    60 g    Apply topically 2 times daily    Rash, skin       lisinopril 5 MG tablet    PRINIVIL/ZESTRIL    30 tablet    Take 1 tablet (5 mg) by mouth daily    Kidney transplanted       mycophenolate 250 MG capsule     120 capsule    Take 2 capsules (500 mg) by mouth 2 times daily    Kidney transplanted       NIFEdipine ER osmotic 60 MG Tb24    PROCARDIA XL    180 tablet    Take 1 tablet (60 mg) by mouth 2 times daily    Hypertension secondary to other renal disorders       pantoprazole 40 MG EC tablet    PROTONIX    30 tablet    Take 1 tablet (40 mg) by mouth daily    Gastroesophageal reflux disease with esophagitis       predniSONE 5 MG tablet    DELTASONE    30 tablet    Take 1 tablet (5 mg) by mouth daily    Kidney transplanted       sodium bicarbonate 650 MG tablet     180 tablet    Take 1 tablet (650 mg) by mouth 2 times daily    Acidosis

## 2018-11-08 NOTE — PATIENT INSTRUCTIONS
[facility] Medication Refill Request Information:  * Please contact your pharmacy regarding ANY request for medication refills.  ** [facility] Prescription Fax = [phone]  * Please allow 3 business days for routine medication refills.  * Please allow 5 business days for controlled substance medication refills.     [facility] Test Result notification information:  *You will be notified with in 7-10 days of your appointment day regarding the results of your test.  If you are on MyChart you will be notified as soon as the provider has reviewed the results and signed off on them.    [facility] Care Center: [phone]              [facility]         Internal Medicine Resident                   Continuity Clinic    Who We Are    Resident Continuity Clinic is a part of the [facility] Primary Care [facility].  Resident physicians see patients independently and establish a relationship with them over the course of their three-year residency program.  As with the Primary Care Clinic, our Resident Continuity Clinic models a group practice.  If your doctor is not available, you will be able to see another resident physician.  At the end of a resident s training, patients will be transitioned to a new resident physician for ongoing care.     We treat patients with a wide array of medical needs from routine physicals, to acute illnesses, to diabetes and blood pressure management, to complex medical illness.  What is a Resident Physician?    Resident physicians hold medical degrees and are doctors. They are training to become specialists in Internal Medicine. They work under the supervision of board-certified faculty physicians.  Expectations for Your Care    We strive to provide accessible, quality care at all times.    In order to provide this care, it is best to see your primary care resident doctor consistently rather switching between providers.  In the event you do see another physician, you should  schedule a follow-up visit with your usual primary care doctor.    If you are transitioning your care from another clinic, it is helpful to have your records available for your doctor to review.    We do not prescribe controlled substances, such as ADD medications or narcotic pain medications, on your first visit.  We will review your health records and concerns prior to devising a treatment plan with you in order to provide the best care.      Clinic Services     Extended clinic hours; patient  to help navigate your visit;  parking; laboratory and imaging services with evening and weekend hours    Multiple medical and surgical specialties in one building    Complementary services, including Nutrition, Integrative Medicine, Pharmacy consultations, Mental and Behavioral Health, Sports Medicine and Physical Therapy    Thank You    We would like to thank you for choosing the [facility] Internal Medicine Resident Continuity Clinic for your primary care. You are making a priceless contribution to the training of the next generation of health care practitioners.     Contact us at [phone] for appointments or questions.    Resident Clinic Hours are Tuesdays and Thursdays, 7:30am-5:00pm    Residents  Leisa Ferrera MD   (Female )   Haleigh Rodriguez MD   (Female)   Elmo Rivera MD  (Male)   Mack Moore MD  (Male)   Adia Sanderson MD   (Female)   Sebastien Connell MD  (Male)    Wily Chamberlain MD  (Male)   Charles Perkins MD  (Male)   Mack Daniel MD (Male)   Dragan Hidalgo MD  (Male)   Libia Greene MD (Female)    Alecia Santana MD (Female)   Indu Pinon MD  (Male)   Karishma Barnes MD(Female)   Winifred Marte MD  (Female)    Supervising Physicians   MD Perri Del Valle MD Briar Duffy, MD James Langland, MD Mary Logeais, MD Tanya Melnik, MD Charles Moldow, MD Heather Thompson Buum, MD Kathleen Watson, MD

## 2018-11-08 NOTE — NURSING NOTE
Eleni Logan      1.  Has the patient received the information for the influenza vaccine? YES    2.  Does the patient have any of the following contraindications?     Allergy to eggs? No     Allergic reaction to previous influenza vaccines? No     Any other problems to previous influenza vaccines? No     Paralyzed by Guillain-Chamberino syndrome? No     Currently pregnant? NO     Current moderate or severe illness? No     Allergy to contact lens solution? No    3.  The vaccine has been administered in the usual fashion and the patient was instructed to wait 20 minutes before leaving the building in the event of an allergic reaction: YES    Recorded by Jag Joseph    Flu shot given by me. Cait Babb, EMT at 10:06 AM on 11/8/2018.

## 2018-11-09 ENCOUNTER — TELEPHONE (OUTPATIENT)
Dept: NEPHROLOGY | Facility: CLINIC | Age: 63
End: 2018-11-09

## 2018-11-09 DIAGNOSIS — N39.0 URINARY TRACT INFECTION: Primary | ICD-10-CM

## 2018-11-09 RX ORDER — AMOXICILLIN AND CLAVULANATE POTASSIUM 500; 125 MG/1; MG/1
1 TABLET, FILM COATED ORAL 2 TIMES DAILY
Qty: 28 TABLET | Refills: 0 | Status: SHIPPED | OUTPATIENT
Start: 2018-11-09 | End: 2019-05-09

## 2018-11-09 NOTE — TELEPHONE ENCOUNTER
Reviewed UA results with provider (patient is symptomatic--frequency, pain). Per Dr. Sexton:    Lets send her augmentin 500-125 mg po bid for 14 days   Make sure she is not allergic   Follow cultures in case we need to change the abx in case of resistance     Called patient, who

## 2018-11-11 LAB
BACTERIA SPEC CULT: ABNORMAL
Lab: ABNORMAL
SPECIMEN SOURCE: ABNORMAL

## 2018-11-15 ENCOUNTER — OFFICE VISIT (OUTPATIENT)
Dept: PHARMACY | Facility: CLINIC | Age: 63
End: 2018-11-15
Payer: MEDICARE

## 2018-11-15 ENCOUNTER — OFFICE VISIT (OUTPATIENT)
Dept: NEPHROLOGY | Facility: CLINIC | Age: 63
End: 2018-11-15
Attending: INTERNAL MEDICINE
Payer: MEDICARE

## 2018-11-15 ENCOUNTER — TELEPHONE (OUTPATIENT)
Dept: PHARMACY | Facility: CLINIC | Age: 63
End: 2018-11-15

## 2018-11-15 VITALS — DIASTOLIC BLOOD PRESSURE: 84 MMHG | SYSTOLIC BLOOD PRESSURE: 134 MMHG

## 2018-11-15 VITALS
OXYGEN SATURATION: 100 % | WEIGHT: 196.8 LBS | HEIGHT: 62 IN | BODY MASS INDEX: 36.22 KG/M2 | HEART RATE: 74 BPM | DIASTOLIC BLOOD PRESSURE: 82 MMHG | SYSTOLIC BLOOD PRESSURE: 150 MMHG

## 2018-11-15 DIAGNOSIS — N18.4 CKD (CHRONIC KIDNEY DISEASE) STAGE 4, GFR 15-29 ML/MIN (H): ICD-10-CM

## 2018-11-15 DIAGNOSIS — B00.9 HERPES: ICD-10-CM

## 2018-11-15 DIAGNOSIS — Z94.0 S/P KIDNEY TRANSPLANT: Primary | ICD-10-CM

## 2018-11-15 DIAGNOSIS — E87.20 ACIDOSIS: ICD-10-CM

## 2018-11-15 DIAGNOSIS — I15.1 HYPERTENSION SECONDARY TO OTHER RENAL DISORDERS: ICD-10-CM

## 2018-11-15 DIAGNOSIS — E55.9 VITAMIN D DEFICIENCY: ICD-10-CM

## 2018-11-15 DIAGNOSIS — D64.9 ANEMIA: ICD-10-CM

## 2018-11-15 DIAGNOSIS — I15.1 HTN, KIDNEY TRANSPLANT RELATED: ICD-10-CM

## 2018-11-15 DIAGNOSIS — Z48.298 AFTERCARE FOLLOWING ORGAN TRANSPLANT: ICD-10-CM

## 2018-11-15 DIAGNOSIS — Z94.0 KIDNEY REPLACED BY TRANSPLANT: Primary | ICD-10-CM

## 2018-11-15 DIAGNOSIS — Z94.0 HTN, KIDNEY TRANSPLANT RELATED: ICD-10-CM

## 2018-11-15 DIAGNOSIS — N25.81 SECONDARY RENAL HYPERPARATHYROIDISM (H): ICD-10-CM

## 2018-11-15 DIAGNOSIS — D47.2 MGUS (MONOCLONAL GAMMOPATHY OF UNKNOWN SIGNIFICANCE): ICD-10-CM

## 2018-11-15 DIAGNOSIS — D84.9 IMMUNOSUPPRESSION (H): ICD-10-CM

## 2018-11-15 PROBLEM — N18.6: Status: RESOLVED | Noted: 2017-09-19 | Resolved: 2018-11-15

## 2018-11-15 LAB
HCT VFR BLD AUTO: 34.1 % (ref 35–47)
HGB BLD-MCNC: 10.2 G/DL (ref 11.7–15.7)

## 2018-11-15 PROCEDURE — 99207 ZZC NO CHARGE LOS: CPT | Mod: GY | Performed by: PHARMACIST

## 2018-11-15 PROCEDURE — 85014 HEMATOCRIT: CPT

## 2018-11-15 PROCEDURE — 85018 HEMOGLOBIN: CPT

## 2018-11-15 RX ORDER — SODIUM BICARBONATE 650 MG/1
1300 TABLET ORAL 2 TIMES DAILY
Qty: 180 TABLET | Refills: 3 | Status: SHIPPED | OUTPATIENT
Start: 2018-11-15 | End: 2019-05-03

## 2018-11-15 RX ORDER — LOSARTAN POTASSIUM 25 MG/1
25 TABLET ORAL DAILY
Qty: 60 TABLET | Refills: 3 | Status: SHIPPED | OUTPATIENT
Start: 2018-11-15 | End: 2018-11-28

## 2018-11-15 ASSESSMENT — PAIN SCALES - GENERAL: PAINLEVEL: NO PAIN (0)

## 2018-11-15 NOTE — TELEPHONE ENCOUNTER
Anemia Management Note  SUBJECTIVE/OBJECTIVE:  Referred by Dr Tyshawn Sexton February 10, 2017  Primary Diagnosis: Anemia in Chronic Kidney Disease (N18.3 D63.1)   Secondary Diagnosis: Chronic Kidney Disease, Stage III (N18.3)   Hgb goal range: 9-10  Epo/Darbo: Aranesp 300 mcg every 14 days - At home   RX will  on 2019  Iron regimen:  None  Lab orders : 19    Anemia Latest Ref Rng & Units 2018 2018 10/4/2018 10/19/2018 10/23/2018 2018 11/15/2018   HGB Goal - - - - - - - -   VALARIE Dose - 300 mcg 300 mcg 300 mcg - 300 mcg - -   Hemoglobin 11.7 - 15.7 g/dL 8.7(L) 9.2(L) 9.7(L) 9.7(L) - 10.9(L) 10.2(L)   TSAT 15 - 46 % - 27 - 20 - - -   Ferritin 8 - 252 ng/mL - 1038(H) - 886(H) - - -   PRBCs - - - - - - - -     BP Readings from Last 3 Encounters:   11/15/18 150/82   11/15/18 134/84   18 127/74     Wt Readings from Last 2 Encounters:   11/15/18 196 lb 12.8 oz (89.3 kg)   18 194 lb (88 kg)         ASSESSMENT:  Hgb: Above goal - recommend hold dose  TSat: not at goal (>30%) but ferritin >500ng/mL.  IV iron not indicated at this time per anemia protocol. Ferritin: At goal (>100ng/mL)    PLAN:  Hold Aranesp and RTC for hgb, ferrtiin, and iron labs then aranesp if needed in 2 week(s)    Orders needed to be renewed (for next follow-up date) in EPIC: None    Iron labs due:  18    Plan discussed with:  Eleni  Plan provided by:  Bharti Akers CPhT  [facility]  [phone]    NEXT FOLLOW-UP DATE:  18  Reviewed 11/15/2018 [facility]  Anemia Management Service  Shea Sotomayor,PharmD and Preethi Akers CPhT  Phone: [phone]  Fax: [phone]

## 2018-11-15 NOTE — LETTER
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   - Proteinuria: Moderate 2.5 g/g   - BK Viremia: Yes, previously, last check without BK Vieremia 7/2017   - Kidney Tx Biopsy: Yes    2005 - Mild interstitial changes consistent with chronic rejection, no vascular rejection, no acute rejection, possible CNI related changes   - She has been referred for Kidney Transplant and has a working Vascular HD Access    Creatinine on an upward trend from 2 to 2.8 since July, but she does fluctuate between 2.0-3.0. At this time, she does have ongoing UTI and may have some volume loss from diarrhea (although weight is up). Hopefully, after UTI treatment and improvement of diarrhea with switching of Acyclovir, she will have downtrend in Crt. She does have proteinuria and would benefit from RAAS inhibition, but dry cough with ACEi, and thus will switch to ARB and recheck labs in 1 week.    # Immunosuppression: Mycophenolate mofetil and Prednisone 5mg Daily   - Changes: No    # Hypertension: Borderline control; Goal BP: < 130/80 Avg BPs 134/85   - Changes: Yes - Given not taking Coreg, cough with lisinopril and not significantly hypertensive, will plan to start 25mg Losartan, and discontinue Coreg and Lisinopril. F/U BPs in a few days.    # Anemia in chronic renal disease: Hgb: Stable   - Iron studies: On Aranesp per Anemia Management    # Mineral Bone Disorder:    - Secondary renal hyperparathyroidism; PTH level is: Moderately elevated and stable in the 200s  - Vitamin D; level is: Last checked 2017, will recheck PTH and Vit D level and determine need for replacement. Currently on low dose Vit D + Ca supplement.  - Calcium; level is: Low   - Phosphorus; level is: Normal     # Electrolytes:   - Potassium; level: Normal  - Bicarbonate; level: Low - will increase  to 1300mg BID, especially given diarrhea.    # Recurrent UTIs - Followed by Tx ID, currently on Augmentin for Klebsiella UTI    # Recurrent Genital HSV - On Acyclovir - Pharm to discuss with ID switching to Valacyclovir.     #MGUS - UPEP with significant albumunuria, but monoclonal spike noted. Will check serum SPEP and Light chains.    # Diarrhea - Appears most likely to medication related. As above, will try and change to Valacyclovir and see if improvement. In the interim, will increase PO Bicarb. If no improvement and or worsening symptoms, will consider stool testing.    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   Significant transplant-related complications: recurrent UTIs and BK viremia    Transplant Office Phone Number: [phone]    Assessment and plan was discussed with the patient and she voiced her understanding and agreement.    Pt seen and discussed with Dr. Boss.    Dyllan David MD   [id number]    Attestation:  This patient has been seen and evaluated by me, Dmitri Boss MD.  I have reviewed the note and agree with plan of care as documented by the fellow.     Chief Complaint   Ms. Logan is a 62 year old here for routine follow up and immunosuppression management.    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   Since last visit in 8/2018, she has been doing okay. She has had issues with another UTI as well as diagnosis of recurrent genital herpes. Since starting Acyclovir, she notes significant increase in number of BMs daily.   She denies hematuria, dysuria, or flank pain. No change in observed UOP. She is eating and drinking well.      Recent Hospitalizations:  [x] No [] Yes    New Medical Issues: [] No [x] Yes UTI and Genital Herpes   Decreased energy: [x] No [] Yes    Chest pain or SOB with exertion:  [x] No [] Yes    Appetite change or weight change: [x] No [] Yes    Nausea, vomiting or diarrhea:  [] No [x] Yes Diarrhea - noted after Acyclovir, 4-5 BMs daily, soft not loose, non-bloody, no worsening since starting Augmentin   Fever, sweats or chills: [x] No [] Yes    Leg swelling: [x] No [] Yes      Other medical issues:  Yes - UTI/Herpes as above. Additionally, she notes dry cough that has been ongoing since lisinopril initiation.    Home BP: 3x per week, 130's/80s    Review of Systems   A comprehensive review of systems was obtained and negative, except as noted in the HPI or PMH.    Problem List   Patient Active Problem List   Diagnosis     Chronic rhinitis     Essential hypertension, benign     S/P kidney transplant     Rash     Asthma exacerbation     UTI (urinary tract infection)     Anemia     Chest pain     Urinary tract infection     Localized pustular psoriasis     CKD (chronic kidney disease) stage 4, GFR 15-29 ml/min (H)     Immunosuppression (H)     Fistula     End-stage renal disease (ESRD) (H)     Aftercare following organ transplant     Age-related osteoporosis without current pathological fracture       Social History   Social History   Substance Use Topics     Smoking status: Never Smoker     Smokeless tobacco: Never Used      Comment: Has been around second hand smoke     Alcohol use Yes      Comment: Once in a great while       Allergies   Allergies   Allergen Reactions     Ciprofloxacin Palpitations     Levaquin [Levofloxacin] Shortness Of Breath       Medications   Current Outpatient Prescriptions   Medication Sig     acyclovir (ZOVIRAX) 400 MG tablet Take 1 tablet (400 mg) by mouth every 12 hours     allopurinol (ZYLOPRIM) 100 MG tablet Take 1 tablet (100 mg) by mouth daily     amoxicillin-clavulanate (AUGMENTIN)  "500-125 MG per tablet Take 1 tablet by mouth 2 times daily for 14 days     artificial saliva (BIOTENE MT) AERS spray Take 1 spray by mouth every 6 hours as needed for dry mouth     atorvastatin (LIPITOR) 20 MG tablet Take 1 tablet (20 mg) by mouth daily     calcium citrate-vitamin D (CITRACAL) 315-250 MG-UNIT TABS per tablet Take 1 tablet by mouth 2 times daily     carvedilol (COREG) 6.25 MG tablet Take 2 tablets (12.5 mg) by mouth 2 times daily (with meals) (Patient not taking: Reported on 11/15/2018)     CELLCEPT (BRAND) 250 MG CAPSULE Take 2 capsules (500 mg) by mouth 2 times daily     cloNIDine (CATAPRES) 0.1 MG tablet Take 1 tablet (0.1 mg) by mouth 2 times daily as needed For systolic BP >180     COMPOUNDED NON-CONTROLLED SUBSTANCE (CMPD RX) - PHARMACY TO MIX COMPOUNDED MEDICATION Estriol 1 mg/g  Apply small amount to finger and apply to inside vagina daily for 2 weeks then twice weekly  Route: vaginally     darbepoetin bucky (ARANESP, ALBUMIN FREE,) 300 MCG/0.6ML injection Inject 0.6 mLs (300 mcg) Subcutaneous every 14 days As needed for hgb<10g/dL     folic acid (FOLVITE) 1 MG tablet Take 1 tablet (1 mg) by mouth daily     hydrALAZINE (APRESOLINE) 25 MG tablet Take 1 tablet (25 mg) by mouth 2 times daily     lisinopril (PRINIVIL/ZESTRIL) 5 MG tablet Take 1 tablet (5 mg) by mouth daily     NIFEdipine ER osmotic (PROCARDIA XL) 60 MG TB24 Take 1 tablet (60 mg) by mouth 2 times daily     predniSONE (DELTASONE) 5 MG tablet Take 1 tablet (5 mg) by mouth daily     sodium bicarbonate 650 MG tablet Take 1 tablet (650 mg) by mouth 2 times daily     No current facility-administered medications for this visit.      There are no discontinued medications.    Physical Exam     /82  Pulse 74  Ht 1.575 m (5' 2\")  Wt 89.3 kg (196 lb 12.8 oz)  SpO2 100%  BMI 36 kg/m2    GENERAL APPEARANCE: alert and no distress  HENT: sclerae anicteric, mouth without ulcers or lesions  LYMPHATICS: no cervical nodes  RESP: lungs clear " to auscultation - no rales or wheezes  CV: regular rhythm, normal rate,   EDEMA: no LE edema bilaterally  ABDOMEN: soft, nondistended, nontender, bowel sounds normal  MS: extremities normal - no gross deformities noted, no evidence of inflammation in joints, no muscle tenderness  SKIN: no rash  ACCESS: RUE Fistula with palpable thrill      Data     Renal Latest Ref Rng & Units 11/8/2018 10/19/2018 9/19/2018   Na 133 - 144 mmol/L 138 138 138   K 3.4 - 5.3 mmol/L 4.3 4.0 4.0   Cl 94 - 109 mmol/L 108 106 106   CO2 20 - 32 mmol/L 19(L) 24 23   BUN 7 - 30 mg/dL 51(H) 53(H) 56(H)   Cr 0.52 - 1.04 mg/dL 2.84(H) 2.61(H) 2.42(H)   Glucose 70 - 99 mg/dL 107(H) 118(H) 109(H)   Ca  8.5 - 10.1 mg/dL 8.0(L) 7.8(L) 8.1(L)   Mg 1.6 - 2.3 mg/dL - - -     Bone Health Latest Ref Rng & Units 9/19/2018 7/10/2018 5/3/2018   Phos 2.5 - 4.5 mg/dL 3.9 4.3 3.2   PTHi 18 - 80 pg/mL - - 262(H)   Vit D Def 20 - 75 ug/L - - -     Heme Latest Ref Rng & Units 11/15/2018 11/8/2018 10/19/2018   WBC 4.0 - 11.0 10e9/L - - 5.5   Hgb 11.7 - 15.7 g/dL 10.2(L) 10.9(L) 9.7(L)   Plt 150 - 450 10e9/L - - 238     Liver Latest Ref Rng & Units 9/19/2018 7/10/2018 5/3/2018   AP 40 - 150 U/L - - 77   TBili 0.2 - 1.3 mg/dL - - -   ALT 0 - 50 U/L - - -   AST 0 - 45 U/L - - -   Tot Protein 6.8 - 8.8 g/dL - - -   Albumin 3.4 - 5.0 g/dL 3.3(L) 3.1(L) 3.3(L)     Pancreas Latest Ref Rng & Units 11/21/2017 12/2/2014 2/14/2007   A1C 4.3 - 6.0 % 5.6 5.0 -   Lipase 20 - 250 U/L - - 23     Iron studies Latest Ref Rng & Units 10/19/2018 9/19/2018 8/15/2018   Iron 35 - 180 ug/dL 38 55 77   Iron sat 15 - 46 % 20 27 38   Ferritin 8 - 252 ng/mL 886(H) 1038(H) 1017(H)     P Txp Virology Latest Ref Rng & Units 7/10/2017 2/28/2017 4/18/2016   CVM DNA Quant - - - Plasma   CMV Quant <100 Copies/mL - - -   CMV QT Log <2.0 Log copies/mL - - -   BK Spec - Plasma Plasma -   BK Res BKNEG copies/mL BK Virus DNA Not Detected BK Virus DNA Not Detected -   BK Log <2.7 Log copies/mL Not  Calculated   The Real-Time quantitative BK Virus assay was developed and its performance   characteristics determined by the [facility] Laboratory at   the [facility] in [place], [place]. The   primers and probes for each analyte are Analyte Specific Reagents (ASRs)   manufactured by [employer].   ASRs are used in many laboratory tests necessary for standard medical care and   generally do not require U.S. Food and Drug Administration approval. The FDA   has determined that such clearance or approval is not necessary.   This test is used for clinical purposes. It should not be regarded as   investigational or for research. This laboratory is certified under the   Clinical Laboratory Improvement Amendments of 1988 (CLIA-88) as qualified to   perform high complexity clinical laboratory testing.   Not Calculated   The Real-Time quantitative BK Virus assay was developed and its performance   characteristics determined by the [facility] Laboratory at   the [facility] in [place], [place]. The   primers and probes for each analyte are Analyte Specific Reagents (ASRs)   manufactured by [employer].   ASRs are used in many laboratory tests necessary for standard medical care and   generally do not require U.S. Food and Drug Administration approval. The FDA   has determined that such clearance or approval is not necessary.   This test is used for clinical purposes. It should not be regarded as   investigational or for research. This laboratory is certified under the   Clinical Laboratory Improvement Amendments of 1988 (CLIA-88) as qualified to   perform high complexity clinical laboratory testing.   -   HIV 1&2 NEG - - -        Recent Labs   Lab Test  09/17/16   0936  12/14/16   0955   DOSTAC  9/16 3240  20:00 12/13/16   TACROL  <3.0  Tacrolimus Reference Range   Kidney Transplant   Pediatric                      ug/L     0-3  months post transplant   10-12     3-6 months post transplant   8-10     6-12 months post transplant  6-8     >12 months post transplant   4-7   Adult     0-6 months post transplant   8-10     6-12 months post transplant  6-8     >12 months post transplant   4-6     >5 years post transplant     3-5   Heart Transplant   Pediatric     0-12 months post transplant  10-15     >12 months post transplant   5-10   Adult     0-3 months post transplant   10-15     3-6 months post transplant   8-12     6-12 months post transplant  6-12     >12 months post transplant   6-10   Lung Transplant     0-12 months post transplant  10-15     >12 months post transplant   8-12   Liver Transplant   Pediatric     0-3 months post transplant   10-15     3-6 months post transplant   8-10     >6 months post transplant    6-8   Adult     0-3 months post transplant   10-12     3-6 months post transplant   8-10     >6  months post transplant    6-8   Pancreas Transplant     0-6 months post transplant   8-10     >6 months post transplant    5-8   This test was developed and its performance characteristics determined by the   [facility],  Special Chemistry Laboratory. It has   not been cleared or approved by the FDA. The laboratory is regulated under CLIA   as qualified to perform high-complexity testing. This test is used for clinical   purposes. It should not be regarded as investigational or for research.  *  <3.0  Tacrolimus Reference Range   Kidney Transplant   Pediatric                      ug/L     0-3 months post transplant   10-12     3-6 months post transplant   8-10     6-12 months post transplant  6-8     >12 months post transplant   4-7   Adult     0-6 months post transplant   8-10     6-12 months post transplant  6-8     >12 months post transplant   4-6     >5 years post transplant     3-5   Heart Transplant   Pediatric     0-12 months post transplant  10-15     >12 months post transplant   5-10   Adult      0-3 months post transplant   10-15     3-6 months post transplant   8-12     6-12 months post transplant  6-12     >12 months post transplant   6-10   Lung Transplant     0-12 months post transplant  10-15     >12 months post transplant   8-12   Liver Transplant   Pediatric     0-3 months post transplant   10-15     3-6 months post transplant   8-10     >6 months post transplant    6-8   Adult     0-3 months post transplant   10-12     3-6 months post transplant   8-10     >6  months post transplant    6-8   Pancreas Transplant     0-6 months post transplant   8-10     >6 months post transplant    5-8   This test was developed and its performance characteristics determined by the   [facility],  Special Chemistry Laboratory. It has   not been cleared or approved by the FDA. The laboratory is regulated under CLIA   as qualified to perform high-complexity testing. This test is used for clinical   purposes. It should not be regarded as investigational or for research.  *     Recent Labs   Lab Test  04/18/16   1631  05/19/16   1128  10/02/16   0831   DOSMPA  Not Provided  2300  Not Provided   MPACID  5.61*  5.07*  1.40   MPAG  101.0*  69.4  17.8*       Dmitri Boss MD

## 2018-11-15 NOTE — MR AVS SNAPSHOT
After Visit Summary   11/15/2018    Eleni Logan    MRN: [id number]           Patient Information     Date Of Birth          1955        Visit Information        Provider Department      11/15/2018 1:00 PM [facility]        Care Instructions    Recommendations from today's MTM visit:                                                    Today we reviewed what your medicines are for, how to know if they are working, that your medicines are safe and how to make your medicine regimen as easy as possible.     1. I'll talk to your transplant team about Lisinopril and Dr. Shah about Acyclovir.     2. Start taking Calcium/D again, your calcium levels have been low.    3. Start taking your Carvedilol again. I will have the team send this down.     Next MTM visit: as needed    To schedule another MTM appointment, please call the clinic directly or you may call the MTM scheduling line at [phone] or toll-free at [phone].     My Clinical Pharmacist's contact information:                                                      It was a pleasure talking with you today!  Please feel free to contact me with any questions or concerns you have.      Milton Choi, PharmD  MTM Pharmacist    Phone: [phone]     You may receive a survey about the MTM services you received.  I would appreciate your feedback to help me serve you better in the future. Please fill it out and return it when you can. Your comments will be anonymous.              Follow-ups after your visit        Your next 10 appointments already scheduled     Nov 15, 2018  2:35 PM CST   (Arrive by 2:05 PM)   Return Kidney Transplant with [facility] Kid[facility]Pancreas Recipient [address] [place] [address] Nephrology ([facility] [place])    [address] [place] [address]  [address]  [facility] [address] [phone]            Nov 28, 2018  2:30 PM CST   (Arrive by 2:15 PM)   RETURN ENDOCRINE with  Ana Pollack MD   [facility] Endocrinology ([facility])    [address]  3rd Floor  [facility] [address] [phone]            Dec 11, 2018  2:00 PM CST   (Arrive by 1:45 PM)   Return Visit with Iwona Shah MD   [facility] Infectious Diseases ([facility])    [address]  [address]  [facility] [id number] [phone]            Dec 12, 2018  9:45 AM CST   (Arrive by 9:30 AM)   Return Visit with Lynnette Garcia MD   [facility] Urology and Inst for Prostate and Urologic Cancers ([facility])    [address]  4th Floor  [facility] [address] [phone]            May 09, 2019  1:25 PM CDT   (Arrive by 1:10 PM)   Return Visit with Haleigh Rodriguez MD   [facility] ([facility])    [address]  4th Floor  [facility] [id number] [phone]              Who to contact     If you have questions or need follow up information about today's clinic visit or your schedule please contact [facility] MEDICATION THERAPY MANAGEMENT directly at [phone].  Normal or non-critical lab and imaging results will be communicated to you by MyChart, letter or phone within 4 business days after the clinic has received the results. If you do not hear from us within 7 days, please contact the clinic through MyChart or phone. If you have a critical or abnormal lab result, we will notify you by phone as soon as possible.  Submit refill requests through [employer] or call your pharmacy and they will forward the refill request to us. Please allow 3 business days for your refill to be completed.          Additional Information About Your Visit        Care EveryWhere ID     This is your Care EveryWhere ID. This could be used by other organizations to access your [place] medical records  [id number]         Blood Pressure from Last 3  Encounters:   11/08/18 127/74   09/11/18 163/77   08/31/18 117/69    Weight from Last 3 Encounters:   11/08/18 194 lb (88 kg)   10/23/18 200 lb (90.7 kg)   08/31/18 202 lb 14.4 oz (92 kg)              Today, you had the following     No orders found for display         Today's Medication Changes          These changes are accurate as of 11/15/18  1:38 PM.  If you have any questions, ask your nurse or doctor.               Stop taking these medicines if you haven't already. Please contact your care team if you have questions.     Blood Pressure Monitor Kit   Stopped by:  Milton Choi [facility]           pantoprazole 40 MG EC tablet   Commonly known as:  PROTONIX   Stopped by:  Milton Choi RPH                    Primary Care Provider Office Phone # Fax #    Haleigh Alejandra Rodriguez [phone]       [place] [address]        Equal Access to Services     [facility] ENRIQUE [facility] : Hadii lani ku hadasho Soomaali, waaxda luqadaha, qaybta kaalmada adeegyada, waxay ju haymodeston que chambers . So [facility] [phone].    ATENCIÓN: Si habla español, tiene a stoner disposición servicios gratuitos de asistencia lingüística. Llame al [phone].    We comply with applicable federal civil rights laws and [place] laws. We do not discriminate on the basis of race, color, national origin, age, disability, sex, sexual orientation, or gender identity.            Thank you!     Thank you for choosing [facility] MEDICATION THERAPY MANAGEMENT  for your care. Our goal is always to provide you with excellent care. Hearing back from our patients is one way we can continue to improve our services. Please take a few minutes to complete the written survey that you may receive in the mail after your visit with us. Thank you!             Your Updated Medication List - Protect others around you: Learn how to safely use, store and throw away your medicines at www.disposemymeds.org.           This list is accurate as of 11/15/18  1:38 PM.  Always use your most recent med list.                   Brand Name Dispense Instructions for use Diagnosis    acyclovir 400 MG tablet    ZOVIRAX    60 tablet    Take 1 tablet (400 mg) by mouth every 12 hours    Herpes simplex infection of perianal skin       allopurinol 100 MG tablet    ZYLOPRIM    90 tablet    Take 1 tablet (100 mg) by mouth daily    Chronic gout without tophus, unspecified cause, unspecified site       amoxicillin-clavulanate 500-125 MG per tablet    AUGMENTIN    28 tablet    Take 1 tablet by mouth 2 times daily for 14 days    Urinary tract infection       artificial saliva Aers spray     1 Bottle    Take 1 spray by mouth every 6 hours as needed for dry mouth    Dry mouth       atorvastatin 20 MG tablet    LIPITOR    90 tablet    Take 1 tablet (20 mg) by mouth daily    Hypercholesteremia       calcium citrate-vitamin D 315-250 MG-UNIT Tabs per tablet    CITRACAL    180 tablet    Take 1 tablet by mouth 2 times daily    Osteoporosis, unspecified osteoporosis type, unspecified pathological fracture presence       carvedilol 6.25 MG tablet    COREG    360 tablet    Take 2 tablets (12.5 mg) by mouth 2 times daily (with meals)    Hypertension secondary to other renal disorders       cloNIDine 0.1 MG tablet    CATAPRES    60 tablet    Take 1 tablet (0.1 mg) by mouth 2 times daily as needed For systolic BP >180    HTN (hypertension)       COMPOUNDED NON-CONTROLLED SUBSTANCE - PHARMACY TO MIX COMPOUNDED MEDICATION    CMPD RX    30 g    Estriol 1 mg/g Apply small amount to finger and apply to inside vagina daily for 2 weeks then twice weekly Route: vaginally    Atrophic vaginitis       darbepoetin bucky 300 MCG/0.6ML injection    ARANESP (ALBUMIN FREE)    1.2 mL    Inject 0.6 mLs (300 mcg) Subcutaneous every 14 days As needed for hgb<10g/dL    CKD (chronic kidney disease) stage 4, GFR 15-29 ml/min (H), Anemia in stage 4 chronic kidney disease (H)       folic  acid 1 MG tablet    FOLVITE    90 tablet    Take 1 tablet (1 mg) by mouth daily    Preventive measure       hydrALAZINE 25 MG tablet    APRESOLINE    60 tablet    Take 1 tablet (25 mg) by mouth 2 times daily    Renovascular hypertension       lisinopril 5 MG tablet    PRINIVIL/ZESTRIL    30 tablet    Take 1 tablet (5 mg) by mouth daily    Kidney transplanted       mycophenolate 250 MG capsule     120 capsule    Take 2 capsules (500 mg) by mouth 2 times daily    Kidney transplanted       NIFEdipine ER osmotic 60 MG Tb24    PROCARDIA XL    180 tablet    Take 1 tablet (60 mg) by mouth 2 times daily    Hypertension secondary to other renal disorders       predniSONE 5 MG tablet    DELTASONE    30 tablet    Take 1 tablet (5 mg) by mouth daily    Kidney transplanted       sodium bicarbonate 650 MG tablet     180 tablet    Take 1 tablet (650 mg) by mouth 2 times daily    Acidosis

## 2018-11-15 NOTE — LETTER
11/15/2018       RE: Eleni Logan  [address]  [address]  [facility] [address]     Dear Colleague,    Thank you for referring your patient, Eleni Logan, to the [facility] NEPHROLOGY at [facility]. Please see a copy of my visit note below.    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   - Proteinuria: Moderate 2.5 g/g   - BK Viremia: Yes, previously, last check without BK Vieremia 7/2017   - Kidney Tx Biopsy: Yes    2005 - Mild interstitial changes consistent with chronic rejection, no vascular rejection, no acute rejection, possible CNI related changes   - She has been referred for Kidney Transplant and has a working Vascular HD Access    Creatinine on an upward trend from 2 to 2.8 since July, but she does fluctuate between 2.0-3.0. At this time, she does have ongoing UTI and may have some volume loss from diarrhea (although weight is up). Hopefully, after UTI treatment and improvement of diarrhea with switching of Acyclovir, she will have downtrend in Crt. She does have proteinuria and would benefit from RAAS inhibition, but dry cough with ACEi, and thus will switch to ARB and recheck labs in 1 week.    # Immunosuppression: Mycophenolate mofetil and Prednisone 5mg Daily   - Changes: No    # Hypertension: Borderline control; Goal BP: < 130/80 Avg BPs 134/85   - Changes: Yes - Given not taking Coreg, cough with lisinopril and not significantly hypertensive, will plan to start 25mg Losartan, and discontinue Coreg and Lisinopril. F/U BPs in a few days.    # Anemia in chronic renal disease: Hgb: Stable   - Iron studies: On Aranesp per Anemia Management    # Mineral Bone Disorder:    - Secondary renal hyperparathyroidism; PTH level is: Moderately elevated and stable in the 200s  - Vitamin D; level is: Last checked 2017, will recheck PTH and Vit D level and determine need for replacement.  Currently on low dose Vit D + Ca supplement.  - Calcium; level is: Low   - Phosphorus; level is: Normal     # Electrolytes:   - Potassium; level: Normal  - Bicarbonate; level: Low - will increase to 1300mg BID, especially given diarrhea.    # Recurrent UTIs - Followed by Tx ID, currently on Augmentin for Klebsiella UTI    # Recurrent Genital HSV - On Acyclovir - Pharm to discuss with ID switching to Valacyclovir.     #MGUS - UPEP with significant albumunuria, but monoclonal spike noted. Will check serum SPEP and Light chains.    # Diarrhea - Appears most likely to medication related. As above, will try and change to Valacyclovir and see if improvement. In the interim, will increase PO Bicarb. If no improvement and or worsening symptoms, will consider stool testing.    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   Significant transplant-related complications: recurrent UTIs and BK viremia    Transplant Office Phone Number: [phone]    Assessment and plan was discussed with the patient and she voiced her understanding and agreement.    Pt seen and discussed with Dr. Boss.    Dyllan David MD   [id number]    Attestation:  This patient has been seen and evaluated by me, Dmitri Boss MD.  I have reviewed the note and agree with plan of care as documented by the fellow.     Chief Complaint   Ms. Logan is a 62 year old here for routine follow up and immunosuppression management.    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   Since last visit in 8/2018, she has been doing okay. She has had issues with another UTI as well as diagnosis of recurrent genital  herpes. Since starting Acyclovir, she notes significant increase in number of BMs daily.   She denies hematuria, dysuria, or flank pain. No change in observed UOP. She is eating and drinking well.     Recent Hospitalizations:  [x] No [] Yes    New Medical Issues: [] No [x] Yes UTI and Genital Herpes   Decreased energy: [x] No [] Yes    Chest pain or SOB with exertion:  [x] No [] Yes    Appetite change or weight change: [x] No [] Yes    Nausea, vomiting or diarrhea:  [] No [x] Yes Diarrhea - noted after Acyclovir, 4-5 BMs daily, soft not loose, non-bloody, no worsening since starting Augmentin   Fever, sweats or chills: [x] No [] Yes    Leg swelling: [x] No [] Yes      Other medical issues:  Yes - UTI/Herpes as above. Additionally, she notes dry cough that has been ongoing since lisinopril initiation.    Home BP: 3x per week, 130's/80s    Review of Systems   A comprehensive review of systems was obtained and negative, except as noted in the HPI or PMH.    Problem List   Patient Active Problem List   Diagnosis     Chronic rhinitis     Essential hypertension, benign     S/P kidney transplant     Rash     Asthma exacerbation     UTI (urinary tract infection)     Anemia     Chest pain     Urinary tract infection     Localized pustular psoriasis     CKD (chronic kidney disease) stage 4, GFR 15-29 ml/min (H)     Immunosuppression (H)     Fistula     End-stage renal disease (ESRD) (H)     Aftercare following organ transplant     Age-related osteoporosis without current pathological fracture       Social History   Social History   Substance Use Topics     Smoking status: Never Smoker     Smokeless tobacco: Never Used      Comment: Has been around second hand smoke     Alcohol use Yes      Comment: Once in a great while       Allergies   Allergies   Allergen Reactions     Ciprofloxacin Palpitations     Levaquin [Levofloxacin] Shortness Of Breath       Medications   Current Outpatient Prescriptions   Medication Sig      "acyclovir (ZOVIRAX) 400 MG tablet Take 1 tablet (400 mg) by mouth every 12 hours     allopurinol (ZYLOPRIM) 100 MG tablet Take 1 tablet (100 mg) by mouth daily     amoxicillin-clavulanate (AUGMENTIN) 500-125 MG per tablet Take 1 tablet by mouth 2 times daily for 14 days     artificial saliva (BIOTENE MT) AERS spray Take 1 spray by mouth every 6 hours as needed for dry mouth     atorvastatin (LIPITOR) 20 MG tablet Take 1 tablet (20 mg) by mouth daily     calcium citrate-vitamin D (CITRACAL) 315-250 MG-UNIT TABS per tablet Take 1 tablet by mouth 2 times daily     carvedilol (COREG) 6.25 MG tablet Take 2 tablets (12.5 mg) by mouth 2 times daily (with meals) (Patient not taking: Reported on 11/15/2018)     CELLCEPT (BRAND) 250 MG CAPSULE Take 2 capsules (500 mg) by mouth 2 times daily     cloNIDine (CATAPRES) 0.1 MG tablet Take 1 tablet (0.1 mg) by mouth 2 times daily as needed For systolic BP >180     COMPOUNDED NON-CONTROLLED SUBSTANCE (CMPD RX) - PHARMACY TO MIX COMPOUNDED MEDICATION Estriol 1 mg/g  Apply small amount to finger and apply to inside vagina daily for 2 weeks then twice weekly  Route: vaginally     darbepoetin bucky (ARANESP, ALBUMIN FREE,) 300 MCG/0.6ML injection Inject 0.6 mLs (300 mcg) Subcutaneous every 14 days As needed for hgb<10g/dL     folic acid (FOLVITE) 1 MG tablet Take 1 tablet (1 mg) by mouth daily     hydrALAZINE (APRESOLINE) 25 MG tablet Take 1 tablet (25 mg) by mouth 2 times daily     lisinopril (PRINIVIL/ZESTRIL) 5 MG tablet Take 1 tablet (5 mg) by mouth daily     NIFEdipine ER osmotic (PROCARDIA XL) 60 MG TB24 Take 1 tablet (60 mg) by mouth 2 times daily     predniSONE (DELTASONE) 5 MG tablet Take 1 tablet (5 mg) by mouth daily     sodium bicarbonate 650 MG tablet Take 1 tablet (650 mg) by mouth 2 times daily     No current facility-administered medications for this visit.      There are no discontinued medications.    Physical Exam     /82  Pulse 74  Ht 1.575 m (5' 2\")  Wt 89.3 " kg (196 lb 12.8 oz)  SpO2 100%  BMI 36 kg/m2    GENERAL APPEARANCE: alert and no distress  HENT: sclerae anicteric, mouth without ulcers or lesions  LYMPHATICS: no cervical nodes  RESP: lungs clear to auscultation - no rales or wheezes  CV: regular rhythm, normal rate,   EDEMA: no LE edema bilaterally  ABDOMEN: soft, nondistended, nontender, bowel sounds normal  MS: extremities normal - no gross deformities noted, no evidence of inflammation in joints, no muscle tenderness  SKIN: no rash  ACCESS: RUE Fistula with palpable thrill      Data     Renal Latest Ref Rng & Units 11/8/2018 10/19/2018 9/19/2018   Na 133 - 144 mmol/L 138 138 138   K 3.4 - 5.3 mmol/L 4.3 4.0 4.0   Cl 94 - 109 mmol/L 108 106 106   CO2 20 - 32 mmol/L 19(L) 24 23   BUN 7 - 30 mg/dL 51(H) 53(H) 56(H)   Cr 0.52 - 1.04 mg/dL 2.84(H) 2.61(H) 2.42(H)   Glucose 70 - 99 mg/dL 107(H) 118(H) 109(H)   Ca  8.5 - 10.1 mg/dL 8.0(L) 7.8(L) 8.1(L)   Mg 1.6 - 2.3 mg/dL - - -     Bone Health Latest Ref Rng & Units 9/19/2018 7/10/2018 5/3/2018   Phos 2.5 - 4.5 mg/dL 3.9 4.3 3.2   PTHi 18 - 80 pg/mL - - 262(H)   Vit D Def 20 - 75 ug/L - - -     Heme Latest Ref Rng & Units 11/15/2018 11/8/2018 10/19/2018   WBC 4.0 - 11.0 10e9/L - - 5.5   Hgb 11.7 - 15.7 g/dL 10.2(L) 10.9(L) 9.7(L)   Plt 150 - 450 10e9/L - - 238     Liver Latest Ref Rng & Units 9/19/2018 7/10/2018 5/3/2018   AP 40 - 150 U/L - - 77   TBili 0.2 - 1.3 mg/dL - - -   ALT 0 - 50 U/L - - -   AST 0 - 45 U/L - - -   Tot Protein 6.8 - 8.8 g/dL - - -   Albumin 3.4 - 5.0 g/dL 3.3(L) 3.1(L) 3.3(L)     Pancreas Latest Ref Rng & Units 11/21/2017 12/2/2014 2/14/2007   A1C 4.3 - 6.0 % 5.6 5.0 -   Lipase 20 - 250 U/L - - 23     Iron studies Latest Ref Rng & Units 10/19/2018 9/19/2018 8/15/2018   Iron 35 - 180 ug/dL 38 55 77   Iron sat 15 - 46 % 20 27 38   Ferritin 8 - 252 ng/mL 886(H) 1038(H) 1017(H)     [facility] Txp Virology Latest Ref Rng & Units 7/10/2017 2/28/2017 4/18/2016   CVM DNA Quant - - - Plasma   CMV Quant  <100 Copies/mL - - -   CMV QT Log <2.0 Log copies/mL - - -   BK Spec - Plasma Plasma -   BK Res BKNEG copies/mL BK Virus DNA Not Detected BK Virus DNA Not Detected -   BK Log <2.7 Log copies/mL Not Calculated   The Real-Time quantitative BK Virus assay was developed and its performance   characteristics determined by the [facility] Laboratory at   the [facility] in [place], [place]. The   primers and probes for each analyte are Analyte Specific Reagents (ASRs)   manufactured by [employer].   ASRs are used in many laboratory tests necessary for standard medical care and   generally do not require U.S. Food and Drug Administration approval. The FDA   has determined that such clearance or approval is not necessary.   This test is used for clinical purposes. It should not be regarded as   investigational or for research. This laboratory is certified under the   Clinical Laboratory Improvement Amendments of 1988 (CLIA-88) as qualified to   perform high complexity clinical laboratory testing.   Not Calculated   The Real-Time quantitative BK Virus assay was developed and its performance   characteristics determined by the Infect[facility] Diagnostic Laboratory at   the [facility] in [place], [place]. The   primers and probes for each analyte are Analyte Specific Reagents (ASRs)   manufactured by [employer].   ASRs are used in many laboratory tests necessary for standard medical care and   generally do not require U.S. Food and Drug Administration approval. The FDA   has determined that such clearance or approval is not necessary.   This test is used for clinical purposes. It should not be regarded as   investigational or for research. This laboratory is certified under the   Clinical Laboratory Improvement Amendments of 1988 (CLIA-88) as qualified to   perform high complexity clinical laboratory testing.   -   HIV 1&2 NEG - - -        Recent  Labs   Lab Test  09/17/16   0936  12/14/16   0955   DOSTA  9/16 2330  20:00 12/13/16   TACROL  <3.0  Tacrolimus Reference Range   Kidney Transplant   Pediatric                      ug/L     0-3 months post transplant   10-12     3-6 months post transplant   8-10     6-12 months post transplant  6-8     >12 months post transplant   4-7   Adult     0-6 months post transplant   8-10     6-12 months post transplant  6-8     >12 months post transplant   4-6     >5 years post transplant     3-5   Heart Transplant   Pediatric     0-12 months post transplant  10-15     >12 months post transplant   5-10   Adult     0-3 months post transplant   10-15     3-6 months post transplant   8-12     6-12 months post transplant  6-12     >12 months post transplant   6-10   Lung Transplant     0-12 months post transplant  10-15     >12 months post transplant   8-12   Liver Transplant   Pediatric     0-3 months post transplant   10-15     3-6 months post transplant   8-10     >6 months post transplant    6-8   Adult     0-3 months post transplant   10-12     3-6 months post transplant   8-10     >6  months post transplant    6-8   Pancreas Transplant     0-6 months post transplant   8-10     >6 months post transplant    5-8   This test was developed and its performance characteristics determined by the   [facility],  Special Chemistry Laboratory. It has   not been cleared or approved by the FDA. The laboratory is regulated under CLIA   as qualified to perform high-complexity testing. This test is used for clinical   purposes. It should not be regarded as investigational or for research.  *  <3.0  Tacrolimus Reference Range   Kidney Transplant   Pediatric                      ug/L     0-3 months post transplant   10-12     3-6 months post transplant   8-10     6-12 months post transplant  6-8     >12 months post transplant   4-7   Adult     0-6 months post transplant   8-10     6-12 months post transplant   6-8     >12 months post transplant   4-6     >5 years post transplant     3-5   Heart Transplant   Pediatric     0-12 months post transplant  10-15     >12 months post transplant   5-10   Adult     0-3 months post transplant   10-15     3-6 months post transplant   8-12     6-12 months post transplant  6-12     >12 months post transplant   6-10   Lung Transplant     0-12 months post transplant  10-15     >12 months post transplant   8-12   Liver Transplant   Pediatric     0-3 months post transplant   10-15     3-6 months post transplant   8-10     >6 months post transplant    6-8   Adult     0-3 months post transplant   10-12     3-6 months post transplant   8-10     >6  months post transplant    6-8   Pancreas Transplant     0-6 months post transplant   8-10     >6 months post transplant    5-8   This test was developed and its performance characteristics determined by the   [facility],  Special Chemistry Laboratory. It has   not been cleared or approved by the FDA. The laboratory is regulated under CLIA   as qualified to perform high-complexity testing. This test is used for clinical   purposes. It should not be regarded as investigational or for research.  *     Recent Labs   Lab Test  04/18/16   1631  05/19/16   1128  10/02/16   0831   DOSMPA  Not Provided  2300  Not Provided   MPACID  5.61*  5.07*  1.40   MPAG  101.0*  69.4  17.8*       Again, thank you for allowing me to participate in the care of your patient.      Sincerely,    Kidney/Pancreas Recipient

## 2018-11-15 NOTE — PROGRESS NOTES
[facility]  Nephrology Clinic  Kidney/Pancreas Recipient  11/15/2018     Name: Eleni Logan  MRN: [id number]   Age: 62 year old  : 1955  Referring provider: Preethi Gibson     CHRONIC TRANSPLANT NEPHROLOGY VISIT    Assessment and Plan:   # LDKT:    - Baseline Cr ~ 2.0; {trend:098969693}   - Proteinuria: { :489209426}   - Date of DSA last checked: *** Latest DSA: { :070279100}   - BK Viremia: { :556899545}   - Kidney Tx Biopsy: {Yes/No    :698742}    # Immunosuppression: Mycophenolic acid (goal  { :772098702}) and Prednisone (dose  { :853457428})   - Changes: { :923382}    # Prophylaxis:    - PJP on ***.     # Hypertension: {BP Control:905579048}; Goal BP: { :221781673}   - Changes: { :496353}    # Diabetes: {DM Control:750769097}    # Anemia in chronic renal disease: Hgb: { :239305705}   - Iron studies: { :445557016}    # Mineral Bone Disorder:    - Secondary renal hyperparathyroidism; PTH level is: { :178113274}   - Vitamin D; level is: { :804329610}   - Calcium; level is: { :920429672}   - Phosphorus; level is: { :325978847}     # Electrolytes:   - Potassium; level: {low/normal/high/not checked:490108371}  - Magnesium; level: { :121619499}  - Bicarbonate; level: { :095315054}  - ***    # ***    {# Skin cancer (risk) (delete if not needed)   :465796593}    {# Medical Compliance (delete if not needed)    :160047780}    Follow-up: Data Unavailable     # Transplant History:  Etiology of kidney failure: { :666283}  Tx: { :568166282}  Transplant: 1999 (Kidney)  Donor Type:  Donor Class:   {Crossmatch, DSA, and Viremia (delete if not needed)    :556669526}  Significant changes in immunosuppression: { :197692}  Significant transplant-related complications: {complications    :695754921}    Transplant Office Phone Number: [phone]    Assessment and plan was discussed with the patient and they voiced understanding and agreement.    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     History of Present Illness:  The patient was last seen on 08/07/2018. At that time, no changes were made to immunosuppression. Blood pressure was well controlled with antihypertensives, so no changes made. Of note, the patient had been complaining of recurrent urinary tract infections and is on prophylaxis for this.     Today, ***     Recent Hospitalizations:  [] No [] Yes    New Medical Issues: [] No [] Yes    Decreased energy: [] No [] Yes    Chest pain or SOB with exertion:  [] No [] Yes    Appetite change or weight change: [] No [] Yes    Nausea, vomiting or diarrhea:  [] No [] Yes    Fever, sweats or chills: [] No [] Yes    Leg swelling: [] No [] Yes      Other medical issues:  {Yes/No    :121994}    Home BP: { :99018280}     Review of Systems:   A comprehensive review of systems was obtained and negative, except as noted in the HPI or past medical history.     Active Medications:     Current Outpatient Prescriptions:      acyclovir (ZOVIRAX) 400 MG tablet, Take 1 tablet (400 mg) by mouth every 12 hours, Disp: 60 tablet, Rfl: 2     allopurinol (ZYLOPRIM) 100 MG tablet, Take 1 tablet (100 mg) by mouth daily, Disp: 90 tablet, Rfl: 3     amoxicillin-clavulanate (AUGMENTIN) 500-125 MG per tablet, Take 1 tablet by mouth 2 times daily for 14 days, Disp: 28 tablet, Rfl: 0     artificial saliva (BIOTENE MT) AERS spray, Take 1 spray by mouth every 6 hours as needed for dry mouth, Disp: 1 Bottle, Rfl: 3     atorvastatin (LIPITOR) 20 MG tablet, Take 1 tablet (20 mg) by mouth daily, Disp: 90 tablet, Rfl: 2     Blood Pressure Monitor KIT, Automatic Blood Pressure Monitor, Disp: 1 kit, Rfl: 0     calcium citrate-vitamin D (CITRACAL) 315-250 MG-UNIT TABS per tablet, Take 1 tablet by mouth 2 times daily, Disp: 180 tablet, Rfl: 3     carvedilol (COREG) 6.25 MG tablet, Take 2 tablets (12.5 mg) by mouth 2 times daily (with meals),  Disp: 360 tablet, Rfl: 1     CELLCEPT (BRAND) 250 MG CAPSULE, Take 2 capsules (500 mg) by mouth 2 times daily, Disp: 120 capsule, Rfl: 11     cephalexin (KEFLEX) 250 MG capsule, Take 1 capsule (250 mg) by mouth At Bedtime, Disp: 90 capsule, Rfl: 1     cloNIDine (CATAPRES) 0.1 MG tablet, Take 1 tablet (0.1 mg) by mouth 2 times daily as needed For systolic BP >180, Disp: 60 tablet, Rfl: 3     COMPOUNDED NON-CONTROLLED SUBSTANCE (CMPD RX) - PHARMACY TO MIX COMPOUNDED MEDICATION, Estriol 1 mg/g Apply small amount to finger and apply to inside vagina daily for 2 weeks then twice weekly Route: vaginally, Disp: 30 g, Rfl: 11     darbepoetin bucky (ARANESP, ALBUMIN FREE,) 300 MCG/0.6ML injection, Inject 0.6 mLs (300 mcg) Subcutaneous every 14 days As needed for hgb<10g/dL, Disp: 1.2 mL, Rfl: 11     folic acid (FOLVITE) 1 MG tablet, Take 1 tablet (1 mg) by mouth daily, Disp: 90 tablet, Rfl: 3     hydrALAZINE (APRESOLINE) 25 MG tablet, Take 1 tablet (25 mg) by mouth 2 times daily, Disp: 60 tablet, Rfl: 3     hydrocortisone (CORTAID) 1 % cream, Apply topically 2 times daily, Disp: 60 g, Rfl: 1     lisinopril (PRINIVIL/ZESTRIL) 5 MG tablet, Take 1 tablet (5 mg) by mouth daily, Disp: 30 tablet, Rfl: 11     NIFEdipine ER osmotic (PROCARDIA XL) 60 MG TB24, Take 1 tablet (60 mg) by mouth 2 times daily, Disp: 180 tablet, Rfl: 3     pantoprazole (PROTONIX) 40 MG EC tablet, Take 1 tablet (40 mg) by mouth daily, Disp: 30 tablet, Rfl: 11     predniSONE (DELTASONE) 5 MG tablet, Take 1 tablet (5 mg) by mouth daily, Disp: 30 tablet, Rfl: 11     sodium bicarbonate 650 MG tablet, Take 1 tablet (650 mg) by mouth 2 times daily, Disp: 180 tablet, Rfl: 3      Allergies:   Ciprofloxacin and Levaquin [levofloxacin]      Active Medical Problems:  Patient Active Problem List   Diagnosis     Chronic rhinitis     Essential hypertension, benign     S/P kidney transplant     Rash     Asthma exacerbation     UTI (urinary tract infection)     Anemia      "Chest pain     Urinary tract infection     Localized pustular psoriasis     CKD (chronic kidney disease) stage 4, GFR 15-29 ml/min (H)     Immunosuppression (H)     Fistula     End-stage renal disease (ESRD) (H)     Aftercare following organ transplant     Age-related osteoporosis without current pathological fracture        Social History:   Social History   Substance Use Topics     Smoking status: Never Smoker     Smokeless tobacco: Never Used      Comment: Has been around second hand smoke     Alcohol use Yes      Comment: Once in a great while       Physical Exam:   There were no vitals taken for this visit.   Wt Readings from Last 4 Encounters:   11/08/18 88 kg (194 lb)   10/23/18 90.7 kg (200 lb)   08/31/18 92 kg (202 lb 14.4 oz)   08/07/18 90.2 kg (198 lb 12.8 oz)       GENERAL APPEARANCE: alert and no distress  HENT: mouth without ulcers or lesions  LYMPHATICS: no cervical or supraclavicular nodes  RESP: lungs clear to auscultation - no rales, rhonchi or wheezes  CV: regular rhythm, normal rate, no rub, no murmur  EDEMA: no LE edema bilaterally  ABDOMEN: soft, nondistended, nontender, bowel sounds normal  MS: extremities normal - no gross deformities noted, no evidence of inflammation in joints, no muscle tenderness  SKIN: no rash  TX KIDNEY: {NORMAL/ABNORMAL DEFAULT NORMAL:723627::\"normal\"}  DIALYSIS ACCESS:  { :265827484}    Data:     Renal Latest Ref Rng & Units 11/8/2018 10/19/2018 9/19/2018   Na 133 - 144 mmol/L 138 138 138   K 3.4 - 5.3 mmol/L 4.3 4.0 4.0   Cl 94 - 109 mmol/L 108 106 106   CO2 20 - 32 mmol/L 19(L) 24 23   BUN 7 - 30 mg/dL 51(H) 53(H) 56(H)   Cr 0.52 - 1.04 mg/dL 2.84(H) 2.61(H) 2.42(H)   Glucose 70 - 99 mg/dL 107(H) 118(H) 109(H)   Ca  8.5 - 10.1 mg/dL 8.0(L) 7.8(L) 8.1(L)   Mg 1.6 - 2.3 mg/dL - - -     Bone Health Latest Ref Rng & Units 9/19/2018 7/10/2018 5/3/2018   Phos 2.5 - 4.5 mg/dL 3.9 4.3 3.2   PTHi 18 - 80 pg/mL - - 262(H)   Vit D Def 20 - 75 ug/L - - -     Heme Latest Ref Rng " & Units 11/15/2018 11/8/2018 10/19/2018   WBC 4.0 - 11.0 10e9/L - - 5.5   Hgb 11.7 - 15.7 g/dL 10.2(L) 10.9(L) 9.7(L)   Plt 150 - 450 10e9/L - - 238     Liver Latest Ref Rng & Units 9/19/2018 7/10/2018 5/3/2018   AP 40 - 150 U/L - - 77   TBili 0.2 - 1.3 mg/dL - - -   ALT 0 - 50 U/L - - -   AST 0 - 45 U/L - - -   Tot Protein 6.8 - 8.8 g/dL - - -   Albumin 3.4 - 5.0 g/dL 3.3(L) 3.1(L) 3.3(L)     Pancreas Latest Ref Rng & Units 11/21/2017 12/2/2014 2/14/2007   A1C 4.3 - 6.0 % 5.6 5.0 -   Lipase 20 - 250 U/L - - 23     Iron studies Latest Ref Rng & Units 10/19/2018 9/19/2018 8/15/2018   Iron 35 - 180 ug/dL 38 55 77   Iron sat 15 - 46 % 20 27 38   Ferritin 8 - 252 ng/mL 886(H) 1038(H) 1017(H)     [facility] Txp Virology Latest Ref Rng & Units 7/10/2017 2/28/2017 4/18/2016   CVM DNA Quant - - - Plasma   CMV Quant <100 Copies/mL - - -   CMV QT Log <2.0 Log copies/mL - - -   BK Spec - Plasma Plasma -   BK Res BKNEG copies/mL BK Virus DNA Not Detected BK Virus DNA Not Detected -   BK Log <2.7 Log copies/mL Not Calculated   The Real-Time quantitative BK Virus assay was developed and its performance   characteristics determined by the [facility] Laboratory at   the [facility] in [place], [place]. The   primers and probes for each analyte are Analyte Specific Reagents (ASRs)   manufactured by [employer].   ASRs are used in many laboratory tests necessary for standard medical care and   generally do not require U.S. Food and Drug Administration approval. The FDA   has determined that such clearance or approval is not necessary.   This test is used for clinical purposes. It should not be regarded as   investigational or for research. This laboratory is certified under the   Clinical Laboratory Improvement Amendments of 1988 (CLIA-88) as qualified to   perform high complexity clinical laboratory testing.   Not Calculated   The Real-Time quantitative BK Virus assay was developed and its  performance   characteristics determined by the [facility] Laboratory at   the [facility] in [place], [place]. The   primers and probes for each analyte are Analyte Specific Reagents (ASRs)   manufactured by [employer].   ASRs are used in many laboratory tests necessary for standard medical care and   generally do not require U.S. Food and Drug Administration approval. The FDA   has determined that such clearance or approval is not necessary.   This test is used for clinical purposes. It should not be regarded as   investigational or for research. This laboratory is certified under the   Clinical Laboratory Improvement Amendments of 1988 (CLIA-88) as qualified to   perform high complexity clinical laboratory testing.   -   HIV 1&2 NEG - - -        Recent Labs   Lab Test  09/17/16   0936  12/14/16   0955   DOSTAC  9/16 2330  20:00 12/13/16   TACROL  <3.0  Tacrolimus Reference Range   Kidney Transplant   Pediatric                      ug/L     0-3 months post transplant   10-12     3-6 months post transplant   8-10     6-12 months post transplant  6-8     >12 months post transplant   4-7   Adult     0-6 months post transplant   8-10     6-12 months post transplant  6-8     >12 months post transplant   4-6     >5 years post transplant     3-5   Heart Transplant   Pediatric     0-12 months post transplant  10-15     >12 months post transplant   5-10   Adult     0-3 months post transplant   10-15     3-6 months post transplant   8-12     6-12 months post transplant  6-12     >12 months post transplant   6-10   Lung Transplant     0-12 months post transplant  10-15     >12 months post transplant   8-12   Liver Transplant   Pediatric     0-3 months post transplant   10-15     3-6 months post transplant   8-10     >6 months post transplant    6-8   Adult     0-3 months post transplant   10-12     3-6 months post transplant   8-10     >6  months post transplant    6-8    Pancreas Transplant     0-6 months post transplant   8-10     >6 months post transplant    5-8   This test was developed and its performance characteristics determined by the   [facility],  [facility] Laboratory. It has   not been cleared or approved by the FDA. The laboratory is regulated under CLIA   as qualified to perform high-complexity testing. This test is used for clinical   purposes. It should not be regarded as investigational or for research.  *  <3.0  Tacrolimus Reference Range   Kidney Transplant   Pediatric                      ug/L     0-3 months post transplant   10-12     3-6 months post transplant   8-10     6-12 months post transplant  6-8     >12 months post transplant   4-7   Adult     0-6 months post transplant   8-10     6-12 months post transplant  6-8     >12 months post transplant   4-6     >5 years post transplant     3-5   Heart Transplant   Pediatric     0-12 months post transplant  10-15     >12 months post transplant   5-10   Adult     0-3 months post transplant   10-15     3-6 months post transplant   8-12     6-12 months post transplant  6-12     >12 months post transplant   6-10   Lung Transplant     0-12 months post transplant  10-15     >12 months post transplant   8-12   Liver Transplant   Pediatric     0-3 months post transplant   10-15     3-6 months post transplant   8-10     >6 months post transplant    6-8   Adult     0-3 months post transplant   10-12     3-6 months post transplant   8-10     >6  months post transplant    6-8   Pancreas Transplant     0-6 months post transplant   8-10     >6 months post transplant    5-8   This test was developed and its performance characteristics determined by the   [facility],  [facility] Laboratory. It has   not been cleared or approved by the FDA. The laboratory is regulated under CLIA   as qualified to perform high-complexity testing. This test is used for  clinical   purposes. It should not be regarded as investigational or for research.  *     Recent Labs   Lab Test  04/18/16   1631  05/19/16   1128  10/02/16   0831   DOSMPA  Not Provided  2300  Not Provided   MPACID  5.61*  5.07*  1.40   MPAG  101.0*  69.4  17.8*           Scribe Disclosure:   I, Dorita Vasquez, am serving as a scribe to document services personally performed by *** at this visit, based upon the provider's statements to me. All documentation has been reviewed by the aforementioned provider prior to being entered into the official medical record.     Portions of this medical record were completed by a scribe. UPON MY REVIEW AND AUTHENTICATION BY ELECTRONIC SIGNATURE, this confirms (a) I performed the applicable clinical services, and (b) the record is accurate.

## 2018-11-15 NOTE — NURSING NOTE
"Chief Complaint   Patient presents with     RECHECK     kidney tx      /82  Pulse 74  Ht 1.575 m (5' 2\")  Wt 89.3 kg (196 lb 12.8 oz)  SpO2 100%  BMI 36 kg/m2  Scott Bonilla, CMA    "

## 2018-11-15 NOTE — MR AVS SNAPSHOT
After Visit Summary   11/15/2018    Eleni Logan    MRN: [id number]           Patient Information     Date Of Birth          1955        Visit Information        Provider Department      11/15/2018 2:35 PM 2, Uc Kidney/Pancreas Recipient [facility] Nephrology        Today's Diagnoses     Kidney replaced by transplant    -  1    Aftercare following organ transplant        Immunosuppression (H)        HTN, kidney transplant related        Secondary renal hyperparathyroidism (H)        Vitamin D deficiency        MGUS (monoclonal gammopathy of unknown significance)        Acidosis          Care Instructions    1. Stop Carvedilol and Lisinopril  2. Start 25mg Losartan  3. Start 1300mg Sodium Bicarbonate 2 times daily  4. Return in 1 week for Blood Work          Follow-ups after your visit        Follow-up notes from your care team     Return in about 6 months (around 5/15/2019).      Your next 10 appointments already scheduled     Nov 28, 2018  2:30 PM CST   (Arrive by 2:15 PM)   RETURN ENDOCRINE with Ana Pollack MD   [facility] Endocrinology ([facility])    [address] [place] [address]  3rd Floor  [facility] [address]   [phone]            Dec 11, 2018  2:00 PM CST   (Arrive by 1:45 PM)   Return Visit with Iwona Shah MD   [facility] Infectious Diseases ([facility])    [address] [place] [address]  [address]  [facility] [address]   [phone]            Dec 12, 2018  9:45 AM CST   (Arrive by 9:30 AM)   Return Visit with Lynnette Garcia MD   [facility] Urology and Inst for Prostate and Urologic Cancers ([facility])    [address] [place] [address]  4th Floor  [facility] [address]   [phone]            May 09, 2019  1:25 PM CDT   (Arrive by 1:10 PM)   Return Visit with Haleigh Rodriguez MD   [facility] ([facility])    [address] [place] [address]  "Floor  [facility] [address] [phone]            May 09, 2019  2:35 PM CDT   (Arrive by 2:05 PM)   Return Kidney Transplant with Uc Kidney/Pancreas Recipient 2   [facility] Nephrology ([facility])    [address]  [address]  [facility] [address] [phone]              Future tests that were ordered for you today     Open Future Orders        Priority Expected Expires Ordered    Kappa and lambda light chain Routine  12/15/2018 11/15/2018    Protein electrophoresis Routine  12/15/2018 11/15/2018    Parathyroid Hormone Intact Routine  12/15/2018 11/15/2018    Vitamin D Deficiency Routine  12/15/2018 11/15/2018    Renal panel Routine  12/15/2018 11/15/2018    CBC with platelets Routine  12/15/2018 11/15/2018            Who to contact     If you have questions or need follow up information about today's clinic visit or your schedule please contact [facility] NEPHROLOGY directly at [phone].  Normal or non-critical lab and imaging results will be communicated to you by MyChart, letter or phone within 4 business days after the clinic has received the results. If you do not hear from us within 7 days, please contact the clinic through [facility] or phone. If you have a critical or abnormal lab result, we will notify you by phone as soon as possible.  Submit refill requests through [employer] or call your pharmacy and they will forward the refill request to us. Please allow 3 business days for your refill to be completed.          Additional Information About Your Visit        Care EveryWhere ID     This is your Care EveryWhere ID. This could be used by other organizations to access your [facility] medical records  [id number]        Your Vitals Were     Pulse Height Pulse Oximetry BMI (Body Mass Index)          74 1.575 m (5' 2\") 100% 36 kg/m2         Blood Pressure from Last 3 Encounters:   11/15/18 150/82   11/15/18 134/84   11/08/18 127/74    Weight from Last 3 Encounters: "   11/15/18 89.3 kg (196 lb 12.8 oz)   11/08/18 88 kg (194 lb)   10/23/18 90.7 kg (200 lb)                 Today's Medication Changes          These changes are accurate as of 11/15/18  3:11 PM.  If you have any questions, ask your nurse or doctor.               Start taking these medicines.        Dose/Directions    losartan 25 MG tablet   Commonly known as:  COZAAR   Used for:  HTN, kidney transplant related   Started by:  2, Uc Kidney/Pancreas Recipient        Dose:  25 mg   Take 1 tablet (25 mg) by mouth daily   Quantity:  60 tablet   Refills:  3         These medicines have changed or have updated prescriptions.        Dose/Directions    sodium bicarbonate 650 MG tablet   This may have changed:  how much to take   Used for:  Acidosis   Changed by:  2, Uc Kidney/Pancreas Recipient        Dose:  1300 mg   Take 2 tablets (1,300 mg) by mouth 2 times daily   Quantity:  180 tablet   Refills:  3         Stop taking these medicines if you haven't already. Please contact your care team if you have questions.     Blood Pressure Monitor Kit   Stopped by:  Milton Choi RPH           carvedilol 6.25 MG tablet   Commonly known as:  COREG   Stopped by:  2, Uc Kidney/Pancreas Recipient           lisinopril 5 MG tablet   Commonly known as:  PRINIVIL/ZESTRIL   Stopped by:  2, Uc Kidney/Pancreas Recipient           pantoprazole 40 MG EC tablet   Commonly known as:  PROTONIX   Stopped by:  Milton Choi RPH                Where to get your medicines      These medications were sent to [facility] [address]  [address], [facility] [address]    Hours:  TRANSPLANT PHONE NUMBER [phone] Phone:  [phone]     losartan 25 MG tablet    sodium bicarbonate 650 MG tablet                Primary Care Provider Office Phone # Fax #    Haleigh Alejandra Rodriguez [phone]       [address] [address]         Equal Access to Services     [facility] Hospital[facility] : Hadii aad ku hadlloydmine Antonioali, wajaymeda luqadaha, qaybta kagustavoclare linares. So [facility] [phone].    ATENCIÓN: Si habla español, tiene a stoner disposición servicios gratuitos de asistencia lingüística. Michelame al [phone].    We comply with applicable federal civil rights laws and [place] laws. We do not discriminate on the basis of race, color, national origin, age, disability, sex, sexual orientation, or gender identity.            Thank you!     Thank you for choosing [facility] NEPHROLOGY  for your care. Our goal is always to provide you with excellent care. Hearing back from our patients is one way we can continue to improve our services. Please take a few minutes to complete the written survey that you may receive in the mail after your visit with us. Thank you!             Your Updated Medication List - Protect others around you: Learn how to safely use, store and throw away your medicines at www.disposemymeds.org.          This list is accurate as of 11/15/18  3:11 PM.  Always use your most recent med list.                   Brand Name Dispense Instructions for use Diagnosis    acyclovir 400 MG tablet    ZOVIRAX    60 tablet    Take 1 tablet (400 mg) by mouth every 12 hours    Herpes simplex infection of perianal skin       allopurinol 100 MG tablet    ZYLOPRIM    90 tablet    Take 1 tablet (100 mg) by mouth daily    Chronic gout without tophus, unspecified cause, unspecified site       amoxicillin-clavulanate 500-125 MG per tablet    AUGMENTIN    28 tablet    Take 1 tablet by mouth 2 times daily for 14 days    Urinary tract infection       artificial saliva Aers spray     1 Bottle    Take 1 spray by mouth every 6 hours as needed for dry mouth    Dry mouth       atorvastatin 20 MG tablet    LIPITOR    90 tablet    Take 1 tablet (20 mg) by mouth daily    Hypercholesteremia       calcium citrate-vitamin D 315-250 MG-UNIT  Tabs per tablet    CITRACAL    180 tablet    Take 1 tablet by mouth 2 times daily    Osteoporosis, unspecified osteoporosis type, unspecified pathological fracture presence       cloNIDine 0.1 MG tablet    CATAPRES    60 tablet    Take 1 tablet (0.1 mg) by mouth 2 times daily as needed For systolic BP >180    HTN (hypertension)       COMPOUNDED NON-CONTROLLED SUBSTANCE - PHARMACY TO MIX COMPOUNDED MEDICATION    CMPD RX    30 g    Estriol 1 mg/g Apply small amount to finger and apply to inside vagina daily for 2 weeks then twice weekly Route: vaginally    Atrophic vaginitis       darbepoetin bucky 300 MCG/0.6ML injection    ARANESP (ALBUMIN FREE)    1.2 mL    Inject 0.6 mLs (300 mcg) Subcutaneous every 14 days As needed for hgb<10g/dL    CKD (chronic kidney disease) stage 4, GFR 15-29 ml/min (H), Anemia in stage 4 chronic kidney disease (H)       folic acid 1 MG tablet    FOLVITE    90 tablet    Take 1 tablet (1 mg) by mouth daily    Preventive measure       hydrALAZINE 25 MG tablet    APRESOLINE    60 tablet    Take 1 tablet (25 mg) by mouth 2 times daily    Renovascular hypertension       losartan 25 MG tablet    COZAAR    60 tablet    Take 1 tablet (25 mg) by mouth daily    HTN, kidney transplant related       mycophenolate 250 MG capsule     120 capsule    Take 2 capsules (500 mg) by mouth 2 times daily    Kidney transplanted       NIFEdipine ER osmotic 60 MG Tb24    PROCARDIA XL    180 tablet    Take 1 tablet (60 mg) by mouth 2 times daily    Hypertension secondary to other renal disorders       predniSONE 5 MG tablet    DELTASONE    30 tablet    Take 1 tablet (5 mg) by mouth daily    Kidney transplanted       sodium bicarbonate 650 MG tablet     180 tablet    Take 2 tablets (1,300 mg) by mouth 2 times daily    Acidosis

## 2018-11-15 NOTE — PATIENT INSTRUCTIONS
1. Stop Carvedilol and Lisinopril  2. Start 25mg Losartan  3. Start 1300mg Sodium Bicarbonate 2 times daily  4. Return in 1 week for Blood Work

## 2018-11-15 NOTE — PROGRESS NOTES
Transplant medication Consult                           Eleni Logan is a 62 year old female coming in for an initial visit for med rec and consult.  She was referred to me from Dr. Sahni to review patient's current medications.     Chief Complaint:   Lisinopril makes her cough  Acyclovir gives her diarrhea, vomiting.    Allergies/ADRs: Reviewed in Epic  Tobacco: No tobacco use  Alcohol: not currently using  Caffeine: 1 cups/day of coffee  Activity: can walk for 30 minutes, does this 2-3 days a week, rides bikes as well.  PMH: Reviewed in Epic    Medication Adherence/Access:  Patient uses pill box(es).  Patient takes medications 2 time(s) per day. Takes morning meds after eating breakfast, before bed as well.   Per patient, misses medication 0 times per week.   The patient fills medications at [place]: YES.    Hypertension: Current medications include Nifediipine ER 60mg daily, Lisinopril 5mg daily, Hydralazine 25mg BID, Clonidine 0.1mg prn BP >140.  Patient does self-monitor BP. 130-150/70-90.  Patient reports no current medication side effects.    Renal Transplant:  Current immunosuppressants include MMF 500mg BID, Prednisone 5mg daily.  Pt reports no side effects  Transplant date: 7/1/1999  Estimated Creatinine Clearance: 20.6 mL/min (based on Cr of 2.84).  Herpes Prevention: Acyclovir 400mg BID. Causes diarrhea if taken for a long period of time per patient. Dr. Shah manages this. She stopped this medication and vomiting diarrhea resolved, restarted and symptoms returned.   CMV prophylaxis:  Completed   PCP prophylaxis: none  Antifungal Prophylaxis: none  PPI use: no issues with GERD.  Current supplements for electrolyte replacement: Calcium BID- has stopped taking as she is on too many meds.  Recent Infections:  Bladder ifnection  Recent Hospitalizations: no  Home BPs: 130-150, 70-90  Immunizations: annual flu shot yearly, [id number]:  When >64 yo; Prevnar 13: UTD, TDaP:  Due 2022    Discussion/ Plan:    Adherence: Pt states she does not miss doses. She is unable to identify medications by name, but knows how to take and what they are for when she sees the tablet. Pt actually has a pretty good grasp on meds. Pt has not been taking Carvedilol or Calcium per our med rec.     Transplant team...  1. Pt has not been taking Carvedilol, likely will need refill if you would like to continue this. BP today is 134/84.  2. Lisinopril has been causing a cough, recommend switching to Losartan.    Dr. Shah...  1. Acyclovir has been causing diarrhea. You could consider using Valacyclovir 500mg once daily instead.     Pt to..  1. Restart Calcium/D.    Milton Choi, PharmD  [facility] Pharmacist    Phone: [phone]

## 2018-11-15 NOTE — PATIENT INSTRUCTIONS
Recommendations from today's MTM visit:                                                    Today we reviewed what your medicines are for, how to know if they are working, that your medicines are safe and how to make your medicine regimen as easy as possible.     1. I'll talk to your transplant team about Lisinopril and Dr. Shah about Acyclovir.     2. Start taking Calcium/D again, your calcium levels have been low.    3. Start taking your Carvedilol again. I will have the team send this down.     Next MTM visit: as needed    To schedule another MTM appointment, please call the clinic directly or you may call the MTM scheduling line at [phone] or toll-free at [phone].     My Clinical Pharmacist's contact information:                                                      It was a pleasure talking with you today!  Please feel free to contact me with any questions or concerns you have.      Milton Choi, PharmD  MTM Pharmacist    Phone: [phone]     You may receive a survey about the MTM services you received.  I would appreciate your feedback to help me serve you better in the future. Please fill it out and return it when you can. Your comments will be anonymous.

## 2018-11-15 NOTE — PROGRESS NOTES
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   - Proteinuria: Moderate 2.5 g/g   - BK Viremia: Yes, previously, last check without BK Vieremia 7/2017   - Kidney Tx Biopsy: Yes    2005 - Mild interstitial changes consistent with chronic rejection, no vascular rejection, no acute rejection, possible CNI related changes   - She has been referred for Kidney Transplant and has a working Vascular HD Access    Creatinine on an upward trend from 2 to 2.8 since July, but she does fluctuate between 2.0-3.0. At this time, she does have ongoing UTI and may have some volume loss from diarrhea (although weight is up). Hopefully, after UTI treatment and improvement of diarrhea with switching of Acyclovir, she will have downtrend in Crt. She does have proteinuria and would benefit from RAAS inhibition, but dry cough with ACEi, and thus will switch to ARB and recheck labs in 1 week.    # Immunosuppression: Mycophenolate mofetil and Prednisone 5mg Daily   - Changes: No    # Hypertension: Borderline control; Goal BP: < 130/80 Avg BPs 134/85   - Changes: Yes - Given not taking Coreg, cough with lisinopril and not significantly hypertensive, will plan to start 25mg Losartan, and discontinue Coreg and Lisinopril. F/U BPs in a few days.    # Anemia in chronic renal disease: Hgb: Stable   - Iron studies: On Aranesp per Anemia Management    # Mineral Bone Disorder:    - Secondary renal hyperparathyroidism; PTH level is: Moderately elevated and stable in the 200s  - Vitamin D; level is: Last checked 2017, will recheck PTH and Vit D level and determine need for replacement. Currently on low dose Vit D + Ca supplement.  - Calcium; level is: Low   - Phosphorus; level is: Normal     # Electrolytes:   - Potassium; level: Normal  - Bicarbonate; level: Low - will increase to 1300mg BID, especially given diarrhea.    # Recurrent UTIs - Followed by Tx ID, currently  on Augmentin for Klebsiella UTI    # Recurrent Genital HSV - On Acyclovir - Pharm to discuss with ID switching to Valacyclovir.     #MGUS - UPEP with significant albumunuria, but monoclonal spike noted. Will check serum SPEP and Light chains.    # Diarrhea - Appears most likely to medication related. As above, will try and change to Valacyclovir and see if improvement. In the interim, will increase PO Bicarb. If no improvement and or worsening symptoms, will consider stool testing.    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   Significant transplant-related complications: recurrent UTIs and BK viremia    Transplant Office Phone Number: [phone]    Assessment and plan was discussed with the patient and she voiced her understanding and agreement.    Pt seen and discussed with Dr. Boss.    Dyllan David MD   [id number]    Attestation:  This patient has been seen and evaluated by me, Dmitri Boss MD.  I have reviewed the note and agree with plan of care as documented by the fellow.     Chief Complaint   Ms. Logan is a 62 year old here for routine follow up and immunosuppression management.    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   Since last visit in 8/2018, she has been doing okay. She has had issues with another UTI as well as diagnosis of recurrent genital herpes. Since starting Acyclovir, she notes significant increase in number of BMs daily.   She denies hematuria, dysuria, or flank pain. No change in observed UOP. She is eating and drinking well.     Recent Hospitalizations:  [x] No [] Yes    New Medical Issues: [] No [x] Yes UTI and  Genital Herpes   Decreased energy: [x] No [] Yes    Chest pain or SOB with exertion:  [x] No [] Yes    Appetite change or weight change: [x] No [] Yes    Nausea, vomiting or diarrhea:  [] No [x] Yes Diarrhea - noted after Acyclovir, 4-5 BMs daily, soft not loose, non-bloody, no worsening since starting Augmentin   Fever, sweats or chills: [x] No [] Yes    Leg swelling: [x] No [] Yes      Other medical issues:  Yes - UTI/Herpes as above. Additionally, she notes dry cough that has been ongoing since lisinopril initiation.    Home BP: 3x per week, 130's/80s    Review of Systems   A comprehensive review of systems was obtained and negative, except as noted in the HPI or PMH.    Problem List   Patient Active Problem List   Diagnosis     Chronic rhinitis     Essential hypertension, benign     S/P kidney transplant     Rash     Asthma exacerbation     UTI (urinary tract infection)     Anemia     Chest pain     Urinary tract infection     Localized pustular psoriasis     CKD (chronic kidney disease) stage 4, GFR 15-29 ml/min (H)     Immunosuppression (H)     Fistula     End-stage renal disease (ESRD) (H)     Aftercare following organ transplant     Age-related osteoporosis without current pathological fracture       Social History   Social History   Substance Use Topics     Smoking status: Never Smoker     Smokeless tobacco: Never Used      Comment: Has been around second hand smoke     Alcohol use Yes      Comment: Once in a great while       Allergies   Allergies   Allergen Reactions     Ciprofloxacin Palpitations     Levaquin [Levofloxacin] Shortness Of Breath       Medications   Current Outpatient Prescriptions   Medication Sig     acyclovir (ZOVIRAX) 400 MG tablet Take 1 tablet (400 mg) by mouth every 12 hours     allopurinol (ZYLOPRIM) 100 MG tablet Take 1 tablet (100 mg) by mouth daily     amoxicillin-clavulanate (AUGMENTIN) 500-125 MG per tablet Take 1 tablet by mouth 2 times daily for 14 days     artificial saliva  "(BIOTENE MT) AERS spray Take 1 spray by mouth every 6 hours as needed for dry mouth     atorvastatin (LIPITOR) 20 MG tablet Take 1 tablet (20 mg) by mouth daily     calcium citrate-vitamin D (CITRACAL) 315-250 MG-UNIT TABS per tablet Take 1 tablet by mouth 2 times daily     carvedilol (COREG) 6.25 MG tablet Take 2 tablets (12.5 mg) by mouth 2 times daily (with meals) (Patient not taking: Reported on 11/15/2018)     CELLCEPT (BRAND) 250 MG CAPSULE Take 2 capsules (500 mg) by mouth 2 times daily     cloNIDine (CATAPRES) 0.1 MG tablet Take 1 tablet (0.1 mg) by mouth 2 times daily as needed For systolic BP >180     COMPOUNDED NON-CONTROLLED SUBSTANCE (CMPD RX) - PHARMACY TO MIX COMPOUNDED MEDICATION Estriol 1 mg/g  Apply small amount to finger and apply to inside vagina daily for 2 weeks then twice weekly  Route: vaginally     darbepoetin bucky (ARANESP, ALBUMIN FREE,) 300 MCG/0.6ML injection Inject 0.6 mLs (300 mcg) Subcutaneous every 14 days As needed for hgb<10g/dL     folic acid (FOLVITE) 1 MG tablet Take 1 tablet (1 mg) by mouth daily     hydrALAZINE (APRESOLINE) 25 MG tablet Take 1 tablet (25 mg) by mouth 2 times daily     lisinopril (PRINIVIL/ZESTRIL) 5 MG tablet Take 1 tablet (5 mg) by mouth daily     NIFEdipine ER osmotic (PROCARDIA XL) 60 MG TB24 Take 1 tablet (60 mg) by mouth 2 times daily     predniSONE (DELTASONE) 5 MG tablet Take 1 tablet (5 mg) by mouth daily     sodium bicarbonate 650 MG tablet Take 1 tablet (650 mg) by mouth 2 times daily     No current facility-administered medications for this visit.      There are no discontinued medications.    Physical Exam     /82  Pulse 74  Ht 1.575 m (5' 2\")  Wt 89.3 kg (196 lb 12.8 oz)  SpO2 100%  BMI 36 kg/m2    GENERAL APPEARANCE: alert and no distress  HENT: sclerae anicteric, mouth without ulcers or lesions  LYMPHATICS: no cervical nodes  RESP: lungs clear to auscultation - no rales or wheezes  CV: regular rhythm, normal rate,   EDEMA: no LE edema " bilaterally  ABDOMEN: soft, nondistended, nontender, bowel sounds normal  MS: extremities normal - no gross deformities noted, no evidence of inflammation in joints, no muscle tenderness  SKIN: no rash  ACCESS: RUE Fistula with palpable thrill      Data     Renal Latest Ref Rng & Units 11/8/2018 10/19/2018 9/19/2018   Na 133 - 144 mmol/L 138 138 138   K 3.4 - 5.3 mmol/L 4.3 4.0 4.0   Cl 94 - 109 mmol/L 108 106 106   CO2 20 - 32 mmol/L 19(L) 24 23   BUN 7 - 30 mg/dL 51(H) 53(H) 56(H)   Cr 0.52 - 1.04 mg/dL 2.84(H) 2.61(H) 2.42(H)   Glucose 70 - 99 mg/dL 107(H) 118(H) 109(H)   Ca  8.5 - 10.1 mg/dL 8.0(L) 7.8(L) 8.1(L)   Mg 1.6 - 2.3 mg/dL - - -     Bone Health Latest Ref Rng & Units 9/19/2018 7/10/2018 5/3/2018   Phos 2.5 - 4.5 mg/dL 3.9 4.3 3.2   PTHi 18 - 80 pg/mL - - 262(H)   Vit D Def 20 - 75 ug/L - - -     Heme Latest Ref Rng & Units 11/15/2018 11/8/2018 10/19/2018   WBC 4.0 - 11.0 10e9/L - - 5.5   Hgb 11.7 - 15.7 g/dL 10.2(L) 10.9(L) 9.7(L)   Plt 150 - 450 10e9/L - - 238     Liver Latest Ref Rng & Units 9/19/2018 7/10/2018 5/3/2018   AP 40 - 150 U/L - - 77   TBili 0.2 - 1.3 mg/dL - - -   ALT 0 - 50 U/L - - -   AST 0 - 45 U/L - - -   Tot Protein 6.8 - 8.8 g/dL - - -   Albumin 3.4 - 5.0 g/dL 3.3(L) 3.1(L) 3.3(L)     Pancreas Latest Ref Rng & Units 11/21/2017 12/2/2014 2/14/2007   A1C 4.3 - 6.0 % 5.6 5.0 -   Lipase 20 - 250 U/L - - 23     Iron studies Latest Ref Rng & Units 10/19/2018 9/19/2018 8/15/2018   Iron 35 - 180 ug/dL 38 55 77   Iron sat 15 - 46 % 20 27 38   Ferritin 8 - 252 ng/mL 886(H) 1038(H) 1017(H)     [facility] Txp Virology Latest Ref Rng & Units 7/10/2017 2/28/2017 4/18/2016   CVM DNA Quant - - - Plasma   CMV Quant <100 Copies/mL - - -   CMV QT Log <2.0 Log copies/mL - - -   BK Spec - Plasma Plasma -   BK Res BKNEG copies/mL BK Virus DNA Not Detected BK Virus DNA Not Detected -   BK Log <2.7 Log copies/mL Not Calculated   The Real-Time quantitative BK Virus assay was developed and its performance    characteristics determined by the [facility] Laboratory at   the [facility] in [place], [place]. The   primers and probes for each analyte are Analyte Specific Reagents (ASRs)   manufactured by [employer].   ASRs are used in many laboratory tests necessary for standard medical care and   generally do not require U.S. Food and Drug Administration approval. The FDA   has determined that such clearance or approval is not necessary.   This test is used for clinical purposes. It should not be regarded as   investigational or for research. This laboratory is certified under the   Clinical Laboratory Improvement Amendments of 1988 (CLIA-88) as qualified to   perform high complexity clinical laboratory testing.   Not Calculated   The Real-Time quantitative BK Virus assay was developed and its performance   characteristics determined by the [facility] Laboratory at   the [facility] in [place], [place]. The   primers and probes for each analyte are Analyte Specific Reagents (ASRs)   manufactured by [employer].   ASRs are used in many laboratory tests necessary for standard medical care and   generally do not require U.S. Food and Drug Administration approval. The FDA   has determined that such clearance or approval is not necessary.   This test is used for clinical purposes. It should not be regarded as   investigational or for research. This laboratory is certified under the   Clinical Laboratory Improvement Amendments of 1988 (CLIA-88) as qualified to   perform high complexity clinical laboratory testing.   -   HIV 1&2 NEG - - -        Recent Labs   Lab Test  09/17/16   0936  12/14/16   0955   DOSTAC  9/16 7770  20:00 12/13/16   TACROL  <3.0  Tacrolimus Reference Range   Kidney Transplant   Pediatric                      ug/L     0-3 months post transplant   10-12     3-6 months post transplant   8-10     6-12 months post  transplant  6-8     >12 months post transplant   4-7   Adult     0-6 months post transplant   8-10     6-12 months post transplant  6-8     >12 months post transplant   4-6     >5 years post transplant     3-5   Heart Transplant   Pediatric     0-12 months post transplant  10-15     >12 months post transplant   5-10   Adult     0-3 months post transplant   10-15     3-6 months post transplant   8-12     6-12 months post transplant  6-12     >12 months post transplant   6-10   Lung Transplant     0-12 months post transplant  10-15     >12 months post transplant   8-12   Liver Transplant   Pediatric     0-3 months post transplant   10-15     3-6 months post transplant   8-10     >6 months post transplant    6-8   Adult     0-3 months post transplant   10-12     3-6 months post transplant   8-10     >6  months post transplant    6-8   Pancreas Transplant     0-6 months post transplant   8-10     >6 months post transplant    5-8   This test was developed and its performance characteristics determined by the   [facility],  Special Chemistry Laboratory. It has   not been cleared or approved by the FDA. The laboratory is regulated under CLIA   as qualified to perform high-complexity testing. This test is used for clinical   purposes. It should not be regarded as investigational or for research.  *  <3.0  Tacrolimus Reference Range   Kidney Transplant   Pediatric                      ug/L     0-3 months post transplant   10-12     3-6 months post transplant   8-10     6-12 months post transplant  6-8     >12 months post transplant   4-7   Adult     0-6 months post transplant   8-10     6-12 months post transplant  6-8     >12 months post transplant   4-6     >5 years post transplant     3-5   Heart Transplant   Pediatric     0-12 months post transplant  10-15     >12 months post transplant   5-10   Adult     0-3 months post transplant   10-15     3-6 months post transplant   8-12     6-12 months  post transplant  6-12     >12 months post transplant   6-10   Lung Transplant     0-12 months post transplant  10-15     >12 months post transplant   8-12   Liver Transplant   Pediatric     0-3 months post transplant   10-15     3-6 months post transplant   8-10     >6 months post transplant    6-8   Adult     0-3 months post transplant   10-12     3-6 months post transplant   8-10     >6  months post transplant    6-8   Pancreas Transplant     0-6 months post transplant   8-10     >6 months post transplant    5-8   This test was developed and its performance characteristics determined by the   [facility],  Special Chemistry Laboratory. It has   not been cleared or approved by the FDA. The laboratory is regulated under CLIA   as qualified to perform high-complexity testing. This test is used for clinical   purposes. It should not be regarded as investigational or for research.  *     Recent Labs   Lab Test  04/18/16   1631  05/19/16   1128  10/02/16   0831   DOSMPA  Not Provided  2300  Not Provided   MPACID  5.61*  5.07*  1.40   MPAG  101.0*  69.4  17.8*

## 2018-11-16 RX ORDER — VALACYCLOVIR HYDROCHLORIDE 500 MG/1
500 TABLET, FILM COATED ORAL DAILY
Qty: 90 TABLET | Refills: 0 | Status: SHIPPED | OUTPATIENT
Start: 2018-11-16 | End: 2018-12-11

## 2018-11-16 NOTE — PROGRESS NOTES
Iwona Shah MD Griffin, Peter Alberto, [facility],   Thanks for getting back to me. Yes, I agree. Can you please let the patient know that I approve and note it in the chart?   Thanks   Iwona            Previous Messages       ----- Message -----      From: Milton Choi [facility]      Sent: 11/15/2018   1:58 PM        To: MD Dr. Pablo Tamayo,     I saw your patient for a med rec/ review on 11/15. She states Acyclovir has been causing diarrhea/ vomitting. She stopped it once, and the diarrhea/vomiting resolved, restarted, and the symptoms began again.     You could consider using Valacyclovir 500mg once daily if you are continuing this medication.     Thanks! Let me know what you decide.     Milton Choi PharmD   [facility] Pharmacist     Phone: [phone]          Spoke with patient, sent script down to pharmacy.     Milton Choi PharmD  [facility] Pharmacist    Phone: [phone]

## 2018-11-21 ENCOUNTER — CARE COORDINATION (OUTPATIENT)
Dept: NEPHROLOGY | Facility: CLINIC | Age: 63
End: 2018-11-21

## 2018-11-21 DIAGNOSIS — Z94.0 STATUS POST KIDNEY TRANSPLANT: Primary | ICD-10-CM

## 2018-11-21 NOTE — PROGRESS NOTES
Nephrology Note: Nursing Outreach Encounter    REASON FOR CALL:                                                      REASON FOR CALL: Blood Pressure Follow Up                                          SITUATION/BACKROUND:                                                    Patient is being treated for HTN.    Switched from carvedilol to losartan at last appointment.      ASSESSMENT:                                                      Patient states BP averaging 130's / 80's since appointment. Pulse in the 70's. Denied any physical symptoms or concerns.    Currently prescribed losartan 25mg daily, nifedipine 60mg BID, clonidine 0.1mg BID prn, and hydralazine 25mg BID.    Uremic Symptoms: No       PLAN:                                                      Follow Up:   Route to provider to further advise     Per Dr. Boss: Let's get a repeat BMP.  If okay, my plan would be to taper off hydralazine and increase losartan.     Patient verbalized understanding and will contact the clinic with any further questions or concerns.     Brittney Quinones RN

## 2018-11-27 ENCOUNTER — PATIENT OUTREACH (OUTPATIENT)
Dept: CARE COORDINATION | Facility: CLINIC | Age: 63
End: 2018-11-27

## 2018-11-28 ENCOUNTER — OFFICE VISIT (OUTPATIENT)
Dept: ENDOCRINOLOGY | Facility: CLINIC | Age: 63
End: 2018-11-28
Payer: MEDICARE

## 2018-11-28 ENCOUNTER — CARE COORDINATION (OUTPATIENT)
Dept: NEPHROLOGY | Facility: CLINIC | Age: 63
End: 2018-11-28

## 2018-11-28 VITALS
BODY MASS INDEX: 37.29 KG/M2 | WEIGHT: 197.5 LBS | SYSTOLIC BLOOD PRESSURE: 146 MMHG | DIASTOLIC BLOOD PRESSURE: 80 MMHG | HEIGHT: 61 IN | HEART RATE: 96 BPM

## 2018-11-28 DIAGNOSIS — Z48.298 AFTERCARE FOLLOWING ORGAN TRANSPLANT: ICD-10-CM

## 2018-11-28 DIAGNOSIS — E55.9 VITAMIN D DEFICIENCY: ICD-10-CM

## 2018-11-28 DIAGNOSIS — Z94.0 KIDNEY REPLACED BY TRANSPLANT: ICD-10-CM

## 2018-11-28 DIAGNOSIS — I15.1 HTN, KIDNEY TRANSPLANT RELATED: ICD-10-CM

## 2018-11-28 DIAGNOSIS — N25.81 SECONDARY RENAL HYPERPARATHYROIDISM (H): ICD-10-CM

## 2018-11-28 DIAGNOSIS — M81.0 AGE-RELATED OSTEOPOROSIS WITHOUT CURRENT PATHOLOGICAL FRACTURE: ICD-10-CM

## 2018-11-28 DIAGNOSIS — M81.0 AGE-RELATED OSTEOPOROSIS WITHOUT CURRENT PATHOLOGICAL FRACTURE: Primary | ICD-10-CM

## 2018-11-28 DIAGNOSIS — D64.9 ANEMIA: ICD-10-CM

## 2018-11-28 DIAGNOSIS — Z94.0 HTN, KIDNEY TRANSPLANT RELATED: ICD-10-CM

## 2018-11-28 DIAGNOSIS — D47.2 MGUS (MONOCLONAL GAMMOPATHY OF UNKNOWN SIGNIFICANCE): ICD-10-CM

## 2018-11-28 DIAGNOSIS — Z94.0 STATUS POST KIDNEY TRANSPLANT: ICD-10-CM

## 2018-11-28 DIAGNOSIS — N18.4 CKD (CHRONIC KIDNEY DISEASE) STAGE 4, GFR 15-29 ML/MIN (H): ICD-10-CM

## 2018-11-28 LAB
ALBUMIN SERPL-MCNC: 3.4 G/DL (ref 3.4–5)
ANION GAP SERPL CALCULATED.3IONS-SCNC: 10 MMOL/L (ref 3–14)
BUN SERPL-MCNC: 59 MG/DL (ref 7–30)
CALCIUM SERPL-MCNC: 7.5 MG/DL (ref 8.5–10.1)
CHLORIDE SERPL-SCNC: 107 MMOL/L (ref 94–109)
CO2 SERPL-SCNC: 21 MMOL/L (ref 20–32)
CREAT SERPL-MCNC: 2.58 MG/DL (ref 0.52–1.04)
DEPRECATED CALCIDIOL+CALCIFEROL SERPL-MC: 17 UG/L (ref 20–75)
ERYTHROCYTE [DISTWIDTH] IN BLOOD BY AUTOMATED COUNT: 16.2 % (ref 10–15)
GFR SERPL CREATININE-BSD FRML MDRD: 19 ML/MIN/1.7M2
GLUCOSE SERPL-MCNC: 110 MG/DL (ref 70–99)
HCT VFR BLD AUTO: 34.2 % (ref 35–47)
HGB BLD-MCNC: 10.4 G/DL (ref 11.7–15.7)
MCH RBC QN AUTO: 25.6 PG (ref 26.5–33)
MCHC RBC AUTO-ENTMCNC: 30.4 G/DL (ref 31.5–36.5)
MCV RBC AUTO: 84 FL (ref 78–100)
PHOSPHATE SERPL-MCNC: 5.3 MG/DL (ref 2.5–4.5)
PLATELET # BLD AUTO: 221 10E9/L (ref 150–450)
POTASSIUM SERPL-SCNC: 4.6 MMOL/L (ref 3.4–5.3)
PTH-INTACT SERPL-MCNC: 376 PG/ML (ref 18–80)
RBC # BLD AUTO: 4.06 10E12/L (ref 3.8–5.2)
SODIUM SERPL-SCNC: 139 MMOL/L (ref 133–144)
WBC # BLD AUTO: 4.1 10E9/L (ref 4–11)

## 2018-11-28 PROCEDURE — 82652 VIT D 1 25-DIHYDROXY: CPT | Performed by: INTERNAL MEDICINE

## 2018-11-28 PROCEDURE — 00000402 ZZHCL STATISTIC TOTAL PROTEIN: Performed by: INTERNAL MEDICINE

## 2018-11-28 PROCEDURE — 83883 ASSAY NEPHELOMETRY NOT SPEC: CPT | Performed by: INTERNAL MEDICINE

## 2018-11-28 PROCEDURE — 84165 PROTEIN E-PHORESIS SERUM: CPT | Performed by: INTERNAL MEDICINE

## 2018-11-28 PROCEDURE — 82306 VITAMIN D 25 HYDROXY: CPT | Performed by: INTERNAL MEDICINE

## 2018-11-28 PROCEDURE — 83970 ASSAY OF PARATHORMONE: CPT | Performed by: INTERNAL MEDICINE

## 2018-11-28 RX ORDER — LOSARTAN POTASSIUM 50 MG/1
50 TABLET ORAL DAILY
Qty: 90 TABLET | Refills: 3 | Status: SHIPPED | OUTPATIENT
Start: 2018-11-28 | End: 2018-12-13

## 2018-11-28 RX ORDER — CALCITRIOL 0.25 UG/1
0.25 CAPSULE, LIQUID FILLED ORAL 2 TIMES DAILY
Qty: 60 CAPSULE | Refills: 3 | Status: SHIPPED | OUTPATIENT
Start: 2018-11-28 | End: 2019-05-14

## 2018-11-28 NOTE — PROGRESS NOTES
"Endocrine Clinic Visit:     Interval history  No falls or fractures.  Taking calcium and vitamin D regularly.  Low 1,25 vitamin D noted in the past.  No new symptoms.      HPI:   Osteoporosis:   She was diagnosed with osteoporosis 1/2017 in the setting of CKD, postmenopausal state and low calcium intake.   1/2017: DXA showed osteoporosis. Significant decline compared to prior DXA in 2010 on all 3 sites  high fracture risk due to steroid use.     Thyroid nodules  2/2018:  incidentally discovered thyroid nodules on US of AV fistula. She did not prior knowledge of this.   2/7/18: US thyroid: 2 nodules measuring 0.5 x 0.3 x 0.4 cm and has no internal vascularity. There is a 1.1 x 1.1 x 1.3 cm hypoechoic nodule with internal soft tissue component and no internal vascularity;  benign \"colloid with clot\" morphology.    No compressive symptoms - neck pain, problems with swallowing, voice change.   Prior TSH normal. Denies temp intolerance, palpitation, new or worse constipation, anorexia, sleep disturbance. Feels fatigued at times, no change in energy levels recently. No SOB / fevers.      She was diagnosed with osteoporosis 1/2017 in the setting of CKD, postmenopausal state and low calcium intake. She has morbid obesity followed up at weight management clinic.          Morbid obesity: Encouraged today for a FU with Dr Salgado.      Other ROS:   No falls or fractures.   No eye symptoms  No headache, change in vision, loss of peripheral vision  Complete ROS that were obtained were negative.     Past Medical History:   Diagnosis Date     Anatomical narrow angle      Anemia      Gout      History of blood transfusion     Blood Transfusion two years ago, done at [facility].     HTN (hypertension)      Hyperlipidaemia      IgA nephropathy      Immunosuppressed status (H)     Prednisone and cellcept     Nonsenile cataract      Obstructive sleep apnea      Uncomplicated asthma     Dx: 2012     Past Surgical History:   Procedure " Laterality Date     ARTHROPLASTY KNEE       BIOPSY      Kidney Biopsy 16 years ago at [facility]      COLONOSCOPY N/A 2/18/2016    Procedure: COLONOSCOPY;  Surgeon: Markie Squires MD;  Location:  GI     CREATE FISTULA ARTERIOVENOUS UPPER EXTREMITY Right 9/19/2017    Procedure: CREATE FISTULA ARTERIOVENOUS UPPER EXTREMITY;  Right Rm Basilic Vein Fistula, Cephalic Vein Thrombectomy.;  Surgeon: Brandy Bond MD;  Location:  OR     ESOPHAGOSCOPY, GASTROSCOPY, DUODENOSCOPY (EGD), COMBINED N/A 2/17/2016    Procedure: COMBINED ESOPHAGOSCOPY, GASTROSCOPY, DUODENOSCOPY (EGD);  Surgeon: Markie Squires MD;  Location:  GI     GYN SURGERY      Hysterectomy 20 years ago at [facility]      HC CAPSULE ENDOSCOPY N/A 2/18/2016    Procedure: CAPSULE/PILL CAM ENDOSCOPY;  Surgeon: Markie Squires MD;  Location:  GI     HERNIA REPAIR      Patient doesnt remember if they used mesh for repair.      LASER YAG IRIDOTOMY BILATERAL  2008     RENAL TRANSPLANT PROGRAM ADULT IP CONSULT       REVISION FISTULA ARTERIOVENOUS UPPER EXTREMITY Right 11/21/2017    Procedure: REVISION FISTULA ARTERIOVENOUS UPPER EXTREMITY;  Right Arm Basilic Vein Transposition,  Anesthesia Block;  Surgeon: Brandy Bond MD;  Location:  OR     TRANSPLANT  07/1999    kidney     Family History   Problem Relation Age of Onset     KIDNEY DISEASE Mother      Macular Degeneration No family hx of      Eye Surgery No family hx of      Glaucoma No family hx of      Diabetes No family hx of      Hypertension No family hx of      Amblyopia No family hx of      Skin Cancer No family hx of      Melanoma No family hx of      Social History     Social History     Marital status:      Spouse name: N/A     Number of children: N/A     Years of education: N/A     Occupational History     Not on file.     Social History Main Topics     Smoking status: Never Smoker     Smokeless tobacco: Never Used      Comment: Has been around  "second hand smoke     Alcohol use Yes      Comment: Once in a great while     Drug use: No     Sexual activity: Not Currently     Other Topics Concern     Not on file     Social History Narrative        Allergies   Allergen Reactions     Ciprofloxacin Palpitations     Levaquin [Levofloxacin] Shortness Of Breath     Current Outpatient Prescriptions   Medication     allopurinol (ZYLOPRIM) 100 MG tablet     artificial saliva (BIOTENE MT) AERS spray     atorvastatin (LIPITOR) 20 MG tablet     calcium citrate-vitamin D (CITRACAL) 315-250 MG-UNIT TABS per tablet     CELLCEPT (BRAND) 250 MG CAPSULE     cloNIDine (CATAPRES) 0.1 MG tablet     COMPOUNDED NON-CONTROLLED SUBSTANCE (CMPD RX) - PHARMACY TO MIX COMPOUNDED MEDICATION     darbepoetin bucky (ARANESP, ALBUMIN FREE,) 300 MCG/0.6ML injection     folic acid (FOLVITE) 1 MG tablet     losartan (COZAAR) 50 MG tablet     NIFEdipine ER osmotic (PROCARDIA XL) 60 MG TB24     predniSONE (DELTASONE) 5 MG tablet     sodium bicarbonate 650 MG tablet     valACYclovir (VALTREX) 500 MG tablet     [DISCONTINUED] losartan (COZAAR) 25 MG tablet     No current facility-administered medications for this visit.            Exam:  /80  Pulse 96  Ht 1.549 m (5' 1\")  Wt 89.6 kg (197 lb 8 oz)  BMI 37.32 kg/m2   Constitutional: obese female.   Head: Normocephalic. No masses, lesions, tenderness or abnormalities  Neck: Neck supple. No adenopathy. Thyroid slightly irregular with no discrete palpable nodule. Carotids without bruits.  ENT: No throat congestion, no neck nodes or sinus tenderness  Cardiovascular: regular rhythm, no tachycardia  Respiratory: Lungs clear  Gastrointestinal: Abdomen soft, non-tender. BS normal. No striae  Musculoskeletal: gait normal and normal muscle tone  Neurologic: Normal speech, reflexes normal.   Psychiatric: mentation appears normal       TSH   Date Value Ref Range Status   05/03/2018 2.63 0.40 - 4.00 mU/L Final   04/18/2016 1.26 0.40 - 4.00 mU/L Final "   10/03/2013 4.26 0.4 - 5.0 mU/L Final   11/23/2010 3.17 0.4 - 5.0 mU/L Final   03/09/2009 1.95 0.4 - 5.0 mU/L Final       T4 Free   Date Value Ref Range Status   05/03/2018 1.29 0.76 - 1.46 ng/dL Final   ]    CBC RESULTS:   Recent Labs   Lab Test  02/01/18   1356  01/11/18   1423   WBC   --   7.8   RBC   --   3.89   HGB  9.7*  9.3*   HCT  32.5*  31.4*   MCV   --   81   MCH   --   23.9*   MCHC   --   29.6*   RDW   --   16.7*   PLT   --   297     Recent Labs   Lab Test  01/11/18   1423  12/19/17   1155   NA  138  141   POTASSIUM  4.0  4.2   CHLORIDE  109  111*   CO2  22  20   ANIONGAP  8  11   GLC  168*  97   BUN  46*  55*   CR  2.08*  2.01*   GLORIA  8.1*  8.6         Assessment   Eleni Logan is a 62 year old female with history of renal transplant who is here for evaluation of incidentally discovered thyroid nodules on US neck. She is clinically euthyroid, and has no compressive symptoms. Her labs in the past showed normal TSH. US done prior to visit showed 2 cystic nodule with very small solid component.    She was diagnosed with osteoporosis 1/2017 in the setting of CKD, postmenopausal state and low calcium intake. She has morbid obesity followed up at weight management clinic.       Thyroid Nodules  Normal TFTs   V low risk US appearance.   FU US in 18-24 mo     Osteoporosis: Treatment plan is complex. We discussed options in detail. Renal insufficiency and high PTH is noted, high risks with reclast or PTH analogs. Denosumab is probably the best option going forward. Skin infection risks are higher, but benefits may outweigh the risks.  She is reluctant to use it at this time.     01/2017:   DXA showed osteoporosis.   Significant decline compared to prior DXA on all 3 sites.  Has not received treatment.   Age related but high fracture risk due to steroid use.   Plan to normalize calcium intake, continue Vit D levels. Labs repeated in 3 months showed normal Vitamin D  High PTH and renal failure noted.   1,25 D was  ordered. Start Calcitriol 0.25 daily with plan to change to BID.   Next step : Revisit Prolia with patient.      Morbid obesity: sees Dr Salgado.      Ana Pollack MD  [id number]  Endocrinology Service

## 2018-11-28 NOTE — PATIENT INSTRUCTIONS
Start taking calcitriol daily.   Please head to lab and see if we need a redraw for 1,25 D levels or they can just add this to morning lab.

## 2018-11-28 NOTE — PROGRESS NOTES
Left voicemail reminding patient to have labs drawn this week. Advised to call if questions.    Brittney Quinones RN

## 2018-11-28 NOTE — LETTER
"11/28/2018       RE: Eleni Logan  [address]  [address]  [facility] [address]     Dear Colleague,    Thank you for referring your patient, Eleni Logan, to the [facility] ENDOCRINOLOGY at [facility]. Please see a copy of my visit note below.    Endocrine Clinic Visit:     Interval history  No falls or fractures.  Taking calcium and vitamin D regularly.  Low 1,25 vitamin D noted in the past.  No new symptoms.      HPI:   Osteoporosis:   She was diagnosed with osteoporosis 1/2017 in the setting of CKD, postmenopausal state and low calcium intake.   1/2017: DXA showed osteoporosis. Significant decline compared to prior DXA in 2010 on all 3 sites  high fracture risk due to steroid use.     Thyroid nodules  2/2018:  incidentally discovered thyroid nodules on US of AV fistula. She did not prior knowledge of this.   2/7/18: US thyroid: 2 nodules measuring 0.5 x 0.3 x 0.4 cm and has no internal vascularity. There is a 1.1 x 1.1 x 1.3 cm hypoechoic nodule with internal soft tissue component and no internal vascularity;  benign \"colloid with clot\" morphology.    No compressive symptoms - neck pain, problems with swallowing, voice change.   Prior TSH normal. Denies temp intolerance, palpitation, new or worse constipation, anorexia, sleep disturbance. Feels fatigued at times, no change in energy levels recently. No SOB / fevers.      She was diagnosed with osteoporosis 1/2017 in the setting of CKD, postmenopausal state and low calcium intake. She has morbid obesity followed up at weight management clinic.          Morbid obesity: Encouraged today for a FU with Dr Salgado.      Other ROS:   No falls or fractures.   No eye symptoms  No headache, change in vision, loss of peripheral vision  Complete ROS that were obtained were negative.     Past Medical History:   Diagnosis Date     Anatomical narrow angle      Anemia      Gout      History of blood transfusion     Blood Transfusion " two years ago, done at [facility].     HTN (hypertension)      Hyperlipidaemia      IgA nephropathy      Immunosuppressed status (H)     Prednisone and cellcept     Nonsenile cataract      Obstructive sleep apnea      Uncomplicated asthma     Dx: 2012     Past Surgical History:   Procedure Laterality Date     ARTHROPLASTY KNEE       BIOPSY      Kidney Biopsy 16 years ago at [facility]      COLONOSCOPY N/A 2/18/2016    Procedure: COLONOSCOPY;  Surgeon: Markie Squires MD;  Location:  GI     CREATE FISTULA ARTERIOVENOUS UPPER EXTREMITY Right 9/19/2017    Procedure: CREATE FISTULA ARTERIOVENOUS UPPER EXTREMITY;  Right Rm Basilic Vein Fistula, Cephalic Vein Thrombectomy.;  Surgeon: Brandy Bond MD;  Location:  OR     ESOPHAGOSCOPY, GASTROSCOPY, DUODENOSCOPY (EGD), COMBINED N/A 2/17/2016    Procedure: COMBINED ESOPHAGOSCOPY, GASTROSCOPY, DUODENOSCOPY (EGD);  Surgeon: Markie Squires MD;  Location:  GI     GYN SURGERY      Hysterectomy 20 years ago at [facility]      HC CAPSULE ENDOSCOPY N/A 2/18/2016    Procedure: CAPSULE/PILL CAM ENDOSCOPY;  Surgeon: Markie Squires MD;  Location:  GI     HERNIA REPAIR      Patient doesnt remember if they used mesh for repair.      LASER YAG IRIDOTOMY BILATERAL  2008     RENAL TRANSPLANT PROGRAM ADULT IP CONSULT       REVISION FISTULA ARTERIOVENOUS UPPER EXTREMITY Right 11/21/2017    Procedure: REVISION FISTULA ARTERIOVENOUS UPPER EXTREMITY;  Right Arm Basilic Vein Transposition,  Anesthesia Block;  Surgeon: Brandy Bond MD;  Location:  OR     TRANSPLANT  07/1999    kidney     Family History   Problem Relation Age of Onset     KIDNEY DISEASE Mother      Macular Degeneration No family hx of      Eye Surgery No family hx of      Glaucoma No family hx of      Diabetes No family hx of      Hypertension No family hx of      Amblyopia No family hx of      Skin Cancer No family hx of      Melanoma No family hx of      Social History     Social  "History     Marital status:      Spouse name: N/A     Number of children: N/A     Years of education: N/A     Occupational History     Not on file.     Social History Main Topics     Smoking status: Never Smoker     Smokeless tobacco: Never Used      Comment: Has been around second hand smoke     Alcohol use Yes      Comment: Once in a great while     Drug use: No     Sexual activity: Not Currently     Other Topics Concern     Not on file     Social History Narrative        Allergies   Allergen Reactions     Ciprofloxacin Palpitations     Levaquin [Levofloxacin] Shortness Of Breath     Current Outpatient Prescriptions   Medication     allopurinol (ZYLOPRIM) 100 MG tablet     artificial saliva (BIOTENE MT) AERS spray     atorvastatin (LIPITOR) 20 MG tablet     calcium citrate-vitamin D (CITRACAL) 315-250 MG-UNIT TABS per tablet     CELLCEPT (BRAND) 250 MG CAPSULE     cloNIDine (CATAPRES) 0.1 MG tablet     COMPOUNDED NON-CONTROLLED SUBSTANCE (CMPD RX) - PHARMACY TO MIX COMPOUNDED MEDICATION     darbepoetin bucky (ARANESP, ALBUMIN FREE,) 300 MCG/0.6ML injection     folic acid (FOLVITE) 1 MG tablet     losartan (COZAAR) 50 MG tablet     NIFEdipine ER osmotic (PROCARDIA XL) 60 MG TB24     predniSONE (DELTASONE) 5 MG tablet     sodium bicarbonate 650 MG tablet     valACYclovir (VALTREX) 500 MG tablet     [DISCONTINUED] losartan (COZAAR) 25 MG tablet     No current facility-administered medications for this visit.            Exam:  /80  Pulse 96  Ht 1.549 m (5' 1\")  Wt 89.6 kg (197 lb 8 oz)  BMI 37.32 kg/m2   Constitutional: obese female.   Head: Normocephalic. No masses, lesions, tenderness or abnormalities  Neck: Neck supple. No adenopathy. Thyroid slightly irregular with no discrete palpable nodule. Carotids without bruits.  ENT: No throat congestion, no neck nodes or sinus tenderness  Cardiovascular: regular rhythm, no tachycardia  Respiratory: Lungs clear  Gastrointestinal: Abdomen soft, non-tender. " BS normal. No striae  Musculoskeletal: gait normal and normal muscle tone  Neurologic: Normal speech, reflexes normal.   Psychiatric: mentation appears normal       TSH   Date Value Ref Range Status   05/03/2018 2.63 0.40 - 4.00 mU/L Final   04/18/2016 1.26 0.40 - 4.00 mU/L Final   10/03/2013 4.26 0.4 - 5.0 mU/L Final   11/23/2010 3.17 0.4 - 5.0 mU/L Final   03/09/2009 1.95 0.4 - 5.0 mU/L Final       T4 Free   Date Value Ref Range Status   05/03/2018 1.29 0.76 - 1.46 ng/dL Final   ]    CBC RESULTS:   Recent Labs   Lab Test  02/01/18   1356  01/11/18   1423   WBC   --   7.8   RBC   --   3.89   HGB  9.7*  9.3*   HCT  32.5*  31.4*   MCV   --   81   MCH   --   23.9*   MCHC   --   29.6*   RDW   --   16.7*   PLT   --   297     Recent Labs   Lab Test  01/11/18   1423  12/19/17   1155   NA  138  141   POTASSIUM  4.0  4.2   CHLORIDE  109  111*   CO2  22  20   ANIONGAP  8  11   GLC  168*  97   BUN  46*  55*   CR  2.08*  2.01*   GLORIA  8.1*  8.6         Assessment   Eleni Logan is a 62 year old female with history of renal transplant who is here for evaluation of incidentally discovered thyroid nodules on US neck. She is clinically euthyroid, and has no compressive symptoms. Her labs in the past showed normal TSH. US done prior to visit showed 2 cystic nodule with very small solid component.    She was diagnosed with osteoporosis 1/2017 in the setting of CKD, postmenopausal state and low calcium intake. She has morbid obesity followed up at weight management clinic.       Thyroid Nodules  Normal TFTs   V low risk US appearance.   FU US in 18-24 mo     Osteoporosis: Treatment plan is complex. We discussed options in detail. Renal insufficiency and high PTH is noted, high risks with reclast or PTH analogs. Denosumab is probably the best option going forward. Skin infection risks are higher, but benefits may outweigh the risks.  She is reluctant to use it at this time.     01/2017:   DXA showed osteoporosis.   Significant decline  compared to prior DXA on all 3 sites.  Has not received treatment.   Age related but high fracture risk due to steroid use.   Plan to normalize calcium intake, continue Vit D levels. Labs repeated in 3 months showed normal Vitamin D  High PTH and renal failure noted.   1,25 D was ordered. Start Calcitriol 0.25 daily with plan to change to BID.   Next step : Revisit Prolia with patient.      Morbid obesity: sees Dr Salgado.      Ana Pollack MD  [id number]  Endocrinology Service        Again, thank you for allowing me to participate in the care of your patient.      Sincerely,    Ana Pollack MD

## 2018-11-28 NOTE — MR AVS SNAPSHOT
After Visit Summary   11/28/2018    Eleni Logan    MRN: [id number]           Patient Information     Date Of Birth          1955        Visit Information        Provider Department      11/28/2018 2:30 PM Ana Pollack MD [facility]        Today's Diagnoses     Age-related osteoporosis without current pathological fracture    -  1    Vitamin D deficiency          Care Instructions    Start taking calcitriol daily.   Please head to lab and see if we need a redraw for 1,25 D levels or they can just add this to morning lab.           Follow-ups after your visit        Follow-up notes from your care team     Return in about 6 months (around 5/28/2019).      Your next 10 appointments already scheduled     Dec 11, 2018  2:00 PM CST   (Arrive by 1:45 PM)   Return Visit with Iwona Shah MD   [facility] ([facility])    [address] [place] [address]  [address]  [facility] [address]   [phone]            Dec 12, 2018  9:45 AM CST   (Arrive by 9:30 AM)   Return Visit with Lynnette Garcia MD   [facility] ([facility])    [address]  4th Floor  [facility] [address] [phone]            May 09, 2019  1:25 PM CDT   (Arrive by 1:10 PM)   Return Visit with Haleigh Rodriguez MD   [facility] ([facility])    [address]  4th Floor  [facility] [address] [phone]            May 09, 2019  2:35 PM CDT   (Arrive by 2:05 PM)   Return Kidney Transplant with  Kidney/Pancreas Recipient 2   [facility] ([facility])    [address]  [address]  [facility] [address] [phone]            May 22, 2019  3:00 PM CDT   (Arrive by 2:45 PM)   RETURN ENDOCRINE with Ana Pollack MD   [facility] (  [facility]    [address]  3rd Floor  [facility] [id number] [phone]              Future tests that were ordered for you today     Open Future Orders        Priority Expected Expires Ordered    Basic metabolic panel Routine 12/10/2018 1/28/2019 11/28/2018            Who to contact     Please call your clinic at [phone] to:    Ask questions about your health    Make or cancel appointments    Discuss your medicines    Learn about your test results    Speak to your doctor            Additional Information About Your Visit        Care EveryWhere ID     This is your Care EveryWhere ID. This could be used by other organizations to access your [place] medical records  [id number]        Your Vitals Were     Pulse Height BMI (Body Mass Index)             96 1.549 m (5' 1\") 37.32 kg/m2          Blood Pressure from Last 3 Encounters:   11/28/18 146/80   11/15/18 150/82   11/15/18 134/84    Weight from Last 3 Encounters:   11/28/18 89.6 kg (197 lb 8 oz)   11/15/18 89.3 kg (196 lb 12.8 oz)   11/08/18 88 kg (194 lb)                 Today's Medication Changes          These changes are accurate as of 11/28/18  5:52 PM.  If you have any questions, ask your nurse or doctor.               Start taking these medicines.        Dose/Directions    calcitRIOL 0.25 MCG capsule   Commonly known as:  ROCALTROL   Used for:  Vitamin D deficiency   Started by:  Ana Pollack MD        Dose:  0.25 mcg   Take 1 capsule (0.25 mcg) by mouth 2 times daily   Quantity:  60 capsule   Refills:  3         These medicines have changed or have updated prescriptions.        Dose/Directions    losartan 50 MG tablet   Commonly known as:  COZAAR   This may have changed:    - medication strength  - how much to take   Used for:  HTN, kidney transplant related   Changed by:  Brittney Quinones, MARIBELL        Dose:  50 mg   Take 1 tablet (50 mg) by mouth daily   Quantity:  90 tablet   Refills:  3          "   Where to get your medicines      These medications were sent to [place], [place] - [address]  [address], [facility] [address]    Hours:  TRANSPLANT PHONE NUMBER [phone] Phone:  [phone]     calcitRIOL 0.25 MCG capsule    losartan 50 MG tablet                Primary Care Provider Office Phone # Fax #    Haleigh Alejandra Rodriguez [phone]       [address] [address]        Equal Access to Services     GERBER DAVENPORT : Hadii aad ku hadasho Soomaali, waaxda luqadaha, qaybta kaalmada adeegyada, waxay kevinin hayricha chambers . So [facility] [phone].    ATENCIÓN: Si habla español, tiene a stoner disposición servicios gratuitos de asistencia lingüística. Llame al [phone].    We comply with applicable federal civil rights laws and [place] laws. We do not discriminate on the basis of race, color, national origin, age, disability, sex, sexual orientation, or gender identity.            Thank you!     Thank you for choosing [facility]  for your care. Our goal is always to provide you with excellent care. Hearing back from our patients is one way we can continue to improve our services. Please take a few minutes to complete the written survey that you may receive in the mail after your visit with us. Thank you!             Your Updated Medication List - Protect others around you: Learn how to safely use, store and throw away your medicines at www.disposemymeds.org.          This list is accurate as of 11/28/18  5:52 PM.  Always use your most recent med list.                   Brand Name Dispense Instructions for use Diagnosis    allopurinol 100 MG tablet    ZYLOPRIM    90 tablet    Take 1 tablet (100 mg) by mouth daily    Chronic gout without tophus, unspecified cause, unspecified site       artificial saliva Aers spray     1 Bottle    Take 1 spray by mouth every 6 hours as needed  for dry mouth    Dry mouth       atorvastatin 20 MG tablet    LIPITOR    90 tablet    Take 1 tablet (20 mg) by mouth daily    Hypercholesteremia       calcitRIOL 0.25 MCG capsule    ROCALTROL    60 capsule    Take 1 capsule (0.25 mcg) by mouth 2 times daily    Vitamin D deficiency       calcium citrate-vitamin D 315-250 MG-UNIT Tabs per tablet    CITRACAL    180 tablet    Take 1 tablet by mouth 2 times daily    Osteoporosis, unspecified osteoporosis type, unspecified pathological fracture presence       cloNIDine 0.1 MG tablet    CATAPRES    60 tablet    Take 1 tablet (0.1 mg) by mouth 2 times daily as needed For systolic BP >180    HTN (hypertension)       COMPOUNDED NON-CONTROLLED SUBSTANCE - PHARMACY TO MIX COMPOUNDED MEDICATION    CMPD RX    30 g    Estriol 1 mg/g Apply small amount to finger and apply to inside vagina daily for 2 weeks then twice weekly Route: vaginally    Atrophic vaginitis       darbepoetin bucky 300 MCG/0.6ML injection    ARANESP (ALBUMIN FREE)    1.2 mL    Inject 0.6 mLs (300 mcg) Subcutaneous every 14 days As needed for hgb<10g/dL    CKD (chronic kidney disease) stage 4, GFR 15-29 ml/min (H), Anemia in stage 4 chronic kidney disease (H)       folic acid 1 MG tablet    FOLVITE    90 tablet    Take 1 tablet (1 mg) by mouth daily    Preventive measure       losartan 50 MG tablet    COZAAR    90 tablet    Take 1 tablet (50 mg) by mouth daily    HTN, kidney transplant related       mycophenolate 250 MG capsule     120 capsule    Take 2 capsules (500 mg) by mouth 2 times daily    Kidney transplanted       NIFEdipine ER osmotic 60 MG 24 HR ER tablet    PROCARDIA XL    180 tablet    Take 1 tablet (60 mg) by mouth 2 times daily    Hypertension secondary to other renal disorders       predniSONE 5 MG tablet    DELTASONE    30 tablet    Take 1 tablet (5 mg) by mouth daily    Kidney transplanted       sodium bicarbonate 650 MG tablet     180 tablet    Take 2 tablets (1,300 mg) by mouth 2 times daily     Acidosis       valACYclovir 500 MG tablet    VALTREX    90 tablet    Take 1 tablet (500 mg) by mouth daily    Herpes

## 2018-11-28 NOTE — PROGRESS NOTES
Reviewed results with Dr. Boss, who advised:    Yes, can stop hydralazine and increase losartan to 50 mg daily.  Recheck BMP 10-14 days.     Called patient, who agreed to plan. Updated rx sent.    Brittney Quinones RN

## 2018-11-29 ENCOUNTER — TELEPHONE (OUTPATIENT)
Dept: PHARMACY | Facility: CLINIC | Age: 63
End: 2018-11-29

## 2018-11-29 ENCOUNTER — TELEPHONE (OUTPATIENT)
Dept: NEPHROLOGY | Facility: CLINIC | Age: 63
End: 2018-11-29

## 2018-11-29 DIAGNOSIS — Z94.0 STATUS POST KIDNEY TRANSPLANT: Primary | ICD-10-CM

## 2018-11-29 DIAGNOSIS — E83.39 HYPERPHOSPHATEMIA: ICD-10-CM

## 2018-11-29 LAB
1,25(OH)2D SERPL-MCNC: 27.5 PG/ML (ref 19.9–79.3)
ALBUMIN SERPL ELPH-MCNC: 3.8 G/DL (ref 3.7–5.1)
ALPHA1 GLOB SERPL ELPH-MCNC: 0.3 G/DL (ref 0.2–0.4)
ALPHA2 GLOB SERPL ELPH-MCNC: 0.9 G/DL (ref 0.5–0.9)
B-GLOBULIN SERPL ELPH-MCNC: 0.7 G/DL (ref 0.6–1)
GAMMA GLOB SERPL ELPH-MCNC: 1.7 G/DL (ref 0.7–1.6)
KAPPA LC UR-MCNC: 35.25 MG/DL (ref 0.33–1.94)
KAPPA LC/LAMBDA SER: 10.52 {RATIO} (ref 0.26–1.65)
LAMBDA LC SERPL-MCNC: 3.35 MG/DL (ref 0.57–2.63)
M PROTEIN SERPL ELPH-MCNC: 0.8 G/DL
PROT PATTERN SERPL ELPH-IMP: ABNORMAL

## 2018-11-29 RX ORDER — CALCIUM CARBONATE 500 MG/1
2 TABLET, CHEWABLE ORAL
Qty: 150 TABLET | COMMUNITY
Start: 2018-11-29

## 2018-11-29 NOTE — TELEPHONE ENCOUNTER
Per Dr. Sexton:  Needs to include 2 tums (1000 mg) with every meal for her high phosphorus     Left detailed voicemail updating patient. Advised to call if questions.    Brittney Quinones RN

## 2018-11-29 NOTE — TELEPHONE ENCOUNTER
Anemia Management Note  SUBJECTIVE/OBJECTIVE:  Referred by Dr Tyshawn Sexton February 10, 2017  Primary Diagnosis: Anemia in Chronic Kidney Disease (N18.3 D63.1)   Secondary Diagnosis: Chronic Kidney Disease, Stage III (N18.3)   Hgb goal range: 9-10  Epo/Darbo: Aranesp 300 mcg every 14 days - At home   RX will  on 2019  Iron regimen:  None  Lab orders : 19  Anemia Latest Ref Rng & Units 2018 10/4/2018 10/19/2018 10/23/2018 2018 11/15/2018 2018   HGB Goal - - - - - - - -   VALARIE Dose - 300 mcg 300 mcg - 300 mcg - - -   Hemoglobin 11.7 - 15.7 g/dL 9.2(L) 9.7(L) 9.7(L) - 10.9(L) 10.2(L) 10.4(L)   TSAT 15 - 46 % 27 - 20 - - - -   Ferritin 8 - 252 ng/mL 1038(H) - 886(H) - - - -   PRBCs - - - - - - - -     BP Readings from Last 3 Encounters:   18 146/80   11/15/18 150/82   11/15/18 134/84     Wt Readings from Last 2 Encounters:   18 197 lb 8 oz (89.6 kg)   11/15/18 196 lb 12.8 oz (89.3 kg)         ASSESSMENT:  Hgb:Above goal - recommend hold dose  TSat: not at goal (>30%) but ferritin >500ng/mL.  IV iron not indicated at this time per anemia protocol. Ferritin: At goal (>100ng/mL)    PLAN:  Hold Aranesp and RTC for hgb, ferritin and iron labs then aranesp if needed in 2 week(s)    Orders needed to be renewed (for next follow-up date) in EPIC: None    Iron labs due:  18    Plan discussed with:  NAY for Us[place]e  Plan provided by:  Bharti Akers CPhT  [facility]  [phone]    NEXT FOLLOW-UP DATE:  18  Reviewed 2018 [facility]  Anemia Management Service  Shea Sotomayor,PharmD and Preethi Akers CPhT  Phone: [phone]  Fax: [phone]

## 2018-11-30 NOTE — TELEPHONE ENCOUNTER
Patient called back, confirmed message was received. She will  both tums and vitamin D over the counter. Updated med list.    Brittney Quinones RN

## 2018-12-03 ENCOUNTER — TELEPHONE (OUTPATIENT)
Dept: TRANSPLANT | Facility: CLINIC | Age: 63
End: 2018-12-03

## 2018-12-03 DIAGNOSIS — Z94.0 KIDNEY TRANSPLANT RECIPIENT: Primary | ICD-10-CM

## 2018-12-03 NOTE — TELEPHONE ENCOUNTER
Dmitri Boss MD Etcheverry, Katherine, MARIBELL; Brittney Quinones, MARIBELL                   Abnormal kappa free light chain and recommend patient be seen by Oncology.       Call placed to pt, no answer.  Left message for pt to return call.

## 2018-12-05 ENCOUNTER — TELEPHONE (OUTPATIENT)
Dept: NEPHROLOGY | Facility: CLINIC | Age: 63
End: 2018-12-05

## 2018-12-05 DIAGNOSIS — E55.9 VITAMIN D DEFICIENCY: Primary | ICD-10-CM

## 2018-12-05 RX ORDER — ERGOCALCIFEROL 1.25 MG/1
50000 CAPSULE, LIQUID FILLED ORAL WEEKLY
Qty: 12 CAPSULE | Refills: 0 | Status: SHIPPED | OUTPATIENT
Start: 2018-12-05 | End: 2019-05-10

## 2018-12-05 NOTE — TELEPHONE ENCOUNTER
Per Dr. Sexton:    Start ergocalciferol 50,000 units weekly x 12     Called patient, who agreed to plan. Rx sent.    Brittney Quinones RN

## 2018-12-06 ENCOUNTER — TELEPHONE (OUTPATIENT)
Dept: PHARMACY | Facility: CLINIC | Age: 63
End: 2018-12-06

## 2018-12-06 NOTE — TELEPHONE ENCOUNTER
Pt called, she lost her prescription of Valacyclovir. I called pharmacy to refill medication, but they were out of refills. They are contacting prescribing provider to get additional refills and will do a lost prescription override.   Milton Choi, PharmD  [facility] Pharmacist    Phone: [phone]

## 2018-12-06 NOTE — TELEPHONE ENCOUNTER
Additional call placed to pt regarding her abnormal kappa free light chain result.  Pt aware Dr Boss recommending she f/u with oncology.  Pt v/u and aware scheduling will reach out to her to set up appointment.    Referral placed.  Message sent to scheduling to set up appointment.     Pt questions answered, pt v/u.

## 2018-12-11 ENCOUNTER — OFFICE VISIT (OUTPATIENT)
Dept: INFECTIOUS DISEASES | Facility: CLINIC | Age: 63
End: 2018-12-11
Attending: STUDENT IN AN ORGANIZED HEALTH CARE EDUCATION/TRAINING PROGRAM
Payer: MEDICARE

## 2018-12-11 VITALS
BODY MASS INDEX: 37.33 KG/M2 | OXYGEN SATURATION: 97 % | HEART RATE: 93 BPM | DIASTOLIC BLOOD PRESSURE: 83 MMHG | HEIGHT: 61 IN | WEIGHT: 197.7 LBS | SYSTOLIC BLOOD PRESSURE: 147 MMHG | TEMPERATURE: 97.9 F

## 2018-12-11 DIAGNOSIS — N18.4 CKD (CHRONIC KIDNEY DISEASE) STAGE 4, GFR 15-29 ML/MIN (H): ICD-10-CM

## 2018-12-11 DIAGNOSIS — Z94.0 KIDNEY REPLACED BY TRANSPLANT: ICD-10-CM

## 2018-12-11 DIAGNOSIS — Z94.0 HTN, KIDNEY TRANSPLANT RELATED: ICD-10-CM

## 2018-12-11 DIAGNOSIS — I15.1 HTN, KIDNEY TRANSPLANT RELATED: ICD-10-CM

## 2018-12-11 DIAGNOSIS — Z23 NEED FOR VACCINATION: ICD-10-CM

## 2018-12-11 DIAGNOSIS — A60.1 HERPES SIMPLEX INFECTION OF PERIANAL SKIN: Primary | ICD-10-CM

## 2018-12-11 DIAGNOSIS — D64.9 ANEMIA: ICD-10-CM

## 2018-12-11 DIAGNOSIS — B00.9 HERPES: ICD-10-CM

## 2018-12-11 LAB
ANION GAP SERPL CALCULATED.3IONS-SCNC: 9 MMOL/L (ref 3–14)
BUN SERPL-MCNC: 60 MG/DL (ref 7–30)
CALCIUM SERPL-MCNC: 7.8 MG/DL (ref 8.5–10.1)
CHLORIDE SERPL-SCNC: 108 MMOL/L (ref 94–109)
CO2 SERPL-SCNC: 23 MMOL/L (ref 20–32)
CREAT SERPL-MCNC: 2.5 MG/DL (ref 0.52–1.04)
FERRITIN SERPL-MCNC: 794 NG/ML (ref 8–252)
GFR SERPL CREATININE-BSD FRML MDRD: 19 ML/MIN/1.7M2
GLUCOSE SERPL-MCNC: 102 MG/DL (ref 70–99)
HCT VFR BLD AUTO: 32.2 % (ref 35–47)
HGB BLD-MCNC: 9.7 G/DL (ref 11.7–15.7)
IRON SATN MFR SERPL: 37 % (ref 15–46)
IRON SERPL-MCNC: 70 UG/DL (ref 35–180)
POTASSIUM SERPL-SCNC: 4.3 MMOL/L (ref 3.4–5.3)
SODIUM SERPL-SCNC: 140 MMOL/L (ref 133–144)
TIBC SERPL-MCNC: 188 UG/DL (ref 240–430)

## 2018-12-11 PROCEDURE — 90471 IMMUNIZATION ADMIN: CPT | Mod: ZF

## 2018-12-11 PROCEDURE — G0463 HOSPITAL OUTPT CLINIC VISIT: HCPCS | Mod: 25,ZF

## 2018-12-11 PROCEDURE — 25000581 ZZH RX MED A9270 GY (STAT IND- M) 250: Mod: GY | Performed by: STUDENT IN AN ORGANIZED HEALTH CARE EDUCATION/TRAINING PROGRAM

## 2018-12-11 PROCEDURE — 82728 ASSAY OF FERRITIN: CPT | Performed by: INTERNAL MEDICINE

## 2018-12-11 PROCEDURE — 80048 BASIC METABOLIC PNL TOTAL CA: CPT | Performed by: INTERNAL MEDICINE

## 2018-12-11 PROCEDURE — 36415 COLL VENOUS BLD VENIPUNCTURE: CPT | Performed by: INTERNAL MEDICINE

## 2018-12-11 PROCEDURE — 90750 HZV VACC RECOMBINANT IM: CPT | Mod: GY | Performed by: STUDENT IN AN ORGANIZED HEALTH CARE EDUCATION/TRAINING PROGRAM

## 2018-12-11 PROCEDURE — 83540 ASSAY OF IRON: CPT | Performed by: INTERNAL MEDICINE

## 2018-12-11 PROCEDURE — 85014 HEMATOCRIT: CPT | Performed by: INTERNAL MEDICINE

## 2018-12-11 PROCEDURE — 85018 HEMOGLOBIN: CPT | Performed by: INTERNAL MEDICINE

## 2018-12-11 PROCEDURE — 83550 IRON BINDING TEST: CPT | Performed by: INTERNAL MEDICINE

## 2018-12-11 RX ORDER — VALACYCLOVIR HYDROCHLORIDE 500 MG/1
500 TABLET, FILM COATED ORAL DAILY
Qty: 90 TABLET | Refills: 1 | Status: SHIPPED | OUTPATIENT
Start: 2018-12-11

## 2018-12-11 RX ADMIN — ZOSTER VACCINE RECOMBINANT, ADJUVANTED 0.5 ML: KIT at 14:36

## 2018-12-11 ASSESSMENT — PAIN SCALES - GENERAL: PAINLEVEL: NO PAIN (0)

## 2018-12-11 ASSESSMENT — MIFFLIN-ST. JEOR: SCORE: 1384.14

## 2018-12-11 NOTE — PROGRESS NOTES
Transplant Infectious Disease clinic follow up Note  12/11/2018    ASSESSMENT;      The patient is a 61-year-old female with PMH of HTN, HLD, obesity, living related kidney transplant secondary to IgA nephropathy in 1999, currently on CellCept 500 bid and prednisone 5 daily; low grade EBV viremia; CKD.  She is originally from [facility].     Recurrent UTIs/ Recurrent HSV 2; For the past 5 years with extreme itching in the buttock followed by  frequency of urination, burning, feels the sensation to go but not much comes out.     Patient had seen dermatology earlier for these symptoms had diagnosed her with pustular psoriasis in 2016. They had prescribed steroid cream.    She was diagnosed with having recurrent UTIs and prescribed multiple courses of abxs for recurrent UTIs.      Based on the history of extreme itchiness and blisters prior to dysuria and frequency, I suspect that she actually has recurrent genital HSV rather than true UTIs. Confirmed  on exam and microbioligically - HSV 2. She developed diarrhea with acyclovir prophylaxis. Hence changed to valtrex. However lost the med with a recurrence.     EBV viremia: low grade, last check in 6/2017 6500. No evidence of PTLD on CT     PLAN:  - start valtrex 1g daily x 5 days followed by 500 mg daily suppressive therapy (dose adjusted for antonina function)  - due to almost persistent activation of HSV, will ask nephrology if we can go further down on immune suppression. If that is not safe then patient will have to continue prophylaxis indefinitely.  - Second dose of shingrix today    RTC in 6 months.     Serostatus: unknown   Immunizations: up to date except for second dose of shingrix vaccine  Prophylaxis - was on keflex UTI prophylaxis Dec 2017 to Aug 2018       Iwona Shah  , [facility]  Division of Infectious Disease, Department of Internal Medicine  Pager  [phone]    ----------------------------------------------------    Interval events:   Last seen 9/11. Started on acyclovir prophylaxis then. She was good with it for 5 weeks then developed diarrhea for a week, stopped acyclovir and the diarrhea resolved. Restarted acyclovir when diarrhea restarted and therefore stopped it. Pharmacist recommended changing to valtrex which was prescribed. Patient notes that she took it for only a week and lost the bottle with 3 month supply. She has not taken the valtrex for 2 weeks now and she has developed bad buttock itching now. She reports that she did not develop any symptoms while on acyclovir or valtrex whereas earlier every 2 weeks it was a problem.     She reports that 1 year ago cellcept decreased from 750 bid to 500 bid. She has had the symptoms of recurrent herpes type 2 for 5 years now.     8/31/18  Last seen by my colleague 6/2017 for low grade EBV viremia without PTLD. She is here to see me for recurrent UTIs, referred by Dr. Sexton    HISTORY OF PRESENT ILLNESS:  The patient is a 61-year-old female with PMH of HTN, HLD, obesity, living related kidney transplant secondary to IgA nephropathy in 1999, currently on CellCept 500 bid and prednisone 5 daily; low grade EBV viremia; CKD.  She is originally from [facility].     She reports recurrent UTIs for the past 3-5 years, every 2 months. It first starts with extreme itchiness in the back in the buttocks with blisters and then develops  frequency of urination, burning, feels the sensation to go but not much comes out. She submits a urine sample and then starts taking an abx. A week into it the symptoms resolve and recur 2 months later.     She has seen Urology for this and had a cystoscopy in June which was normal. She has been on keflex prophylaxis since Dec 2017 but she does not think that has helped. She has been prescribed vaginal estrogen cream but has not filled the prescription.     Her abx history includes usually  2 week treatment of     augmentin in July 2018  levaquin in May 2018  Cefpodoxime in Jan 2018  augmentin Nov 2017   cefpodoxime March,Aug and sep 2017        PAST MEDICAL HISTORY:    Past Medical History:   Diagnosis Date     Anatomical narrow angle      Anemia      Gout      History of blood transfusion     Blood Transfusion two years ago, done at [facility].     HTN (hypertension)      Hyperlipidaemia      IgA nephropathy      Immunosuppressed status (H)     Prednisone and cellcept     Nonsenile cataract      Obstructive sleep apnea      Uncomplicated asthma     Dx: 2012        Immunization History   Administered Date(s) Administered     Hepatitis A Immunity: Titer/MD Dx 11/26/2015     Influenza (IIV3) PF 11/01/2005, 10/29/2007, 11/03/2008, 11/02/2009, 11/17/2010, 12/11/2012, 09/30/2014, 10/27/2015     Influenza Vaccine IM 3yrs+ 4 Valent IIV4 10/03/2013, 10/02/2016, 09/20/2017, 11/08/2018     Pneumo Conj 13-V (2010&after) 12/02/2014     Pneumococcal 23 valent 09/03/2014     TDAP Vaccine (Boostrix) 12/11/2012     Zoster vaccine recombinant adjuvanted (SHINGRIX) 08/31/2018     Has had chicken pox as a kid     MEDICATIONS:    Current Outpatient Medications   Medication     allopurinol (ZYLOPRIM) 100 MG tablet     artificial saliva (BIOTENE MT) AERS spray     atorvastatin (LIPITOR) 20 MG tablet     calcitRIOL (ROCALTROL) 0.25 MCG capsule     calcium carbonate (TUMS) 500 MG chewable tablet     calcium citrate-vitamin D (CITRACAL) 315-250 MG-UNIT TABS per tablet     CELLCEPT (BRAND) 250 MG CAPSULE     cloNIDine (CATAPRES) 0.1 MG tablet     COMPOUNDED NON-CONTROLLED SUBSTANCE (CMPD RX) - PHARMACY TO MIX COMPOUNDED MEDICATION     darbepoetin bucky (ARANESP, ALBUMIN FREE,) 300 MCG/0.6ML injection     folic acid (FOLVITE) 1 MG tablet     losartan (COZAAR) 50 MG tablet     NIFEdipine ER osmotic (PROCARDIA XL) 60 MG TB24     predniSONE (DELTASONE) 5 MG tablet     sodium bicarbonate 650 MG tablet     valACYclovir (VALTREX) 500 MG  "tablet     VITAMIN D, CHOLECALCIFEROL, PO     vitamin D2 (ERGOCALCIFEROL) 72156 units (1250 mcg) capsule     Current Facility-Administered Medications   Medication     zoster vaccine recombinant adjuvanted (SHINGRIX) injection 0.5 mL         SOCIAL HISTORY:  Reviewed.      FAMILY HISTORY:  Reviewed.      PHYSICAL EXAMINATION:   /83   Pulse 93   Temp 97.9  F (36.6  C) (Oral)   Ht 1.549 m (5' 1\")   Wt 89.7 kg (197 lb 11.2 oz)   SpO2 97%   BMI 37.36 kg/m      Constitutional: Patient in no distress  Eyes: not pale, not jaundiced  Neck: no lymphadenopathy  CVS: no added sounds  RS: clear  Abdomen: soft, BS+  Skin: exam of buttock with lots of scars   Several small ulcers with scratch marks present on either side of the gluteal cleft  Extremities: no pedal edema  Psych: Alert and oriented x 3      LABORATORY WORK AND IMAGING:     CBC RESULTS:   Recent Labs   Lab Test  08/31/18   1347   03/01/18   1548   WBC   --    --   6.1   RBC   --    --   3.86   HGB  8.7*   < >  9.1*   HCT  29.7*   < >  30.8*   MCV   --    --   80   MCH   --    --   23.6*   MCHC   --    --   29.5*   RDW   --    --   17.2*   PLT   --    --   243    < > = values in this interval not displayed.         Last Comprehensive Metabolic Panel:  Sodium   Date Value Ref Range Status   11/28/2018 139 133 - 144 mmol/L Final     Potassium   Date Value Ref Range Status   11/28/2018 4.6 3.4 - 5.3 mmol/L Final     Chloride   Date Value Ref Range Status   11/28/2018 107 94 - 109 mmol/L Final     Carbon Dioxide   Date Value Ref Range Status   11/28/2018 21 20 - 32 mmol/L Final     Anion Gap   Date Value Ref Range Status   11/28/2018 10 3 - 14 mmol/L Final     Glucose   Date Value Ref Range Status   11/28/2018 110 (H) 70 - 99 mg/dL Final     Urea Nitrogen   Date Value Ref Range Status   11/28/2018 59 (H) 7 - 30 mg/dL Final     Creatinine   Date Value Ref Range Status   11/28/2018 2.58 (H) 0.52 - 1.04 mg/dL Final     GFR Estimate   Date Value Ref Range Status "   11/28/2018 19 (L) >60 mL/min/1.7m2 Final     Comment:     Non  GFR Calc     Calcium   Date Value Ref Range Status   11/28/2018 7.5 (L) 8.5 - 10.1 mg/dL Final     Liver Function Studies -   Recent Labs   Lab Test  07/10/18   1224  05/03/18   1233   10/01/16   0815   PROTTOTAL   --    --    --   7.5   ALBUMIN  3.1*  3.3*   < >  3.2*   BILITOTAL   --    --    --   0.4   ALKPHOS   --   77   --   137   AST   --    --    --   28   ALT   --    --    --   13    < > = values in this interval not displayed.         Results for ISABELLE VAZQUEZ (MRN [id number]) as of 8/31/2018 15:24   Ref. Range 5/16/2018 09:57 7/10/2018 12:27 7/30/2018 10:14 8/7/2018 14:41   Color Urine Unknown Yellow Yellow Yellow Yellow   Appearance Urine Unknown Clear Cloudy Slightly Cloudy Slightly Cloudy   Glucose Urine Latest Ref Range: NEG^Negative mg/dL Negative Negative Negative Negative   Bilirubin Urine Latest Ref Range: NEG^Negative  Negative Negative Negative Negative   Ketones Urine Latest Ref Range: NEG^Negative mg/dL Negative Negative Negative Negative   Specific Gravity Urine Latest Ref Range: 1.003 - 1.035  1.011 1.011 1.012 1.012   pH Urine Latest Ref Range: 5.0 - 7.0 pH 5.0 5.0 5.0 5.0   Protein Albumin Urine Latest Ref Range: NEG^Negative mg/dL >499 (A) 100 (A) >499 (A) 100 (A)   Urobilinogen mg/dL Latest Ref Range: 0.0 - 2.0 mg/dL 0.0 0.0 0.0 0.0   Nitrite Urine Latest Ref Range: NEG^Negative  Negative Negative Negative Negative   Blood Urine Latest Ref Range: NEG^Negative  Small (A) Small (A) Small (A) Negative   Leukocyte Esterase Urine Latest Ref Range: NEG^Negative  Small (A) Large (A) Large (A) Moderate (A)   Source Unknown Midstream Urine Midstream Urine Midstream Urine Unspecified Urine   WBC Urine Latest Ref Range: 0 - 5 /HPF 29 (H) >182 (H) 50 (H) 106 (H)   RBC Urine Latest Ref Range: 0 - 2 /HPF 4 (H) 1 2 2   Bacteria Urine Latest Ref Range: NEG^Negative /HPF Few (A) Few (A)  Few (A)   WBC Clumps Latest Ref  Range: NEG^Negative /HPF Present (A) Present (A) Present (A) Present (A)   Squamous Epithelial /HPF Urine Latest Ref Range: 0 - 1 /HPF <1 <1 1    Transitional Epi Latest Ref Range: FEW^Few /HPF  <1 (A)     Mucous Urine Latest Ref Range: NEG^Negative /LPF Present (A)   Present (A)     9/11/18 viral swab pos for HSV 2 by PCR negative for HSV 1 by PCR    Urine cultures   8/7/18 <10k urogenital samir  7/30/18 50-100k klebsiella pneumoniae, 10-50k morganella  7/10 >100k Klebsiella pneumoniae  6/5 <10k urogenital samir  5/16 ,,  5/3 ,,  1/11 >100k E.coli , 50-100k Enterobacter aerogenes  12/19/17 >100k E.coli   8/2017 .100k E.coli  3/2017 - 50-100k E.coli     Imaging   6/22/17 CT chest/abdomen/pelvis        Impression: In this patient with Abiel-Barr virus viremia, there are  no CT findings to suggest PTLD.

## 2018-12-11 NOTE — NURSING NOTE
"Chief Complaint   Patient presents with     RECHECK     f/u for UTI     /83   Pulse 93   Temp 97.9  F (36.6  C) (Oral)   Ht 1.549 m (5' 1\")   Wt 89.7 kg (197 lb 11.2 oz)   SpO2 97%   BMI 37.36 kg/m    Kate Roque CMA    "

## 2018-12-11 NOTE — LETTER
12/11/2018      RE: Eleni Logan  [address]  [address]  [facility] [address]         Transplant Infectious Disease clinic follow up Note  12/11/2018    ASSESSMENT;      The patient is a 61-year-old female with PMH of HTN, HLD, obesity, living related kidney transplant secondary to IgA nephropathy in 1999, currently on CellCept 500 bid and prednisone 5 daily; low grade EBV viremia; CKD.  She is originally from [facility].     Recurrent UTIs/ Recurrent HSV 2; For the past 5 years with extreme itching in the buttock followed by  frequency of urination, burning, feels the sensation to go but not much comes out.     Patient had seen dermatology earlier for these symptoms had diagnosed her with pustular psoriasis in 2016. They had prescribed steroid cream.    She was diagnosed with having recurrent UTIs and prescribed multiple courses of abxs for recurrent UTIs.      Based on the history of extreme itchiness and blisters prior to dysuria and frequency, I suspect that she actually has recurrent genital HSV rather than true UTIs. Confirmed  on exam and microbioligically - HSV 2. She developed diarrhea with acyclovir prophylaxis. Hence changed to valtrex. However lost the med with a recurrence.     EBV viremia: low grade, last check in 6/2017 6500. No evidence of PTLD on CT     PLAN:  - start valtrex 1g daily x 5 days followed by 500 mg daily suppressive therapy (dose adjusted for antonina function)  - due to almost persistent activation of HSV, will ask nephrology if we can go further down on immune suppression. If that is not safe then patient will have to continue prophylaxis indefinitely.  - Second dose of shingrix today    RTC in 6 months.     Serostatus: unknown   Immunizations: up to date except for second dose of shingrix vaccine  Prophylaxis - was on keflex UTI prophylaxis Dec 2017 to Aug 2018       Iwona Shah  , [facility]  Division of Infectious Disease, Department of  Internal Medicine  Pager [phone]    ----------------------------------------------------    Interval events:   Last seen 9/11. Started on acyclovir prophylaxis then. She was good with it for 5 weeks then developed diarrhea for a week, stopped acyclovir and the diarrhea resolved. Restarted acyclovir when diarrhea restarted and therefore stopped it. Pharmacist recommended changing to valtrex which was prescribed. Patient notes that she took it for only a week and lost the bottle with 3 month supply. She has not taken the valtrex for 2 weeks now and she has developed bad buttock itching now. She reports that she did not develop any symptoms while on acyclovir or valtrex whereas earlier every 2 weeks it was a problem.     She reports that 1 year ago cellcept decreased from 750 bid to 500 bid. She has had the symptoms of recurrent herpes type 2 for 5 years now.     8/31/18  Last seen by my colleague 6/2017 for low grade EBV viremia without PTLD. She is here to see me for recurrent UTIs, referred by Dr. Sexton    HISTORY OF PRESENT ILLNESS:  The patient is a 61-year-old female with PMH of HTN, HLD, obesity, living related kidney transplant secondary to IgA nephropathy in 1999, currently on CellCept 500 bid and prednisone 5 daily; low grade EBV viremia; CKD.  She is originally from [place].     She reports recurrent UTIs for the past 3-5 years, every 2 months. It first starts with extreme itchiness in the back in the buttocks with blisters and then develops  frequency of urination, burning, feels the sensation to go but not much comes out. She submits a urine sample and then starts taking an abx. A week into it the symptoms resolve and recur 2 months later.     She has seen Urology for this and had a cystoscopy in June which was normal. She has been on keflex prophylaxis since Dec 2017 but she does not think that has helped. She has been prescribed vaginal estrogen cream but has not filled the prescription.     Her abx  history includes usually 2 week treatment of     augmentin in July 2018  levaquin in May 2018  Cefpodoxime in Jan 2018  augmentin Nov 2017   cefpodoxime March,Aug and sep 2017        PAST MEDICAL HISTORY:    Past Medical History:   Diagnosis Date     Anatomical narrow angle      Anemia      Gout      History of blood transfusion     Blood Transfusion two years ago, done at [facility].     HTN (hypertension)      Hyperlipidaemia      IgA nephropathy      Immunosuppressed status (H)     Prednisone and cellcept     Nonsenile cataract      Obstructive sleep apnea      Uncomplicated asthma     Dx: 2012        Immunization History   Administered Date(s) Administered     Hepatitis A Immunity: Titer/MD Dx 11/26/2015     Influenza (IIV3) PF 11/01/2005, 10/29/2007, 11/03/2008, 11/02/2009, 11/17/2010, 12/11/2012, 09/30/2014, 10/27/2015     Influenza Vaccine IM 3yrs+ 4 Valent IIV4 10/03/2013, 10/02/2016, 09/20/2017, 11/08/2018     Pneumo Conj 13-V (2010&after) 12/02/2014     Pneumococcal 23 valent 09/03/2014     TDAP Vaccine (Boostrix) 12/11/2012     Zoster vaccine recombinant adjuvanted (SHINGRIX) 08/31/2018     Has had chicken pox as a kid     MEDICATIONS:    Current Outpatient Medications   Medication     allopurinol (ZYLOPRIM) 100 MG tablet     artificial saliva (BIOTENE MT) AERS spray     atorvastatin (LIPITOR) 20 MG tablet     calcitRIOL (ROCALTROL) 0.25 MCG capsule     calcium carbonate (TUMS) 500 MG chewable tablet     calcium citrate-vitamin D (CITRACAL) 315-250 MG-UNIT TABS per tablet     CELLCEPT (BRAND) 250 MG CAPSULE     cloNIDine (CATAPRES) 0.1 MG tablet     COMPOUNDED NON-CONTROLLED SUBSTANCE (CMPD RX) - PHARMACY TO MIX COMPOUNDED MEDICATION     darbepoetin bucky (ARANESP, ALBUMIN FREE,) 300 MCG/0.6ML injection     folic acid (FOLVITE) 1 MG tablet     losartan (COZAAR) 50 MG tablet     NIFEdipine ER osmotic (PROCARDIA XL) 60 MG TB24     predniSONE (DELTASONE) 5 MG tablet     sodium bicarbonate 650 MG tablet      "valACYclovir (VALTREX) 500 MG tablet     VITAMIN D, CHOLECALCIFEROL, PO     vitamin D2 (ERGOCALCIFEROL) 34005 units (1250 mcg) capsule     Current Facility-Administered Medications   Medication     zoster vaccine recombinant adjuvanted (SHINGRIX) injection 0.5 mL         SOCIAL HISTORY:  Reviewed.      FAMILY HISTORY:  Reviewed.      PHYSICAL EXAMINATION:   /83   Pulse 93   Temp 97.9  F (36.6  C) (Oral)   Ht 1.549 m (5' 1\")   Wt 89.7 kg (197 lb 11.2 oz)   SpO2 97%   BMI 37.36 kg/m       Constitutional: Patient in no distress  Eyes: not pale, not jaundiced  Neck: no lymphadenopathy  CVS: no added sounds  RS: clear  Abdomen: soft, BS+  Skin: exam of buttock with lots of scars   Several small ulcers with scratch marks present on either side of the gluteal cleft  Extremities: no pedal edema  Psych: Alert and oriented x 3      LABORATORY WORK AND IMAGING:     CBC RESULTS:   Recent Labs   Lab Test  08/31/18   1347   03/01/18   1548   WBC   --    --   6.1   RBC   --    --   3.86   HGB  8.7*   < >  9.1*   HCT  29.7*   < >  30.8*   MCV   --    --   80   MCH   --    --   23.6*   MCHC   --    --   29.5*   RDW   --    --   17.2*   PLT   --    --   243    < > = values in this interval not displayed.         Last Comprehensive Metabolic Panel:  Sodium   Date Value Ref Range Status   11/28/2018 139 133 - 144 mmol/L Final     Potassium   Date Value Ref Range Status   11/28/2018 4.6 3.4 - 5.3 mmol/L Final     Chloride   Date Value Ref Range Status   11/28/2018 107 94 - 109 mmol/L Final     Carbon Dioxide   Date Value Ref Range Status   11/28/2018 21 20 - 32 mmol/L Final     Anion Gap   Date Value Ref Range Status   11/28/2018 10 3 - 14 mmol/L Final     Glucose   Date Value Ref Range Status   11/28/2018 110 (H) 70 - 99 mg/dL Final     Urea Nitrogen   Date Value Ref Range Status   11/28/2018 59 (H) 7 - 30 mg/dL Final     Creatinine   Date Value Ref Range Status   11/28/2018 2.58 (H) 0.52 - 1.04 mg/dL Final     GFR Estimate "   Date Value Ref Range Status   11/28/2018 19 (L) >60 mL/min/1.7m2 Final     Comment:     Non  GFR Calc     Calcium   Date Value Ref Range Status   11/28/2018 7.5 (L) 8.5 - 10.1 mg/dL Final     Liver Function Studies -   Recent Labs   Lab Test  07/10/18   1224  05/03/18   1233   10/01/16   0815   PROTTOTAL   --    --    --   7.5   ALBUMIN  3.1*  3.3*   < >  3.2*   BILITOTAL   --    --    --   0.4   ALKPHOS   --   77   --   137   AST   --    --    --   28   ALT   --    --    --   13    < > = values in this interval not displayed.         Results for ISABELLE VAZQUEZ (MRN [id number]) as of 8/31/2018 15:24   Ref. Range 5/16/2018 09:57 7/10/2018 12:27 7/30/2018 10:14 8/7/2018 14:41   Color Urine Unknown Yellow Yellow Yellow Yellow   Appearance Urine Unknown Clear Cloudy Slightly Cloudy Slightly Cloudy   Glucose Urine Latest Ref Range: NEG^Negative mg/dL Negative Negative Negative Negative   Bilirubin Urine Latest Ref Range: NEG^Negative  Negative Negative Negative Negative   Ketones Urine Latest Ref Range: NEG^Negative mg/dL Negative Negative Negative Negative   Specific Gravity Urine Latest Ref Range: 1.003 - 1.035  1.011 1.011 1.012 1.012   pH Urine Latest Ref Range: 5.0 - 7.0 pH 5.0 5.0 5.0 5.0   Protein Albumin Urine Latest Ref Range: NEG^Negative mg/dL >499 (A) 100 (A) >499 (A) 100 (A)   Urobilinogen mg/dL Latest Ref Range: 0.0 - 2.0 mg/dL 0.0 0.0 0.0 0.0   Nitrite Urine Latest Ref Range: NEG^Negative  Negative Negative Negative Negative   Blood Urine Latest Ref Range: NEG^Negative  Small (A) Small (A) Small (A) Negative   Leukocyte Esterase Urine Latest Ref Range: NEG^Negative  Small (A) Large (A) Large (A) Moderate (A)   Source Unknown Midstream Urine Midstream Urine Midstream Urine Unspecified Urine   WBC Urine Latest Ref Range: 0 - 5 /HPF 29 (H) >182 (H) 50 (H) 106 (H)   RBC Urine Latest Ref Range: 0 - 2 /HPF 4 (H) 1 2 2   Bacteria Urine Latest Ref Range: NEG^Negative /HPF Few (A) Few (A)  Few (A)    WBC Clumps Latest Ref Range: NEG^Negative /HPF Present (A) Present (A) Present (A) Present (A)   Squamous Epithelial /HPF Urine Latest Ref Range: 0 - 1 /HPF <1 <1 1    Transitional Epi Latest Ref Range: FEW^Few /HPF  <1 (A)     Mucous Urine Latest Ref Range: NEG^Negative /LPF Present (A)   Present (A)     9/11/18 viral swab pos for HSV 2 by PCR negative for HSV 1 by PCR    Urine cultures   8/7/18 <10k urogenital samir  7/30/18 50-100k klebsiella pneumoniae, 10-50k morganella  7/10 >100k Klebsiella pneumoniae  6/5 <10k urogenital samir  5/16 ,,  5/3 ,,  1/11 >100k E.coli , 50-100k Enterobacter aerogenes  12/19/17 >100k E.coli   8/2017 .100k E.coli  3/2017 - 50-100k E.coli     Imaging   6/22/17 CT chest/abdomen/pelvis        Impression: In this patient with Abiel-Barr virus viremia, there are  no CT findings to suggest PTLD.           Iwona Shah MD

## 2018-12-12 ENCOUNTER — TELEPHONE (OUTPATIENT)
Dept: PHARMACY | Facility: CLINIC | Age: 63
End: 2018-12-12

## 2018-12-12 ENCOUNTER — CARE COORDINATION (OUTPATIENT)
Dept: NEPHROLOGY | Facility: CLINIC | Age: 63
End: 2018-12-12

## 2018-12-12 DIAGNOSIS — Z94.0 HTN, KIDNEY TRANSPLANT RELATED: ICD-10-CM

## 2018-12-12 DIAGNOSIS — I15.1 HTN, KIDNEY TRANSPLANT RELATED: ICD-10-CM

## 2018-12-12 NOTE — PROGRESS NOTES
Nephrology Note: Nursing Outreach Encounter    REASON FOR CALL:                                                      REASON FOR CALL: Blood Pressure Follow Up                                          SITUATION/BACKROUND:                                                    Patient is being treated for CKD Stage 4.    Stopped hydralazine last month, increased losartan.      ASSESSMENT:                                                      Spoke with patient. States BP has been around 140/80's since the med changes, pulse in the 70's. Denied any symptoms today.    Uremic Symptoms: No     Currently prescribed losartan 50mg daily, nifedipine 60mg BID, and clonidine 0.1mg BID prn (sys >180).    PLAN:                                                      Follow Up:   Route to provider to further advise     Per Dr. Boss: Yes, let's increase losartan to 100 mg daily.    Patient verbalized understanding and will contact the clinic with any further questions or concerns.     Brittney Quinones RN

## 2018-12-12 NOTE — TELEPHONE ENCOUNTER
Anemia Management Note  SUBJECTIVE/OBJECTIVE:  Referred by Dr Tyshawn Sexton February 10, 2017  Primary Diagnosis: Anemia in Chronic Kidney Disease (N18.3 D63.1)   Secondary Diagnosis: Chronic Kidney Disease, Stage III (N18.3)   Hgb goal range: 9-10  Epo/Darbo: Aranesp 300 mcg every 14 days - At home   RX will  on 2019  Iron regimen:  None  Lab orders : 19    Anemia Latest Ref Rng & Units 10/4/2018 10/19/2018 10/23/2018 2018 11/15/2018 2018 2018   HGB Goal - - - - - - - -   VALARIE Dose - 300 mcg - 300 mcg - - - -   Hemoglobin 11.7 - 15.7 g/dL 9.7(L) 9.7(L) - 10.9(L) 10.2(L) 10.4(L) 9.7(L)   TSAT 15 - 46 % - 20 - - - - 37   Ferritin 8 - 252 ng/mL - 886(H) - - - - 794(H)   PRBCs - - - - - - - -     BP Readings from Last 3 Encounters:   18 147/83   18 146/80   11/15/18 150/82     Wt Readings from Last 2 Encounters:   18 197 lb 11.2 oz (89.7 kg)   18 197 lb 8 oz (89.6 kg)         ASSESSMENT:  Hgb: At goal - recommend dose  TSat: at goal >30% Ferritin: At goal (>100ng/mL)    PLAN:  Dose with aranesp and RTC for hgb then aranesp if needed in 2 week(s)    Orders needed to be renewed (for next follow-up date) in EPIC: None    Iron labs due:  19    Plan discussed with:  NAY for Eleni  Plan provided by:  Bharti Akers CPhT  Anemia Clinic  [phone]    NEXT FOLLOW-UP DATE:  18  Reviewed 2018 [facility]  Anemia Management Service  Shea Sotomayor,GiniD, [place] Lorenzana, [place]  Phone: [phone]  Fax: [phone]

## 2018-12-13 ENCOUNTER — TELEPHONE (OUTPATIENT)
Dept: NEPHROLOGY | Facility: CLINIC | Age: 63
End: 2018-12-13

## 2018-12-13 DIAGNOSIS — D84.9 IMMUNOSUPPRESSED STATUS (H): Primary | ICD-10-CM

## 2018-12-13 NOTE — TELEPHONE ENCOUNTER
Dmitri Boss MD Etcheverry, Katherine, RN Katie,     Please check CD4 level and IgG level to get a better idea of how immunosuppressed this patient is.     Thanks.    Previous Messages      ----- Message -----   From: Iwona Shah MD   Sent: 12/11/2018   2:42 PM   To: Dmitri Boss MD     Hello Dr. Boss,   This patient was referred to me for recurrent UTIs. But she actually has recurrent HSV 2 that's causing the symptoms. She has it almost on an ongoing basis (without remission for more than a week or 2) which seems to indicate over immune suppression. Can cellcept be decreased ? If not she will just have to be on indefinite anti-viral prophylaxis. Hopefully she can tolerate the valtrex. She did not tolerate acyclovir due to diarrhea.   Thanks   Iwona NOLAN TASK:  Please call pt to let her know Dr Boss requesting additional labs to be collected to check her immunosuppressed status.  Please ask her to complete these within 1 week.   Please place order.

## 2018-12-13 NOTE — TELEPHONE ENCOUNTER
12/13: Eleni returned my call. She will dose with aranesp today and RTC for labs in 2 weeks    Anemia Latest Ref Rng & Units 10/19/2018 10/23/2018 11/8/2018 11/15/2018 11/28/2018 12/11/2018 12/13/2018   HGB Goal - - - - - - - -   VALARIE Dose - - 300 mcg - - - - 300 mcg   Hemoglobin 11.7 - 15.7 g/dL 9.7(L) - 10.9(L) 10.2(L) 10.4(L) 9.7(L) -   TSAT 15 - 46 % 20 - - - - 37 -   Ferritin 8 - 252 ng/mL 886(H) - - - - 794(H) -   PRBCs - - - - - - - -     Reviewed 12/14/2018 GLORIA

## 2018-12-14 RX ORDER — LOSARTAN POTASSIUM 100 MG/1
100 TABLET ORAL DAILY
Qty: 90 TABLET | Refills: 3 | Status: SHIPPED | OUTPATIENT
Start: 2018-12-14 | End: 2019-09-30

## 2018-12-19 DIAGNOSIS — D84.9 IMMUNOSUPPRESSED STATUS (H): ICD-10-CM

## 2018-12-19 PROCEDURE — 82784 ASSAY IGA/IGD/IGG/IGM EACH: CPT | Performed by: INTERNAL MEDICINE

## 2018-12-19 PROCEDURE — 86359 T CELLS TOTAL COUNT: CPT | Performed by: INTERNAL MEDICINE

## 2018-12-19 PROCEDURE — 86360 T CELL ABSOLUTE COUNT/RATIO: CPT | Performed by: INTERNAL MEDICINE

## 2018-12-20 LAB
CD3 CELLS # BLD: 385 CELLS/UL (ref 603–2990)
CD3 CELLS NFR BLD: 64 % (ref 49–84)
CD3+CD4+ CELLS # BLD: 311 CELLS/UL (ref 441–2156)
CD3+CD4+ CELLS NFR BLD: 51 % (ref 28–63)
CD3+CD4+ CELLS/CD3+CD8+ CLL BLD: 4.25 % (ref 1.4–2.6)
CD3+CD8+ CELLS # BLD: 74 CELLS/UL (ref 125–1312)
CD3+CD8+ CELLS NFR BLD: 12 % (ref 10–40)
IFC SPECIMEN: ABNORMAL
IGG SERPL-MCNC: 1630 MG/DL (ref 695–1620)

## 2018-12-26 ENCOUNTER — TELEPHONE (OUTPATIENT)
Dept: PHARMACY | Facility: CLINIC | Age: 63
End: 2018-12-26

## 2018-12-26 NOTE — TELEPHONE ENCOUNTER
Follow-up with anemia management service:    LM for Eleni to remind her that she will be due for a Hgb lab and possibly an aranesp dose on 18  No appointment scheduled as of      Anemia Latest Ref Rng & Units 10/19/2018 10/23/2018 2018 11/15/2018 2018 2018 2018   HGB Goal - - - - - - - -   VALARIE Dose - - 300 mcg - - - - 300 mcg   Hemoglobin 11.7 - 15.7 g/dL 9.7(L) - 10.9(L) 10.2(L) 10.4(L) 9.7(L) -   TSAT 15 - 46 % 20 - - - - 37 -   Ferritin 8 - 252 ng/mL 886(H) - - - - 794(H) -   PRBCs - - - - - - - -       Orders needed to be renewed (for next follow-up date) in EPIC: None   Med order expires: 2019   Lab orders : 2019    Follow-up call date: 2019  Reviewed 2018 MAJ Bharti Akers [facility]  Anemia Clinic  [phone]    Anemia Management Service  Shea Sotomayor,GiniD, [facility]ers[facility]  Ashley Morrell RN  Phone: [phone]  Fax: [phone]

## 2018-12-28 ENCOUNTER — CARE COORDINATION (OUTPATIENT)
Dept: NEPHROLOGY | Facility: CLINIC | Age: 63
End: 2018-12-28

## 2019-01-02 NOTE — TELEPHONE ENCOUNTER
Follow-up with anemia management service:    I spoke to Eleni on 19 to remind her that she will be due for a Hgb lab and possibly an aranesp dose.  She will go in for her lab on 1/3/19    Anemia Latest Ref Rng & Units 10/19/2018 10/23/2018 2018 11/15/2018 2018 2018 2018   HGB Goal - - - - - - - -   VALARIE Dose - - 300 mcg - - - - 300 mcg   Hemoglobin 11.7 - 15.7 g/dL 9.7(L) - 10.9(L) 10.2(L) 10.4(L) 9.7(L) -   TSAT 15 - 46 % 20 - - - - 37 -   Ferritin 8 - 252 ng/mL 886(H) - - - - 794(H) -   PRBCs - - - - - - - -       Orders needed to be renewed (for next follow-up date) in EPIC: None                Med order expires: 2019                Lab orders : 2019    Follow-up call date: 1/3/19    Bharti Akers [facility]  Anemia Clinic  [phone]    Anemia Management Service  Shea Sotomayor,GiniD, [facility]  Ashley Morrell RN  Phone: [phone]  Fax: [phone]

## 2019-01-03 DIAGNOSIS — N18.4 CKD (CHRONIC KIDNEY DISEASE) STAGE 4, GFR 15-29 ML/MIN (H): ICD-10-CM

## 2019-01-03 DIAGNOSIS — Z94.0 KIDNEY TRANSPLANTED: ICD-10-CM

## 2019-01-03 DIAGNOSIS — Z48.298 AFTERCARE FOLLOWING ORGAN TRANSPLANT: ICD-10-CM

## 2019-01-03 DIAGNOSIS — D64.9 ANEMIA: ICD-10-CM

## 2019-01-03 LAB
ANION GAP SERPL CALCULATED.3IONS-SCNC: 8 MMOL/L (ref 3–14)
BUN SERPL-MCNC: 66 MG/DL (ref 7–30)
CALCIUM SERPL-MCNC: 8.2 MG/DL (ref 8.5–10.1)
CHLORIDE SERPL-SCNC: 110 MMOL/L (ref 94–109)
CO2 SERPL-SCNC: 22 MMOL/L (ref 20–32)
CREAT SERPL-MCNC: 2.2 MG/DL (ref 0.52–1.04)
CREAT UR-MCNC: 49 MG/DL
ERYTHROCYTE [DISTWIDTH] IN BLOOD BY AUTOMATED COUNT: 16.6 % (ref 10–15)
FERRITIN SERPL-MCNC: 709 NG/ML (ref 8–252)
GFR SERPL CREATININE-BSD FRML MDRD: 23 ML/MIN/{1.73_M2}
GLUCOSE SERPL-MCNC: 115 MG/DL (ref 70–99)
HCT VFR BLD AUTO: 33.7 % (ref 35–47)
HGB BLD-MCNC: 10.2 G/DL (ref 11.7–15.7)
IRON SATN MFR SERPL: 31 % (ref 15–46)
IRON SERPL-MCNC: 65 UG/DL (ref 35–180)
MCH RBC QN AUTO: 25.8 PG (ref 26.5–33)
MCHC RBC AUTO-ENTMCNC: 30.3 G/DL (ref 31.5–36.5)
MCV RBC AUTO: 85 FL (ref 78–100)
PLATELET # BLD AUTO: 270 10E9/L (ref 150–450)
POTASSIUM SERPL-SCNC: 4.3 MMOL/L (ref 3.4–5.3)
PROT UR-MCNC: 1.66 G/L
PROT/CREAT 24H UR: 3.38 G/G CR (ref 0–0.2)
RBC # BLD AUTO: 3.96 10E12/L (ref 3.8–5.2)
SODIUM SERPL-SCNC: 141 MMOL/L (ref 133–144)
TIBC SERPL-MCNC: 210 UG/DL (ref 240–430)
WBC # BLD AUTO: 5.6 10E9/L (ref 4–11)

## 2019-01-04 ENCOUNTER — TELEPHONE (OUTPATIENT)
Dept: PHARMACY | Facility: CLINIC | Age: 64
End: 2019-01-04

## 2019-01-04 NOTE — TELEPHONE ENCOUNTER
Anemia Management Note  SUBJECTIVE/OBJECTIVE:  Referred by Dr Tyshawn Sexton February 10, 2017  Primary Diagnosis: Anemia in Chronic Kidney Disease (N18.3 D63.1)   Secondary Diagnosis: Chronic Kidney Disease, Stage III (N18.3)   Hgb goal range: 9-10  Epo/Darbo: Aranesp 300 mcg every 14 days - At home   RX will  on 2019  Iron regimen:  None  Lab orders : 19    Anemia Latest Ref Rng & Units 10/23/2018 2018 11/15/2018 2018 2018 2018 1/3/2019   HGB Goal - - - - - - - -   VALARIE Dose - 300 mcg - - - - 300 mcg -   Hemoglobin 11.7 - 15.7 g/dL - 10.9(L) 10.2(L) 10.4(L) 9.7(L) - 10.2(L)   TSAT 15 - 46 % - - - - 37 - 31   Ferritin 8 - 252 ng/mL - - - - 794(H) - 709(H)   PRBCs - - - - - - - -     BP Readings from Last 3 Encounters:   18 147/83   18 146/80   11/15/18 150/82     Wt Readings from Last 2 Encounters:   18 197 lb 11.2 oz (89.7 kg)   18 197 lb 8 oz (89.6 kg)           ASSESSMENT:  Hgb: Above goal - recommend hold dose  TSat: at goal >30% Ferritin: At goal (>100ng/mL)    PLAN:  LM for Usanee to hold Aranesp and RTC for hgb then aranesp if needed in 2 week(s)    Orders needed to be renewed (for next follow-up date) in EPIC: Hgb, ferritin, and iron standing lab orders will  on 19    Iron labs due:  19    Plan discussed with:  Eleni  Plan provided by:  Bharti Akers [facility]  Anemia Clinic  [phone]    NEXT FOLLOW-UP DATE:  19  Reviewed 2019 [facility]  Anemia Management Service  Shea Sotomayor,GiniD, [facility]  Ashley Morrell RN  Phone: [phone]  Fax: [phone]

## 2019-01-07 ENCOUNTER — CARE COORDINATION (OUTPATIENT)
Dept: NEPHROLOGY | Facility: CLINIC | Age: 64
End: 2019-01-07

## 2019-01-07 DIAGNOSIS — Z94.0 HTN, KIDNEY TRANSPLANT RELATED: Primary | ICD-10-CM

## 2019-01-07 DIAGNOSIS — I15.1 HTN, KIDNEY TRANSPLANT RELATED: Primary | ICD-10-CM

## 2019-01-07 NOTE — PROGRESS NOTES
Nephrology Note: Nursing Outreach Encounter    REASON FOR CALL:                                                      REASON FOR CALL: Blood Pressure Follow Up                                          SITUATION/BACKROUND:                                                    Patient is being treated for HTN.    ASSESSMENT:                                                      Patient called with BP update, said it's been averaging 140/70's at home with pulse around 88. She is asymptomatic.She has not taken clonidine at all.    Currently prescribed losartan 100mg daily, nifedipine 60mg BID, and clonidine 0.1mg BID prn.    Uremic Symptoms: No       PLAN:                                                      Follow Up:   Route to provider to further advise     Per Dr. Boss: Let's restart carvedilol at 6.25 mg bid.     Patient verbalized understanding and will contact the clinic with any further questions or concerns.     Brittney Quinones RN

## 2019-01-08 ENCOUNTER — PRE VISIT (OUTPATIENT)
Dept: UROLOGY | Facility: CLINIC | Age: 64
End: 2019-01-08

## 2019-01-08 RX ORDER — CARVEDILOL 6.25 MG/1
6.25 TABLET ORAL 2 TIMES DAILY WITH MEALS
Qty: 180 TABLET | Refills: 3 | Status: SHIPPED | OUTPATIENT
Start: 2019-01-08 | End: 2019-01-25

## 2019-01-16 ENCOUNTER — OFFICE VISIT (OUTPATIENT)
Dept: UROLOGY | Facility: CLINIC | Age: 64
End: 2019-01-16
Payer: MEDICARE

## 2019-01-16 ENCOUNTER — ANCILLARY PROCEDURE (OUTPATIENT)
Dept: MAMMOGRAPHY | Facility: CLINIC | Age: 64
End: 2019-01-16
Payer: MEDICARE

## 2019-01-16 VITALS
HEIGHT: 62 IN | BODY MASS INDEX: 36.62 KG/M2 | WEIGHT: 199 LBS | HEART RATE: 60 BPM | DIASTOLIC BLOOD PRESSURE: 76 MMHG | SYSTOLIC BLOOD PRESSURE: 162 MMHG

## 2019-01-16 DIAGNOSIS — Z12.31 VISIT FOR SCREENING MAMMOGRAM: ICD-10-CM

## 2019-01-16 DIAGNOSIS — N95.2 ATROPHIC VAGINITIS: ICD-10-CM

## 2019-01-16 DIAGNOSIS — N39.0 RECURRENT UTI: Primary | ICD-10-CM

## 2019-01-16 ASSESSMENT — MIFFLIN-ST. JEOR: SCORE: 1400.91

## 2019-01-16 ASSESSMENT — PAIN SCALES - GENERAL: PAINLEVEL: NO PAIN (0)

## 2019-01-16 NOTE — PROGRESS NOTES
Reason for Visit:  F/u on urinary symptoms    Clinical Data: Ms. Eleni Logan is a 63 year old female with a hx of recurrent uti's.  She is on prophylaxis with keflex and returns for cystoscopy.  She has hx of ESRD secondary to IgA nephropathy and is s/p kidney transplant in 1999.  She stopped taking her low dose prophylaxis and she has only had 1 infection.  She has not been taking the estrogen cream.    Cystoscopy in June of 2018 was normal    A/P:  63 year old female with recurrent uti's in the setting of kidney transplant from 1999.  Normal cystoscopy and doing well with no infections off the prophylaxis.  So she can f/u with me as needed.  -f/u with me prn.    Thank you for allowing me to participate in the care of  Ms. Eleni Logan and I will keep you updated on her progress.    Lynnette Garcia MD

## 2019-01-16 NOTE — LETTER
1/16/2019       RE: Eleni Logan  [address]  [address]  [facility] [address]     Dear Colleague,    Thank you for referring your patient, Eleni Logan, to the [facility] UROLOGY AND INST FOR PROSTATE AND UROLOGIC CANCERS at [facility]. Please see a copy of my visit note below.    Reason for Visit:  F/u on urinary symptoms    Clinical Data: Ms. Eleni Logan is a 63 year old female with a hx of recurrent uti's.  She is on prophylaxis with keflex and returns for cystoscopy.  She has hx of ESRD secondary to IgA nephropathy and is s/p kidney transplant in 1999.  She stopped taking her low dose prophylaxis and she has only had 1 infection.  She has not been taking the estrogen cream.    Cystoscopy in June of 2018 was normal    A/P:  63 year old female with recurrent uti's in the setting of kidney transplant from 1999.  Normal cystoscopy and doing well with no infections off the prophylaxis.  So she can f/u with me as needed.  -f/u with me prn.    Thank you for allowing me to participate in the care of  Ms. Eleni Logan and I will keep you updated on her progress.    Lynnette Garcia MD

## 2019-01-17 ENCOUNTER — TELEPHONE (OUTPATIENT)
Dept: PHARMACY | Facility: CLINIC | Age: 64
End: 2019-01-17

## 2019-01-17 ENCOUNTER — CARE COORDINATION (OUTPATIENT)
Dept: NEPHROLOGY | Facility: CLINIC | Age: 64
End: 2019-01-17

## 2019-01-17 NOTE — TELEPHONE ENCOUNTER
Follow-up with anemia management service:    Spoke with Eleni to remind her to have her labs drawn. She will try and get in next week. I will check in with Eleni next Wednesday, she agreeed with this plan.     1/23/19: Spoke with Eleni to remind her to have her labs drawn. She will try and get in later today. freddy     Anemia Latest Ref Rng & Units 10/23/2018 11/8/2018 11/15/2018 11/28/2018 12/11/2018 12/13/2018 1/3/2019   HGB Goal - - - - - - - -   VALARIE Dose - 300 mcg - - - - 300 mcg -   Hemoglobin 11.7 - 15.7 g/dL - 10.9(L) 10.2(L) 10.4(L) 9.7(L) - 10.2(L)   TSAT 15 - 46 % - - - - 37 - 31   Ferritin 8 - 252 ng/mL - - - - 794(H) - 709(H)   PRBCs - - - - - - - -           Follow-up call date: 01/23/2019    katalina    Anemia Management Service  Shea Sotomayor,PharmD, Preethi Akers,CPJose Morrell, [place]  Phone: [phone]  Fax: [phone]

## 2019-01-17 NOTE — PROGRESS NOTES
Nephrology Note: Nursing Outreach Encounter    REASON FOR CALL:                                                      REASON FOR CALL: Blood Pressure Follow Up                                          SITUATION/BACKROUND:                                                    Patient is being treated for HTN.    Restarted carvedilol 6.25mg BID last week.      ASSESSMENT:                                                      Called patient to check in. States BP still averaging 140/80's, but she denied any symptoms today. She will monitor this for another 1-2 weeks and call with an update.    Currently prescribed losartan 100mg daily, nifedipine 60mg BID, carvedilol 6.25mg BID, and clonidine 0.1mg BID prn.     Uremic Symptoms: No       PLAN:                                                      Follow Up:   Follow up call in 1-2 weeks     Patient verbalized understanding and will contact the clinic with any further questions or concerns.     Brittney Quinones RN

## 2019-01-23 DIAGNOSIS — N18.4 CKD (CHRONIC KIDNEY DISEASE) STAGE 4, GFR 15-29 ML/MIN (H): ICD-10-CM

## 2019-01-23 DIAGNOSIS — D64.9 ANEMIA: ICD-10-CM

## 2019-01-23 LAB
HCT VFR BLD AUTO: 32 % (ref 35–47)
HGB BLD-MCNC: 9.9 G/DL (ref 11.7–15.7)

## 2019-01-24 ENCOUNTER — CARE COORDINATION (OUTPATIENT)
Dept: NEPHROLOGY | Facility: CLINIC | Age: 64
End: 2019-01-24

## 2019-01-24 DIAGNOSIS — I15.1 HTN, KIDNEY TRANSPLANT RELATED: ICD-10-CM

## 2019-01-24 DIAGNOSIS — Z94.0 HTN, KIDNEY TRANSPLANT RELATED: ICD-10-CM

## 2019-01-25 ENCOUNTER — TELEPHONE (OUTPATIENT)
Dept: PHARMACY | Facility: CLINIC | Age: 64
End: 2019-01-25

## 2019-01-25 DIAGNOSIS — N18.30 CKD (CHRONIC KIDNEY DISEASE) STAGE 3, GFR 30-59 ML/MIN (H): ICD-10-CM

## 2019-01-25 DIAGNOSIS — D63.1 ANEMIA DUE TO STAGE 3 CHRONIC KIDNEY DISEASE (H): Primary | ICD-10-CM

## 2019-01-25 DIAGNOSIS — N18.30 ANEMIA DUE TO STAGE 3 CHRONIC KIDNEY DISEASE (H): Primary | ICD-10-CM

## 2019-01-25 RX ORDER — CARVEDILOL 12.5 MG/1
12.5 TABLET ORAL 2 TIMES DAILY WITH MEALS
Qty: 180 TABLET | Refills: 3 | Status: SHIPPED | OUTPATIENT
Start: 2019-01-25 | End: 2019-02-07

## 2019-01-25 NOTE — PROGRESS NOTES
Nephrology Note: Nursing Outreach Encounter    REASON FOR CALL:                                                      REASON FOR CALL: Blood Pressure Follow Up                                          SITUATION/BACKROUND:                                                    Patient is being treated for HTN.    Restarted carvedilol a few weeks ago.      ASSESSMENT:                                                      BP has been averaging 140/80 with pulse around 83. Denied any symptoms today.    Currently prescribed losartan 100mg daily, nifedipine 60mg BID, carvedilol 6.25mg BID, and clonidine 0.1mg BID prn.    Uremic Symptoms: No       PLAN:                                                      Follow Up:   Route to provider to further advise     Per Dr. Boss: Let's increase carvedilol to 12.5 mg bid.     Patient verbalized understanding and will contact the clinic with any further questions or concerns.     Brittney Quinones RN

## 2019-02-05 ENCOUNTER — TELEPHONE (OUTPATIENT)
Dept: NEPHROLOGY | Facility: CLINIC | Age: 64
End: 2019-02-05

## 2019-02-05 ENCOUNTER — TELEPHONE (OUTPATIENT)
Dept: UROLOGY | Facility: CLINIC | Age: 64
End: 2019-02-05

## 2019-02-05 DIAGNOSIS — D63.1 ANEMIA DUE TO STAGE 3 CHRONIC KIDNEY DISEASE (H): ICD-10-CM

## 2019-02-05 DIAGNOSIS — N18.30 ANEMIA DUE TO STAGE 3 CHRONIC KIDNEY DISEASE (H): ICD-10-CM

## 2019-02-05 DIAGNOSIS — N39.0 URINARY TRACT INFECTION WITHOUT HEMATURIA, SITE UNSPECIFIED: ICD-10-CM

## 2019-02-05 DIAGNOSIS — N39.0 URINARY TRACT INFECTION WITHOUT HEMATURIA, SITE UNSPECIFIED: Primary | ICD-10-CM

## 2019-02-05 DIAGNOSIS — N18.30 CKD (CHRONIC KIDNEY DISEASE) STAGE 3, GFR 30-59 ML/MIN (H): ICD-10-CM

## 2019-02-05 LAB
ALBUMIN UR-MCNC: 100 MG/DL
APPEARANCE UR: ABNORMAL
BACTERIA #/AREA URNS HPF: ABNORMAL /HPF
BILIRUB UR QL STRIP: NEGATIVE
COLOR UR AUTO: YELLOW
FERRITIN SERPL-MCNC: 704 NG/ML (ref 8–252)
GLUCOSE UR STRIP-MCNC: NEGATIVE MG/DL
HCT VFR BLD AUTO: 30.1 % (ref 35–47)
HGB BLD-MCNC: 9.4 G/DL (ref 11.7–15.7)
HGB UR QL STRIP: ABNORMAL
IRON SATN MFR SERPL: 30 % (ref 15–46)
IRON SERPL-MCNC: 60 UG/DL (ref 35–180)
KETONES UR STRIP-MCNC: NEGATIVE MG/DL
LEUKOCYTE ESTERASE UR QL STRIP: ABNORMAL
NITRATE UR QL: NEGATIVE
PH UR STRIP: 5 PH (ref 5–7)
RBC #/AREA URNS AUTO: 15 /HPF (ref 0–2)
SOURCE: ABNORMAL
SP GR UR STRIP: 1.01 (ref 1–1.03)
TIBC SERPL-MCNC: 200 UG/DL (ref 240–430)
UROBILINOGEN UR STRIP-MCNC: 0 MG/DL (ref 0–2)
WBC #/AREA URNS AUTO: >182 /HPF (ref 0–5)
WBC CLUMPS #/AREA URNS HPF: PRESENT /HPF

## 2019-02-05 PROCEDURE — 87086 URINE CULTURE/COLONY COUNT: CPT | Performed by: UROLOGY

## 2019-02-05 PROCEDURE — 87186 SC STD MICRODIL/AGAR DIL: CPT | Performed by: UROLOGY

## 2019-02-05 PROCEDURE — 87088 URINE BACTERIA CULTURE: CPT | Performed by: UROLOGY

## 2019-02-05 NOTE — TELEPHONE ENCOUNTER
----- Message from Brittney Quinones, RN sent at 2/5/2019 11:33 AM CST -----  Regarding: UA/UC order?  Eleni Rosa called to request a UA/UC order, as she thinks she may have a bladder infection. She reports urinary frequency and pain over the last few days. She's a transplant patient that's been seen in urology, so I'm not sure who is best to place the order. She'd like to do this today. Can someone order this and let her know?    Thanks,  Brittney, Nephrology RN Care Coordinator

## 2019-02-05 NOTE — TELEPHONE ENCOUNTER
Patient called to request a lab order for a UA/UC. She's been experiencing urinary frequency and pain over the last few days. She's seen the urology team for recurrent UTI's, so a message was sent to their team (as well as to her transplant coordinator as an update) to order this. Patient verbalized understanding.    Brittney Quinones RN

## 2019-02-06 ENCOUNTER — TELEPHONE (OUTPATIENT)
Dept: PHARMACY | Facility: CLINIC | Age: 64
End: 2019-02-06

## 2019-02-06 NOTE — TELEPHONE ENCOUNTER
Anemia Management Note  SUBJECTIVE/OBJECTIVE:  Referred by Dr Tyshawn Sexton February 10, 2017  Primary Diagnosis: Anemia in Chronic Kidney Disease (N18.3 D63.1)   Secondary Diagnosis: Chronic Kidney Disease, Stage III (N18.3)   Hgb goal range: 9-10  Epo/Darbo: Aranesp 300 mcg every 14 days - At home   RX will  on 2019  Iron regimen:  None  Lab orders : 2020    Anemia Latest Ref Rng & Units 2018 2018 2018 1/3/2019 2019 2019 2019   HGB Goal - - - - - - - -   VALARIE Dose - - - 300 mcg - - - 300 mcg   Hemoglobin 11.7 - 15.7 g/dL 10.4(L) 9.7(L) - 10.2(L) 9.9(L) 9.4(L) -   TSAT 15 - 46 % - 37 - 31 - 30 -   Ferritin 8 - 252 ng/mL - 794(H) - 709(H) - 704(H) -   PRBCs - - - - - - - -     BP Readings from Last 3 Encounters:   19 162/76   18 147/83   18 146/80     Wt Readings from Last 2 Encounters:   19 199 lb (90.3 kg)   18 197 lb 11.2 oz (89.7 kg)       Spoke with Eleni, she will dose her Aranesp today and recheck labs in 2 weeks. She had 2 doses of Aransep at home.     ASSESSMENT:  Hgb:At goal - recommend dose  TSat: at goal >30% Ferritin: At goal (>100ng/mL)     PLAN:  Dose with aranesp and RTC for hgb then aranesp if needed in 2 week(s)    Orders needed to be renewed (for next follow-up date) in EPIC: None    Iron labs due:  3/5/2019    Plan discussed with:  Eleni  Plan provided by:  freddy    NEXT FOLLOW-UP DATE:  2019    Anemia Management Service  Ashley Morrell RN  Phone: [phone]  Fax: [phone]

## 2019-02-07 ENCOUNTER — TELEPHONE (OUTPATIENT)
Dept: UROLOGY | Facility: CLINIC | Age: 64
End: 2019-02-07

## 2019-02-07 ENCOUNTER — CARE COORDINATION (OUTPATIENT)
Dept: NEPHROLOGY | Facility: CLINIC | Age: 64
End: 2019-02-07

## 2019-02-07 DIAGNOSIS — I15.1 HTN, KIDNEY TRANSPLANT RELATED: ICD-10-CM

## 2019-02-07 DIAGNOSIS — Z94.0 HTN, KIDNEY TRANSPLANT RELATED: ICD-10-CM

## 2019-02-07 RX ORDER — CARVEDILOL 25 MG/1
25 TABLET ORAL 2 TIMES DAILY WITH MEALS
Qty: 180 TABLET | Refills: 3 | Status: SHIPPED | OUTPATIENT
Start: 2019-02-07 | End: 2020-03-31

## 2019-02-07 NOTE — PROGRESS NOTES
Nephrology Note: Nursing Outreach Encounter    REASON FOR CALL:                                                      REASON FOR CALL: Blood Pressure Follow Up                                          SITUATION/BACKROUND:                                                    Patient is being treated for HTN.      ASSESSMENT:                                                      Patient called with an update on BP. States it continues to average 140-150's / 80's with pulse around 83. Denied any edema, headaches, or dizziness.    Currently prescribed losartan 100mg daily, nifedipine 60mg BID, carvedilol 12.5mg BID, and clonidine 0.1mg BID prn.    She is also requesting an update on the UA/UC she completed, as ordered by urology. Will reach out to urology for results.    Uremic Symptoms: No       PLAN:                                                      Follow Up:   Route to provider to further advise     Per Dr. Boss: Let's increase carvedilol to 25 mg bid.     Patient verbalized understanding and will contact the clinic with any further questions or concerns.     Brittney Quinones RN

## 2019-02-07 NOTE — TELEPHONE ENCOUNTER
Patient called and again today and told that the UC is not finalized  When it is we will call her with the appropriate medication. Teodora Valencia LPN Staff Nurse

## 2019-02-11 ENCOUNTER — TELEPHONE (OUTPATIENT)
Dept: UROLOGY | Facility: CLINIC | Age: 64
End: 2019-02-11

## 2019-02-11 DIAGNOSIS — N39.0 URINARY TRACT INFECTION: Primary | ICD-10-CM

## 2019-02-11 RX ORDER — SULFAMETHOXAZOLE AND TRIMETHOPRIM 400; 80 MG/1; MG/1
1 TABLET ORAL 2 TIMES DAILY
Qty: 10 TABLET | Refills: 0 | Status: SHIPPED | OUTPATIENT
Start: 2019-02-11 | End: 2019-05-09

## 2019-02-11 RX ORDER — SULFAMETHOXAZOLE/TRIMETHOPRIM 800-160 MG
1 TABLET ORAL 2 TIMES DAILY
Qty: 10 TABLET | Refills: 0 | Status: SHIPPED | OUTPATIENT
Start: 2019-02-11 | End: 2019-05-09

## 2019-02-11 NOTE — TELEPHONE ENCOUNTER
Pharmacy called regarding her medication bactrim  The dose is too high for her poor kidney function.  RN care coordinator notified. Teodora Valencia LPN Staff Nurse

## 2019-02-20 ENCOUNTER — TELEPHONE (OUTPATIENT)
Dept: PHARMACY | Facility: CLINIC | Age: 64
End: 2019-02-20

## 2019-02-20 NOTE — TELEPHONE ENCOUNTER
Follow-up with anemia management service:    Spoke with Eleni, she will try and get in the end of this week or early next week for her Hgb.     2019; Left message for Eleni to remind her that she is due for Hgb, Ferritin and Iron labs as well as a possible Aranesp injection.     Anemia Latest Ref Rng & Units 2018 2018 2018 1/3/2019 2019 2019 2019   HGB Goal - - - - - - - -   VALARIE Dose - - - 300 mcg - - - 300 mcg   Hemoglobin 11.7 - 15.7 g/dL 10.4(L) 9.7(L) - 10.2(L) 9.9(L) 9.4(L) -   TSAT 15 - 46 % - 37 - 31 - 30 -   Ferritin 8 - 252 ng/mL - 794(H) - 709(H) - 704(H) -   PRBCs - - - - - - - -       Orders needed to be renewed (for next follow-up date) in EPIC: None   Lab orders : 2020    Follow-up call date: 2019        Anemia Management Service  Ashley Morrell RN  Phone: [phone]  Fax: [phone]

## 2019-02-21 ENCOUNTER — CARE COORDINATION (OUTPATIENT)
Dept: NEPHROLOGY | Facility: CLINIC | Age: 64
End: 2019-02-21

## 2019-02-21 NOTE — PROGRESS NOTES
Nephrology Note: Nursing Outreach Encounter    REASON FOR CALL:                                                      REASON FOR CALL: Blood Pressure Follow Up                                          SITUATION/BACKROUND:                                                    Patient is being treated for HTN.    ASSESSMENT:                                                      Patient states BP continues to hold around 140/80's. She's feeling better today and denied any symptoms.     Currently prescribed losartan 100mg daily, nifedipine 60mg BID, carvedilol 25mg BID, and clonidine 0.1mg BID prn.    Uremic Symptoms: No       PLAN:                                                      Follow Up:   Route to provider to further advise     Patient verbalized understanding and will contact the clinic with any further questions or concerns.     Brittney Quinones RN

## 2019-02-25 NOTE — PROGRESS NOTES
Followed up with patient. Her blood pressure this morning is 159/78 with pulse of 74. Her pulse in the 70's, and she uses clonidine a few times per week (she occasionally forgets to take it). Update sent to Dr. Boss.    Brittney Quinones RN

## 2019-02-25 NOTE — PROGRESS NOTES
Per Dr. Boss:  Let's just make clonidine 0.1 mg bid.     Left message for patient to call back.    Brittney Quinones RN

## 2019-02-26 NOTE — PROGRESS NOTES
Attempted to reach patient, line cut out. Left her a detailed voicemail with plan.    Brittney Quniones RN

## 2019-02-28 DIAGNOSIS — N18.30 CKD (CHRONIC KIDNEY DISEASE) STAGE 3, GFR 30-59 ML/MIN (H): ICD-10-CM

## 2019-02-28 DIAGNOSIS — D63.1 ANEMIA DUE TO STAGE 3 CHRONIC KIDNEY DISEASE (H): ICD-10-CM

## 2019-02-28 DIAGNOSIS — N18.30 ANEMIA DUE TO STAGE 3 CHRONIC KIDNEY DISEASE (H): ICD-10-CM

## 2019-02-28 LAB
HCT VFR BLD AUTO: 28.4 % (ref 35–47)
HGB BLD-MCNC: 8.3 G/DL (ref 11.7–15.7)

## 2019-03-01 LAB
BACTERIA SPEC CULT: ABNORMAL
Lab: ABNORMAL
SPECIMEN SOURCE: ABNORMAL

## 2019-03-06 ENCOUNTER — TELEPHONE (OUTPATIENT)
Dept: PHARMACY | Facility: CLINIC | Age: 64
End: 2019-03-06

## 2019-03-06 DIAGNOSIS — N18.30 CKD (CHRONIC KIDNEY DISEASE) STAGE 3, GFR 30-59 ML/MIN (H): Primary | ICD-10-CM

## 2019-03-06 DIAGNOSIS — D63.1 ANEMIA OF CHRONIC RENAL FAILURE, STAGE 3 (MODERATE) (H): ICD-10-CM

## 2019-03-06 DIAGNOSIS — N18.30 ANEMIA OF CHRONIC RENAL FAILURE, STAGE 3 (MODERATE) (H): ICD-10-CM

## 2019-03-06 NOTE — TELEPHONE ENCOUNTER
Anemia Management Note  SUBJECTIVE/OBJECTIVE:  Referred by Dr Tyshawn Sexton February 10, 2017  Primary Diagnosis: Anemia in Chronic Kidney Disease (N18.3 D63.1)   Secondary Diagnosis: Chronic Kidney Disease, Stage III (N18.3)   Hgb goal range: 9-10  Epo/Darbo: Aranesp 300 mcg every 14 days - At home   RX will  on 2020  Iron regimen:  None  Lab orders : 2020    Anemia Latest Ref Rng & Units 2018 1/3/2019 2019 2019 2019 2019 3/6/2019   HGB Goal - - - - - - - -   VALARIE Dose - 300 mcg - - - 300 mcg - 300 mcg   Hemoglobin 11.7 - 15.7 g/dL - 10.2(L) 9.9(L) 9.4(L) - 8.3(L) -   TSAT 15 - 46 % - 31 - 30 - - -   Ferritin 8 - 252 ng/mL - 709(H) - 704(H) - - -   PRBCs - - - - - - - -     BP Readings from Last 3 Encounters:   19 162/76   18 147/83   18 146/80     Wt Readings from Last 2 Encounters:   19 199 lb (90.3 kg)   18 197 lb 11.2 oz (89.7 kg)       Spoke with Eleni, she will dose today with Aranesp and have her labs drawn in 2 weeks. She has 1 Aranesp dose left for today. Will send updated Rx to  [facility] per pt request.     ASSESSMENT:  Hgb:Not at goal but improving - recommend dose and continue current regimen  TSat: at goal >30% Ferritin: At goal (>100ng/mL)    PLAN:  Dose with aranesp and RTC for hgb then aranesp if needed in 2 week(s)    Orders needed to be renewed (for next follow-up date) in EPIC: None    Iron labs due: Due with next lab draw    Plan discussed with:  Eleni  Plan provided by:  freddy    NEXT FOLLOW-UP DATE:  3/20/2019    Anemia Management Service  Ashley Morrell RN  Phone: [phone]  Fax: [phone]

## 2019-03-07 ENCOUNTER — TELEPHONE (OUTPATIENT)
Dept: NEPHROLOGY | Facility: CLINIC | Age: 64
End: 2019-03-07

## 2019-03-07 NOTE — TELEPHONE ENCOUNTER
PLEASE SEE IF SWITCH TO RETACRIT IS APPROPRIATE, ARANESP IS NON-FORMULARY AND REQUIRES TRIED/FAILED OF RETACRIT TO BE COVERED, NO [facility] NOTES THAT PT TRIED/FAILED RETACRIT.    Thank you!  Bernadine Song Jose  [facility]

## 2019-03-08 ENCOUNTER — TELEPHONE (OUTPATIENT)
Dept: NEPHROLOGY | Facility: CLINIC | Age: 64
End: 2019-03-08

## 2019-03-08 NOTE — TELEPHONE ENCOUNTER
"Additional question answered and faxed back to ins:    Per Anemia TM Alecia Morrell - \"Patient has been on this medication since 12/15/2016.   If we switch to Retacrit this would require weekly labs , which could possibly put her at higher risk for infection (going to lab/clinic) weekly.\"       "

## 2019-03-08 NOTE — TELEPHONE ENCOUNTER
[facility] Prior Authorization Team   Phone: [phone]  Fax: [phone]    PA Initiation    Medication: ARANESP 300MCG - PA INITIATED  Insurance Company: [employer] - Phone [phone] Fax [phone]  Pharmacy Filling the Rx: [facility] - [place], [place] - [address]  Filling Pharmacy Phone:    Filling Pharmacy Fax:    Start Date: 3/8/2019

## 2019-03-08 NOTE — TELEPHONE ENCOUNTER
Prior Authorization Approval    Authorization Effective Date: 3/8/2019  Authorization Expiration Date: 12/31/2019  Medication: ARANESP 300MCG - PA approved  Approved Dose/Quantity: 2  Reference #: [id number]    Insurance Company: [employer] [phone] Fax [phone]  Expected CoPay:       CoPay Card Available:      Foundation Assistance Needed:    Which Pharmacy is filling the prescription (Not needed for infusion/clinic administered): [facility] - [place] [address]  Pharmacy Notified: Yes  Patient Notified: Yes

## 2019-03-14 ENCOUNTER — CARE COORDINATION (OUTPATIENT)
Dept: NEPHROLOGY | Facility: CLINIC | Age: 64
End: 2019-03-14

## 2019-03-14 NOTE — PROGRESS NOTES
Nephrology Note: Nursing Outreach Encounter    REASON FOR CALL:                                                      REASON FOR CALL: Blood Pressure Follow Up                                          SITUATION/BACKROUND:                                                    Patient is being treated for HTN.      ASSESSMENT:                                                      Called patient to check in. She's been feeling well the last couple of weeks, denied any symptoms or concerns today. BP has stayed around 127/70's. Denied questions for the team at this time.    Uremic Symptoms: No       PLAN:                                                      Follow Up:   Follow up call in 3-4 weeks     Patient verbalized understanding and will contact the clinic with any further questions or concerns.     Brittney Quinones RN

## 2019-03-20 ENCOUNTER — TELEPHONE (OUTPATIENT)
Dept: PHARMACY | Facility: CLINIC | Age: 64
End: 2019-03-20

## 2019-03-20 NOTE — TELEPHONE ENCOUNTER
Follow-up with anemia management service:    Left message for Eleni to remind her that she is due for her Hgg, ferritin, Iron/Iron Binding and possible Aranesp injection.     Anemia Latest Ref Rng & Units 12/13/2018 1/3/2019 1/23/2019 2/5/2019 2/6/2019 2/28/2019 3/6/2019   HGB Goal - - - - - - - -   VALARIE Dose - 300 mcg - - - 300 mcg - 300 mcg   Hemoglobin 11.7 - 15.7 g/dL - 10.2(L) 9.9(L) 9.4(L) - 8.3(L) -   TSAT 15 - 46 % - 31 - 30 - - -   Ferritin 8 - 252 ng/mL - 709(H) - 704(H) - - -   PRBCs - - - - - - - -       Orders needed to be renewed (for next follow-up date) in EPIC: None       Follow-up call date: 03/27/2019        Anemia Management Service  Ashley Morrell RN  Phone: [phone]  Fax: [phone]

## 2019-03-21 DIAGNOSIS — N18.30 CKD (CHRONIC KIDNEY DISEASE) STAGE 3, GFR 30-59 ML/MIN (H): ICD-10-CM

## 2019-03-21 DIAGNOSIS — N18.30 ANEMIA DUE TO STAGE 3 CHRONIC KIDNEY DISEASE (H): ICD-10-CM

## 2019-03-21 DIAGNOSIS — D63.1 ANEMIA DUE TO STAGE 3 CHRONIC KIDNEY DISEASE (H): ICD-10-CM

## 2019-03-21 DIAGNOSIS — Z48.298 AFTERCARE FOLLOWING ORGAN TRANSPLANT: ICD-10-CM

## 2019-03-21 DIAGNOSIS — Z94.0 KIDNEY TRANSPLANTED: ICD-10-CM

## 2019-03-21 LAB
ANION GAP SERPL CALCULATED.3IONS-SCNC: 10 MMOL/L (ref 3–14)
BUN SERPL-MCNC: 67 MG/DL (ref 7–30)
CALCIUM SERPL-MCNC: 7.6 MG/DL (ref 8.5–10.1)
CHLORIDE SERPL-SCNC: 111 MMOL/L (ref 94–109)
CO2 SERPL-SCNC: 19 MMOL/L (ref 20–32)
CREAT SERPL-MCNC: 2.58 MG/DL (ref 0.52–1.04)
ERYTHROCYTE [DISTWIDTH] IN BLOOD BY AUTOMATED COUNT: 15.9 % (ref 10–15)
FERRITIN SERPL-MCNC: 727 NG/ML (ref 8–252)
GFR SERPL CREATININE-BSD FRML MDRD: 19 ML/MIN/{1.73_M2}
GLUCOSE SERPL-MCNC: 103 MG/DL (ref 70–99)
HCT VFR BLD AUTO: 29.9 % (ref 35–47)
HGB BLD-MCNC: 8.7 G/DL (ref 11.7–15.7)
IRON SATN MFR SERPL: 21 % (ref 15–46)
IRON SERPL-MCNC: 41 UG/DL (ref 35–180)
MCH RBC QN AUTO: 26 PG (ref 26.5–33)
MCHC RBC AUTO-ENTMCNC: 29.1 G/DL (ref 31.5–36.5)
MCV RBC AUTO: 90 FL (ref 78–100)
PLATELET # BLD AUTO: 196 10E9/L (ref 150–450)
POTASSIUM SERPL-SCNC: 4.6 MMOL/L (ref 3.4–5.3)
RBC # BLD AUTO: 3.34 10E12/L (ref 3.8–5.2)
SODIUM SERPL-SCNC: 140 MMOL/L (ref 133–144)
TIBC SERPL-MCNC: 191 UG/DL (ref 240–430)
WBC # BLD AUTO: 5.2 10E9/L (ref 4–11)

## 2019-03-25 ENCOUNTER — TELEPHONE (OUTPATIENT)
Dept: PHARMACY | Facility: CLINIC | Age: 64
End: 2019-03-25

## 2019-03-25 NOTE — TELEPHONE ENCOUNTER
Anemia Management Note  SUBJECTIVE/OBJECTIVE:  Referred by Dr Tyshawn Sexton February 10, 2017  Primary Diagnosis: Anemia in Chronic Kidney Disease (N18.3 D63.1)   Secondary Diagnosis: Chronic Kidney Disease, Stage III (N18.3)   Hgb goal range: 9-10  Epo/Darbo: Aranesp 300 mcg every 14 days - At home   RX will  on 2020  Iron regimen:  None  Lab orders : 2020    Anemia Latest Ref Rng & Units 2019 2019 2019 2019 3/6/2019 3/21/2019 3/25/2019   HGB Goal - - - - - - - -   VALARIE Dose - - - 300 mcg - 300 mcg - 300 mcg   Hemoglobin 11.7 - 15.7 g/dL 9.9(L) 9.4(L) - 8.3(L) - 8.7(L) -   TSAT 15 - 46 % - 30 - - - 21 -   Ferritin 8 - 252 ng/mL - 704(H) - - - 727(H) -   PRBCs - - - - - - - -     BP Readings from Last 3 Encounters:   19 162/76   18 147/83   18 146/80     Wt Readings from Last 2 Encounters:   19 199 lb (90.3 kg)   18 197 lb 11.2 oz (89.7 kg)       Spoke with Eleni, she will dose with Aranesp today and have her labs drawn in 2 weeks.     ASSESSMENT:  Hgb:Not at goal but improving - recommend dose and continue current regimen  TSat: not at goal (>30%) but ferritin >500ng/mL.  IV iron not indicated at this time per anemia protocol. Ferritin: At goal (>100ng/mL)    PLAN:  Dose with Aranesp and RTC for hgb in 2 weeks    Orders needed to be renewed (for next follow-up date) in EPIC: None    Iron labs due:  2019    Plan discussed with:  Eleni  Plan provided by:  freddy    NEXT FOLLOW-UP DATE:  2019    Anemia Management Service  Ashley Morrell RN  Phone: [phone]  Fax: [phone]

## 2019-04-03 DIAGNOSIS — Z94.0 KIDNEY TRANSPLANTED: ICD-10-CM

## 2019-04-03 DIAGNOSIS — D63.1 ANEMIA DUE TO STAGE 3 CHRONIC KIDNEY DISEASE (H): ICD-10-CM

## 2019-04-03 DIAGNOSIS — N18.30 ANEMIA DUE TO STAGE 3 CHRONIC KIDNEY DISEASE (H): ICD-10-CM

## 2019-04-03 DIAGNOSIS — N18.30 CKD (CHRONIC KIDNEY DISEASE) STAGE 3, GFR 30-59 ML/MIN (H): ICD-10-CM

## 2019-04-03 LAB
CREAT UR-MCNC: 100 MG/DL
HCT VFR BLD AUTO: 33.9 % (ref 35–47)
HGB BLD-MCNC: 10.2 G/DL (ref 11.7–15.7)
PROT UR-MCNC: 2.44 G/L
PROT/CREAT 24H UR: 2.44 G/G CR (ref 0–0.2)

## 2019-04-04 ENCOUNTER — CARE COORDINATION (OUTPATIENT)
Dept: NEPHROLOGY | Facility: CLINIC | Age: 64
End: 2019-04-04

## 2019-04-04 NOTE — PROGRESS NOTES
Attempted to call patient to check in on BP. No answer, voicemail full. Will try again at a later time.    Brittney Quinones RN

## 2019-04-08 ENCOUNTER — TELEPHONE (OUTPATIENT)
Dept: PHARMACY | Facility: CLINIC | Age: 64
End: 2019-04-08

## 2019-04-08 NOTE — TELEPHONE ENCOUNTER
Anemia Management Note  SUBJECTIVE/OBJECTIVE:  Referred by Dr Tyshawn Sexton February 10, 2017  Primary Diagnosis: Anemia in Chronic Kidney Disease (N18.3 D63.1)   Secondary Diagnosis: Chronic Kidney Disease, Stage III (N18.3)   Hgb goal range: 9-10  Epo/Darbo: Aranesp 300 mcg every 14 days - At home   RX will  on 2020  Iron regimen:  None  Lab orders : 2020    Anemia Latest Ref Rng & Units 2019 2019 2019 3/6/2019 3/21/2019 3/25/2019 4/3/2019   HGB Goal - - - - - - - -   VALARIE Dose - - 300 mcg - 300 mcg - 300 mcg -   Hemoglobin 11.7 - 15.7 g/dL 9.4(L) - 8.3(L) - 8.7(L) - 10.2(L)   TSAT 15 - 46 % 30 - - - 21 - -   Ferritin 8 - 252 ng/mL 704(H) - - - 727(H) - -   PRBCs - - - - - - - -     BP Readings from Last 3 Encounters:   19 162/76   18 147/83   18 146/80     Wt Readings from Last 2 Encounters:   19 199 lb (90.3 kg)   18 197 lb 11.2 oz (89.7 kg)           ASSESSMENT:  Hgb:Above goal - recommend hold dose  TSat: not at goal (>30%) but ferritin >500ng/mL.  IV iron not indicated at this time per anemia protocol. Ferritin: At goal (>100ng/mL)    PLAN:  Hold Aranesp and RTC for hgb then aranesp if needed in 2 week(s)    Orders needed to be renewed (for next follow-up date) in EPIC: None    Iron labs due:  2019    Plan discussed with: Unable to LM for Eleni on her home phone, LM on cell.   Plan provided by:  freddy    NEXT FOLLOW-UP DATE:  2019; Appt sched for 2019.  Eleni is moving to [place] on 19. She will not be coming back to [place].    Anemia Management Service  Ashleylakshmi Morrell RN  Phone: [phone]  Fax: [phone]

## 2019-04-09 DIAGNOSIS — Z29.9 PREVENTIVE MEASURE: ICD-10-CM

## 2019-04-09 RX ORDER — FOLIC ACID 1 MG/1
1 TABLET ORAL DAILY
Qty: 90 TABLET | Refills: 2 | Status: SHIPPED | OUTPATIENT
Start: 2019-04-09

## 2019-04-09 NOTE — TELEPHONE ENCOUNTER
Last Clinic Visit: 11/8/2018  [facility]

## 2019-04-22 ENCOUNTER — CARE COORDINATION (OUTPATIENT)
Dept: NEPHROLOGY | Facility: CLINIC | Age: 64
End: 2019-04-22

## 2019-04-22 NOTE — PROGRESS NOTES
Nephrology Note: Nursing Outreach Encounter    REASON FOR CALL:                                                      REASON FOR CALL: Blood Pressure Follow Up                                          SITUATION/BACKROUND:                                                    Patient is being treated for HTN.      ASSESSMENT:                                                      Called patient to check in, states she's doing well overall. She will be moving to [place] on 5/18, will need to establish transplant care there as she will not be coming back to [place]. BP today is elevated, 170/88 with pulse of 82. However, she hasn't taken her meds yet and was talking while the cuff was inflating. She will call back if this doesn't improve after meds. Denied any symptoms.    Uremic Symptoms: No       PLAN:                                                      Follow Up:   Patient to follow up as scheduled at next apt     Patient verbalized understanding and will contact the clinic with any further questions or concerns.     Brittney Quinones RN

## 2019-05-03 ENCOUNTER — TELEPHONE (OUTPATIENT)
Dept: INTERNAL MEDICINE | Facility: CLINIC | Age: 64
End: 2019-05-03

## 2019-05-03 DIAGNOSIS — N18.30 CKD (CHRONIC KIDNEY DISEASE) STAGE 3, GFR 30-59 ML/MIN (H): ICD-10-CM

## 2019-05-03 DIAGNOSIS — E87.20 ACIDOSIS: ICD-10-CM

## 2019-05-03 DIAGNOSIS — E78.00 HYPERCHOLESTEREMIA: Primary | ICD-10-CM

## 2019-05-03 DIAGNOSIS — N18.30 ANEMIA DUE TO STAGE 3 CHRONIC KIDNEY DISEASE (H): ICD-10-CM

## 2019-05-03 DIAGNOSIS — D63.1 ANEMIA DUE TO STAGE 3 CHRONIC KIDNEY DISEASE (H): ICD-10-CM

## 2019-05-03 LAB
FERRITIN SERPL-MCNC: 776 NG/ML (ref 8–252)
HCT VFR BLD AUTO: 34.8 % (ref 35–47)
HGB BLD-MCNC: 10.4 G/DL (ref 11.7–15.7)
IRON SATN MFR SERPL: 49 % (ref 15–46)
IRON SERPL-MCNC: 100 UG/DL (ref 35–180)
TIBC SERPL-MCNC: 204 UG/DL (ref 240–430)

## 2019-05-03 RX ORDER — SODIUM BICARBONATE 650 MG/1
1950 TABLET ORAL 2 TIMES DAILY
Qty: 180 TABLET | Refills: 11 | Status: SHIPPED | OUTPATIENT
Start: 2019-05-03

## 2019-05-03 NOTE — TELEPHONE ENCOUNTER
Per Dr. Boss, dose to increase to 1950mg BID. Updated rx sent, notified patient.    Brittney Quinones RN

## 2019-05-06 RX ORDER — ATORVASTATIN CALCIUM 20 MG/1
20 TABLET, FILM COATED ORAL DAILY
Qty: 90 TABLET | Refills: 0 | Status: SHIPPED | OUTPATIENT
Start: 2019-05-06

## 2019-05-08 ENCOUNTER — TELEPHONE (OUTPATIENT)
Dept: PHARMACY | Facility: CLINIC | Age: 64
End: 2019-05-08

## 2019-05-08 DIAGNOSIS — M1A.9XX0 CHRONIC GOUT WITHOUT TOPHUS, UNSPECIFIED CAUSE, UNSPECIFIED SITE: ICD-10-CM

## 2019-05-08 NOTE — TELEPHONE ENCOUNTER
Anemia Management Note  SUBJECTIVE/OBJECTIVE:  Referred by Dr Tyshawn Sexton February 10, 2017  Primary Diagnosis: Anemia in Chronic Kidney Disease (N18.3 D63.1)   Secondary Diagnosis: Chronic Kidney Disease, Stage III (N18.3)   Hgb goal range: 9-10  Epo/Darbo: Aranesp 300 mcg every 14 days - At home   RX will  on 2020  Iron regimen:  None  Lab orders : 2020    Eleni is moving to [place] on 19. She will not be coming back to [place].    Anemia Latest Ref Rng & Units 2019 2019 3/6/2019 3/21/2019 3/25/2019 4/3/2019 5/3/2019   HGB Goal - - - - - - - -   VALARIE Dose - 300 mcg - 300 mcg - 300 mcg - -   Hemoglobin 11.7 - 15.7 g/dL - 8.3(L) - 8.7(L) - 10.2(L) 10.4(L)   TSAT 15 - 46 % - - - 21 - - 49(H)   Ferritin 8 - 252 ng/mL - - - 727(H) - - 776(H)   PRBCs - - - - - - - -     BP Readings from Last 3 Encounters:   19 162/76   18 147/83   18 146/80     Wt Readings from Last 2 Encounters:   19 199 lb (90.3 kg)   18 197 lb 11.2 oz (89.7 kg)       Verify after 19 that Eleni has moved to [place] and then close Anemia Services.     ASSESSMENT:  Hgb:Above goal - recommend hold dose  TSat: at goal >30% Ferritin: At goal (>100ng/mL)    PLAN:  Hold Aranesp    Orders needed to be renewed (for next follow-up date) in EPIC: None    Iron labs due:  2019    Plan discussed with:  No call made.  Eleni is moving to [place].   Plan provided by:  freddy    NEXT FOLLOW-UP DATE:  19  Did she move?    Anemia Management Service  Ashley Morrell RN  Phone: [phone]  Fax: [phone]

## 2019-05-09 ENCOUNTER — OFFICE VISIT (OUTPATIENT)
Dept: INTERNAL MEDICINE | Facility: CLINIC | Age: 64
End: 2019-05-09
Payer: MEDICARE

## 2019-05-09 ENCOUNTER — OFFICE VISIT (OUTPATIENT)
Dept: NEPHROLOGY | Facility: CLINIC | Age: 64
End: 2019-05-09
Attending: INTERNAL MEDICINE
Payer: MEDICARE

## 2019-05-09 VITALS
HEIGHT: 62 IN | DIASTOLIC BLOOD PRESSURE: 77 MMHG | BODY MASS INDEX: 35.99 KG/M2 | OXYGEN SATURATION: 100 % | WEIGHT: 195.6 LBS | HEART RATE: 72 BPM | SYSTOLIC BLOOD PRESSURE: 141 MMHG

## 2019-05-09 VITALS
DIASTOLIC BLOOD PRESSURE: 84 MMHG | SYSTOLIC BLOOD PRESSURE: 128 MMHG | OXYGEN SATURATION: 100 % | BODY MASS INDEX: 35.85 KG/M2 | HEART RATE: 72 BPM | WEIGHT: 196 LBS

## 2019-05-09 DIAGNOSIS — Z94.0 KIDNEY TRANSPLANTED: Primary | ICD-10-CM

## 2019-05-09 DIAGNOSIS — R19.7 DIARRHEA, UNSPECIFIED TYPE: ICD-10-CM

## 2019-05-09 DIAGNOSIS — I10 HYPERTENSION, UNSPECIFIED TYPE: Primary | ICD-10-CM

## 2019-05-09 DIAGNOSIS — K52.9 GASTROENTERITIS: ICD-10-CM

## 2019-05-09 LAB
ALBUMIN UR-MCNC: 100 MG/DL
APPEARANCE UR: ABNORMAL
BACTERIA #/AREA URNS HPF: ABNORMAL /HPF
BILIRUB UR QL STRIP: NEGATIVE
COLOR UR AUTO: YELLOW
GLUCOSE UR STRIP-MCNC: NEGATIVE MG/DL
HGB UR QL STRIP: ABNORMAL
KETONES UR STRIP-MCNC: NEGATIVE MG/DL
LEUKOCYTE ESTERASE UR QL STRIP: ABNORMAL
NITRATE UR QL: POSITIVE
PH UR STRIP: 5 PH (ref 5–7)
RBC #/AREA URNS AUTO: 13 /HPF (ref 0–2)
SOURCE: ABNORMAL
SP GR UR STRIP: 1.01 (ref 1–1.03)
UROBILINOGEN UR STRIP-MCNC: 0 MG/DL (ref 0–2)
WBC #/AREA URNS AUTO: >182 /HPF (ref 0–5)
WBC CLUMPS #/AREA URNS HPF: PRESENT /HPF

## 2019-05-09 PROCEDURE — 87088 URINE BACTERIA CULTURE: CPT | Performed by: INTERNAL MEDICINE

## 2019-05-09 PROCEDURE — 81001 URINALYSIS AUTO W/SCOPE: CPT | Performed by: INTERNAL MEDICINE

## 2019-05-09 PROCEDURE — 87186 SC STD MICRODIL/AGAR DIL: CPT | Performed by: INTERNAL MEDICINE

## 2019-05-09 PROCEDURE — 87086 URINE CULTURE/COLONY COUNT: CPT | Performed by: INTERNAL MEDICINE

## 2019-05-09 PROCEDURE — G0463 HOSPITAL OUTPT CLINIC VISIT: HCPCS | Mod: ZF

## 2019-05-09 ASSESSMENT — PAIN SCALES - GENERAL
PAINLEVEL: NO PAIN (0)
PAINLEVEL: NO PAIN (0)

## 2019-05-09 ASSESSMENT — MIFFLIN-ST. JEOR: SCORE: 1385.49

## 2019-05-09 NOTE — TELEPHONE ENCOUNTER
allopurinol (ZYLOPRIM) 100 MG tablet     Last Written Prescription Date:  5/3/18  Last Fill Quantity: 90,   # refills: 3  Last Office Visit : 5/9/19  Future Office visit:  none    Routing refill request to provider for review/approval because:  Abnormal creatinine. Overdue labs. Appointment in clinic today.

## 2019-05-09 NOTE — LETTER
2019       RE: Eleni Logan  [address]  [address]  [facility] [address]     Dear Colleague,    Thank you for referring your patient, Eleni Logan, to the [facility] NEPHROLOGY at General acute hospital. Please see a copy of my visit note below.    [facility]  Nephrology Clinic  Kidney/Pancreas Recipient  2019     Name: Eleni Logan  MRN: [id number]   Age: 63 year old  : 1955  Referring provider: Preethi Gibson     CHRONIC TRANSPLANT NEPHROLOGY VISIT    Assessment and Plan:   # LDKT:    - Baseline Cr ~ 2.5; slightly increasing trend.   - Proteinuria: Moderate 2.5 g/g   - BK Viremia: Yes, previously, last check without BK Vieremia 2017   - Kidney Tx Biopsy: Yes                           - Mild interstitial changes consistent with chronic rejection, no vascular rejection, no acute rejection, possible CNI related changes    # Immunosuppression: Mycophenolate mofetil and Prednisone 5mg Daily   - Changes: No   - Patient is moving to [place] next week and I recommend she follow-up with pharmacy to review her medication regimen before she leaves.    # Hypertension: Controlled; Goal BP: < 130/80   - Changes: No    # Anemia in chronic renal disease: Hgb: Stable    - Iron studies: On Aranesp per Anemia Management    # Mineral Bone Disorder:    - Secondary renal hyperparathyroidism; PTH level is: Moderately elevated   - Vitamin D; level is: Low. Currently on low dose Vit D + Ca supplement.   - Calcium; level is: Low   - Phosphorus; level is: High     # Electrolytes:   - Potassium; level: Normal  - Magnesium; level: Normal  - Bicarbonate; level: Normal    # Recurrent UTIs - Followed by Tx ID, not on prophylaxis. No recent UTI symptoms.     # Recurrent Genital HSV - On Valacyclovir.     #MGUS - UPEP with significant albumunuria, but monoclonal spike noted. Will check serum SPEP and Light chains. Will need to schedule with hematology for assessment of her light  chains, if not already been done     # Diarrhea - Patient continues to experience diarrhea. I recommend obtaining a stool sample.    # Medical Compliance: Yes      Follow-up: No follow-ups on file.     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   Significant transplant-related complications: recurrent UTIs and BK viremia      Transplant Office Phone Number: [phone]    Assessment and plan was discussed with the patient and they voiced understanding and agreement.    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     The patient was last evaluated on 11/15/18 by Dr. Boss. At that time, she reported diarrhea that was thought to be medication related. She was recommended to increase sodium bicarbonate to 1300mg BID and was recommended to discontinue coreg and lisinopril. Please see note for further details.    Today, she reports an episode of diarrhea earlier this week, but this has since improved. Denies symptoms of weakness or fatigue. Patient has had no recent UTIs. She is tolerating her fistula well. No changes in her current treatment regimen. She has been up to date with her age appropriate cancer screening.     Of note: Patient is moving to [place] next week.       Recent Hospitalizations:  x No ? Yes    New Medical Issues: x No ? Yes    Decreased energy: x No ? Yes    Chest pain or SOB with exertion:  x No ? Yes    Appetite change or weight change: x No ? Yes    Nausea, vomiting or diarrhea:  ? No x Yes    Fever, sweats or chills: x No ? Yes    Leg swelling: x No ? Yes      Other medical issues: No    Home BP: Not checked     Review of Systems:   A comprehensive  review of systems was obtained and negative, except as noted in the HPI or past medical history.     Active Medications:     Current Outpatient Medications:      allopurinol (ZYLOPRIM) 100 MG tablet, Take 1 tablet (100 mg) by mouth daily, Disp: 90 tablet, Rfl: 3     artificial saliva (BIOTENE MT) AERS spray, Take 1 spray by mouth every 6 hours as needed for dry mouth, Disp: 1 Bottle, Rfl: 3     atorvastatin (LIPITOR) 20 MG tablet, Take 1 tablet (20 mg) by mouth daily, Disp: 90 tablet, Rfl: 0     calcitRIOL (ROCALTROL) 0.25 MCG capsule, Take 1 capsule (0.25 mcg) by mouth 2 times daily, Disp: 60 capsule, Rfl: 3     calcium carbonate (TUMS) 500 MG chewable tablet, Take 2 tablets (1,000 mg) by mouth 3 times daily (with meals), Disp: 150 tablet, Rfl:      calcium citrate-vitamin D (CITRACAL) 315-250 MG-UNIT TABS per tablet, Take 1 tablet by mouth 2 times daily, Disp: 180 tablet, Rfl: 3     carvedilol (COREG) 25 MG tablet, Take 1 tablet (25 mg) by mouth 2 times daily (with meals), Disp: 180 tablet, Rfl: 3     CELLCEPT (BRAND) 250 MG CAPSULE, Take 2 capsules (500 mg) by mouth 2 times daily, Disp: 120 capsule, Rfl: 11     cloNIDine (CATAPRES) 0.1 MG tablet, Take 1 tablet (0.1 mg) by mouth 2 times daily as needed For systolic BP >180, Disp: 60 tablet, Rfl: 3     COMPOUNDED NON-CONTROLLED SUBSTANCE (CMPD RX) - PHARMACY TO MIX COMPOUNDED MEDICATION, Estriol 1 mg/g Apply small amount to finger and apply to inside vagina daily for 2 weeks then twice weekly Route: vaginally, Disp: 30 g, Rfl: 11     darbepoetin bucky (ARANESP, ALBUMIN FREE,) 300 MCG/0.6ML injection, Inject 0.6 mLs (300 mcg) Subcutaneous every 14 days As needed for hgb<10g/dL, Disp: 1.2 mL, Rfl: 11     darbepoetin bucky-polysorbate (ARANESP) 300 MCG/0.6ML injection, Inject 0.6 mLs (300 mcg) Subcutaneous every 14 days For Hemoglobin <10, Disp: 1.2 mL, Rfl: 11     folic acid (FOLVITE) 1 MG tablet, Take 1 tablet (1 mg) by mouth daily, Disp: 90 tablet, Rfl: 2      "losartan (COZAAR) 100 MG tablet, Take 1 tablet (100 mg) by mouth daily, Disp: 90 tablet, Rfl: 3     NIFEdipine ER osmotic (PROCARDIA XL) 60 MG TB24, Take 1 tablet (60 mg) by mouth 2 times daily, Disp: 180 tablet, Rfl: 3     predniSONE (DELTASONE) 5 MG tablet, Take 1 tablet (5 mg) by mouth daily, Disp: 30 tablet, Rfl: 11     sodium bicarbonate 650 MG tablet, Take 3 tablets (1,950 mg) by mouth 2 times daily, Disp: 180 tablet, Rfl: 11     valACYclovir (VALTREX) 500 MG tablet, Take 1 tablet (500 mg) by mouth daily, Disp: 90 tablet, Rfl: 1     VITAMIN D, CHOLECALCIFEROL, PO, Take 2,000 Units by mouth daily, Disp: , Rfl:       Allergies:   Ciprofloxacin and Levaquin [levofloxacin]      Active Medical Problems:  Patient Active Problem List   Diagnosis     Chronic rhinitis     HTN, kidney transplant related     Kidney replaced by transplant     Rash     Asthma exacerbation     UTI (urinary tract infection)     Anemia     Chest pain     Urinary tract infection     Localized pustular psoriasis     Immunosuppression (H)     Fistula     Aftercare following organ transplant     Age-related osteoporosis without current pathological fracture        Social History:   Social History     Tobacco Use     Smoking status: Never Smoker     Smokeless tobacco: Never Used     Tobacco comment: Has been around second hand smoke   Substance Use Topics     Alcohol use: Yes     Comment: Once in a great while     Drug use: No       Physical Exam:   /77   Pulse 72   Ht 1.575 m (5' 2\")   Wt 88.7 kg (195 lb 9.6 oz)   SpO2 100%   BMI 35.78 kg/m      Wt Readings from Last 4 Encounters:   05/09/19 88.7 kg (195 lb 9.6 oz)   05/09/19 88.9 kg (196 lb)   01/16/19 90.3 kg (199 lb)   12/11/18 89.7 kg (197 lb 11.2 oz)     GENERAL APPEARANCE: alert and no distress  HENT: mouth without ulcers or lesions  LYMPHATICS: no cervical or supraclavicular nodes  RESP: lungs clear to auscultation - no rales, rhonchi or wheezes  CV: regular rhythm, normal rate, " no rub, no murmur  EDEMA: no LE edema bilaterally  ABDOMEN: soft, nondistended, nontender, bowel sounds normal  MS: extremities normal - no gross deformities noted, no evidence of inflammation in joints, no muscle tenderness  SKIN: no rash  TX KIDNEY: normal  ACCESS: RUE Fistula with palpable thrill      Data     Renal Latest Ref Rng & Units 3/21/2019 1/3/2019 12/11/2018   Na 133 - 144 mmol/L 140 141 140   K 3.4 - 5.3 mmol/L 4.6 4.3 4.3   Cl 94 - 109 mmol/L 111(H) 110(H) 108   CO2 20 - 32 mmol/L 19(L) 22 23   BUN 7 - 30 mg/dL 67(H) 66(H) 60(H)   Cr 0.52 - 1.04 mg/dL 2.58(H) 2.20(H) 2.50(H)   Glucose 70 - 99 mg/dL 103(H) 115(H) 102(H)   Ca  8.5 - 10.1 mg/dL 7.6(L) 8.2(L) 7.8(L)   Mg 1.6 - 2.3 mg/dL - - -     Bone Health Latest Ref Rng & Units 11/28/2018 9/19/2018 7/10/2018   Phos 2.5 - 4.5 mg/dL 5.3(H) 3.9 4.3   PTHi 18 - 80 pg/mL 376(H) - -   Vit D Def 20 - 75 ug/L 17(L) - -     Heme Latest Ref Rng & Units 5/3/2019 4/3/2019 3/21/2019   WBC 4.0 - 11.0 10e9/L - - 5.2   Hgb 11.7 - 15.7 g/dL 10.4(L) 10.2(L) 8.7(L)   Plt 150 - 450 10e9/L - - 196     Liver Latest Ref Rng & Units 11/28/2018 9/19/2018 7/10/2018   AP 40 - 150 U/L - - -   TBili 0.2 - 1.3 mg/dL - - -   ALT 0 - 50 U/L - - -   AST 0 - 45 U/L - - -   Tot Protein 6.8 - 8.8 g/dL - - -   Albumin 3.4 - 5.0 g/dL 3.4 3.3(L) 3.1(L)     Pancreas Latest Ref Rng & Units 11/21/2017 12/2/2014 2/14/2007   A1C 4.3 - 6.0 % 5.6 5.0 -   Lipase 20 - 250 U/L - - 23     Iron studies Latest Ref Rng & Units 5/3/2019 3/21/2019 2/5/2019   Iron 35 - 180 ug/dL 100 41 60   Iron sat 15 - 46 % 49(H) 21 30   Ferritin 8 - 252 ng/mL 776(H) 727(H) 704(H)     [facility] Tx Virology Latest Ref Rng & Units 7/10/2017 2/28/2017 4/18/2016   CVM DNA Quant - - - Plasma   CMV Quant <100 Copies/mL - - -   CMV QT Log <2.0 Log copies/mL - - -   BK Spec - Plasma Plasma -   BK Res BKNEG copies/mL BK Virus DNA Not Detected BK Virus DNA Not Detected -   BK Log <2.7 Log copies/mL Not Calculated   The Real-Time  quantitative BK Virus assay was developed and its performance   characteristics determined by the [facility] Diagnostic Laboratory at   the [facility] in [place], [place]. The   primers and probes for each analyte are Analyte Specific Reagents (ASRs)   manufactured by [employer].   ASRs are used in many laboratory tests necessary for standard medical care and   generally do not require U.S. Food and Drug Administration approval. The FDA   has determined that such clearance or approval is not necessary.   This test is used for clinical purposes. It should not be regarded as   investigational or for research. This laboratory is certified under the   Clinical Laboratory Improvement Amendments of 1988 (CLIA-88) as qualified to   perform high complexity clinical laboratory testing.   Not Calculated   The Real-Time quantitative BK Virus assay was developed and its performance   characteristics determined by the Infect[facility] Diseases Diagnostic Laboratory at   the [facility] in [place], [place]. The   primers and probes for each analyte are Analyte Specific Reagents (ASRs)   manufactured by [employer].   ASRs are used in many laboratory tests necessary for standard medical care and   generally do not require U.S. Food and Drug Administration approval. The FDA   has determined that such clearance or approval is not necessary.   This test is used for clinical purposes. It should not be regarded as   investigational or for research. This laboratory is certified under the   Clinical Laboratory Improvement Amendments of 1988 (CLIA-88) as qualified to   perform high complexity clinical laboratory testing.   -   HIV 1&2 NEG - - -        Recent Labs   Lab Test 09/17/16  0936 12/14/16  0955   DOSTAC 9/16 2330 20:00 12/13/16   TACROL <3.0  Tacrolimus Reference Range   Kidney Transplant   Pediatric                      ug/L     0-3 months post transplant   10-12      3-6 months post transplant   8-10     6-12 months post transplant  6-8     >12 months post transplant   4-7   Adult     0-6 months post transplant   8-10     6-12 months post transplant  6-8     >12 months post transplant   4-6     >5 years post transplant     3-5   Heart Transplant   Pediatric     0-12 months post transplant  10-15     >12 months post transplant   5-10   Adult     0-3 months post transplant   10-15     3-6 months post transplant   8-12     6-12 months post transplant  6-12     >12 months post transplant   6-10   Lung Transplant     0-12 months post transplant  10-15     >12 months post transplant   8-12   Liver Transplant   Pediatric     0-3 months post transplant   10-15     3-6 months post transplant   8-10     >6 months post transplant    6-8   Adult     0-3 months post transplant   10-12     3-6 months post transplant   8-10     >6  months post transplant    6-8   Pancreas Transplant     0-6 months post transplant   8-10     >6 months post transplant    5-8   This test was developed and its performance characteristics determined by the   [facility],  Special Chemistry Laboratory. It has   not been cleared or approved by the FDA. The laboratory is regulated under CLIA   as qualified to perform high-complexity testing. This test is used for clinical   purposes. It should not be regarded as investigational or for research.  * <3.0  Tacrolimus Reference Range   Kidney Transplant   Pediatric                      ug/L     0-3 months post transplant   10-12     3-6 months post transplant   8-10     6-12 months post transplant  6-8     >12 months post transplant   4-7   Adult     0-6 months post transplant   8-10     6-12 months post transplant  6-8     >12 months post transplant   4-6     >5 years post transplant     3-5   Heart Transplant   Pediatric     0-12 months post transplant  10-15     >12 months post transplant   5-10   Adult     0-3 months post transplant    10-15     3-6 months post transplant   8-12     6-12 months post transplant  6-12     >12 months post transplant   6-10   Lung Transplant     0-12 months post transplant  10-15     >12 months post transplant   8-12   Liver Transplant   Pediatric     0-3 months post transplant   10-15     3-6 months post transplant   8-10     >6 months post transplant    6-8   Adult     0-3 months post transplant   10-12     3-6 months post transplant   8-10     >6  months post transplant    6-8   Pancreas Transplant     0-6 months post transplant   8-10     >6 months post transplant    5-8   This test was developed and its performance characteristics determined by the   [facility],  Special Chemistry Laboratory. It has   not been cleared or approved by the FDA. The laboratory is regulated under CLIA   as qualified to perform high-complexity testing. This test is used for clinical   purposes. It should not be regarded as investigational or for research.  *     Recent Labs   Lab Test 04/18/16  1631 05/19/16  1128 10/02/16  0831   DOSMPA Not Provided 2,300 Not Provided   MPACID 5.61* 5.07* 1.40   MPAG 101.0* 69.4 17.8*         Scribe Disclosure:  I, Henrry Sheilds, am serving as a scribe to document services personally performed by Kidney/Pancreas Recipient at this visit, based upon the provider's statements to me. All documentation has been reviewed by the aforementioned provider prior to being entered into the official medical record.      Again, thank you for allowing me to participate in the care of your patient.      Sincerely,    Kidney/Pancreas Recipient

## 2019-05-09 NOTE — PATIENT INSTRUCTIONS
[facility] Medication Refill Request Information:  * Please contact your pharmacy regarding ANY request for medication refills.  ** [facility] Prescription Fax = [phone]  * Please allow 3 business days for routine medication refills.  * Please allow 5 business days for controlled substance medication refills.     [facility] Test Result notification information:  *You will be notified with in 7-10 days of your appointment day regarding the results of your test.  If you are on MyChart you will be notified as soon as the provider has reviewed the results and signed off on them.    [facility]: [phone]            [facility]         Internal Medicine Resident                   Continuity Clinic    Who We Are    Resident Continuity Clinic is a part of the [facility] Primary Care Clinic.  Resident physicians see patients independently and establish a relationship with them over the course of their three-year residency program.  As with the Primary Care Clinic, our Resident Continuity Clinic models a group practice.  If your doctor is not available, you will be able to see another resident physician.  At the end of a resident s training, patients will be transitioned to a new resident physician for ongoing care.     We treat patients with a wide array of medical needs from routine physicals, to acute illnesses, to diabetes and blood pressure management, to complex medical illness.  What is a Resident Physician?    Resident physicians hold medical degrees and are doctors. They are training to become specialists in Internal Medicine. They work under the supervision of board-certified faculty physicians.  Expectations for Your Care    We strive to provide accessible, quality care at all times.    In order to provide this care, it is best to see your primary care resident doctor consistently rather switching between providers.  In the event you do see another physician, you should  schedule a follow-up visit with your usual primary care doctor.    If you are transitioning your care from another clinic, it is helpful to have your records available for your doctor to review.    We do not prescribe controlled substances, such as ADD medications or narcotic pain medications, on your first visit.  We will review your health records and concerns prior to devising a treatment plan with you in order to provide the best care.      Clinic Services     Extended clinic hours; patient  to help navigate your visit;  parking; laboratory and imaging services with evening and weekend hours    Multiple medical and surgical specialties in one building    Complementary services, including Nutrition, Integrative Medicine, Pharmacy consultations, Mental and Behavioral Health, Sports Medicine and Physical Therapy    Thank You    We would like to thank you for choosing the [facility] Internal Medicine Resident Continuity Clinic for your primary care. You are making a priceless contribution to the training of the next generation of health care practitioners.     Contact us at [phone] for appointments or questions.    Resident Clinic Hours are Tuesdays and Thursdays, 7:30am-5:00pm    Residents   Darius Benson MD   (Male)   Heidi Lester MD  (Female)  Leisa Ferrera MD   (Female)   Haleigh Rodriguez MD   (Female)   Elmo Rivera MD  (Male)   Mack Moore MD  (Male)   Adia Sanderson MD    (Female)   Cory Merchant MD  (Male)   Sebastien Connell MD  (Male)    Serina Barajas MD  (Female)   Wily Chamberlain MD  (Male)   Jaren Weaver MD  (Female)   Kamran Stokes MD    (Female)   Charles Perkins MD  (Male)   Kiel Knight MD  (Male)   Mack Daniel MD (Male)   Dragan Hidalgo MD  (Male)   Libia Greene MD (Female)    Alecia Santana MD (Female)   Indu Pinon MD  (Male)   Karishma Barnes MD    (Female)   Winifred Marte MD  (Female)    Supervising Physicians   Nani  Geronimo, MD Perri Silverman, MD Mirza Allen, MD Salvador Ovalle, MD Alma Sahu, MD Rocio Saul, MD Pancho Ye, MD Mirian Rae, MD Meghana Gonzalez MD

## 2019-05-09 NOTE — PROGRESS NOTES
PRIMARY CARE CENTER       SUBJECTIVE:  Eleni Logan is a 63 year old female with a PMHx of ESRD 2/2 IgA nephropathy s/p LDKT in 99, HTN presenting for follow up    Overall feels ok. On Sunday had some nausea and then Monday/tuesday had some diarrhea, stopped yesterday, no blood, just runny. Notes she is still a little weak because wasn't eating well. She had been at a party and no one else had symptoms. Has had some chills but no fevers. Notes she tried to keep up with fluid intake. No dizziness/lightheadedness though had a little earlier this week. No SOB or chest pain. No abdominal pain. No changes in urination.    Blood pressures have been good. Usually 130s/60s.     She is moving to [place] this week to be closer to family. Wanted an overall visit to get things in order.     Medications and allergies reviewed by me today.     ROS:   Constitutional, HEENT, cardiovascular, pulmonary, gi and gu systems are negative, except as otherwise noted.    OBJECTIVE:    /84   Pulse 72   Wt 88.9 kg (196 lb)   SpO2 100%   BMI 35.85 kg/m     Wt Readings from Last 1 Encounters:   05/09/19 88.9 kg (196 lb)       GENERAL APPEARANCE: healthy, alert and no distress     EYES: EOMI, PERRL     HENT:  mouth without ulcers or lesions     NECK: no adenopathy, no asymmetry, masses, or scars and thyroid normal to palpation     RESP: lungs clear to auscultation - no rales, rhonchi or wheezes     CV: regular rates and rhythm,  and no murmur, click or rub     ABDOMEN:  soft, nontender, no HSM or masses and bowel sounds normal     MSK. no edema, pulses strong     SKIN: no suspicious lesions or rashes. Dry     NEURO: Normal strength and tone, sensory exam grossly normal, mentation intact and speech normal     PSYCH: mentation appears normal. and affect normal     ASSESSMENT/PLAN:    Eleni Logan is a 63 year old female with a PMHx of ESRD 2/2 IgA nephropathy s/p LDKT in 99, HTN presenting for follow up    Hypertension,  unspecified type  Appropriately controlled, SBP 130s/60s, nephrology also assisting. Will plan to sent scripts to new pharmacy once established, currently with good supply of medications.     Gastroenteritis  Nausea and diarrhea starting Sunday through Tuesday. Also having fatigue and weak. Had been at a party prior so possible food related. No other sick contacts. Symptoms improved and stable at this time.        Pt should return to clinic for f/u with as needed or reestablish in [place]    Haleigh Rodriguez MD  May 9, 2019    Pt was seen and plan of care discussed with Dr Saul    I was present during the visit and the patient was seen and examined by me in conjunction with the resident.  I discussed the pertinent history, exam, results and plan with the resident and agree with the documentation above with the following exceptions: none.    Rocio Saul MD

## 2019-05-09 NOTE — LETTER
2019      RE: Eleni Logan  [address]  [address]  [facility] [address]       [facility]  Nephrology Clinic  Kidney/Pancreas Recipient  2019     Name: lEeni Logan  MRN: [id number]   Age: 63 year old  : 1955  Referring provider: Preethi Gibson     CHRONIC TRANSPLANT NEPHROLOGY VISIT    Assessment and Plan:   # LDKT:    - Baseline Cr ~ 2.5; slightly increasing trend.   - Proteinuria: Moderate 2.5 g/g   - BK Viremia: Yes, previously, last check without BK Vieremia 2017   - Kidney Tx Biopsy: Yes                           - Mild interstitial changes consistent with chronic rejection, no vascular rejection, no acute rejection, possible CNI related changes    # Immunosuppression: Mycophenolate mofetil and Prednisone 5mg Daily   - Changes: No   - Patient is moving to [place] next week and I recommend she follow-up with pharmacy to review her medication regimen before she leaves.    # Hypertension: Controlled; Goal BP: < 130/80   - Changes: No    # Anemia in chronic renal disease: Hgb: Stable    - Iron studies: On Aranesp per Anemia Management    # Mineral Bone Disorder:    - Secondary renal hyperparathyroidism; PTH level is: Moderately elevated   - Vitamin D; level is: Low. Currently on low dose Vit D + Ca supplement.   - Calcium; level is: Low   - Phosphorus; level is: High     # Electrolytes:   - Potassium; level: Normal  - Magnesium; level: Normal  - Bicarbonate; level: Normal    # Recurrent UTIs - Followed by Tx ID, not on prophylaxis. No recent UTI symptoms.     # Recurrent Genital HSV - On Valacyclovir.     #MGUS - UPEP with significant albumunuria, but monoclonal spike noted. Will check serum SPEP and Light chains. Will need to schedule with hematology for assessment of her light chains, if not already been done     # Diarrhea - Patient continues to experience diarrhea. I recommend obtaining a stool sample.    # Medical Compliance: Yes      Follow-up: No follow-ups on file.      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   Significant transplant-related complications: recurrent UTIs and BK viremia      Transplant Office Phone Number: [phone]    Assessment and plan was discussed with the patient and they voiced understanding and agreement.    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     The patient was last evaluated on 11/15/18 by Dr. Boss. At that time, she reported diarrhea that was thought to be medication related. She was recommended to increase sodium bicarbonate to 1300mg BID and was recommended to discontinue coreg and lisinopril. Please see note for further details.    Today, she reports an episode of diarrhea earlier this week, but this has since improved. Denies symptoms of weakness or fatigue. Patient has had no recent UTIs. She is tolerating her fistula well. No changes in her current treatment regimen. She has been up to date with her age appropriate cancer screening.     Of note: Patient is moving to [place] next week.       Recent Hospitalizations:  x No ? Yes    New Medical Issues: x No ? Yes    Decreased energy: x No ? Yes    Chest pain or SOB with exertion:  x No ? Yes    Appetite change or weight change: x No ? Yes    Nausea, vomiting or diarrhea:  ? No x Yes    Fever, sweats or chills: x No ? Yes    Leg swelling: x No ? Yes      Other medical issues: No    Home BP: Not checked     Review of Systems:   A comprehensive review of systems was obtained and negative, except as noted in the HPI or past medical history.     Active Medications:     Current Outpatient Medications:      allopurinol (ZYLOPRIM) 100 MG tablet,  Take 1 tablet (100 mg) by mouth daily, Disp: 90 tablet, Rfl: 3     artificial saliva (BIOTENE MT) AERS spray, Take 1 spray by mouth every 6 hours as needed for dry mouth, Disp: 1 Bottle, Rfl: 3     atorvastatin (LIPITOR) 20 MG tablet, Take 1 tablet (20 mg) by mouth daily, Disp: 90 tablet, Rfl: 0     calcitRIOL (ROCALTROL) 0.25 MCG capsule, Take 1 capsule (0.25 mcg) by mouth 2 times daily, Disp: 60 capsule, Rfl: 3     calcium carbonate (TUMS) 500 MG chewable tablet, Take 2 tablets (1,000 mg) by mouth 3 times daily (with meals), Disp: 150 tablet, Rfl:      calcium citrate-vitamin D (CITRACAL) 315-250 MG-UNIT TABS per tablet, Take 1 tablet by mouth 2 times daily, Disp: 180 tablet, Rfl: 3     carvedilol (COREG) 25 MG tablet, Take 1 tablet (25 mg) by mouth 2 times daily (with meals), Disp: 180 tablet, Rfl: 3     CELLCEPT (BRAND) 250 MG CAPSULE, Take 2 capsules (500 mg) by mouth 2 times daily, Disp: 120 capsule, Rfl: 11     cloNIDine (CATAPRES) 0.1 MG tablet, Take 1 tablet (0.1 mg) by mouth 2 times daily as needed For systolic BP >180, Disp: 60 tablet, Rfl: 3     COMPOUNDED NON-CONTROLLED SUBSTANCE (CMPD RX) - PHARMACY TO MIX COMPOUNDED MEDICATION, Estriol 1 mg/g Apply small amount to finger and apply to inside vagina daily for 2 weeks then twice weekly Route: vaginally, Disp: 30 g, Rfl: 11     darbepoetin bucky (ARANESP, ALBUMIN FREE,) 300 MCG/0.6ML injection, Inject 0.6 mLs (300 mcg) Subcutaneous every 14 days As needed for hgb<10g/dL, Disp: 1.2 mL, Rfl: 11     darbepoetin bucky-polysorbate (ARANESP) 300 MCG/0.6ML injection, Inject 0.6 mLs (300 mcg) Subcutaneous every 14 days For Hemoglobin <10, Disp: 1.2 mL, Rfl: 11     folic acid (FOLVITE) 1 MG tablet, Take 1 tablet (1 mg) by mouth daily, Disp: 90 tablet, Rfl: 2     losartan (COZAAR) 100 MG tablet, Take 1 tablet (100 mg) by mouth daily, Disp: 90 tablet, Rfl: 3     NIFEdipine ER osmotic (PROCARDIA XL) 60 MG TB24, Take 1 tablet (60 mg) by mouth 2 times daily, Disp: 180  "tablet, Rfl: 3     predniSONE (DELTASONE) 5 MG tablet, Take 1 tablet (5 mg) by mouth daily, Disp: 30 tablet, Rfl: 11     sodium bicarbonate 650 MG tablet, Take 3 tablets (1,950 mg) by mouth 2 times daily, Disp: 180 tablet, Rfl: 11     valACYclovir (VALTREX) 500 MG tablet, Take 1 tablet (500 mg) by mouth daily, Disp: 90 tablet, Rfl: 1     VITAMIN D, CHOLECALCIFEROL, PO, Take 2,000 Units by mouth daily, Disp: , Rfl:       Allergies:   Ciprofloxacin and Levaquin [levofloxacin]      Active Medical Problems:  Patient Active Problem List   Diagnosis     Chronic rhinitis     HTN, kidney transplant related     Kidney replaced by transplant     Rash     Asthma exacerbation     UTI (urinary tract infection)     Anemia     Chest pain     Urinary tract infection     Localized pustular psoriasis     Immunosuppression (H)     Fistula     Aftercare following organ transplant     Age-related osteoporosis without current pathological fracture        Social History:   Social History     Tobacco Use     Smoking status: Never Smoker     Smokeless tobacco: Never Used     Tobacco comment: Has been around second hand smoke   Substance Use Topics     Alcohol use: Yes     Comment: Once in a great while     Drug use: No       Physical Exam:   /77   Pulse 72   Ht 1.575 m (5' 2\")   Wt 88.7 kg (195 lb 9.6 oz)   SpO2 100%   BMI 35.78 kg/m      Wt Readings from Last 4 Encounters:   05/09/19 88.7 kg (195 lb 9.6 oz)   05/09/19 88.9 kg (196 lb)   01/16/19 90.3 kg (199 lb)   12/11/18 89.7 kg (197 lb 11.2 oz)     GENERAL APPEARANCE: alert and no distress  HENT: mouth without ulcers or lesions  LYMPHATICS: no cervical or supraclavicular nodes  RESP: lungs clear to auscultation - no rales, rhonchi or wheezes  CV: regular rhythm, normal rate, no rub, no murmur  EDEMA: no LE edema bilaterally  ABDOMEN: soft, nondistended, nontender, bowel sounds normal  MS: extremities normal - no gross deformities noted, no evidence of inflammation in joints, " no muscle tenderness  SKIN: no rash  TX KIDNEY: normal  ACCESS: RUE Fistula with palpable thrill      Data     Renal Latest Ref Rng & Units 3/21/2019 1/3/2019 12/11/2018   Na 133 - 144 mmol/L 140 141 140   K 3.4 - 5.3 mmol/L 4.6 4.3 4.3   Cl 94 - 109 mmol/L 111(H) 110(H) 108   CO2 20 - 32 mmol/L 19(L) 22 23   BUN 7 - 30 mg/dL 67(H) 66(H) 60(H)   Cr 0.52 - 1.04 mg/dL 2.58(H) 2.20(H) 2.50(H)   Glucose 70 - 99 mg/dL 103(H) 115(H) 102(H)   Ca  8.5 - 10.1 mg/dL 7.6(L) 8.2(L) 7.8(L)   Mg 1.6 - 2.3 mg/dL - - -     Bone Health Latest Ref Rng & Units 11/28/2018 9/19/2018 7/10/2018   Phos 2.5 - 4.5 mg/dL 5.3(H) 3.9 4.3   PTHi 18 - 80 pg/mL 376(H) - -   Vit D Def 20 - 75 ug/L 17(L) - -     Heme Latest Ref Rng & Units 5/3/2019 4/3/2019 3/21/2019   WBC 4.0 - 11.0 10e9/L - - 5.2   Hgb 11.7 - 15.7 g/dL 10.4(L) 10.2(L) 8.7(L)   Plt 150 - 450 10e9/L - - 196     Liver Latest Ref Rng & Units 11/28/2018 9/19/2018 7/10/2018   AP 40 - 150 U/L - - -   TBili 0.2 - 1.3 mg/dL - - -   ALT 0 - 50 U/L - - -   AST 0 - 45 U/L - - -   Tot Protein 6.8 - 8.8 g/dL - - -   Albumin 3.4 - 5.0 g/dL 3.4 3.3(L) 3.1(L)     Pancreas Latest Ref Rng & Units 11/21/2017 12/2/2014 2/14/2007   A1C 4.3 - 6.0 % 5.6 5.0 -   Lipase 20 - 250 U/L - - 23     Iron studies Latest Ref Rng & Units 5/3/2019 3/21/2019 2/5/2019   Iron 35 - 180 ug/dL 100 41 60   Iron sat 15 - 46 % 49(H) 21 30   Ferritin 8 - 252 ng/mL 776(H) 727(H) 704(H)     [facility] Txp Virology Latest Ref Rng & Units 7/10/2017 2/28/2017 4/18/2016   CVM DNA Quant - - - Plasma   CMV Quant <100 Copies/mL - - -   CMV QT Log <2.0 Log copies/mL - - -   BK Spec - Plasma Plasma -   BK Res BKNEG copies/mL BK Virus DNA Not Detected BK Virus DNA Not Detected -   BK Log <2.7 Log copies/mL Not Calculated   The Real-Time quantitative BK Virus assay was developed and its performance   characteristics determined by the Infectious Diseases Diagnostic Laboratory at   the [facility] in [place],  [place]. The   primers and probes for each analyte are Analyte Specific Reagents (ASRs)   manufactured by [employer].   ASRs are used in many laboratory tests necessary for standard medical care and   generally do not require U.S. Food and Drug Administration approval. The FDA   has determined that such clearance or approval is not necessary.   This test is used for clinical purposes. It should not be regarded as   investigational or for research. This laboratory is certified under the   Clinical Laboratory Improvement Amendments of 1988 (CLIA-88) as qualified to   perform high complexity clinical laboratory testing.   Not Calculated   The Real-Time quantitative BK Virus assay was developed and its performance   characteristics determined by the Infectious Diseases Diagnostic Laboratory at   the [facility] in [place], [place]. The   primers and probes for each analyte are Analyte Specific Reagents (ASRs)   manufactured by [employer].   ASRs are used in many laboratory tests necessary for standard medical care and   generally do not require U.S. Food and Drug Administration approval. The FDA   has determined that such clearance or approval is not necessary.   This test is used for clinical purposes. It should not be regarded as   investigational or for research. This laboratory is certified under the   Clinical Laboratory Improvement Amendments of 1988 (CLIA-88) as qualified to   perform high complexity clinical laboratory testing.   -   HIV 1&2 NEG - - -        Recent Labs   Lab Test 09/17/16  0936 12/14/16  0955   DOSTAC 9/16 6420 20:00 12/13/16   TACROL <3.0  Tacrolimus Reference Range   Kidney Transplant   Pediatric                      ug/L     0-3 months post transplant   10-12     3-6 months post transplant   8-10     6-12 months post transplant  6-8     >12 months post transplant   4-7   Adult     0-6 months post transplant   8-10     6-12 months post transplant  6-8     >12 months  post transplant   4-6     >5 years post transplant     3-5   Heart Transplant   Pediatric     0-12 months post transplant  10-15     >12 months post transplant   5-10   Adult     0-3 months post transplant   10-15     3-6 months post transplant   8-12     6-12 months post transplant  6-12     >12 months post transplant   6-10   Lung Transplant     0-12 months post transplant  10-15     >12 months post transplant   8-12   Liver Transplant   Pediatric     0-3 months post transplant   10-15     3-6 months post transplant   8-10     >6 months post transplant    6-8   Adult     0-3 months post transplant   10-12     3-6 months post transplant   8-10     >6  months post transplant    6-8   Pancreas Transplant     0-6 months post transplant   8-10     >6 months post transplant    5-8   This test was developed and its performance characteristics determined by the   [facility],  Special Chemistry Laboratory. It has   not been cleared or approved by the FDA. The laboratory is regulated under CLIA   as qualified to perform high-complexity testing. This test is used for clinical   purposes. It should not be regarded as investigational or for research.  * <3.0  Tacrolimus Reference Range   Kidney Transplant   Pediatric                      ug/L     0-3 months post transplant   10-12     3-6 months post transplant   8-10     6-12 months post transplant  6-8     >12 months post transplant   4-7   Adult     0-6 months post transplant   8-10     6-12 months post transplant  6-8     >12 months post transplant   4-6     >5 years post transplant     3-5   Heart Transplant   Pediatric     0-12 months post transplant  10-15     >12 months post transplant   5-10   Adult     0-3 months post transplant   10-15     3-6 months post transplant   8-12     6-12 months post transplant  6-12     >12 months post transplant   6-10   Lung Transplant     0-12 months post transplant  10-15     >12 months post transplant    8-12   Liver Transplant   Pediatric     0-3 months post transplant   10-15     3-6 months post transplant   8-10     >6 months post transplant    6-8   Adult     0-3 months post transplant   10-12     3-6 months post transplant   8-10     >6  months post transplant    6-8   Pancreas Transplant     0-6 months post transplant   8-10     >6 months post transplant    5-8   This test was developed and its performance characteristics determined by the   [facility],  Special Chemistry Laboratory. It has   not been cleared or approved by the FDA. The laboratory is regulated under CLIA   as qualified to perform high-complexity testing. This test is used for clinical   purposes. It should not be regarded as investigational or for research.  *     Recent Labs   Lab Test 04/18/16  1631 05/19/16  1128 10/02/16  0831   DOSMPA Not Provided 2,300 Not Provided   MPACID 5.61* 5.07* 1.40   MPAG 101.0* 69.4 17.8*         Scribe Disclosure:  I, Henrry Shields, am serving as a scribe to document services personally performed by Kidney/Pancreas Recipient at this visit, based upon the provider's statements to me. All documentation has been reviewed by the aforementioned provider prior to being entered into the official medical record.      Kidney/Pancreas Recipient

## 2019-05-09 NOTE — NURSING NOTE
Chief Complaint   Patient presents with     Medication Follow-up     Pt is here to follow up on medications.      Radhika Arrington LPN at 1:28 PM on 5/9/2019.

## 2019-05-09 NOTE — LETTER
[employer] Customer Service  [facility]  [address], [address]  [place] [address]  Fax: [phone]  Phone: [phone]      May 9, 2019      Eleni Logan  [address]  [address]  [place] [facility] [address]        Dear Eleni,    Thank you for your interest in becoming a [employer] user!    Your access code is: [email or web address]  Expires: 2019  6:30 AM     Please access the [employer] website at [email or web address].  Below the ID and password fields, select the  Sign Up Now  as New User.  You will be prompted to enter the access code listed above as well as additional personal information.  Please follow the directions carefully when creating your username and password.    If you allow your access code to , or if you have any questions please call a [employer] Representative at [phone] during normal clinic hours.     Sincerely,      [employer] Customer Service  [facility]

## 2019-05-09 NOTE — PROGRESS NOTES
[facility]  Nephrology Clinic  Kidney/Pancreas Recipient  2019     Name: Eleni Logan  MRN: [id number]   Age: 63 year old  : 1955  Referring provider: Preethi Gibson     CHRONIC TRANSPLANT NEPHROLOGY VISIT    Assessment and Plan:   # LDKT:    - Baseline Cr ~ 2.5; slightly increasing trend.   - Proteinuria: Moderate 2.5 g/g   - BK Viremia: Yes, previously, last check without BK Vieremia 2017   - Kidney Tx Biopsy: Yes                           - Mild interstitial changes consistent with chronic rejection, no vascular rejection, no acute rejection, possible CNI related changes    # Immunosuppression: Mycophenolate mofetil and Prednisone 5mg Daily   - Changes: No   - Patient is moving to [place] next week and I recommend she follow-up with pharmacy to review her medication regimen before she leaves.    # Hypertension: Controlled; Goal BP: < 130/80   - Changes: No    # Anemia in chronic renal disease: Hgb: Stable    - Iron studies: On Aranesp per Anemia Management    # Mineral Bone Disorder:    - Secondary renal hyperparathyroidism; PTH level is: Moderately elevated   - Vitamin D; level is: Low. Currently on low dose Vit D + Ca supplement.   - Calcium; level is: Low   - Phosphorus; level is: High     # Electrolytes:   - Potassium; level: Normal  - Magnesium; level: Normal  - Bicarbonate; level: Normal    # Recurrent UTIs - Followed by Tx ID, not on prophylaxis. No recent UTI symptoms.     # Recurrent Genital HSV - On Valacyclovir.     #MGUS - UPEP with significant albumunuria, but monoclonal spike noted. Will check serum SPEP and Light chains. Will need to schedule with hematology for assessment of her light chains, if not already been done     # Diarrhea - Patient continues to experience diarrhea. I recommend obtaining a stool sample.    # Medical Compliance: Yes      Follow-up: No follow-ups on file.     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   Significant transplant-related complications: recurrent UTIs and BK viremia      Transplant Office Phone Number: [phone]    Assessment and plan was discussed with the patient and they voiced understanding and agreement.    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     The patient was last evaluated on 11/15/18 by Dr. Boss. At that time, she reported diarrhea that was thought to be medication related. She was recommended to increase sodium bicarbonate to 1300mg BID and was recommended to discontinue coreg and lisinopril. Please see note for further details.    Today, she reports an episode of diarrhea earlier this week, but this has since improved. Denies symptoms of weakness or fatigue. Patient has had no recent UTIs. She is tolerating her fistula well. No changes in her current treatment regimen. She has been up to date with her age appropriate cancer screening.     Of note: Patient is moving to [place] next week.       Recent Hospitalizations:  x No ? Yes    New Medical Issues: x No ? Yes    Decreased energy: x No ? Yes    Chest pain or SOB with exertion:  x No ? Yes    Appetite change or weight change: x No ? Yes    Nausea, vomiting or diarrhea:  ? No x Yes    Fever, sweats or chills: x No ? Yes    Leg swelling: x No ? Yes      Other medical issues: No    Home BP: Not checked     Review of Systems:   A comprehensive review of systems was obtained and negative, except as noted in the HPI or past medical history.     Active Medications:     Current Outpatient Medications:      allopurinol (ZYLOPRIM) 100 MG tablet, Take 1 tablet (100 mg) by mouth daily, Disp: 90 tablet, Rfl: 3      artificial saliva (BIOTENE MT) AERS spray, Take 1 spray by mouth every 6 hours as needed for dry mouth, Disp: 1 Bottle, Rfl: 3     atorvastatin (LIPITOR) 20 MG tablet, Take 1 tablet (20 mg) by mouth daily, Disp: 90 tablet, Rfl: 0     calcitRIOL (ROCALTROL) 0.25 MCG capsule, Take 1 capsule (0.25 mcg) by mouth 2 times daily, Disp: 60 capsule, Rfl: 3     calcium carbonate (TUMS) 500 MG chewable tablet, Take 2 tablets (1,000 mg) by mouth 3 times daily (with meals), Disp: 150 tablet, Rfl:      calcium citrate-vitamin D (CITRACAL) 315-250 MG-UNIT TABS per tablet, Take 1 tablet by mouth 2 times daily, Disp: 180 tablet, Rfl: 3     carvedilol (COREG) 25 MG tablet, Take 1 tablet (25 mg) by mouth 2 times daily (with meals), Disp: 180 tablet, Rfl: 3     CELLCEPT (BRAND) 250 MG CAPSULE, Take 2 capsules (500 mg) by mouth 2 times daily, Disp: 120 capsule, Rfl: 11     cloNIDine (CATAPRES) 0.1 MG tablet, Take 1 tablet (0.1 mg) by mouth 2 times daily as needed For systolic BP >180, Disp: 60 tablet, Rfl: 3     COMPOUNDED NON-CONTROLLED SUBSTANCE (CMPD RX) - PHARMACY TO MIX COMPOUNDED MEDICATION, Estriol 1 mg/g Apply small amount to finger and apply to inside vagina daily for 2 weeks then twice weekly Route: vaginally, Disp: 30 g, Rfl: 11     darbepoetin bucky (ARANESP, ALBUMIN FREE,) 300 MCG/0.6ML injection, Inject 0.6 mLs (300 mcg) Subcutaneous every 14 days As needed for hgb<10g/dL, Disp: 1.2 mL, Rfl: 11     darbepoetin bucky-polysorbate (ARANESP) 300 MCG/0.6ML injection, Inject 0.6 mLs (300 mcg) Subcutaneous every 14 days For Hemoglobin <10, Disp: 1.2 mL, Rfl: 11     folic acid (FOLVITE) 1 MG tablet, Take 1 tablet (1 mg) by mouth daily, Disp: 90 tablet, Rfl: 2     losartan (COZAAR) 100 MG tablet, Take 1 tablet (100 mg) by mouth daily, Disp: 90 tablet, Rfl: 3     NIFEdipine ER osmotic (PROCARDIA XL) 60 MG TB24, Take 1 tablet (60 mg) by mouth 2 times daily, Disp: 180 tablet, Rfl: 3     predniSONE (DELTASONE) 5 MG tablet, Take 1 tablet  "(5 mg) by mouth daily, Disp: 30 tablet, Rfl: 11     sodium bicarbonate 650 MG tablet, Take 3 tablets (1,950 mg) by mouth 2 times daily, Disp: 180 tablet, Rfl: 11     valACYclovir (VALTREX) 500 MG tablet, Take 1 tablet (500 mg) by mouth daily, Disp: 90 tablet, Rfl: 1     VITAMIN D, CHOLECALCIFEROL, PO, Take 2,000 Units by mouth daily, Disp: , Rfl:       Allergies:   Ciprofloxacin and Levaquin [levofloxacin]      Active Medical Problems:  Patient Active Problem List   Diagnosis     Chronic rhinitis     HTN, kidney transplant related     Kidney replaced by transplant     Rash     Asthma exacerbation     UTI (urinary tract infection)     Anemia     Chest pain     Urinary tract infection     Localized pustular psoriasis     Immunosuppression (H)     Fistula     Aftercare following organ transplant     Age-related osteoporosis without current pathological fracture        Social History:   Social History     Tobacco Use     Smoking status: Never Smoker     Smokeless tobacco: Never Used     Tobacco comment: Has been around second hand smoke   Substance Use Topics     Alcohol use: Yes     Comment: Once in a great while     Drug use: No       Physical Exam:   /77   Pulse 72   Ht 1.575 m (5' 2\")   Wt 88.7 kg (195 lb 9.6 oz)   SpO2 100%   BMI 35.78 kg/m     Wt Readings from Last 4 Encounters:   05/09/19 88.7 kg (195 lb 9.6 oz)   05/09/19 88.9 kg (196 lb)   01/16/19 90.3 kg (199 lb)   12/11/18 89.7 kg (197 lb 11.2 oz)     GENERAL APPEARANCE: alert and no distress  HENT: mouth without ulcers or lesions  LYMPHATICS: no cervical or supraclavicular nodes  RESP: lungs clear to auscultation - no rales, rhonchi or wheezes  CV: regular rhythm, normal rate, no rub, no murmur  EDEMA: no LE edema bilaterally  ABDOMEN: soft, nondistended, nontender, bowel sounds normal  MS: extremities normal - no gross deformities noted, no evidence of inflammation in joints, no muscle tenderness  SKIN: no rash  TX KIDNEY: normal  ACCESS: RUE " Fistula with palpable thrill      Data     Renal Latest Ref Rng & Units 3/21/2019 1/3/2019 12/11/2018   Na 133 - 144 mmol/L 140 141 140   K 3.4 - 5.3 mmol/L 4.6 4.3 4.3   Cl 94 - 109 mmol/L 111(H) 110(H) 108   CO2 20 - 32 mmol/L 19(L) 22 23   BUN 7 - 30 mg/dL 67(H) 66(H) 60(H)   Cr 0.52 - 1.04 mg/dL 2.58(H) 2.20(H) 2.50(H)   Glucose 70 - 99 mg/dL 103(H) 115(H) 102(H)   Ca  8.5 - 10.1 mg/dL 7.6(L) 8.2(L) 7.8(L)   Mg 1.6 - 2.3 mg/dL - - -     Bone Health Latest Ref Rng & Units 11/28/2018 9/19/2018 7/10/2018   Phos 2.5 - 4.5 mg/dL 5.3(H) 3.9 4.3   PTHi 18 - 80 pg/mL 376(H) - -   Vit D Def 20 - 75 ug/L 17(L) - -     Heme Latest Ref Rng & Units 5/3/2019 4/3/2019 3/21/2019   WBC 4.0 - 11.0 10e9/L - - 5.2   Hgb 11.7 - 15.7 g/dL 10.4(L) 10.2(L) 8.7(L)   Plt 150 - 450 10e9/L - - 196     Liver Latest Ref Rng & Units 11/28/2018 9/19/2018 7/10/2018   AP 40 - 150 U/L - - -   TBili 0.2 - 1.3 mg/dL - - -   ALT 0 - 50 U/L - - -   AST 0 - 45 U/L - - -   Tot Protein 6.8 - 8.8 g/dL - - -   Albumin 3.4 - 5.0 g/dL 3.4 3.3(L) 3.1(L)     Pancreas Latest Ref Rng & Units 11/21/2017 12/2/2014 2/14/2007   A1C 4.3 - 6.0 % 5.6 5.0 -   Lipase 20 - 250 U/L - - 23     Iron studies Latest Ref Rng & Units 5/3/2019 3/21/2019 2/5/2019   Iron 35 - 180 ug/dL 100 41 60   Iron sat 15 - 46 % 49(H) 21 30   Ferritin 8 - 252 ng/mL 776(H) 727(H) 704(H)     [facility] Txp Virology Latest Ref Rng & Units 7/10/2017 2/28/2017 4/18/2016   CVM DNA Quant - - - Plasma   CMV Quant <100 Copies/mL - - -   CMV QT Log <2.0 Log copies/mL - - -   BK Spec - Plasma Plasma -   BK Res BKNEG copies/mL BK Virus DNA Not Detected BK Virus DNA Not Detected -   BK Log <2.7 Log copies/mL Not Calculated   The Real-Time quantitative BK Virus assay was developed and its performance   characteristics determined by the Infectious Diseases Diagnostic Laboratory at   the [facility] in [place], [place]. The   primers and probes for each analyte are Analyte Specific  Reagents (ASRs)   manufactured by [employer].   ASRs are used in many laboratory tests necessary for standard medical care and   generally do not require U.S. Food and Drug Administration approval. The FDA   has determined that such clearance or approval is not necessary.   This test is used for clinical purposes. It should not be regarded as   investigational or for research. This laboratory is certified under the   Clinical Laboratory Improvement Amendments of 1988 (CLIA-88) as qualified to   perform high complexity clinical laboratory testing.   Not Calculated   The Real-Time quantitative BK Virus assay was developed and its performance   characteristics determined by the Infectious Diseases Diagnostic Laboratory at   the [facility] in [place], [place]. The   primers and probes for each analyte are Analyte Specific Reagents (ASRs)   manufactured by [employer].   ASRs are used in many laboratory tests necessary for standard medical care and   generally do not require U.S. Food and Drug Administration approval. The FDA   has determined that such clearance or approval is not necessary.   This test is used for clinical purposes. It should not be regarded as   investigational or for research. This laboratory is certified under the   Clinical Laboratory Improvement Amendments of 1988 (CLIA-88) as qualified to   perform high complexity clinical laboratory testing.   -   HIV 1&2 NEG - - -        Recent Labs   Lab Test 09/17/16  0936 12/14/16  0955   DOSTAC 9/16 2330 20:00 12/13/16   TACROL <3.0  Tacrolimus Reference Range   Kidney Transplant   Pediatric                      ug/L     0-3 months post transplant   10-12     3-6 months post transplant   8-10     6-12 months post transplant  6-8     >12 months post transplant   4-7   Adult     0-6 months post transplant   8-10     6-12 months post transplant  6-8     >12 months post transplant   4-6     >5 years post transplant     3-5   Heart  Transplant   Pediatric     0-12 months post transplant  10-15     >12 months post transplant   5-10   Adult     0-3 months post transplant   10-15     3-6 months post transplant   8-12     6-12 months post transplant  6-12     >12 months post transplant   6-10   Lung Transplant     0-12 months post transplant  10-15     >12 months post transplant   8-12   Liver Transplant   Pediatric     0-3 months post transplant   10-15     3-6 months post transplant   8-10     >6 months post transplant    6-8   Adult     0-3 months post transplant   10-12     3-6 months post transplant   8-10     >6  months post transplant    6-8   Pancreas Transplant     0-6 months post transplant   8-10     >6 months post transplant    5-8   This test was developed and its performance characteristics determined by the   [facility],  Special Chemistry Laboratory. It has   not been cleared or approved by the FDA. The laboratory is regulated under CLIA   as qualified to perform high-complexity testing. This test is used for clinical   purposes. It should not be regarded as investigational or for research.  * <3.0  Tacrolimus Reference Range   Kidney Transplant   Pediatric                      ug/L     0-3 months post transplant   10-12     3-6 months post transplant   8-10     6-12 months post transplant  6-8     >12 months post transplant   4-7   Adult     0-6 months post transplant   8-10     6-12 months post transplant  6-8     >12 months post transplant   4-6     >5 years post transplant     3-5   Heart Transplant   Pediatric     0-12 months post transplant  10-15     >12 months post transplant   5-10   Adult     0-3 months post transplant   10-15     3-6 months post transplant   8-12     6-12 months post transplant  6-12     >12 months post transplant   6-10   Lung Transplant     0-12 months post transplant  10-15     >12 months post transplant   8-12   Liver Transplant   Pediatric     0-3 months post transplant    10-15     3-6 months post transplant   8-10     >6 months post transplant    6-8   Adult     0-3 months post transplant   10-12     3-6 months post transplant   8-10     >6  months post transplant    6-8   Pancreas Transplant     0-6 months post transplant   8-10     >6 months post transplant    5-8   This test was developed and its performance characteristics determined by the   [facility],  Special Chemistry Laboratory. It has   not been cleared or approved by the FDA. The laboratory is regulated under CLIA   as qualified to perform high-complexity testing. This test is used for clinical   purposes. It should not be regarded as investigational or for research.  *     Recent Labs   Lab Test 04/18/16  1631 05/19/16  1128 10/02/16  0831   DOSMPA Not Provided 2,300 Not Provided   MPACID 5.61* 5.07* 1.40   MPAG 101.0* 69.4 17.8*         Scribe Disclosure:  I, Henrry Shields, am serving as a scribe to document services personally performed by Kidney/Pancreas Recipient at this visit, based upon the provider's statements to me. All documentation has been reviewed by the aforementioned provider prior to being entered into the official medical record.

## 2019-05-10 ENCOUNTER — OFFICE VISIT (OUTPATIENT)
Dept: PHARMACY | Facility: CLINIC | Age: 64
End: 2019-05-10
Payer: MEDICARE

## 2019-05-10 DIAGNOSIS — N39.0 RECURRENT UTI (URINARY TRACT INFECTION): ICD-10-CM

## 2019-05-10 DIAGNOSIS — E78.2 MIXED HYPERLIPIDEMIA: ICD-10-CM

## 2019-05-10 DIAGNOSIS — N18.30 CKD (CHRONIC KIDNEY DISEASE) STAGE 3, GFR 30-59 ML/MIN (H): ICD-10-CM

## 2019-05-10 DIAGNOSIS — N39.0 URINARY TRACT INFECTION: Primary | ICD-10-CM

## 2019-05-10 DIAGNOSIS — E55.9 VITAMIN D DEFICIENCY: ICD-10-CM

## 2019-05-10 DIAGNOSIS — N18.30 ANEMIA DUE TO STAGE 3 CHRONIC KIDNEY DISEASE (H): ICD-10-CM

## 2019-05-10 DIAGNOSIS — R19.7 DIARRHEA, UNSPECIFIED TYPE: ICD-10-CM

## 2019-05-10 DIAGNOSIS — D63.1 ANEMIA DUE TO STAGE 3 CHRONIC KIDNEY DISEASE (H): ICD-10-CM

## 2019-05-10 DIAGNOSIS — Z94.0 S/P KIDNEY TRANSPLANT: Primary | ICD-10-CM

## 2019-05-10 DIAGNOSIS — Z94.0 KIDNEY TRANSPLANTED: ICD-10-CM

## 2019-05-10 DIAGNOSIS — I15.1 HYPERTENSION SECONDARY TO OTHER RENAL DISORDERS: ICD-10-CM

## 2019-05-10 DIAGNOSIS — Z94.0 KIDNEY REPLACED BY TRANSPLANT: ICD-10-CM

## 2019-05-10 DIAGNOSIS — M10.9 GOUT, UNSPECIFIED CAUSE, UNSPECIFIED CHRONICITY, UNSPECIFIED SITE: ICD-10-CM

## 2019-05-10 DIAGNOSIS — M1A.9XX0 CHRONIC GOUT WITHOUT TOPHUS, UNSPECIFIED CAUSE, UNSPECIFIED SITE: ICD-10-CM

## 2019-05-10 LAB
ALBUMIN SERPL-MCNC: 3 G/DL (ref 3.4–5)
ANION GAP SERPL CALCULATED.3IONS-SCNC: 9 MMOL/L (ref 3–14)
BUN SERPL-MCNC: 71 MG/DL (ref 7–30)
C COLI+JEJUNI+LARI FUSA STL QL NAA+PROBE: NOT DETECTED
CALCIUM SERPL-MCNC: 7.3 MG/DL (ref 8.5–10.1)
CHLORIDE SERPL-SCNC: 107 MMOL/L (ref 94–109)
CO2 SERPL-SCNC: 21 MMOL/L (ref 20–32)
CREAT SERPL-MCNC: 3.32 MG/DL (ref 0.52–1.04)
EC STX1 GENE STL QL NAA+PROBE: NOT DETECTED
EC STX2 GENE STL QL NAA+PROBE: NOT DETECTED
ENTERIC PATHOGEN COMMENT: NORMAL
ERYTHROCYTE [DISTWIDTH] IN BLOOD BY AUTOMATED COUNT: 14.3 % (ref 10–15)
GFR SERPL CREATININE-BSD FRML MDRD: 14 ML/MIN/{1.73_M2}
GLUCOSE SERPL-MCNC: 94 MG/DL (ref 70–99)
HCT VFR BLD AUTO: 31 % (ref 35–47)
HGB BLD-MCNC: 9.6 G/DL (ref 11.7–15.7)
MCH RBC QN AUTO: 26.1 PG (ref 26.5–33)
MCHC RBC AUTO-ENTMCNC: 31 G/DL (ref 31.5–36.5)
MCV RBC AUTO: 84 FL (ref 78–100)
NOROV GI+II ORF1-ORF2 JNC STL QL NAA+PR: NOT DETECTED
PHOSPHATE SERPL-MCNC: 4.9 MG/DL (ref 2.5–4.5)
PLATELET # BLD AUTO: 190 10E9/L (ref 150–450)
POTASSIUM SERPL-SCNC: 4 MMOL/L (ref 3.4–5.3)
RBC # BLD AUTO: 3.68 10E12/L (ref 3.8–5.2)
RVA NSP5 STL QL NAA+PROBE: NOT DETECTED
SALMONELLA SP RPOD STL QL NAA+PROBE: NOT DETECTED
SHIGELLA SP+EIEC IPAH STL QL NAA+PROBE: NOT DETECTED
SODIUM SERPL-SCNC: 137 MMOL/L (ref 133–144)
URATE SERPL-MCNC: 8.7 MG/DL (ref 2.6–6)
V CHOL+PARA RFBL+TRKH+TNAA STL QL NAA+PR: NOT DETECTED
WBC # BLD AUTO: 6.1 10E9/L (ref 4–11)
Y ENTERO RECN STL QL NAA+PROBE: NOT DETECTED

## 2019-05-10 PROCEDURE — 99605 MTMS BY PHARM NP 15 MIN: CPT | Mod: GY | Performed by: PHARMACIST

## 2019-05-10 PROCEDURE — 00000211 ZZHCL STATISTIC MYCOPHENOLIC ACID AUC

## 2019-05-10 PROCEDURE — 87506 IADNA-DNA/RNA PROBE TQ 6-11: CPT | Performed by: INTERNAL MEDICINE

## 2019-05-10 PROCEDURE — 80069 RENAL FUNCTION PANEL: CPT

## 2019-05-10 PROCEDURE — 00000211 ZZHCL STATISTIC MYCOPHENOLIC ACID AUC: Performed by: INTERNAL MEDICINE

## 2019-05-10 PROCEDURE — 80180 DRUG SCRN QUAN MYCOPHENOLATE: CPT | Performed by: INTERNAL MEDICINE

## 2019-05-10 PROCEDURE — 85027 COMPLETE CBC AUTOMATED: CPT

## 2019-05-10 PROCEDURE — 99607 MTMS BY PHARM ADDL 15 MIN: CPT | Mod: GY | Performed by: PHARMACIST

## 2019-05-10 RX ORDER — ALLOPURINOL 100 MG/1
100 TABLET ORAL DAILY
Qty: 90 TABLET | Refills: 0 | Status: SHIPPED | OUTPATIENT
Start: 2019-05-10

## 2019-05-10 RX ORDER — CEFPODOXIME PROXETIL 100 MG/1
100 TABLET, FILM COATED ORAL DAILY
Qty: 14 TABLET | Refills: 0 | Status: SHIPPED | OUTPATIENT
Start: 2019-05-10 | End: 2019-05-24

## 2019-05-10 NOTE — TELEPHONE ENCOUNTER
Call placed to patient. No answer. Voice message left with instructions listed below. Will try back. Rx sent

## 2019-05-10 NOTE — PROGRESS NOTES
SUBJECTIVE/OBJECTIVE:                           Eleni Logan is a 63 year old female coming in for an initial visit for Medication Therapy Management.  She was referred to me from Dr. Sexton.    Chief Complaint: Wondering if Allopurinol was sent and what her drug allergies are. Pt is moving to [place], wanted a med review before leaving.     Allergies/ADRs: Reviewed in Epic  Tobacco: No tobacco use people smoke around her  Alcohol: Less than 1 beverage / month  Caffeine: 1-2 cups/day of coffee  PMH: Reviewed in Epic    Medication Adherence/Access:  Patient uses pill box(es).  Patient takes medications 2 time(s) per day. 10 AM, 8-9PM  Per patient, misses medication 0 times per week. Maybe misses a dose once a month.     Hypertension: Current medications include Losartan 100mg daily, Carvedilol 12.5mg BID, Nifedipine 60mg ER BID.  Patient does self-monitor BP. 130/80s  Patient reports no current medication side effects.  BP Readings from Last 3 Encounters:   05/09/19 141/77   05/09/19 128/84   01/16/19 162/76      Renal Transplant:  Current immunosuppressants include MMF 500mg BID, Prednisone 5mfg  daily.  Pt reports no side effects  Transplant date: 7/1/1999  Estimated Creatinine Clearance: 22.5 mL/min (A) (based on SCr of 2.58 mg/dL (H)).  Herpes Prevention: Valacyclovir 500mg daily.   PCP prophylaxis: none  PPI use: Tums prn- rare.   Current supplements for electrolyte replacement: Sodium Bicarb 1300mg BID, no longer taking Calcitriol, Vitamin D or Calcium.    Ref. Range 11/28/2018 13:16   Parathyroid Hormone Intact Latest Ref Range: 18 - 80 pg/mL 376 (H)     Hyperlipidemia: Current therapy includes Atorvastatin 20mg once daily.  Pt reports no significant myalgias or other side effects.  The 10-year ASCVD risk score ([place] BARBRA Jr., et al., 2013) is: 7.3%    Values used to calculate the score:      Age: 63 years      Sex: Female      Is Non- : No      Diabetic: No      Tobacco smoker: No       Systolic Blood Pressure: 141 mmHg      Is BP treated: Yes      HDL Cholesterol: 60 mg/dL      Total Cholesterol: 144 mg/dL    Gout: pt is not taking Allopurinol 100mg daily- has not had a flare for 6-7 years.  Uric Acid   Date Value Ref Range Status   12/19/2017 7.5 (H) 2.6 - 6.0 mg/dL Final     Today's Vitals: There were no vitals taken for this visit.      ASSESSMENT:                             Current medications were reviewed today.      Medication Adherence: poor see below    Hypertension: Pt is taking the incorrect dose of Carvedilol, should be 25mg BID. Instructed patient to make this change.      Renal Transplant:  Pt instructed to start Vitamin D 2000 units daily, Calcium, and increase Sodium Bicarb to 3 tablets BID.    Hyperlipidemia: No changes.     Gout: Pt instructed to fill Allopurinol today at pharmacy.    PLAN:                            Dr. Sexton...   1. I had patient restart Calcium, Vitamin D, and increase Carvedilol and Sodium bicarb to the prescribed doses.  2. Patient has not been taking Calctriol. If you would like her to restart this I can reach out to her for you.     I spent 30 minutes with this patient today. I offer these suggestions for consideration by txp team. A copy of the visit note was provided to the patient's referring provider.    Will follow up as needed- [place] out [place]. .    The patient was given a summary of these recommendations as an after visit summary.     Milton Choi, PharmD  [facility] Pharmacist    Phone: [phone]

## 2019-05-10 NOTE — TELEPHONE ENCOUNTER
URINARY TRACT INFECTION  - preliminary UC: >100,000 col/mL actose fermenting gram negative esha    PLAN (per Dr. Sexton)  - cefpodoxime 100 mg daily for 14 days  - monitor stools/diarrhea, concern for C diff    LPN TASK  - call with instructions per plan.  - determine patient's preferred pharmacy for this prescription  - queue up prescription for RN/MD signing

## 2019-05-10 NOTE — PATIENT INSTRUCTIONS
Recommendations from today's MTM visit:                                                      1.At the pharmacy today fill Allopurinol 100mg and Carvedilol 25mg. Carvedilol 25mg will be taken twice daily and replace your 12.5mg tablets.     2. Your drug allergies are: Levofloxacin and Ciprofloxacin.     3. Increase Sodium bicarb to 3 tablets two times a day.     4. Start Vitamin D- take 2000 units once daily.     Next MTM visit: as needed.     To schedule another MTM appointment, please call the clinic directly or you may call the MTM scheduling line at [phone] or toll-free at [phone].     My Clinical Pharmacist's contact information:                                                      It was a pleasure talking with you today!  Please feel free to contact me with any questions or concerns you have.      Milton Choi, PharmD  [facility] Pharmacist    Phone: [phone]     You may receive a survey about the MTM services you received by email and/or US Mail.  I would appreciate your feedback to help me serve you better in the future. Your comments will be anonymous.

## 2019-05-11 DIAGNOSIS — R19.7 DIARRHEA, UNSPECIFIED TYPE: ICD-10-CM

## 2019-05-11 LAB
C DIFF TOX B STL QL: NEGATIVE
CMV DNA SPEC NAA+PROBE-ACNC: NORMAL [IU]/ML
CMV DNA SPEC NAA+PROBE-LOG#: NORMAL {LOG_IU}/ML
SPECIMEN SOURCE: NORMAL
SPECIMEN SOURCE: NORMAL

## 2019-05-11 PROCEDURE — 87493 C DIFF AMPLIFIED PROBE: CPT | Performed by: INTERNAL MEDICINE

## 2019-05-13 LAB
BACTERIA SPEC CULT: ABNORMAL
Lab: ABNORMAL
SPECIMEN SOURCE: ABNORMAL

## 2019-05-14 ENCOUNTER — TELEPHONE (OUTPATIENT)
Dept: TRANSPLANT | Facility: CLINIC | Age: 64
End: 2019-05-14

## 2019-05-14 LAB
MYCOPHENOLATE SERPL LC/MS/MS-MCNC: 5.15 MG/L (ref 1–3.5)
MYCOPHENOLATE-G SERPL LC/MS/MS-MCNC: 77.1 MG/L (ref 30–95)
TME LAST DOSE: ABNORMAL H

## 2019-05-14 RX ORDER — CALCITRIOL 0.25 UG/1
0.25 CAPSULE, LIQUID FILLED ORAL 2 TIMES DAILY
Qty: 60 CAPSULE | Refills: 3 | Status: SHIPPED | OUTPATIENT
Start: 2019-05-14

## 2019-05-14 NOTE — PROGRESS NOTES
Carlie, MD Jimbo Quinn Peter Alberto, [facility]             Yes for calcitriol    Previous Messages      ----- Message -----   From: Milton Choi [facility]   Sent: 5/10/2019   1:22 PM   To: MD Dr. Carlie Esqueda,     Pt was taking a few things incorrectly. I had her increase her Carvedilol and sodium bicarb to prescribed doses and start taking Calcium and Vitamin D.     She has not been taking Calcitriol at all- would you like her to restart this medication?     Milton Choi, PharmD   [facility] Pharmacist     Phone: [phone]

## 2019-05-14 NOTE — RESULT ENCOUNTER NOTE
Cr elevated. On Abx for UTI, see Epic notes.  Calcium low, no longer taking Calcium supplement but on Calcitriol.  Uric acid managed by ordering Provider.

## 2019-05-14 NOTE — TELEPHONE ENCOUNTER
Issue:  Antibiotic ordered 5/10/19 for UTI-LPN unable to reach patient.  Mycophenolic acid level 5.15    Outcome:  Called Eleni to verify that she got voicemail and had picked up her prescription. Eleni verified that she had picked up her Cefpodoxime and is taking it as prescribed.  Eleni states she did take her Cellcept in the morning before her afternoon labs.  Tresakevin also notified that her C. Diff came back negative.

## 2019-05-15 ENCOUNTER — TELEPHONE (OUTPATIENT)
Dept: TRANSPLANT | Facility: CLINIC | Age: 64
End: 2019-05-15

## 2019-05-15 DIAGNOSIS — Z94.0 KIDNEY TRANSPLANT RECIPIENT: ICD-10-CM

## 2019-05-15 DIAGNOSIS — D84.9 IMMUNOSUPPRESSED STATUS (H): ICD-10-CM

## 2019-05-15 DIAGNOSIS — D47.2 MONOCLONAL GAMMOPATHY OF UNKNOWN SIGNIFICANCE (MGUS): Primary | ICD-10-CM

## 2019-05-15 DIAGNOSIS — R80.9 PROTEINURIA: ICD-10-CM

## 2019-05-15 NOTE — TELEPHONE ENCOUNTER
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 Sexton  Referral placed in Epic for Hematology/Oncology referral    PHONE CALL  Called to discuss abnormal monoclonal gammopathy, kappa lambda light chain results and need for hematology/oncology referral. Message left requesting call back to transplant coordinator.     TASK:  Please schedule oncology appointment and contact patient with day and time. Referral in Epic.

## 2019-05-15 NOTE — TELEPHONE ENCOUNTER
PATIENT RETURNED CALL  RE: Oncology Referral    She is moving to [facility] later this week and is unable to come to an oncology appointment before she leaves. She will call when she establishes primary care in [place] and transplant care in [facility].     NOTE: [facility] in [place] provides hematology-oncology care.

## 2019-05-31 DIAGNOSIS — Z94.0 KIDNEY TRANSPLANTED: ICD-10-CM

## 2019-05-31 RX ORDER — PREDNISONE 5 MG/1
5 TABLET ORAL DAILY
Qty: 30 TABLET | Refills: 11 | Status: SHIPPED | OUTPATIENT
Start: 2019-05-31

## 2019-05-31 RX ORDER — MYCOPHENOLATE MOFETIL 250 MG/1
500 CAPSULE ORAL 2 TIMES DAILY
Qty: 120 CAPSULE | Refills: 11 | Status: SHIPPED | OUTPATIENT
Start: 2019-05-31

## 2019-05-31 NOTE — TELEPHONE ENCOUNTER
Provider Call: Medication Refill  Route to LPN  Pharmacy Name: [facility]  Pharmacy Location: [place]  Name of Medication: Prednisone and Cellcept they have checked with 3 pharmacies and no one has brand name in stock   When will the patient be out of this medication?: Less than 24 hours (Bailey NOLAN, then page if no answer)  Callback needed? Yes    Return Call Needed  Same as documented in contacts section  When to return call?: Same day: Route High Priority

## 2019-06-03 ENCOUNTER — TELEPHONE (OUTPATIENT)
Dept: PHARMACY | Facility: CLINIC | Age: 64
End: 2019-06-03

## 2019-06-03 NOTE — TELEPHONE ENCOUNTER
Follow-up with anemia management service:    Home Phone Has been disconnected.   Left message on cell, has she found an MD? Is she having her labs drawn in [place]?      Anemia Latest Ref Rng & Units 2/28/2019 3/6/2019 3/21/2019 3/25/2019 4/3/2019 5/3/2019 5/10/2019   HGB Goal - - - - - - - -   VALARIE Dose - - 300 mcg - 300 mcg - - -   Hemoglobin 11.7 - 15.7 g/dL 8.3(L) - 8.7(L) - 10.2(L) 10.4(L) 9.6(L)   TSAT 15 - 46 % - - 21 - - 49(H) -   Ferritin 8 - 252 ng/mL - - 727(H) - - 776(H) -   PRBCs - - - - - - - -         Follow-up call date: 06/17/2019        Anemia Management Service  Ashley Morrell RN  Phone: [phone]  Fax: [phone]

## 2019-06-07 ENCOUNTER — TELEPHONE (OUTPATIENT)
Dept: NEPHROLOGY | Facility: CLINIC | Age: 64
End: 2019-06-07

## 2019-06-07 NOTE — TELEPHONE ENCOUNTER
Call placed to patient. No answer. Voice message left instructing patient that local department should request her medical record for  Medical records department. Will try back

## 2019-06-07 NOTE — TELEPHONE ENCOUNTER
Eleni called to update the transplant team that she has established with a new provider in [place]. She is requesting to have records faxed to Dr. Aguilar's office at [phone]. Routing note to team.    Brittney Quinones RN

## 2019-06-10 NOTE — TELEPHONE ENCOUNTER
Call placed to patient. No answer. Voice message left instructing patient that local department should request her medical record for [facility] records department

## 2019-06-17 ENCOUNTER — TELEPHONE (OUTPATIENT)
Dept: PHARMACY | Facility: CLINIC | Age: 64
End: 2019-06-17

## 2019-06-17 NOTE — TELEPHONE ENCOUNTER
Referred by Dr Tyshawn Sexton February 10, 2017    [facility] moved to [place].     She has established care with a new provider in [place]; Dr. Aguilar.    Anemia Services will be closed.     Anemia Latest Ref Rng & Units 2/28/2019 3/6/2019 3/21/2019 3/25/2019 4/3/2019 5/3/2019 5/10/2019   HGB Goal - - - - - - - -   VALARIE Dose - - 300 mcg - 300 mcg - - -   Hemoglobin 11.7 - 15.7 g/dL 8.3(L) - 8.7(L) - 10.2(L) 10.4(L) 9.6(L)   TSAT 15 - 46 % - - 21 - - 49(H) -   Ferritin 8 - 252 ng/mL - - 727(H) - - 776(H) -   PRBCs - - - - - - - -       Anemia labs discontinued, therapy plans cancelled, removed from active patient list, and referring provider notified.    Ashley Morrell RN   [facility]  [address] [address]   [email or web address]   Office : [phone]  Fax: [phone]

## 2019-09-30 DIAGNOSIS — I15.1 HTN, KIDNEY TRANSPLANT RELATED: ICD-10-CM

## 2019-09-30 DIAGNOSIS — Z94.0 HTN, KIDNEY TRANSPLANT RELATED: ICD-10-CM

## 2019-09-30 RX ORDER — LOSARTAN POTASSIUM 100 MG/1
TABLET ORAL
Qty: 90 TABLET | Refills: 0 | Status: SHIPPED | OUTPATIENT
Start: 2019-09-30 | End: 2020-01-27

## 2020-01-27 DIAGNOSIS — I15.1 HTN, KIDNEY TRANSPLANT RELATED: ICD-10-CM

## 2020-01-27 DIAGNOSIS — Z94.0 HTN, KIDNEY TRANSPLANT RELATED: ICD-10-CM

## 2020-01-27 RX ORDER — LOSARTAN POTASSIUM 100 MG/1
TABLET ORAL
Qty: 90 TABLET | Refills: 0 | Status: SHIPPED | OUTPATIENT
Start: 2020-01-27 | End: 2020-04-27

## 2020-01-29 DIAGNOSIS — Z94.0 HTN, KIDNEY TRANSPLANT RELATED: ICD-10-CM

## 2020-01-29 DIAGNOSIS — I15.1 HTN, KIDNEY TRANSPLANT RELATED: ICD-10-CM

## 2020-01-29 RX ORDER — LOSARTAN POTASSIUM 100 MG/1
TABLET ORAL
Qty: 90 TABLET | Refills: 0 | OUTPATIENT
Start: 2020-01-29

## 2020-03-31 DIAGNOSIS — Z94.0 HTN, KIDNEY TRANSPLANT RELATED: ICD-10-CM

## 2020-03-31 DIAGNOSIS — I15.1 HTN, KIDNEY TRANSPLANT RELATED: ICD-10-CM

## 2020-03-31 RX ORDER — CARVEDILOL 25 MG/1
TABLET ORAL
Qty: 180 TABLET | Refills: 2 | Status: SHIPPED | OUTPATIENT
Start: 2020-03-31

## 2020-04-27 DIAGNOSIS — I15.1 HTN, KIDNEY TRANSPLANT RELATED: ICD-10-CM

## 2020-04-27 DIAGNOSIS — Z94.0 HTN, KIDNEY TRANSPLANT RELATED: ICD-10-CM

## 2020-04-27 RX ORDER — LOSARTAN POTASSIUM 100 MG/1
TABLET ORAL
Qty: 90 TABLET | Refills: 0 | Status: SHIPPED | OUTPATIENT
Start: 2020-04-27 | End: 2020-07-23

## 2020-07-22 DIAGNOSIS — Z94.0 HTN, KIDNEY TRANSPLANT RELATED: ICD-10-CM

## 2020-07-22 DIAGNOSIS — I15.1 HTN, KIDNEY TRANSPLANT RELATED: ICD-10-CM

## 2020-07-23 RX ORDER — LOSARTAN POTASSIUM 100 MG/1
100 TABLET ORAL DAILY
Qty: 90 TABLET | Refills: 0 | Status: SHIPPED | OUTPATIENT
Start: 2020-07-23

## 2020-07-23 NOTE — TELEPHONE ENCOUNTER
Dmitri Boss MD  You 22 minutes ago (4:29 PM)      Yes, short term refill and have her see her PCP who can   You  Dmitri Boss MD 1 hour ago (3:31 PM)      Does not fit protocol--has no appointment with [facility] or lab within 1 year. Would you like to refill with 3 month supply and no refills until seen and labs?      manage it.

## 2021-05-27 ENCOUNTER — RECORDS - HEALTHEAST (OUTPATIENT)
Dept: ADMINISTRATIVE | Facility: CLINIC | Age: 66
End: 2021-05-27

## 2022-04-28 NOTE — PROGRESS NOTES
Urine protein 1.66, sent to Dr. Sexton for review.
FAMILY HISTORY:  Family history of diabetes mellitus  Family history of hypertension  Family history of prostate cancer    Mother  Still living? Unknown  Family history of cancer, Age at diagnosis: Age Unknown

## 2023-08-14 ENCOUNTER — DOCUMENTATION ONLY (OUTPATIENT)
Dept: TRANSPLANT | Facility: CLINIC | Age: 68
End: 2023-08-14
Payer: MEDICARE

## 2023-08-14 NOTE — PROGRESS NOTES
UNOS DATA REPORTING EPISODE CLEAN UP:  Kidney episode tracking updated to Reporting Follow Up Only, in accordance with clinical and data follow up.

## (undated) DEVICE — GLOVE PROTEXIS W/NEU-THERA 7.0  2D73TE70

## (undated) DEVICE — SU PROLENE 6-0 BV-1DA 18" 8709H

## (undated) DEVICE — ADHESIVE SWIFTSET 0.8ML OCTYL SS6

## (undated) DEVICE — SOL NACL 0.9% IRRIG 1000ML BOTTLE 2F7124

## (undated) DEVICE — SU DERMABOND ADVANCED .7ML DNX12

## (undated) DEVICE — LINEN TOWEL PACK X6 WHITE 5487

## (undated) DEVICE — CATH FOGARTY EMBOLECTOMY 3FR 80CM LATEX 120803FP

## (undated) DEVICE — Device

## (undated) DEVICE — SOL WATER IRRIG 1000ML BOTTLE 2F7114

## (undated) DEVICE — SU VICRYL 3-0 SH 27" UND J416H

## (undated) DEVICE — CLIP HORIZON MED BLUE 002200

## (undated) DEVICE — DRAPE STOCKINETTE IMPERVIOUS 10" 21048

## (undated) DEVICE — LINEN TOWEL PACK X30 5481

## (undated) DEVICE — SU VICRYL 4-0 PS-2 18" UND J496H

## (undated) DEVICE — PACK AV FISTULA

## (undated) DEVICE — NDL BLUNT 18GA 1" W/O FILTER 305181

## (undated) DEVICE — DRAPE EXTREMITY UPPER 120X76" 29414

## (undated) DEVICE — CLIP HORIZON SM RED WIDE SLOT 001201

## (undated) DEVICE — DECANTER BAG 2002S

## (undated) DEVICE — GLOVE PROTEXIS BLUE W/NEU-THERA 7.5  2D73EB75

## (undated) DEVICE — SU VICRYL 2-0 SH 27" UND J417H

## (undated) DEVICE — SU SILK 3-0 TIE 12X30" A304H

## (undated) DEVICE — COVER NEOPROBE SOFTFLEX 5X96" W/BANDS 20-PC596

## (undated) DEVICE — PREP CHLORAPREP 26ML TINTED ORANGE  260815

## (undated) DEVICE — GEL ULTRASOUND AQUASONIC 20GM 01-01

## (undated) DEVICE — SPONGE RAY-TEC 4X8" 7318

## (undated) DEVICE — SU SILK 2-0 TIE 12X30" A305H

## (undated) DEVICE — INSERT FOGARTY 33MM TRACTION HYDRAJAW HYDRA33

## (undated) DEVICE — SU VICRYL 3-0 SH 27" J316H

## (undated) DEVICE — SU SILK 4-0 TIE 12X30" A303H

## (undated) DEVICE — ESU GROUND PAD ADULT W/CORD E7507

## (undated) DEVICE — SU SILK 0 TIE 6X30" A306H

## (undated) DEVICE — SU PROLENE 5-0 24" BV-1 9702H

## (undated) DEVICE — SU PROLENE 6-0 RB-2DA 18" 8714H

## (undated) DEVICE — BLADE CLIPPER SGL USE 9680

## (undated) DEVICE — COVER ULTRASOUND PROBE W/GEL FLEXI-FEEL 6"X58" LF  25-FF658

## (undated) DEVICE — DRSG PRIMAPORE 03 1/8X6" 66000318

## (undated) DEVICE — SYR 10ML SLIP TIP W/O NDL 303134

## (undated) DEVICE — SU PROLENE 7-0 BV-1DA 18" 8701H

## (undated) DEVICE — SU PROLENE 6-0 C-1DA 30" 8706H

## (undated) DEVICE — BNDG COBAN 4"X5YDS STERILE

## (undated) DEVICE — DRSG PRIMAPORE 02X3" 7133

## (undated) RX ORDER — FENTANYL CITRATE 50 UG/ML
INJECTION, SOLUTION INTRAMUSCULAR; INTRAVENOUS
Status: DISPENSED
Start: 2017-09-14

## (undated) RX ORDER — PROPOFOL 10 MG/ML
INJECTION, EMULSION INTRAVENOUS
Status: DISPENSED
Start: 2017-11-21

## (undated) RX ORDER — ONDANSETRON 2 MG/ML
INJECTION INTRAMUSCULAR; INTRAVENOUS
Status: DISPENSED
Start: 2017-09-19

## (undated) RX ORDER — CEFAZOLIN SODIUM 1 G/3ML
INJECTION, POWDER, FOR SOLUTION INTRAMUSCULAR; INTRAVENOUS
Status: DISPENSED
Start: 2017-11-21

## (undated) RX ORDER — BUPIVACAINE HYDROCHLORIDE 2.5 MG/ML
INJECTION, SOLUTION EPIDURAL; INFILTRATION; INTRACAUDAL
Status: DISPENSED
Start: 2017-11-21

## (undated) RX ORDER — HEPARIN SODIUM 1000 [USP'U]/ML
INJECTION, SOLUTION INTRAVENOUS; SUBCUTANEOUS
Status: DISPENSED
Start: 2017-11-21

## (undated) RX ORDER — FENTANYL CITRATE 50 UG/ML
INJECTION, SOLUTION INTRAMUSCULAR; INTRAVENOUS
Status: DISPENSED
Start: 2017-11-21

## (undated) RX ORDER — ETOMIDATE 2 MG/ML
INJECTION INTRAVENOUS
Status: DISPENSED
Start: 2017-09-19

## (undated) RX ORDER — ACETAMINOPHEN 325 MG/1
TABLET ORAL
Status: DISPENSED
Start: 2017-09-19

## (undated) RX ORDER — ACETAMINOPHEN 325 MG/1
TABLET ORAL
Status: DISPENSED
Start: 2017-11-21

## (undated) RX ORDER — ESMOLOL HYDROCHLORIDE 10 MG/ML
INJECTION INTRAVENOUS
Status: DISPENSED
Start: 2017-09-19

## (undated) RX ORDER — CEFAZOLIN SODIUM 2 G/100ML
INJECTION, SOLUTION INTRAVENOUS
Status: DISPENSED
Start: 2017-11-21

## (undated) RX ORDER — LIDOCAINE HYDROCHLORIDE 10 MG/ML
INJECTION, SOLUTION EPIDURAL; INFILTRATION; INTRACAUDAL; PERINEURAL
Status: DISPENSED
Start: 2017-09-14

## (undated) RX ORDER — FENTANYL CITRATE 50 UG/ML
INJECTION, SOLUTION INTRAMUSCULAR; INTRAVENOUS
Status: DISPENSED
Start: 2017-09-19

## (undated) RX ORDER — GABAPENTIN 300 MG/1
CAPSULE ORAL
Status: DISPENSED
Start: 2017-11-21

## (undated) RX ORDER — DEXAMETHASONE SODIUM PHOSPHATE 4 MG/ML
INJECTION, SOLUTION INTRA-ARTICULAR; INTRALESIONAL; INTRAMUSCULAR; INTRAVENOUS; SOFT TISSUE
Status: DISPENSED
Start: 2017-11-21

## (undated) RX ORDER — SODIUM CHLORIDE 9 MG/ML
INJECTION, SOLUTION INTRAVENOUS
Status: DISPENSED
Start: 2017-09-14

## (undated) RX ORDER — DEXAMETHASONE SODIUM PHOSPHATE 4 MG/ML
INJECTION, SOLUTION INTRA-ARTICULAR; INTRALESIONAL; INTRAMUSCULAR; INTRAVENOUS; SOFT TISSUE
Status: DISPENSED
Start: 2017-09-19